# Patient Record
Sex: FEMALE | Race: WHITE | Employment: OTHER | URBAN - METROPOLITAN AREA
[De-identification: names, ages, dates, MRNs, and addresses within clinical notes are randomized per-mention and may not be internally consistent; named-entity substitution may affect disease eponyms.]

---

## 2017-06-08 ENCOUNTER — HOSPITAL ENCOUNTER (OUTPATIENT)
Dept: MAMMOGRAPHY | Age: 42
Discharge: HOME | End: 2017-06-08
Payer: MEDICAID

## 2017-06-08 ENCOUNTER — HOSPITAL ENCOUNTER (OUTPATIENT)
Age: 42
Discharge: HOME | End: 2017-06-08
Attending: FAMILY MEDICINE
Payer: MEDICAID

## 2017-06-08 VITALS
WEIGHT: 205 LBS | SYSTOLIC BLOOD PRESSURE: 116 MMHG | TEMPERATURE: 98 F | DIASTOLIC BLOOD PRESSURE: 83 MMHG | OXYGEN SATURATION: 98 % | HEART RATE: 70 BPM | RESPIRATION RATE: 16 BRPM | BODY MASS INDEX: 41 KG/M2

## 2017-06-08 DIAGNOSIS — Z12.31 SCREENING MAMMOGRAM, ENCOUNTER FOR: ICD-10-CM

## 2017-06-08 DIAGNOSIS — J40 BRONCHITIS: Primary | ICD-10-CM

## 2017-06-08 PROCEDURE — 99214 OFFICE O/P EST MOD 30 MIN: CPT

## 2017-06-08 PROCEDURE — 94640 AIRWAY INHALATION TREATMENT: CPT

## 2017-06-08 PROCEDURE — 77067 SCR MAMMO BI INCL CAD: CPT | Performed by: OBSTETRICS & GYNECOLOGY

## 2017-06-08 RX ORDER — IPRATROPIUM BROMIDE AND ALBUTEROL SULFATE 2.5; .5 MG/3ML; MG/3ML
3 SOLUTION RESPIRATORY (INHALATION) ONCE
Status: COMPLETED | OUTPATIENT
Start: 2017-06-08 | End: 2017-06-08

## 2017-06-08 RX ORDER — AZITHROMYCIN 250 MG/1
TABLET, FILM COATED ORAL
Qty: 6 TABLET | Refills: 0 | Status: SHIPPED | OUTPATIENT
Start: 2017-06-08 | End: 2017-09-28

## 2017-06-08 RX ORDER — PREDNISONE 20 MG/1
60 TABLET ORAL ONCE
Status: COMPLETED | OUTPATIENT
Start: 2017-06-08 | End: 2017-06-08

## 2017-06-08 RX ORDER — PREDNISONE 50 MG/1
50 TABLET ORAL DAILY
Qty: 4 TABLET | Refills: 0 | Status: SHIPPED | OUTPATIENT
Start: 2017-06-08 | End: 2017-06-12

## 2017-06-08 NOTE — ED INITIAL ASSESSMENT (HPI)
Productive cough and sinus congestion and pressure for couple days. Using albuterol and flovent inhalers at home. History of asthma. No fevers.

## 2017-06-08 NOTE — ED PROVIDER NOTES
[place] [address] [facility] [place]   Patient presents with:  Cough/URI    Stated Complaint: coughing     HPI    15-year-old female presents sinus pressure, congestion and cough.   States for past 2 days she has sinus pressure, con into the lungs 2 (two) times daily. acetaminophen 500 MG Oral Tab,  Take 1,000 mg by mouth every 6 (six) hours as needed for Pain. ibuprofen (MOTRIN) 800 MG Oral Tab,  Take 800 mg by mouth every 6 (six) hours as needed. History reviewed.  No perti Skin: Skin is warm and dry. Psychiatric: She has a normal mood and affect.  Her behavior is normal. Judgment and thought content normal.       ED Course   Labs Reviewed - No data to display    MDM     Nebulizer with duoneb, reports feeling slightly bett

## 2017-09-28 ENCOUNTER — HOSPITAL ENCOUNTER (OUTPATIENT)
Age: 42
Discharge: HOME | End: 2017-09-28
Attending: FAMILY MEDICINE
Payer: MEDICAID

## 2017-09-28 VITALS
HEART RATE: 74 BPM | TEMPERATURE: 97 F | RESPIRATION RATE: 16 BRPM | OXYGEN SATURATION: 98 % | DIASTOLIC BLOOD PRESSURE: 80 MMHG | SYSTOLIC BLOOD PRESSURE: 112 MMHG

## 2017-09-28 DIAGNOSIS — S39.012A LUMBAR STRAIN, INITIAL ENCOUNTER: Primary | ICD-10-CM

## 2017-09-28 PROCEDURE — 99213 OFFICE O/P EST LOW 20 MIN: CPT

## 2017-09-28 PROCEDURE — 99214 OFFICE O/P EST MOD 30 MIN: CPT

## 2017-09-28 RX ORDER — METHYLPREDNISOLONE 4 MG/1
TABLET ORAL
Qty: 21 TABLET | Refills: 0 | Status: SHIPPED | OUTPATIENT
Start: 2017-09-28 | End: 2018-01-22

## 2017-09-28 RX ORDER — CYCLOBENZAPRINE HCL 10 MG
10 TABLET ORAL 3 TIMES DAILY PRN
Qty: 21 TABLET | Refills: 0 | Status: SHIPPED | OUTPATIENT
Start: 2017-09-28 | End: 2018-01-22

## 2017-09-29 NOTE — ED PROVIDER NOTES
Patient Seen in: [address] [facility] [place]    History   Patient presents with:  Back Pain (musculoskeletal)    Stated Complaint: lower back pain for a few weeks    HPI    39-year-old female presents to clinic today with 2 week history of low back p Never Smoker                                                              Smokeless tobacco: Never Used                      Alcohol use:  No                Review of Systems    Positive for stated complaint: lower back pain for a few weeks  Other systems a motion bilaterally:  No  CVA tenderness: negative       ED Course   Labs Reviewed - No data to display    ============================================================  ED Course  ------------------------------------------------------------  MDM     Medrol

## 2017-09-29 NOTE — ED INITIAL ASSESSMENT (HPI)
Pt states hx of back pain in the past. Usually improved with motrin 800mg. States past 2 weeks worsening pain with no relief. No radiation of pain.  No trauma or injury

## 2017-10-16 ENCOUNTER — APPOINTMENT (OUTPATIENT)
Dept: GENERAL RADIOLOGY | Facility: HOSPITAL | Age: 42
End: 2017-10-16
Attending: EMERGENCY MEDICINE
Payer: MEDICAID

## 2017-10-16 ENCOUNTER — HOSPITAL ENCOUNTER (EMERGENCY)
Facility: HOSPITAL | Age: 42
Discharge: HOME | End: 2017-10-16
Attending: EMERGENCY MEDICINE
Payer: MEDICAID

## 2017-10-16 VITALS
HEART RATE: 68 BPM | SYSTOLIC BLOOD PRESSURE: 100 MMHG | BODY MASS INDEX: 37.29 KG/M2 | WEIGHT: 185 LBS | HEIGHT: 59 IN | DIASTOLIC BLOOD PRESSURE: 70 MMHG | TEMPERATURE: 98 F | OXYGEN SATURATION: 96 % | RESPIRATION RATE: 14 BRPM

## 2017-10-16 DIAGNOSIS — M54.9 BACK PAIN WITHOUT RADIATION: ICD-10-CM

## 2017-10-16 DIAGNOSIS — R10.9 ABDOMINAL PAIN OF UNKNOWN ETIOLOGY: Primary | ICD-10-CM

## 2017-10-16 PROCEDURE — 72110 X-RAY EXAM L-2 SPINE 4/>VWS: CPT | Performed by: EMERGENCY MEDICINE

## 2017-10-16 PROCEDURE — 99284 EMERGENCY DEPT VISIT MOD MDM: CPT

## 2017-10-16 PROCEDURE — 85025 COMPLETE CBC W/AUTO DIFF WBC: CPT | Performed by: EMERGENCY MEDICINE

## 2017-10-16 PROCEDURE — 81003 URINALYSIS AUTO W/O SCOPE: CPT | Performed by: EMERGENCY MEDICINE

## 2017-10-16 PROCEDURE — 80053 COMPREHEN METABOLIC PANEL: CPT | Performed by: EMERGENCY MEDICINE

## 2017-10-16 PROCEDURE — 96374 THER/PROPH/DIAG INJ IV PUSH: CPT

## 2017-10-16 PROCEDURE — 81025 URINE PREGNANCY TEST: CPT

## 2017-10-16 PROCEDURE — 96361 HYDRATE IV INFUSION ADD-ON: CPT

## 2017-10-16 PROCEDURE — 83690 ASSAY OF LIPASE: CPT | Performed by: EMERGENCY MEDICINE

## 2017-10-16 RX ORDER — TRAMADOL HYDROCHLORIDE 50 MG/1
TABLET ORAL EVERY 4 HOURS PRN
Qty: 20 TABLET | Refills: 0 | Status: SHIPPED | OUTPATIENT
Start: 2017-10-16 | End: 2017-10-23

## 2017-10-16 RX ORDER — ONDANSETRON 2 MG/ML
4 INJECTION INTRAMUSCULAR; INTRAVENOUS ONCE
Status: COMPLETED | OUTPATIENT
Start: 2017-10-16 | End: 2017-10-16

## 2017-10-16 NOTE — ED PROVIDER NOTES
Patient Seen in: [facility] Emergency Department    History   Patient presents with:  Back Pain (musculoskeletal)  Abdomen/Flank Pain (GI/)    Stated Complaint: back and pain    TONY Smith is a 64-year-old female coming with 2 complaints.   First Location:  MAIN OR    No family history on file. Smoking status: Never Smoker                                                              Smokeless tobacco: Never Used                      Alcohol use:  No                Review of Systems    Positive CBC W/ DIFFERENTIAL[[id number]]          Abnormal            Final result                 Please view results for these tests on the individual orders.    URINALYSIS WITH CULTURE REFLEX   POCT PREGNANCY, URINE       ==========================

## 2017-10-24 ENCOUNTER — OFFICE VISIT (OUTPATIENT)
Dept: SURGERY | Facility: CLINIC | Age: 42
End: 2017-10-24

## 2017-10-24 VITALS
TEMPERATURE: 98 F | DIASTOLIC BLOOD PRESSURE: 70 MMHG | WEIGHT: 210 LBS | HEIGHT: 59 IN | BODY MASS INDEX: 42.33 KG/M2 | SYSTOLIC BLOOD PRESSURE: 100 MMHG

## 2017-10-24 DIAGNOSIS — K43.9 VENTRAL HERNIA WITHOUT OBSTRUCTION OR GANGRENE: Primary | ICD-10-CM

## 2017-10-24 DIAGNOSIS — Z98.891 PREVIOUS CESAREAN SECTION: ICD-10-CM

## 2017-10-24 DIAGNOSIS — K59.01 SLOW TRANSIT CONSTIPATION: ICD-10-CM

## 2017-10-24 DIAGNOSIS — R10.33 UMBILICAL PAIN: ICD-10-CM

## 2017-10-24 DIAGNOSIS — E66.01 MORBID OBESITY WITH BMI OF 40.0-44.9, ADULT (HCC): ICD-10-CM

## 2017-10-24 PROCEDURE — 99213 OFFICE O/P EST LOW 20 MIN: CPT | Performed by: COLON & RECTAL SURGERY

## 2017-10-24 NOTE — PATIENT INSTRUCTIONS
This patient presents for evaluation of abdominal pain and possible ventral hernia.      The patient is known to me from previous ventral hernia repair with mesh performed in November 2013.   The patient's procedure was performed 8 weeks following her third is confirmed, we will schedule repair within the next two weeks. All risks, benefits, complications and alternatives to the proposed operation were fully discussed with the patient. All questions from the patient were answered in detail.  A description o

## 2017-10-24 NOTE — PROGRESS NOTES
Follow Up Visit Note       Active Problems      1. Ventral hernia without obstruction or gangrene    2. Umbilical pain    3. Slow transit constipation    4. Morbid obesity with BMI of 40.0-44.9, adult ([address]    5.  Previous  section          Chief Co there is an incision near the umbilicus. It appears to be a possible combination of a ventral incisional hernia, epigastric hernia, with multiple potential sites.   Once the patient coughs I feel protrusion of contents, when she lifts her baby I could see SPECIMEN 1 SITE RIG*      Comment: Benign  No date: OTHER      Comment: baker cyst   No date: OTHER      Comment: bunion bilateral  1999: REDUCTION LEFT  1999: REDUCTION RIGHT  No date: REMOVAL GALLBLADDER  11/20/2013: VENTRAL HERNIA REPAIR      Comment: P of breath and wheezing. Cardiovascular: Negative for chest pain, palpitations and leg swelling. Gastrointestinal: Positive for abdominal pain, constipation and nausea.  Negative for abdominal distention, anal bleeding, blood in stool, diarrhea and vomi do not reduce. There appears to be multiple sites including the umbilicus at the previous laparoscopic cholecystectomy incision, the area just above the umbilicus, and an area in the epigastrium. They are not well-defined.   I cannot feel a fascial defect pleasant obese female in no acute distress. She has normal heart and lung sounds. Her abdomen is soft, nondistended, normal bowel sounds. No masses or organomegaly. She is tender in the midline and epigastric areas.  Her left lower ventral hernia repair inc

## 2017-10-25 PROBLEM — E66.01 MORBID OBESITY WITH BMI OF 40.0-44.9, ADULT (HCC): Status: ACTIVE | Noted: 2017-10-25

## 2017-11-01 ENCOUNTER — TELEPHONE (OUTPATIENT)
Dept: SURGERY | Facility: CLINIC | Age: 42
End: 2017-11-01

## 2017-11-01 ENCOUNTER — HOSPITAL ENCOUNTER (OUTPATIENT)
Dept: CT IMAGING | Age: 42
Discharge: HOME | End: 2017-11-01
Attending: COLON & RECTAL SURGERY
Payer: MEDICAID

## 2017-11-01 RX ORDER — PREDNISONE 10 MG/1
TABLET ORAL
Qty: 15 TABLET | Refills: 0 | Status: SHIPPED | OUTPATIENT
Start: 2017-11-01 | End: 2018-01-22

## 2017-11-01 NOTE — TELEPHONE ENCOUNTER
Called pt and instructed her on prednisone and benadryl prior to her CT. Pt verbalized understanding.

## 2017-11-01 NOTE — IMAGING NOTE
pt allergic to CT contrast. Needs premeds. Dr. Anders Kovacs office called and spoke with Solomon Huang RN. Premeds to be called in. Instructions already given to pt per Rad RN. Pt called and explained will need premeds and to change appt to accommodate premeds.   Tra

## 2017-11-02 ENCOUNTER — HOSPITAL ENCOUNTER (OUTPATIENT)
Dept: CT IMAGING | Age: 42
Discharge: HOME | End: 2017-11-02
Attending: COLON & RECTAL SURGERY
Payer: MEDICAID

## 2017-11-02 ENCOUNTER — OFFICE VISIT (OUTPATIENT)
Dept: SURGERY | Facility: CLINIC | Age: 42
End: 2017-11-02

## 2017-11-02 VITALS
TEMPERATURE: 97 F | HEIGHT: 59 IN | BODY MASS INDEX: 42.33 KG/M2 | WEIGHT: 210 LBS | DIASTOLIC BLOOD PRESSURE: 74 MMHG | SYSTOLIC BLOOD PRESSURE: 114 MMHG | HEART RATE: 94 BPM

## 2017-11-02 DIAGNOSIS — R10.33 UMBILICAL PAIN: ICD-10-CM

## 2017-11-02 DIAGNOSIS — E66.01 MORBID OBESITY WITH BMI OF 40.0-44.9, ADULT (HCC): ICD-10-CM

## 2017-11-02 DIAGNOSIS — K43.9 VENTRAL HERNIA WITHOUT OBSTRUCTION OR GANGRENE: Primary | ICD-10-CM

## 2017-11-02 DIAGNOSIS — K43.9 VENTRAL HERNIA WITHOUT OBSTRUCTION OR GANGRENE: ICD-10-CM

## 2017-11-02 PROCEDURE — 74177 CT ABD & PELVIS W/CONTRAST: CPT | Performed by: COLON & RECTAL SURGERY

## 2017-11-02 PROCEDURE — 99214 OFFICE O/P EST MOD 30 MIN: CPT | Performed by: COLON & RECTAL SURGERY

## 2017-11-02 NOTE — PROGRESS NOTES
Follow Up Visit Note       Active Problems      1. Ventral hernia without obstruction or gangrene    2. Umbilical pain    3.  Morbid obesity with BMI of 40.0-44.9, adult [facility])          Chief Complaint   Patient presents with:  Hernia: continued care for her She has a periumbilical ventral incisional hernia. There are approximately 4 cm apart. They both have incarcerated fat in the hernia sacs. This is preperitoneal fat and the natural location but being extruded into the subcutaneous tissues.   There are no Slight diffuse fatty infiltration of the liver. BILIARY:  Cholecystectomy. No biliary dilatation. PANCREAS:  No lesion, fluid collection, ductal dilatation, or atrophy. SPLEEN:  No enlargement or focal lesion.     KIDNEYS:  Nonobstructing 3-4 mm le Social / Family History    The past medical and past surgical history have been reviewed by me today.     Past Medical History:   Diagnosis Date   • Asthma    • Depression 12 years ago   • Extrinsic asthma, unspecified     last attack a couple of months ago methylPREDNISolone 4 MG Oral Tablet Therapy Pack Use as directed Disp: 21 tablet Rfl: 0   Cyclobenzaprine HCl 10 MG Oral Tab Take 1 tablet (10 mg total) by mouth 3 (three) times daily as needed for Muscle spasms.  Disp: 21 tablet Rfl: 0   acetaminophen 50 splenomegaly or hepatomegaly. There is tenderness in the epigastric area and periumbilical area. There is no rigidity, no rebound and no guarding. A hernia is present. Hernia confirmed positive in the ventral area.  Hernia confirmed negative in the right in standing or getting up from a seated position. She describes a visible egg protruding from the umbilical region after standing for long periods of time. The patient has not had any abdominal imaging.      The patient has had no advancement of symptoms s old.  Her partner has contemplated vasectomy. I do not want to introduce any further variables into this decision making. She is already at high recurrence with her current BMI greater than 40.   Getting pregnancy after repair would make it virtually ce

## 2017-11-03 NOTE — PATIENT INSTRUCTIONS
I am seeing this patient in consultation regarding ventral incisional hernias. The patient's presenting history as listed below:    The patient is known to me from previous ventral hernia repair with mesh performed in November 2013.   The patient's proce with a palpable epigastric region hernia as well as a palpable umbilical hernia. They are 2 separate entities. They are not connected. Neither is tender on today's examination. Neither can be reduced on today's examination.   The remaining examination r

## 2018-01-22 ENCOUNTER — LAB ENCOUNTER (OUTPATIENT)
Dept: LAB | Facility: HOSPITAL | Age: 43
End: 2018-01-22
Attending: NURSE PRACTITIONER
Payer: MEDICAID

## 2018-01-22 ENCOUNTER — HOSPITAL ENCOUNTER (OUTPATIENT)
Age: 43
Discharge: HOME | End: 2018-01-22
Attending: FAMILY MEDICINE
Payer: MEDICAID

## 2018-01-22 VITALS
HEART RATE: 83 BPM | BODY MASS INDEX: 40 KG/M2 | TEMPERATURE: 98 F | DIASTOLIC BLOOD PRESSURE: 75 MMHG | WEIGHT: 200 LBS | OXYGEN SATURATION: 98 % | RESPIRATION RATE: 16 BRPM | SYSTOLIC BLOOD PRESSURE: 106 MMHG

## 2018-01-22 DIAGNOSIS — G89.29 CHRONIC ABDOMINAL PAIN: ICD-10-CM

## 2018-01-22 DIAGNOSIS — R11.0 NAUSEA: ICD-10-CM

## 2018-01-22 DIAGNOSIS — R10.9 CHRONIC ABDOMINAL PAIN: ICD-10-CM

## 2018-01-22 DIAGNOSIS — B34.9 VIRAL SYNDROME: Primary | ICD-10-CM

## 2018-01-22 DIAGNOSIS — R19.7 DIARRHEA, UNSPECIFIED TYPE: ICD-10-CM

## 2018-01-22 DIAGNOSIS — R10.84 GENERALIZED ABDOMINAL PAIN: ICD-10-CM

## 2018-01-22 LAB
ALBUMIN SERPL-MCNC: 3.8 G/DL (ref 3.5–4.8)
ALP LIVER SERPL-CCNC: 50 U/L (ref 37–98)
ALT SERPL-CCNC: 25 U/L (ref 14–54)
AST SERPL-CCNC: 15 U/L (ref 15–41)
BASOPHILS # BLD AUTO: 0.04 X10(3) UL (ref 0–0.1)
BASOPHILS NFR BLD AUTO: 0.5 %
BILIRUB SERPL-MCNC: 0.6 MG/DL (ref 0.1–2)
BUN BLD-MCNC: 13 MG/DL (ref 8–20)
CALCIUM BLD-MCNC: 8.6 MG/DL (ref 8.3–10.3)
CHLORIDE: 108 MMOL/L (ref 101–111)
CO2: 26 MMOL/L (ref 22–32)
CREAT BLD-MCNC: 0.64 MG/DL (ref 0.55–1.02)
EOSINOPHIL # BLD AUTO: 0.2 X10(3) UL (ref 0–0.3)
EOSINOPHIL NFR BLD AUTO: 2.6 %
ERYTHROCYTE [DISTWIDTH] IN BLOOD BY AUTOMATED COUNT: 12.3 % (ref 11.5–16)
GLUCOSE BLD-MCNC: 96 MG/DL (ref 70–99)
HCT VFR BLD AUTO: 40.6 % (ref 34–50)
HGB BLD-MCNC: 13.6 G/DL (ref 12–16)
IMMATURE GRANULOCYTE COUNT: 0.02 X10(3) UL (ref 0–1)
IMMATURE GRANULOCYTE RATIO %: 0.3 %
LYMPHOCYTES # BLD AUTO: 1.91 X10(3) UL (ref 0.9–4)
LYMPHOCYTES NFR BLD AUTO: 25.2 %
M PROTEIN MFR SERPL ELPH: 7.2 G/DL (ref 6.1–8.3)
MCH RBC QN AUTO: 30.7 PG (ref 27–33.2)
MCHC RBC AUTO-ENTMCNC: 33.5 G/DL (ref 31–37)
MCV RBC AUTO: 91.6 FL (ref 81–100)
MONOCYTES # BLD AUTO: 0.44 X10(3) UL (ref 0.1–0.6)
MONOCYTES NFR BLD AUTO: 5.8 %
NEUTROPHIL ABS PRELIM: 4.97 X10 (3) UL (ref 1.3–6.7)
NEUTROPHILS # BLD AUTO: 4.97 X10(3) UL (ref 1.3–6.7)
NEUTROPHILS NFR BLD AUTO: 65.6 %
PLATELET # BLD AUTO: 275 10(3)UL (ref 150–450)
POCT RAPID STREP: NEGATIVE
POTASSIUM SERPL-SCNC: 3.9 MMOL/L (ref 3.6–5.1)
RBC # BLD AUTO: 4.43 X10(6)UL (ref 3.8–5.1)
RED CELL DISTRIBUTION WIDTH-SD: 40.9 FL (ref 35.1–46.3)
SODIUM SERPL-SCNC: 140 MMOL/L (ref 136–144)
WBC # BLD AUTO: 7.6 X10(3) UL (ref 4–13)

## 2018-01-22 PROCEDURE — 87081 CULTURE SCREEN ONLY: CPT | Performed by: FAMILY MEDICINE

## 2018-01-22 PROCEDURE — 80053 COMPREHEN METABOLIC PANEL: CPT

## 2018-01-22 PROCEDURE — 99213 OFFICE O/P EST LOW 20 MIN: CPT

## 2018-01-22 PROCEDURE — 85025 COMPLETE CBC W/AUTO DIFF WBC: CPT

## 2018-01-22 PROCEDURE — 99214 OFFICE O/P EST MOD 30 MIN: CPT

## 2018-01-22 PROCEDURE — 36415 COLL VENOUS BLD VENIPUNCTURE: CPT

## 2018-01-22 PROCEDURE — 87430 STREP A AG IA: CPT | Performed by: FAMILY MEDICINE

## 2018-01-22 NOTE — ED PROVIDER NOTES
Patient Seen in: [address] [facility] [place]    History   Patient presents with:  Nausea/Vomiting/Diarrhea (gastrointestinal)    Stated Complaint: sore throat and ab pain    HPI    39-year-old female presented with chief complaint of nausea, vomiting presenting problem. Smoking status: Never Smoker                                                              Smokeless tobacco: Never Used                      Alcohol use:  No                Review of Systems    Positive for stated complaint: sore thro [id number]  [phone]    In 1 week          Medications Prescribed:  Discharge Medication List as of 1/22/2018 10:29 AM

## 2018-01-22 NOTE — ED INITIAL ASSESSMENT (HPI)
Onset Thursday of nausea, vomiting and diarrhea. Continues to be nauseous. Stool if now formed. C/o chills and bodyaches. Feels a little dry. Son had strep last week and mother diagnosed with hpylori.  Some abd pain but has a hernia she is due to have repai

## 2018-01-31 ENCOUNTER — HOSPITAL ENCOUNTER (OUTPATIENT)
Age: 43
Discharge: HOME | End: 2018-01-31
Attending: FAMILY MEDICINE
Payer: MEDICAID

## 2018-01-31 VITALS
TEMPERATURE: 98 F | SYSTOLIC BLOOD PRESSURE: 116 MMHG | RESPIRATION RATE: 16 BRPM | HEIGHT: 59 IN | HEART RATE: 82 BPM | BODY MASS INDEX: 40.32 KG/M2 | OXYGEN SATURATION: 100 % | DIASTOLIC BLOOD PRESSURE: 77 MMHG | WEIGHT: 200 LBS

## 2018-01-31 DIAGNOSIS — J11.1 INFLUENZA: Primary | ICD-10-CM

## 2018-01-31 PROCEDURE — 99214 OFFICE O/P EST MOD 30 MIN: CPT

## 2018-01-31 PROCEDURE — 99213 OFFICE O/P EST LOW 20 MIN: CPT

## 2018-01-31 RX ORDER — OSELTAMIVIR PHOSPHATE 75 MG/1
75 CAPSULE ORAL 2 TIMES DAILY
Qty: 10 CAPSULE | Refills: 0 | Status: SHIPPED | OUTPATIENT
Start: 2018-01-31 | End: 2018-02-05

## 2018-02-01 NOTE — ED PROVIDER NOTES
Jasmyn Nguyen is a 38year old female who presents with for chief complaint of fever, chills, nasal congestion, coryza, rhinorrhea, sore throat, cough, headache, myalgias, fatigue,  Onset of symptoms was  yesterday  .  Symptoms have been gradually worsened adenopathy  Lungs: clear to auscultation bilaterally. No chest wall retractions. No respiratory distress.  No tachypnea noted  Heart: S1, S2 normal, no murmur, click, rub or gallop, regular rate and rhythm  Abdomen: soft, non-tender; bowel sounds normal; no

## 2018-02-22 ENCOUNTER — HOSPITAL ENCOUNTER (OUTPATIENT)
Age: 43
Discharge: HOME | End: 2018-02-22
Attending: FAMILY MEDICINE
Payer: MEDICAID

## 2018-02-22 VITALS
HEIGHT: 59 IN | OXYGEN SATURATION: 98 % | RESPIRATION RATE: 16 BRPM | SYSTOLIC BLOOD PRESSURE: 106 MMHG | WEIGHT: 205 LBS | DIASTOLIC BLOOD PRESSURE: 74 MMHG | BODY MASS INDEX: 41.33 KG/M2 | TEMPERATURE: 98 F | HEART RATE: 84 BPM

## 2018-02-22 DIAGNOSIS — J20.9 ACUTE BRONCHITIS, UNSPECIFIED ORGANISM: Primary | ICD-10-CM

## 2018-02-22 PROCEDURE — 99213 OFFICE O/P EST LOW 20 MIN: CPT

## 2018-02-22 PROCEDURE — 99214 OFFICE O/P EST MOD 30 MIN: CPT

## 2018-02-22 RX ORDER — AMOXICILLIN 875 MG/1
875 TABLET, COATED ORAL 2 TIMES DAILY
Qty: 20 TABLET | Refills: 0 | Status: SHIPPED | OUTPATIENT
Start: 2018-02-22 | End: 2018-03-04

## 2018-02-23 NOTE — ED PROVIDER NOTES
Patient presents with:  Cough/URI  Body ache and/or chills      HPI:   Bruno Sheets is a 38year old female who presents with complaints of chest congestion, productive cough with colored sputum for  7  weeks.   Patient denies fever, chills, chest pain, sh Comment: bunion bilateral  1999: REDUCTION LEFT  1999: REDUCTION RIGHT  No date: REMOVAL GALLBLADDER  11/20/2013: VENTRAL HERNIA REPAIR      Comment: Procedure:  HERNIA VENTRAL REPAIR;  Surgeon:                Deborah Rivera MD;  Location: [facility] MAIN OR   No f organomegaly  Skin: Skin color, texture, turgor normal. No rashes or lesions  Extremities:  No edema, no cyanosis. ASSESSMENT AND PLAN:     @DX@    No orders of the defined types were placed in this encounter.       Meds & Refills for this Visit:  Si

## 2018-03-08 ENCOUNTER — APPOINTMENT (OUTPATIENT)
Dept: CT IMAGING | Facility: HOSPITAL | Age: 43
End: 2018-03-08
Attending: EMERGENCY MEDICINE
Payer: MEDICAID

## 2018-03-08 ENCOUNTER — HOSPITAL ENCOUNTER (EMERGENCY)
Facility: HOSPITAL | Age: 43
Discharge: HOME | End: 2018-03-08
Attending: EMERGENCY MEDICINE
Payer: MEDICAID

## 2018-03-08 VITALS
TEMPERATURE: 98 F | WEIGHT: 205 LBS | DIASTOLIC BLOOD PRESSURE: 78 MMHG | HEIGHT: 59 IN | OXYGEN SATURATION: 98 % | SYSTOLIC BLOOD PRESSURE: 110 MMHG | BODY MASS INDEX: 41.33 KG/M2 | RESPIRATION RATE: 18 BRPM | HEART RATE: 66 BPM

## 2018-03-08 DIAGNOSIS — K29.70 GASTRITIS WITHOUT BLEEDING, UNSPECIFIED CHRONICITY, UNSPECIFIED GASTRITIS TYPE: Primary | ICD-10-CM

## 2018-03-08 LAB
ALBUMIN SERPL-MCNC: 3.8 G/DL (ref 3.5–4.8)
ALP LIVER SERPL-CCNC: 56 U/L (ref 37–98)
ALT SERPL-CCNC: 28 U/L (ref 14–54)
AMYLASE: 30 U/L (ref 25–115)
AST SERPL-CCNC: 21 U/L (ref 15–41)
BASOPHILS # BLD AUTO: 0.03 X10(3) UL (ref 0–0.1)
BASOPHILS NFR BLD AUTO: 0.2 %
BILIRUB SERPL-MCNC: 0.8 MG/DL (ref 0.1–2)
BILIRUB UR QL STRIP.AUTO: NEGATIVE
BUN BLD-MCNC: 14 MG/DL (ref 8–20)
CALCIUM BLD-MCNC: 8.7 MG/DL (ref 8.3–10.3)
CHLORIDE: 106 MMOL/L (ref 101–111)
CO2: 24 MMOL/L (ref 22–32)
CREAT BLD-MCNC: 0.61 MG/DL (ref 0.55–1.02)
EOSINOPHIL # BLD AUTO: 0 X10(3) UL (ref 0–0.3)
EOSINOPHIL NFR BLD AUTO: 0 %
ERYTHROCYTE [DISTWIDTH] IN BLOOD BY AUTOMATED COUNT: 12.7 % (ref 11.5–16)
GLUCOSE BLD-MCNC: 123 MG/DL (ref 70–99)
GLUCOSE UR STRIP.AUTO-MCNC: NEGATIVE MG/DL
HCT VFR BLD AUTO: 41.9 % (ref 34–50)
HGB BLD-MCNC: 14 G/DL (ref 12–16)
IMMATURE GRANULOCYTE COUNT: 0.04 X10(3) UL (ref 0–1)
IMMATURE GRANULOCYTE RATIO %: 0.3 %
KETONES UR STRIP.AUTO-MCNC: 20 MG/DL
LEUKOCYTE ESTERASE UR QL STRIP.AUTO: NEGATIVE
LYMPHOCYTES # BLD AUTO: 1.35 X10(3) UL (ref 0.9–4)
LYMPHOCYTES NFR BLD AUTO: 11.2 %
M PROTEIN MFR SERPL ELPH: 7.3 G/DL (ref 6.1–8.3)
MCH RBC QN AUTO: 30.6 PG (ref 27–33.2)
MCHC RBC AUTO-ENTMCNC: 33.4 G/DL (ref 31–37)
MCV RBC AUTO: 91.5 FL (ref 81–100)
MONOCYTES # BLD AUTO: 0.52 X10(3) UL (ref 0.1–1)
MONOCYTES NFR BLD AUTO: 4.3 %
NEUTROPHIL ABS PRELIM: 10.12 X10 (3) UL (ref 1.3–6.7)
NEUTROPHILS # BLD AUTO: 10.12 X10(3) UL (ref 1.3–6.7)
NEUTROPHILS NFR BLD AUTO: 84 %
NITRITE UR QL STRIP.AUTO: NEGATIVE
PH UR STRIP.AUTO: 6 [PH] (ref 4.5–8)
PLATELET # BLD AUTO: 254 10(3)UL (ref 150–450)
POCT LOT NUMBER: NORMAL
POCT URINE PREGNANCY: NEGATIVE
POTASSIUM SERPL-SCNC: 3.5 MMOL/L (ref 3.6–5.1)
PROT UR STRIP.AUTO-MCNC: 30 MG/DL
RBC # BLD AUTO: 4.58 X10(6)UL (ref 3.8–5.1)
RBC UR QL AUTO: NEGATIVE
RED CELL DISTRIBUTION WIDTH-SD: 41.5 FL (ref 35.1–46.3)
SODIUM SERPL-SCNC: 140 MMOL/L (ref 136–144)
SP GR UR STRIP.AUTO: 1.03 (ref 1–1.03)
UROBILINOGEN UR STRIP.AUTO-MCNC: <2 MG/DL
WBC # BLD AUTO: 12.1 X10(3) UL (ref 4–13)

## 2018-03-08 PROCEDURE — S0028 INJECTION, FAMOTIDINE, 20 MG: HCPCS | Performed by: EMERGENCY MEDICINE

## 2018-03-08 PROCEDURE — 80053 COMPREHEN METABOLIC PANEL: CPT | Performed by: EMERGENCY MEDICINE

## 2018-03-08 PROCEDURE — 85025 COMPLETE CBC W/AUTO DIFF WBC: CPT | Performed by: EMERGENCY MEDICINE

## 2018-03-08 PROCEDURE — 81001 URINALYSIS AUTO W/SCOPE: CPT | Performed by: EMERGENCY MEDICINE

## 2018-03-08 PROCEDURE — 99284 EMERGENCY DEPT VISIT MOD MDM: CPT

## 2018-03-08 PROCEDURE — 96361 HYDRATE IV INFUSION ADD-ON: CPT

## 2018-03-08 PROCEDURE — 74176 CT ABD & PELVIS W/O CONTRAST: CPT | Performed by: EMERGENCY MEDICINE

## 2018-03-08 PROCEDURE — 96374 THER/PROPH/DIAG INJ IV PUSH: CPT

## 2018-03-08 PROCEDURE — 82150 ASSAY OF AMYLASE: CPT | Performed by: EMERGENCY MEDICINE

## 2018-03-08 PROCEDURE — 96376 TX/PRO/DX INJ SAME DRUG ADON: CPT

## 2018-03-08 PROCEDURE — 96375 TX/PRO/DX INJ NEW DRUG ADDON: CPT

## 2018-03-08 PROCEDURE — 81025 URINE PREGNANCY TEST: CPT

## 2018-03-08 RX ORDER — OMEPRAZOLE 40 MG/1
40 CAPSULE, DELAYED RELEASE ORAL DAILY
Qty: 30 CAPSULE | Refills: 0 | Status: SHIPPED | OUTPATIENT
Start: 2018-03-08 | End: 2018-03-12

## 2018-03-08 RX ORDER — FAMOTIDINE 10 MG/ML
20 INJECTION, SOLUTION INTRAVENOUS ONCE
Status: COMPLETED | OUTPATIENT
Start: 2018-03-08 | End: 2018-03-08

## 2018-03-08 RX ORDER — HYDROCODONE BITARTRATE AND ACETAMINOPHEN 5; 325 MG/1; MG/1
1-2 TABLET ORAL EVERY 4 HOURS PRN
Qty: 15 TABLET | Refills: 0 | Status: SHIPPED | OUTPATIENT
Start: 2018-03-08 | End: 2018-03-18

## 2018-03-08 RX ORDER — HYDROMORPHONE HYDROCHLORIDE 1 MG/ML
0.05 INJECTION, SOLUTION INTRAMUSCULAR; INTRAVENOUS; SUBCUTANEOUS ONCE
Status: DISCONTINUED | OUTPATIENT
Start: 2018-03-08 | End: 2018-03-08

## 2018-03-08 RX ORDER — HYDROMORPHONE HYDROCHLORIDE 1 MG/ML
INJECTION, SOLUTION INTRAMUSCULAR; INTRAVENOUS; SUBCUTANEOUS EVERY 30 MIN PRN
Status: DISCONTINUED | OUTPATIENT
Start: 2018-03-08 | End: 2018-03-08

## 2018-03-08 RX ORDER — ONDANSETRON 2 MG/ML
4 INJECTION INTRAMUSCULAR; INTRAVENOUS ONCE
Status: COMPLETED | OUTPATIENT
Start: 2018-03-08 | End: 2018-03-08

## 2018-03-08 RX ORDER — ONDANSETRON 4 MG/1
4 TABLET, ORALLY DISINTEGRATING ORAL EVERY 4 HOURS PRN
Qty: 10 TABLET | Refills: 0 | Status: SHIPPED | OUTPATIENT
Start: 2018-03-08 | End: 2018-03-15

## 2018-03-08 NOTE — ED INITIAL ASSESSMENT (HPI)
Pt states vomiting since yesterday over 100 times states it \"smells like shit\", no diarrhea, chills no fever

## 2018-03-08 NOTE — ED PROVIDER NOTES
Patient Seen in: [facility] Emergency Department    History   Patient presents with:  Abdomen/Flank Pain (GI/)    Stated Complaint: ABD PAIN    HPI    Patient started to feel slightly ill yesterday around 1:00.   Around 5:00 she started vomiting, a Smokeless tobacco: Never Used                      Alcohol use: No                Review of Systems    Positive for stated complaint: ABD PAIN  Other systems are as noted in HPI.   Constitutional and v components within normal limits   CBC W/ DIFFERENTIAL - Abnormal; Notable for the following:     Neutrophil Absolute Prelim 10.12 (*)     Neutrophil Absolute 10.12 (*)     All other components within normal limits   AMYLASE - Normal   CBC WITH DIFFERENTIAL Dispersible  Take 1 tablet (4 mg total) by mouth every 4 (four) hours as needed for Nausea. , Print Script, Disp-10 tablet, R-0    HYDROcodone-acetaminophen 5-325 MG Oral Tab  Take 1-2 tablets by mouth every 4 (four) hours as needed for Pain., Print Script,

## 2018-03-09 ENCOUNTER — APPOINTMENT (OUTPATIENT)
Dept: LAB | Facility: HOSPITAL | Age: 43
End: 2018-03-09
Attending: NURSE PRACTITIONER
Payer: MEDICAID

## 2018-03-09 DIAGNOSIS — K21.9 GASTROESOPHAGEAL REFLUX DISEASE, ESOPHAGITIS PRESENCE NOT SPECIFIED: ICD-10-CM

## 2018-03-09 DIAGNOSIS — K29.70 GASTRITIS, PRESENCE OF BLEEDING UNSPECIFIED, UNSPECIFIED CHRONICITY, UNSPECIFIED GASTRITIS TYPE: ICD-10-CM

## 2018-03-09 PROCEDURE — 83013 H PYLORI (C-13) BREATH: CPT

## 2018-03-11 LAB — H. PYLORI BREATH TEST: POSITIVE

## 2018-03-13 ENCOUNTER — HOSPITAL ENCOUNTER (EMERGENCY)
Facility: HOSPITAL | Age: 43
Discharge: HOME | End: 2018-03-13
Attending: EMERGENCY MEDICINE
Payer: MEDICAID

## 2018-03-13 ENCOUNTER — APPOINTMENT (OUTPATIENT)
Dept: CT IMAGING | Facility: HOSPITAL | Age: 43
End: 2018-03-13
Attending: EMERGENCY MEDICINE
Payer: MEDICAID

## 2018-03-13 VITALS
WEIGHT: 211.63 LBS | OXYGEN SATURATION: 98 % | HEART RATE: 65 BPM | TEMPERATURE: 98 F | SYSTOLIC BLOOD PRESSURE: 102 MMHG | DIASTOLIC BLOOD PRESSURE: 71 MMHG | RESPIRATION RATE: 16 BRPM | BODY MASS INDEX: 43 KG/M2

## 2018-03-13 DIAGNOSIS — R10.13 ABDOMINAL PAIN, EPIGASTRIC: ICD-10-CM

## 2018-03-13 DIAGNOSIS — S30.1XXA ABDOMINAL WALL SEROMA, INITIAL ENCOUNTER: ICD-10-CM

## 2018-03-13 DIAGNOSIS — A04.8 H. PYLORI INFECTION: Primary | ICD-10-CM

## 2018-03-13 DIAGNOSIS — K76.0 FATTY LIVER: ICD-10-CM

## 2018-03-13 DIAGNOSIS — N20.0 RENAL STONES: ICD-10-CM

## 2018-03-13 DIAGNOSIS — R16.1 SPLENOMEGALY: ICD-10-CM

## 2018-03-13 LAB
ALBUMIN SERPL-MCNC: 3.7 G/DL (ref 3.5–4.8)
ALP LIVER SERPL-CCNC: 48 U/L (ref 37–98)
ALT SERPL-CCNC: 25 U/L (ref 14–54)
AST SERPL-CCNC: 17 U/L (ref 15–41)
BASOPHILS # BLD AUTO: 0.05 X10(3) UL (ref 0–0.1)
BASOPHILS NFR BLD AUTO: 0.7 %
BILIRUB SERPL-MCNC: 0.4 MG/DL (ref 0.1–2)
BILIRUB UR QL STRIP.AUTO: NEGATIVE
BUN BLD-MCNC: 10 MG/DL (ref 8–20)
CALCIUM BLD-MCNC: 8.5 MG/DL (ref 8.3–10.3)
CHLORIDE: 107 MMOL/L (ref 101–111)
CO2: 28 MMOL/L (ref 22–32)
COLOR UR AUTO: YELLOW
CREAT BLD-MCNC: 0.71 MG/DL (ref 0.55–1.02)
EOSINOPHIL # BLD AUTO: 0.25 X10(3) UL (ref 0–0.3)
EOSINOPHIL NFR BLD AUTO: 3.3 %
ERYTHROCYTE [DISTWIDTH] IN BLOOD BY AUTOMATED COUNT: 13.1 % (ref 11.5–16)
GLUCOSE BLD-MCNC: 108 MG/DL (ref 70–99)
GLUCOSE UR STRIP.AUTO-MCNC: NEGATIVE MG/DL
HCT VFR BLD AUTO: 41 % (ref 34–50)
HGB BLD-MCNC: 13.7 G/DL (ref 12–16)
IMMATURE GRANULOCYTE COUNT: 0.02 X10(3) UL (ref 0–1)
IMMATURE GRANULOCYTE RATIO %: 0.3 %
KETONES UR STRIP.AUTO-MCNC: NEGATIVE MG/DL
LEUKOCYTE ESTERASE UR QL STRIP.AUTO: NEGATIVE
LIPASE: 178 U/L (ref 73–393)
LYMPHOCYTES # BLD AUTO: 1.88 X10(3) UL (ref 0.9–4)
LYMPHOCYTES NFR BLD AUTO: 24.9 %
M PROTEIN MFR SERPL ELPH: 6.9 G/DL (ref 6.1–8.3)
MCH RBC QN AUTO: 30.6 PG (ref 27–33.2)
MCHC RBC AUTO-ENTMCNC: 33.4 G/DL (ref 31–37)
MCV RBC AUTO: 91.7 FL (ref 81–100)
MONOCYTES # BLD AUTO: 0.58 X10(3) UL (ref 0.1–1)
MONOCYTES NFR BLD AUTO: 7.7 %
NEUTROPHIL ABS PRELIM: 4.77 X10 (3) UL (ref 1.3–6.7)
NEUTROPHILS # BLD AUTO: 4.77 X10(3) UL (ref 1.3–6.7)
NEUTROPHILS NFR BLD AUTO: 63.1 %
NITRITE UR QL STRIP.AUTO: NEGATIVE
PH UR STRIP.AUTO: 7 [PH] (ref 4.5–8)
PLATELET # BLD AUTO: 245 10(3)UL (ref 150–450)
POCT LOT NUMBER: NORMAL
POCT URINE PREGNANCY: NEGATIVE
POTASSIUM SERPL-SCNC: 3.8 MMOL/L (ref 3.6–5.1)
PROCEDURE CONTROL: YES
PROT UR STRIP.AUTO-MCNC: NEGATIVE MG/DL
RBC # BLD AUTO: 4.47 X10(6)UL (ref 3.8–5.1)
RBC UR QL AUTO: NEGATIVE
RED CELL DISTRIBUTION WIDTH-SD: 43 FL (ref 35.1–46.3)
SODIUM SERPL-SCNC: 141 MMOL/L (ref 136–144)
SP GR UR STRIP.AUTO: 1.02 (ref 1–1.03)
UROBILINOGEN UR STRIP.AUTO-MCNC: <2 MG/DL
WBC # BLD AUTO: 7.6 X10(3) UL (ref 4–13)

## 2018-03-13 PROCEDURE — 99285 EMERGENCY DEPT VISIT HI MDM: CPT

## 2018-03-13 PROCEDURE — 74176 CT ABD & PELVIS W/O CONTRAST: CPT | Performed by: EMERGENCY MEDICINE

## 2018-03-13 PROCEDURE — 99284 EMERGENCY DEPT VISIT MOD MDM: CPT

## 2018-03-13 PROCEDURE — 81003 URINALYSIS AUTO W/O SCOPE: CPT | Performed by: EMERGENCY MEDICINE

## 2018-03-13 PROCEDURE — 96374 THER/PROPH/DIAG INJ IV PUSH: CPT

## 2018-03-13 PROCEDURE — 83690 ASSAY OF LIPASE: CPT | Performed by: EMERGENCY MEDICINE

## 2018-03-13 PROCEDURE — 85025 COMPLETE CBC W/AUTO DIFF WBC: CPT | Performed by: EMERGENCY MEDICINE

## 2018-03-13 PROCEDURE — 96375 TX/PRO/DX INJ NEW DRUG ADDON: CPT

## 2018-03-13 PROCEDURE — 96361 HYDRATE IV INFUSION ADD-ON: CPT

## 2018-03-13 PROCEDURE — 80053 COMPREHEN METABOLIC PANEL: CPT | Performed by: EMERGENCY MEDICINE

## 2018-03-13 PROCEDURE — 81025 URINE PREGNANCY TEST: CPT

## 2018-03-13 RX ORDER — HYDROMORPHONE HYDROCHLORIDE 1 MG/ML
1 INJECTION, SOLUTION INTRAMUSCULAR; INTRAVENOUS; SUBCUTANEOUS EVERY 30 MIN PRN
Status: DISCONTINUED | OUTPATIENT
Start: 2018-03-13 | End: 2018-03-13

## 2018-03-13 RX ORDER — SODIUM CHLORIDE 9 MG/ML
1000 INJECTION, SOLUTION INTRAVENOUS ONCE
Status: DISCONTINUED | OUTPATIENT
Start: 2018-03-13 | End: 2018-03-13

## 2018-03-13 RX ORDER — MAGNESIUM HYDROXIDE/ALUMINUM HYDROXICE/SIMETHICONE 120; 1200; 1200 MG/30ML; MG/30ML; MG/30ML
30 SUSPENSION ORAL ONCE
Status: COMPLETED | OUTPATIENT
Start: 2018-03-13 | End: 2018-03-13

## 2018-03-13 RX ORDER — ONDANSETRON 2 MG/ML
4 INJECTION INTRAMUSCULAR; INTRAVENOUS ONCE
Status: COMPLETED | OUTPATIENT
Start: 2018-03-13 | End: 2018-03-13

## 2018-03-13 RX ORDER — FAMOTIDINE 20 MG/1
20 TABLET ORAL 2 TIMES DAILY PRN
Qty: 30 TABLET | Refills: 0 | Status: SHIPPED | OUTPATIENT
Start: 2018-03-13 | End: 2018-04-12

## 2018-03-13 NOTE — ED PROVIDER NOTES
Patient Seen in: [facility] Emergency Department    History   Patient presents with:  Abdominal Pain    Stated Complaint: Stomach pain    HPI    The patient is a 33-year-old female presenting to the emergency department due to abdominal pain and dec Benign  No date: OTHER      Comment: baker cyst   No date: OTHER      Comment: bunion bilateral - right foot metal  1999: REDUCTION LEFT  1999: REDUCTION RIGHT  No date: REMOVAL GALLBLADDER  11/20/2013: VENTRAL HERNIA REPAIR      Comment: Procedure:  HERNIA motion of all 4 extremities. Distal pulses normal and symmetric  Skin: No masses or nodules or abnormalities.   Psych: Normal interaction, cooperative with exam      ED Course     Labs Reviewed   URINALYSIS WITH CULTURE REFLEX - Abnormal; Notable for the f pain   FINDINGS: Evaluation of the visceral organs is limited due to the lack of IV contrast. LUNG BASE:  Mild atelectasis/scarring. LIVER:  Diffuse fatty infiltration of the liver. BILIARY:  Status post cholecystectomy.  SPLEEN:  Stable splenomegaly measu 9:52.  INDICATIONS:  ABD PAIN, vomiting  TECHNIQUE:  Unenhanced multislice CT scanning was performed from the dome of the diaphragm to the pubic symphysis. Dose reduction techniques were used.  Dose information is transmitted to the [employer] o 2.  Diffuse fatty infiltration of the liver. 3.  Splenomegaly. 4.  Left nephrolithiasis. No obstructive uropathy. Please see above for further details.   Dictated by: Donya Orona MD on 3/08/2018 at 10:28     Approved by: Donya Orona MD

## 2018-03-15 ENCOUNTER — OFFICE VISIT (OUTPATIENT)
Dept: SURGERY | Facility: CLINIC | Age: 43
End: 2018-03-15

## 2018-03-15 VITALS
BODY MASS INDEX: 42.54 KG/M2 | TEMPERATURE: 99 F | DIASTOLIC BLOOD PRESSURE: 77 MMHG | WEIGHT: 211 LBS | HEIGHT: 59 IN | HEART RATE: 69 BPM | SYSTOLIC BLOOD PRESSURE: 112 MMHG

## 2018-03-15 DIAGNOSIS — Z98.891 PREVIOUS CESAREAN SECTION: ICD-10-CM

## 2018-03-15 DIAGNOSIS — R10.33 UMBILICAL PAIN: ICD-10-CM

## 2018-03-15 DIAGNOSIS — K43.9 VENTRAL HERNIA WITHOUT OBSTRUCTION OR GANGRENE: Primary | ICD-10-CM

## 2018-03-15 DIAGNOSIS — E66.01 MORBID OBESITY WITH BMI OF 40.0-44.9, ADULT (HCC): ICD-10-CM

## 2018-03-15 PROCEDURE — 99214 OFFICE O/P EST MOD 30 MIN: CPT | Performed by: COLON & RECTAL SURGERY

## 2018-03-15 RX ORDER — RANITIDINE 300 MG/1
TABLET ORAL
Refills: 0 | COMMUNITY
Start: 2018-03-08 | End: 2018-09-01

## 2018-03-15 NOTE — PROGRESS NOTES
Follow Up Visit Note       Active Problems      1. Ventral hernia without obstruction or gangrene    2. Umbilical pain    3. Previous  section    4.  Morbid obesity with BMI of 40.0-44.9, adult [facility])          Chief Complaint   Patient presents with: Stomach pain     TECHNIQUE:  Unenhanced multislice CT scanning was performed from the dome of the diaphragm to the pubic symphysis. Dose reduction techniques were used.  Dose information is transmitted to the [facility] [employer] of Radiology) NRDR (Natio inferior aspect of the mesh. 3.  Left nephrolithiasis. No obstructive uropathy. 4.  Diffuse fatty infiltration of the liver. 5.  Splenomegaly. Please see above for further details.      Dictated by: Leora Krueger MD on 3/13/2018 at 10: education:                 Number of children:               Social History Main Topics    Smoking status: Never Smoker                                                                Smokeless tobacco: Never Used                        Alcohol use:  No abdominal pain, anal bleeding, blood in stool, constipation, diarrhea, nausea and vomiting. Genitourinary: Negative for difficulty urinating, dysuria, frequency and urgency. Musculoskeletal: Negative for arthralgias and myalgias.    Skin: Negative for c department. A CT scan was ordered for some abdominal issues. This demonstrated a seroma in the left lower quadrant, and 2 other ventral hernias.     We have seen her for the 2 occurrences of ventral hernias in the past.  One is at the umbilicus, the other problem. Theoretically she should undergo profound weight loss prior to any further attempts at herniorrhaphy on 9 urgent hernia repairs. I have given her the name of Dr. Sonia Lazaro, [facility], for potential weight loss surgery.   She is inter

## 2018-03-16 NOTE — PATIENT INSTRUCTIONS
This patient presents for further care and treatment regarding hernias as well as previous hernia repair. This patient had a hernia at the lateral aspect of a  incision. She underwent a major ventral incisional herniorrhaphy with mesh.     She hernias are identified. The left lower quadrant seroma is asymptomatic and will require no treatment. The umbilical region and epigastric region hernias will require a repair. This will be deferred until she is done having babies.     The patient's m

## 2018-05-23 ENCOUNTER — HOSPITAL ENCOUNTER (EMERGENCY)
Age: 43
Discharge: HOME | End: 2018-05-23
Attending: EMERGENCY MEDICINE
Payer: MEDICAID

## 2018-05-23 ENCOUNTER — APPOINTMENT (OUTPATIENT)
Dept: GENERAL RADIOLOGY | Age: 43
End: 2018-05-23
Attending: PHYSICIAN ASSISTANT
Payer: MEDICAID

## 2018-05-23 VITALS
WEIGHT: 205 LBS | DIASTOLIC BLOOD PRESSURE: 65 MMHG | RESPIRATION RATE: 20 BRPM | HEART RATE: 103 BPM | BODY MASS INDEX: 41.33 KG/M2 | HEIGHT: 59 IN | TEMPERATURE: 99 F | SYSTOLIC BLOOD PRESSURE: 112 MMHG | OXYGEN SATURATION: 98 %

## 2018-05-23 DIAGNOSIS — J02.0 STREP PHARYNGITIS: Primary | ICD-10-CM

## 2018-05-23 DIAGNOSIS — R11.2 NAUSEA AND VOMITING IN ADULT: ICD-10-CM

## 2018-05-23 PROCEDURE — 99284 EMERGENCY DEPT VISIT MOD MDM: CPT

## 2018-05-23 PROCEDURE — 81003 URINALYSIS AUTO W/O SCOPE: CPT | Performed by: PHYSICIAN ASSISTANT

## 2018-05-23 PROCEDURE — 96375 TX/PRO/DX INJ NEW DRUG ADDON: CPT

## 2018-05-23 PROCEDURE — 96374 THER/PROPH/DIAG INJ IV PUSH: CPT

## 2018-05-23 PROCEDURE — 80053 COMPREHEN METABOLIC PANEL: CPT | Performed by: PHYSICIAN ASSISTANT

## 2018-05-23 PROCEDURE — 96361 HYDRATE IV INFUSION ADD-ON: CPT

## 2018-05-23 PROCEDURE — 85025 COMPLETE CBC W/AUTO DIFF WBC: CPT | Performed by: PHYSICIAN ASSISTANT

## 2018-05-23 PROCEDURE — 71046 X-RAY EXAM CHEST 2 VIEWS: CPT | Performed by: PHYSICIAN ASSISTANT

## 2018-05-23 PROCEDURE — 87430 STREP A AG IA: CPT | Performed by: PHYSICIAN ASSISTANT

## 2018-05-23 RX ORDER — KETOROLAC TROMETHAMINE 30 MG/ML
30 INJECTION, SOLUTION INTRAMUSCULAR; INTRAVENOUS ONCE
Status: COMPLETED | OUTPATIENT
Start: 2018-05-23 | End: 2018-05-23

## 2018-05-23 RX ORDER — AMOXICILLIN 500 MG/1
500 TABLET, FILM COATED ORAL 2 TIMES DAILY
Qty: 20 TABLET | Refills: 0 | Status: SHIPPED | OUTPATIENT
Start: 2018-05-23 | End: 2018-06-02

## 2018-05-23 RX ORDER — ONDANSETRON 4 MG/1
4 TABLET, ORALLY DISINTEGRATING ORAL EVERY 4 HOURS PRN
Qty: 10 TABLET | Refills: 0 | Status: SHIPPED | OUTPATIENT
Start: 2018-05-23 | End: 2018-09-01

## 2018-05-23 RX ORDER — ONDANSETRON 2 MG/ML
4 INJECTION INTRAMUSCULAR; INTRAVENOUS ONCE
Status: COMPLETED | OUTPATIENT
Start: 2018-05-23 | End: 2018-05-23

## 2018-05-23 RX ORDER — ACETAMINOPHEN 500 MG
1000 TABLET ORAL ONCE
Status: COMPLETED | OUTPATIENT
Start: 2018-05-23 | End: 2018-05-23

## 2018-05-23 NOTE — ED PROVIDER NOTES
Patient Seen in: [facility] Emergency Department In [place]    History   Patient presents with:  Headache (neurologic)  GI Bleeding (gastrointestinal)    Stated Complaint: headache, sore throat, diarrhea last two days    HPI    CHIEF COMPLAINT: Headache, indicated as above.     Past Medical History:   Diagnosis Date   • Asthma    • Extrinsic asthma, unspecified     last attack a couple of months ago   • Herpes     last outbreak years ago   • IBS (irritable bowel syndrome)    • Pneumonia, organism unspecifie SpO2 98%   BMI 41.40 kg/m²         Physical Exam    Vital signs and nursing notes reviewed   General Appearance: Patient is alert and oriented x4 patient appears ill but nontoxic. Patient's temperature is 100.4 orally.   Eyes: pupils equal and round no pa PLATELET.   Procedure                               Abnormality         Status                     ---------                               -----------         ------                     CBC W/ DIFFERENTIAL[[id number]]          Abnormal            Final resul as soon as possible for a visit in 2 days  For reevaluation        Medications Prescribed:  Current Discharge Medication List    START taking these medications    amoxicillin 500 MG Oral Tab  Take 1 tablet (500 mg total) by mouth 2 (two) times daily.   Qty:

## 2018-05-23 NOTE — ED INITIAL ASSESSMENT (HPI)
c/o cold symptoms x 2-3 days with cough, diarrhea, nausea, vomiting. 5 episodes of diarrhea.   Has tightness to chest.

## 2018-05-24 NOTE — ED PROVIDER NOTES
I reviewed that chart and discussed the case. I have examined the patient and noted lungs clear to auscultation. Cardiovascular plus S1-S2 regular rate and rhythm. Abdomen soft nontender nondistended. Tympanic membranes normal bilaterally.   Throat eryt

## 2018-09-01 ENCOUNTER — APPOINTMENT (OUTPATIENT)
Dept: GENERAL RADIOLOGY | Age: 43
End: 2018-09-01
Attending: EMERGENCY MEDICINE
Payer: MEDICAID

## 2018-09-01 ENCOUNTER — HOSPITAL ENCOUNTER (EMERGENCY)
Age: 43
Discharge: HOME | End: 2018-09-01
Attending: EMERGENCY MEDICINE
Payer: MEDICAID

## 2018-09-01 VITALS
BODY MASS INDEX: 41.93 KG/M2 | OXYGEN SATURATION: 98 % | TEMPERATURE: 98 F | RESPIRATION RATE: 18 BRPM | SYSTOLIC BLOOD PRESSURE: 98 MMHG | HEIGHT: 59 IN | WEIGHT: 208 LBS | DIASTOLIC BLOOD PRESSURE: 64 MMHG | HEART RATE: 86 BPM

## 2018-09-01 DIAGNOSIS — R07.9 CHEST PAIN OF UNCERTAIN ETIOLOGY: ICD-10-CM

## 2018-09-01 DIAGNOSIS — J02.9 VIRAL PHARYNGITIS: ICD-10-CM

## 2018-09-01 DIAGNOSIS — R20.2 PARESTHESIAS: Primary | ICD-10-CM

## 2018-09-01 LAB
ALBUMIN SERPL-MCNC: 3.7 G/DL (ref 3.5–4.8)
ALBUMIN/GLOB SERPL: 1.1 {RATIO} (ref 1–2)
ALP LIVER SERPL-CCNC: 55 U/L (ref 37–98)
ALT SERPL-CCNC: 31 U/L (ref 14–54)
ANION GAP SERPL CALC-SCNC: 8 MMOL/L (ref 0–18)
AST SERPL-CCNC: 17 U/L (ref 15–41)
ATRIAL RATE: 76 BPM
BASOPHILS # BLD AUTO: 0.07 X10(3) UL (ref 0–0.1)
BASOPHILS NFR BLD AUTO: 0.8 %
BILIRUB SERPL-MCNC: 0.6 MG/DL (ref 0.1–2)
BILIRUB UR QL STRIP.AUTO: NEGATIVE
BUN BLD-MCNC: 13 MG/DL (ref 8–20)
BUN/CREAT SERPL: 18.6 (ref 10–20)
CALCIUM BLD-MCNC: 8.8 MG/DL (ref 8.3–10.3)
CHLORIDE SERPL-SCNC: 105 MMOL/L (ref 101–111)
CLARITY UR REFRACT.AUTO: CLEAR
CO2 SERPL-SCNC: 27 MMOL/L (ref 22–32)
COLOR UR AUTO: YELLOW
CREAT BLD-MCNC: 0.7 MG/DL (ref 0.55–1.02)
D-DIMER: <0.27 UG/ML FEU (ref 0–0.49)
EOSINOPHIL # BLD AUTO: 0.21 X10(3) UL (ref 0–0.3)
EOSINOPHIL NFR BLD AUTO: 2.3 %
ERYTHROCYTE [DISTWIDTH] IN BLOOD BY AUTOMATED COUNT: 13 % (ref 11.5–16)
GLOBULIN PLAS-MCNC: 3.5 G/DL (ref 2.5–4)
GLUCOSE BLD-MCNC: 111 MG/DL (ref 70–99)
GLUCOSE UR STRIP.AUTO-MCNC: NEGATIVE MG/DL
HCT VFR BLD AUTO: 40.9 % (ref 34–50)
HGB BLD-MCNC: 13.4 G/DL (ref 12–16)
IMMATURE GRANULOCYTE COUNT: 0.02 X10(3) UL (ref 0–1)
IMMATURE GRANULOCYTE RATIO %: 0.2 %
KETONES UR STRIP.AUTO-MCNC: NEGATIVE MG/DL
LEUKOCYTE ESTERASE UR QL STRIP.AUTO: NEGATIVE
LYMPHOCYTES # BLD AUTO: 1.92 X10(3) UL (ref 0.9–4)
LYMPHOCYTES NFR BLD AUTO: 20.9 %
M PROTEIN MFR SERPL ELPH: 7.2 G/DL (ref 6.1–8.3)
MCH RBC QN AUTO: 30.2 PG (ref 27–33.2)
MCHC RBC AUTO-ENTMCNC: 32.8 G/DL (ref 31–37)
MCV RBC AUTO: 92.1 FL (ref 81–100)
MONOCYTES # BLD AUTO: 0.56 X10(3) UL (ref 0.1–1)
MONOCYTES NFR BLD AUTO: 6.1 %
NEUTROPHIL ABS PRELIM: 6.42 X10 (3) UL (ref 1.3–6.7)
NEUTROPHILS # BLD AUTO: 6.42 X10(3) UL (ref 1.3–6.7)
NEUTROPHILS NFR BLD AUTO: 69.7 %
NITRITE UR QL STRIP.AUTO: NEGATIVE
OSMOLALITY SERPL CALC.SUM OF ELEC: 291 MOSM/KG (ref 275–295)
P AXIS: 42 DEGREES
P-R INTERVAL: 164 MS
PH UR STRIP.AUTO: 5.5 [PH] (ref 4.5–8)
PLATELET # BLD AUTO: 238 10(3)UL (ref 150–450)
POCT LOT NUMBER: NORMAL
POCT URINE PREGNANCY: NEGATIVE
POTASSIUM SERPL-SCNC: 3.7 MMOL/L (ref 3.6–5.1)
Q-T INTERVAL: 404 MS
QRS DURATION: 90 MS
QTC CALCULATION (BEZET): 454 MS
R AXIS: -15 DEGREES
RBC # BLD AUTO: 4.44 X10(6)UL (ref 3.8–5.1)
RBC UR QL AUTO: NEGATIVE
RED CELL DISTRIBUTION WIDTH-SD: 43 FL (ref 35.1–46.3)
SODIUM SERPL-SCNC: 140 MMOL/L (ref 136–144)
SP GR UR STRIP.AUTO: 1.02 (ref 1–1.03)
T AXIS: 10 DEGREES
TROPONIN I SERPL-MCNC: <0.046 NG/ML (ref ?–0.05)
UROBILINOGEN UR STRIP.AUTO-MCNC: 0.2 MG/DL
VENTRICULAR RATE: 76 BPM
WBC # BLD AUTO: 9.2 X10(3) UL (ref 4–13)

## 2018-09-01 PROCEDURE — 85025 COMPLETE CBC W/AUTO DIFF WBC: CPT | Performed by: EMERGENCY MEDICINE

## 2018-09-01 PROCEDURE — 87430 STREP A AG IA: CPT | Performed by: EMERGENCY MEDICINE

## 2018-09-01 PROCEDURE — 93010 ELECTROCARDIOGRAM REPORT: CPT

## 2018-09-01 PROCEDURE — 36415 COLL VENOUS BLD VENIPUNCTURE: CPT

## 2018-09-01 PROCEDURE — 87081 CULTURE SCREEN ONLY: CPT | Performed by: EMERGENCY MEDICINE

## 2018-09-01 PROCEDURE — 99285 EMERGENCY DEPT VISIT HI MDM: CPT

## 2018-09-01 PROCEDURE — 85378 FIBRIN DEGRADE SEMIQUANT: CPT | Performed by: EMERGENCY MEDICINE

## 2018-09-01 PROCEDURE — 81003 URINALYSIS AUTO W/O SCOPE: CPT | Performed by: EMERGENCY MEDICINE

## 2018-09-01 PROCEDURE — 93005 ELECTROCARDIOGRAM TRACING: CPT

## 2018-09-01 PROCEDURE — 84484 ASSAY OF TROPONIN QUANT: CPT | Performed by: EMERGENCY MEDICINE

## 2018-09-01 PROCEDURE — 81025 URINE PREGNANCY TEST: CPT

## 2018-09-01 PROCEDURE — 80053 COMPREHEN METABOLIC PANEL: CPT | Performed by: EMERGENCY MEDICINE

## 2018-09-01 PROCEDURE — 71046 X-RAY EXAM CHEST 2 VIEWS: CPT | Performed by: EMERGENCY MEDICINE

## 2018-09-01 RX ORDER — PANTOPRAZOLE SODIUM 40 MG/1
40 TABLET, DELAYED RELEASE ORAL DAILY
Qty: 30 TABLET | Refills: 0 | Status: SHIPPED | OUTPATIENT
Start: 2018-09-01 | End: 2018-10-01

## 2018-09-01 NOTE — ED PROVIDER NOTES
Patient Seen in: [facility] Emergency Department In [place]    History   Patient presents with:  Swelling    Stated Complaint: c/o hand and feet swelling x3 days     HPI    This is a 49-year-old female who presents with what he she describes as tingling i LEFT  1999: REDUCTION RIGHT  No date: REMOVAL GALLBLADDER  11/20/2013: VENTRAL HERNIA REPAIR      Comment: Procedure:  HERNIA VENTRAL REPAIR;  Surgeon:                Marcella Toth MD;  Location: [facility] MAIN OR        Smoking status: Never Smoker METABOLIC PANEL (14) - Abnormal; Notable for the following:        Result Value    Glucose 111 (*)     All other components within normal limits   URINALYSIS WITH CULTURE REFLEX - Abnormal; Notable for the following:     Protein Urine Trace (*)     All oth is no acute ST elevations or ischemic findings. The rest of the EKG including rate rhythm axis and intervals I agree with the EKG report . The rate is 76. There are some nonspecific nonspecific ST changes.   When compared to an old EKG there is no signifi to her. Constant gently. She says is more she felt more anxious with the cold symptoms but I recommend she does follow-up with a primary care physician take 1 baby aspirin daily.   Disposition and Plan     Clinical Impression:  Paresthesias  (primary enco

## 2018-09-08 ENCOUNTER — APPOINTMENT (OUTPATIENT)
Dept: GENERAL RADIOLOGY | Age: 43
End: 2018-09-08
Attending: EMERGENCY MEDICINE
Payer: MEDICAID

## 2018-09-08 ENCOUNTER — HOSPITAL ENCOUNTER (EMERGENCY)
Age: 43
Discharge: HOME | End: 2018-09-08
Attending: EMERGENCY MEDICINE
Payer: MEDICAID

## 2018-09-08 VITALS
OXYGEN SATURATION: 99 % | BODY MASS INDEX: 39.92 KG/M2 | DIASTOLIC BLOOD PRESSURE: 64 MMHG | WEIGHT: 198 LBS | TEMPERATURE: 98 F | HEIGHT: 59 IN | SYSTOLIC BLOOD PRESSURE: 117 MMHG | HEART RATE: 68 BPM | RESPIRATION RATE: 14 BRPM

## 2018-09-08 DIAGNOSIS — S46.811A STRAIN OF RIGHT TRAPEZIUS MUSCLE, INITIAL ENCOUNTER: Primary | ICD-10-CM

## 2018-09-08 PROCEDURE — 99283 EMERGENCY DEPT VISIT LOW MDM: CPT

## 2018-09-08 PROCEDURE — 99284 EMERGENCY DEPT VISIT MOD MDM: CPT

## 2018-09-08 PROCEDURE — 73030 X-RAY EXAM OF SHOULDER: CPT | Performed by: EMERGENCY MEDICINE

## 2018-09-08 RX ORDER — CYCLOBENZAPRINE HCL 10 MG
10 TABLET ORAL 3 TIMES DAILY PRN
Qty: 20 TABLET | Refills: 0 | Status: SHIPPED | OUTPATIENT
Start: 2018-09-08 | End: 2018-09-15

## 2018-09-08 RX ORDER — TRAMADOL HYDROCHLORIDE 50 MG/1
TABLET ORAL EVERY 4 HOURS PRN
Qty: 20 TABLET | Refills: 0 | Status: SHIPPED | OUTPATIENT
Start: 2018-09-08 | End: 2018-09-15

## 2018-09-09 NOTE — ED PROVIDER NOTES
Patient Seen in: [facility] In [place]    History   Patient presents with:  Upper Extremity Injury (musculoskeletal)    Stated Complaint: right arm and shoulder pain    HPI    For the past day or 2 the patient has noted pain behind th Temp 98 °F (36.7 °C)   Temp src Oral   SpO2 99 %   O2 Device None (Room air)       Current:/64   Pulse 68   Temp 98 °F (36.7 °C) (Oral)   Resp 14   Ht 149.9 cm (4' 11\")   Wt 89.8 kg   LMP 08/16/2018 (Approximate)   SpO2 99%   BMI 39.99 kg/m² pain probably muscular. Discharged with analgesics and muscle relaxants. Advise follow-up with family physician sometime later this week.             Disposition and Plan     Clinical Impression:  Strain of right trapezius muscle, initial encounter  (prim

## 2018-11-07 ENCOUNTER — HOSPITAL (OUTPATIENT)
Dept: OTHER | Age: 43
End: 2018-11-07
Attending: OBSTETRICS & GYNECOLOGY

## 2018-11-07 ENCOUNTER — IMAGING SERVICES (OUTPATIENT)
Dept: OTHER | Age: 43
End: 2018-11-07

## 2019-01-13 NOTE — ED INITIAL ASSESSMENT (HPI)
Please see the consult note already done  Pt states she has been taking care of her kids with the flu all week. States since this am sneezing, sinus congestion, coughing, chills and body aches. States asthma is under control.

## 2019-04-02 ENCOUNTER — HOSPITAL ENCOUNTER (OUTPATIENT)
Age: 44
Discharge: HOME | End: 2019-04-02
Attending: FAMILY MEDICINE
Payer: MEDICAID

## 2019-04-02 VITALS
HEIGHT: 59 IN | OXYGEN SATURATION: 99 % | BODY MASS INDEX: 40.32 KG/M2 | HEART RATE: 75 BPM | DIASTOLIC BLOOD PRESSURE: 82 MMHG | RESPIRATION RATE: 18 BRPM | SYSTOLIC BLOOD PRESSURE: 123 MMHG | TEMPERATURE: 98 F | WEIGHT: 200 LBS

## 2019-04-02 DIAGNOSIS — J20.9 ACUTE BRONCHITIS, UNSPECIFIED ORGANISM: Primary | ICD-10-CM

## 2019-04-02 DIAGNOSIS — H10.33 ACUTE CONJUNCTIVITIS OF BOTH EYES, UNSPECIFIED ACUTE CONJUNCTIVITIS TYPE: ICD-10-CM

## 2019-04-02 DIAGNOSIS — R68.89 FLU-LIKE SYMPTOMS: ICD-10-CM

## 2019-04-02 PROCEDURE — 99214 OFFICE O/P EST MOD 30 MIN: CPT

## 2019-04-02 PROCEDURE — 87502 INFLUENZA DNA AMP PROBE: CPT | Performed by: FAMILY MEDICINE

## 2019-04-02 PROCEDURE — 99213 OFFICE O/P EST LOW 20 MIN: CPT

## 2019-04-02 RX ORDER — AZITHROMYCIN 250 MG/1
TABLET, FILM COATED ORAL
Qty: 6 TABLET | Refills: 0 | Status: SHIPPED | OUTPATIENT
Start: 2019-04-02 | End: 2019-04-18

## 2019-04-02 RX ORDER — ALBUTEROL SULFATE 90 UG/1
2 AEROSOL, METERED RESPIRATORY (INHALATION) EVERY 4 HOURS PRN
Qty: 1 INHALER | Refills: 6 | Status: SHIPPED | OUTPATIENT
Start: 2019-04-02 | End: 2019-04-12

## 2019-04-02 RX ORDER — TOBRAMYCIN 3 MG/ML
1 SOLUTION/ DROPS OPHTHALMIC EVERY 6 HOURS
Qty: 1 BOTTLE | Refills: 0 | Status: SHIPPED | OUTPATIENT
Start: 2019-04-02 | End: 2019-04-09

## 2019-04-02 RX ORDER — ALBUTEROL SULFATE 2.5 MG/3ML
2.5 SOLUTION RESPIRATORY (INHALATION) EVERY 4 HOURS PRN
Qty: 30 AMPULE | Refills: 0 | Status: SHIPPED | OUTPATIENT
Start: 2019-04-02 | End: 2019-10-16

## 2019-04-02 NOTE — ED PROVIDER NOTES
Patient Seen in: [address] [facility]    History   Patient presents with:  Cough/URI  Body ache and/or chills    Stated Complaint: Cough (x3 days)    HPI    **24-year-old female presents to the immediate care today with chief complaints of reviewed and is not pertinent to presenting problem.     Social History    Tobacco Use      Smoking status: Never Smoker      Smokeless tobacco: Never Used    Alcohol use: No    Drug use: No      Review of Systems    Positive for stated complaint: Cough (x3 unspecified organism  (primary encounter diagnosis)  Acute conjunctivitis of both eyes, unspecified acute conjunctivitis type  Flu-like symptoms    Disposition:  Discharge  4/2/2019  6:34 pm    Follow-up:   R[address] 64

## 2019-04-02 NOTE — ED INITIAL ASSESSMENT (HPI)
x3 days Pt c/o congested/productive cough, +chills.   +exposure to flu at home, denies flu shot.  +recent travel to [place] [facility]    Denies fevers

## 2019-04-18 ENCOUNTER — HOSPITAL ENCOUNTER (OUTPATIENT)
Age: 44
Discharge: HOME | End: 2019-04-18
Attending: FAMILY MEDICINE
Payer: MEDICAID

## 2019-04-18 VITALS
OXYGEN SATURATION: 99 % | WEIGHT: 200 LBS | TEMPERATURE: 97 F | HEIGHT: 59 IN | BODY MASS INDEX: 40.32 KG/M2 | SYSTOLIC BLOOD PRESSURE: 126 MMHG | DIASTOLIC BLOOD PRESSURE: 87 MMHG | HEART RATE: 68 BPM | RESPIRATION RATE: 16 BRPM

## 2019-04-18 DIAGNOSIS — B34.9 VIRAL SYNDROME: Primary | ICD-10-CM

## 2019-04-18 DIAGNOSIS — R05.8 SPASMODIC COUGH: ICD-10-CM

## 2019-04-18 PROCEDURE — 99214 OFFICE O/P EST MOD 30 MIN: CPT

## 2019-04-18 PROCEDURE — 87502 INFLUENZA DNA AMP PROBE: CPT | Performed by: FAMILY MEDICINE

## 2019-04-18 PROCEDURE — 87081 CULTURE SCREEN ONLY: CPT | Performed by: FAMILY MEDICINE

## 2019-04-18 PROCEDURE — 87430 STREP A AG IA: CPT | Performed by: FAMILY MEDICINE

## 2019-04-18 RX ORDER — BENZONATATE 100 MG/1
100 CAPSULE ORAL 3 TIMES DAILY PRN
Qty: 15 CAPSULE | Refills: 0 | Status: SHIPPED | OUTPATIENT
Start: 2019-04-18 | End: 2019-04-23

## 2019-04-18 NOTE — ED PROVIDER NOTES
Patient Seen in: [address] [facility]    History   Patient presents with:  Sore Throat  Headache (neurologic)  Cough/URI    Stated Complaint: sore throat headache runny nose     HPI  This is a 36 yo F here with complaints of sore throat, heada Smoker      Smokeless tobacco: Never Used    Alcohol use: No    Drug use: No      Review of Systems  Positive for stated complaint: sore throat headache runny nose   Other systems are as noted in HPI. Constitutional and vital signs reviewed.       All othe apply over the nasal septal mucosa)  · Daily antihistamine - Claritin OR Zyrtec in the AM (non-drowsy) and take Benadryl 25 mg at night time  · Avoid Sudafed (pseudoephedrine or phenylephrine) if you have heart problems or blood pressure issues like hypert

## 2019-04-23 ENCOUNTER — OFFICE VISIT (OUTPATIENT)
Dept: SURGERY | Facility: CLINIC | Age: 44
End: 2019-04-23
Payer: MEDICAID

## 2019-04-23 VITALS
HEART RATE: 74 BPM | SYSTOLIC BLOOD PRESSURE: 129 MMHG | DIASTOLIC BLOOD PRESSURE: 89 MMHG | HEIGHT: 59 IN | BODY MASS INDEX: 40.32 KG/M2 | WEIGHT: 200 LBS

## 2019-04-23 DIAGNOSIS — R19.7 DIARRHEA, UNSPECIFIED TYPE: ICD-10-CM

## 2019-04-23 DIAGNOSIS — Z98.891 PREVIOUS CESAREAN SECTION: ICD-10-CM

## 2019-04-23 DIAGNOSIS — R10.33 UMBILICAL PAIN: ICD-10-CM

## 2019-04-23 DIAGNOSIS — R19.8 ALTERNATING CONSTIPATION AND DIARRHEA: Primary | ICD-10-CM

## 2019-04-23 DIAGNOSIS — K59.01 SLOW TRANSIT CONSTIPATION: ICD-10-CM

## 2019-04-23 DIAGNOSIS — K43.9 VENTRAL HERNIA WITHOUT OBSTRUCTION OR GANGRENE: ICD-10-CM

## 2019-04-23 DIAGNOSIS — E66.01 MORBID OBESITY WITH BMI OF 40.0-44.9, ADULT (HCC): ICD-10-CM

## 2019-04-23 PROCEDURE — 99244 OFF/OP CNSLTJ NEW/EST MOD 40: CPT | Performed by: COLON & RECTAL SURGERY

## 2019-04-23 NOTE — PATIENT INSTRUCTIONS
Luz Hunter is a 37year old female here for two different problems involving two different organ systems. The first problem is a consult for a colonoscopy. She was advised she should have an EGD because of persistent vomiting.      Her last colonosco The patient's presenting history as listed below:    The patient is known to me from previous ventral hernia repair with mesh performed in 2013. The patient's procedure was performed 8 weeks following her third  section.   The area of

## 2019-04-23 NOTE — H&P
New Patient Visit Note       Active Problems      1. Alternating constipation and diarrhea    2. Ventral hernia without obstruction or gangrene    3. Umbilical pain    4. Slow transit constipation    5.  Morbid obesity with BMI of 40.0-44.9, [facility] of [facility] procedures. The patient has not had a heart attack. The patient has not had a stroke or TIA. The patient does not have a heart murmur or valve.  The patient does not require special antibiotics prior to surgery because of a prosthesis or any othe attack a couple of months ago   • Herpes     last outbreak years ago   • IBS (irritable bowel syndrome)    • Pneumonia, organism unspecified(486) 1 year ago   • PONV (postoperative nausea and vomiting)    •  labor     pt. had 20 week loss   • Laquita needed. , Disp: , Rfl:       Review of Systems  The Review of Systems has been reviewed by me during today. Review of Systems   Constitutional: Negative for chills, diaphoresis, fatigue, fever and unexpected weight change.    HENT: Negative for hearing loss positive in the ventral area. Hernia confirmed negative in the right inguinal area and confirmed negative in the left inguinal area. Abdominal exam: At the Epigastric region, the patient has a ventral hernia that is 5 cm on the sack, it is tender.  At orders of the defined types were placed in this encounter. Imaging & Referrals   None    Follow Up  Return in 1 month (on 5/22/2019).     Toney Perrin MD

## 2019-05-01 PROBLEM — R19.8 ALTERNATING CONSTIPATION AND DIARRHEA: Status: ACTIVE | Noted: 2019-05-01

## 2019-05-08 RX ORDER — IBUPROFEN 200 MG
200 TABLET ORAL EVERY 6 HOURS PRN
COMMUNITY
End: 2019-10-30

## 2019-05-08 RX ORDER — ACETAMINOPHEN 325 MG/1
325 TABLET ORAL EVERY 6 HOURS PRN
COMMUNITY
End: 2019-10-30

## 2019-05-08 RX ORDER — OMEGA-3 FATTY ACIDS/FISH OIL 300-1000MG
CAPSULE ORAL AS NEEDED
COMMUNITY
End: 2019-10-30

## 2019-05-09 ENCOUNTER — HOSPITAL ENCOUNTER (EMERGENCY)
Age: 44
Discharge: HOME | End: 2019-05-09
Attending: EMERGENCY MEDICINE
Payer: MEDICAID

## 2019-05-09 VITALS
OXYGEN SATURATION: 98 % | BODY MASS INDEX: 40 KG/M2 | HEART RATE: 74 BPM | TEMPERATURE: 98 F | RESPIRATION RATE: 16 BRPM | WEIGHT: 199.94 LBS | DIASTOLIC BLOOD PRESSURE: 77 MMHG | SYSTOLIC BLOOD PRESSURE: 110 MMHG

## 2019-05-09 DIAGNOSIS — R19.7 DIARRHEA, UNSPECIFIED TYPE: Primary | ICD-10-CM

## 2019-05-09 PROCEDURE — 99283 EMERGENCY DEPT VISIT LOW MDM: CPT | Performed by: EMERGENCY MEDICINE

## 2019-05-09 PROCEDURE — 80053 COMPREHEN METABOLIC PANEL: CPT | Performed by: EMERGENCY MEDICINE

## 2019-05-09 PROCEDURE — 96360 HYDRATION IV INFUSION INIT: CPT | Performed by: EMERGENCY MEDICINE

## 2019-05-09 PROCEDURE — 99284 EMERGENCY DEPT VISIT MOD MDM: CPT | Performed by: EMERGENCY MEDICINE

## 2019-05-09 PROCEDURE — 81025 URINE PREGNANCY TEST: CPT | Performed by: EMERGENCY MEDICINE

## 2019-05-09 PROCEDURE — 81001 URINALYSIS AUTO W/SCOPE: CPT | Performed by: EMERGENCY MEDICINE

## 2019-05-09 PROCEDURE — 85025 COMPLETE CBC W/AUTO DIFF WBC: CPT | Performed by: EMERGENCY MEDICINE

## 2019-05-09 PROCEDURE — 83690 ASSAY OF LIPASE: CPT | Performed by: EMERGENCY MEDICINE

## 2019-05-10 NOTE — ED PROVIDER NOTES
Patient Seen in: [facility] [place]    History   Patient presents with:  Abdomen/Flank Pain (GI/)  Nausea/Vomiting/Diarrhea (gastrointestinal)    Stated Complaint: abd pain, diarrhea    HPI    38-year-old female presents with abd Catheryn Ganser, MD at [facility] MAIN OR   • [address]  2 weeks ago   • SCARLETT NEEDLE LOCALIZATION W/ SPECIMEN 1 SITE LEFT  2008    Benign   • SCARLETT NEEDLE LOCALIZATION W/ SPECIMEN 1 SITE RIGHT  2008    Benign   • OTHER      baker cyst    • OTHER      bunion Skin: no rashes or nodules    ED Course     Labs Reviewed   COMP METABOLIC PANEL (14) - Abnormal; Notable for the following components:       Result Value    Glucose 112 (*)     BUN/CREA Ratio 25.4 (*)     All other components within normal limits   URIN colonoscopy with Dr. Trisha Delgado in less than 2 weeks. There is no evidence of a UTI.             Disposition and Plan     Clinical Impression:  Diarrhea, unspecified type  (primary encounter diagnosis)    Disposition:  Discharge  5/9/2019  9:28 pm    Follow-up

## 2019-05-13 ENCOUNTER — TELEPHONE (OUTPATIENT)
Dept: SURGERY | Facility: CLINIC | Age: 44
End: 2019-05-13

## 2019-05-13 ENCOUNTER — LAB ENCOUNTER (OUTPATIENT)
Dept: LAB | Facility: HOSPITAL | Age: 44
End: 2019-05-13
Attending: COLON & RECTAL SURGERY
Payer: MEDICAID

## 2019-05-13 ENCOUNTER — OFFICE VISIT (OUTPATIENT)
Dept: SURGERY | Facility: CLINIC | Age: 44
End: 2019-05-13
Payer: MEDICAID

## 2019-05-13 VITALS
BODY MASS INDEX: 40 KG/M2 | HEART RATE: 78 BPM | DIASTOLIC BLOOD PRESSURE: 87 MMHG | SYSTOLIC BLOOD PRESSURE: 123 MMHG | TEMPERATURE: 98 F | WEIGHT: 200 LBS

## 2019-05-13 DIAGNOSIS — E66.01 MORBID OBESITY WITH BMI OF 40.0-44.9, ADULT (HCC): ICD-10-CM

## 2019-05-13 DIAGNOSIS — K43.9 VENTRAL HERNIA WITHOUT OBSTRUCTION OR GANGRENE: ICD-10-CM

## 2019-05-13 DIAGNOSIS — R19.8 ALTERNATING CONSTIPATION AND DIARRHEA: ICD-10-CM

## 2019-05-13 DIAGNOSIS — R19.8 ALTERNATING CONSTIPATION AND DIARRHEA: Primary | ICD-10-CM

## 2019-05-13 DIAGNOSIS — A04.8 H. PYLORI INFECTION: ICD-10-CM

## 2019-05-13 DIAGNOSIS — R19.7 DIARRHEA, UNSPECIFIED TYPE: Primary | ICD-10-CM

## 2019-05-13 DIAGNOSIS — Z98.891 PREVIOUS CESAREAN SECTION: ICD-10-CM

## 2019-05-13 DIAGNOSIS — R10.33 UMBILICAL PAIN: ICD-10-CM

## 2019-05-13 PROCEDURE — 83013 H PYLORI (C-13) BREATH: CPT

## 2019-05-13 PROCEDURE — 99214 OFFICE O/P EST MOD 30 MIN: CPT | Performed by: COLON & RECTAL SURGERY

## 2019-05-13 RX ORDER — POLYETHYLENE GLYCOL 3350, SODIUM CHLORIDE, SODIUM BICARBONATE, POTASSIUM CHLORIDE 420; 11.2; 5.72; 1.48 G/4L; G/4L; G/4L; G/4L
POWDER, FOR SOLUTION ORAL
Qty: 1 BOTTLE | Refills: 0 | Status: SHIPPED | OUTPATIENT
Start: 2019-05-13 | End: 2019-08-27

## 2019-05-13 NOTE — PROGRESS NOTES
Follow Up Visit Note       Active Problems      1. Diarrhea, unspecified type    2. H. pylori infection    3. Morbid obesity with BMI of 40.0-44.9, adult (HCC)    4. Umbilical pain    5. Ventral hernia without obstruction or gangrene    6.  Alternating cons not have a history of cardiac procedures. The patient has not had a heart attack. The patient has not had a stroke or TIA. The patient does not have a heart murmur or valve.  The patient does not require special antibiotics prior to surgery because of a pro • OTHER      baker cyst    • OTHER      bunion bilateral - right foot metal   • REDUCTION LEFT  1999   • REDUCTION RIGHT  1999   • REMOVAL GALLBLADDER         The family history and social history have been reviewed by me today.     No family history on f for abdominal distention, abdominal pain, diarrhea and nausea. Negative for anal bleeding, blood in stool, constipation and vomiting. Genitourinary: Negative for difficulty urinating, dysuria, frequency and urgency.    Musculoskeletal: Negative for arthra Liver is within normal limits and spleen is not palpable. Musculoskeletal: Normal range of motion. Neurological: She is alert and oriented to person, place, and time. Skin: Skin is dry.    Psychiatric: Her behavior is normal. Judgment normal.   Nursi W/SHIGATOXIN & ECOLI      Ova and Parasite w Giardia EIA      STOOL CULTURE W/SHIGATOXIN & ECOLI, second specimen      Ova and Parasite w Giardia EIA, second specimen      Imaging & Referrals   None    Follow Up  Return in 11 days (on 5/24/2019).     Angie Portillo

## 2019-05-13 NOTE — PATIENT INSTRUCTIONS
Denise Slade is a 37year old female here for evaluation of her diarrhea and constipation. She was in the [facility] emergency room May 9, 2019, for abdominal pain and diarrhea. Her presenting history is below.      Her last colonoscopy was wi hernia that is non-tender. She has a Pfannenstiel incision without any evidence of a hernia. She is morbidly obese. Her body mass index is 40.4 kg/m². Bowel sounds are present with normal activity and normal pitch. No guarding or rebound.  Liver is within

## 2019-05-14 ENCOUNTER — TELEPHONE (OUTPATIENT)
Dept: SURGERY | Facility: CLINIC | Age: 44
End: 2019-05-14

## 2019-05-14 ENCOUNTER — LAB ENCOUNTER (OUTPATIENT)
Dept: LAB | Age: 44
End: 2019-05-14
Attending: COLON & RECTAL SURGERY
Payer: MEDICAID

## 2019-05-14 DIAGNOSIS — R19.7 DIARRHEA, UNSPECIFIED TYPE: ICD-10-CM

## 2019-05-14 PROCEDURE — 87329 GIARDIA AG IA: CPT

## 2019-05-14 PROCEDURE — 87427 SHIGA-LIKE TOXIN AG IA: CPT

## 2019-05-14 PROCEDURE — 87209 SMEAR COMPLEX STAIN: CPT

## 2019-05-14 PROCEDURE — 87045 FECES CULTURE AEROBIC BACT: CPT

## 2019-05-14 PROCEDURE — 87493 C DIFF AMPLIFIED PROBE: CPT

## 2019-05-14 PROCEDURE — 87046 STOOL CULTR AEROBIC BACT EA: CPT

## 2019-05-14 PROCEDURE — 87272 CRYPTOSPORIDIUM AG IF: CPT

## 2019-05-14 PROCEDURE — 87177 OVA AND PARASITES SMEARS: CPT

## 2019-05-15 ENCOUNTER — TELEPHONE (OUTPATIENT)
Dept: SURGERY | Facility: CLINIC | Age: 44
End: 2019-05-15

## 2019-05-15 PROBLEM — A04.8 H. PYLORI INFECTION: Status: ACTIVE | Noted: 2019-05-15

## 2019-05-16 ENCOUNTER — TELEPHONE (OUTPATIENT)
Dept: SURGERY | Facility: CLINIC | Age: 44
End: 2019-05-16

## 2019-05-16 RX ORDER — LANSOPRAZOLE, AMOXICILLIN, CLARITHROMYCIN 30-500-500
1 KIT ORAL 2 TIMES DAILY
Qty: 1 KIT | Refills: 0 | Status: SHIPPED | OUTPATIENT
Start: 2019-05-16 | End: 2019-05-30

## 2019-05-16 NOTE — PROGRESS NOTES
Phoned pt, notified of test results per Dr. Kristen Wiggins. prevpac sent to pts pharmacy. Pt verbalizes understanding.

## 2019-05-16 NOTE — TELEPHONE ENCOUNTER
Phoned pt, notiied of test results per Dr. Jeanne Barthel. Order for prevpac sent to pts pharmacy. Pt verbalizes understanding.

## 2019-05-24 ENCOUNTER — ANESTHESIA (OUTPATIENT)
Dept: ENDOSCOPY | Facility: HOSPITAL | Age: 44
End: 2019-05-24

## 2019-05-24 ENCOUNTER — ANESTHESIA EVENT (OUTPATIENT)
Dept: ENDOSCOPY | Facility: HOSPITAL | Age: 44
End: 2019-05-24

## 2019-05-24 ENCOUNTER — HOSPITAL ENCOUNTER (OUTPATIENT)
Facility: HOSPITAL | Age: 44
Setting detail: HOSPITAL OUTPATIENT SURGERY
Discharge: HOME | End: 2019-05-24
Attending: COLON & RECTAL SURGERY | Admitting: COLON & RECTAL SURGERY
Payer: MEDICAID

## 2019-05-24 VITALS
DIASTOLIC BLOOD PRESSURE: 78 MMHG | HEIGHT: 59 IN | BODY MASS INDEX: 40.32 KG/M2 | RESPIRATION RATE: 18 BRPM | HEART RATE: 78 BPM | TEMPERATURE: 99 F | WEIGHT: 200 LBS | OXYGEN SATURATION: 100 % | SYSTOLIC BLOOD PRESSURE: 98 MMHG

## 2019-05-24 DIAGNOSIS — R19.8 ALTERNATING CONSTIPATION AND DIARRHEA: ICD-10-CM

## 2019-05-24 PROCEDURE — 0DB98ZX EXCISION OF DUODENUM, VIA NATURAL OR ARTIFICIAL OPENING ENDOSCOPIC, DIAGNOSTIC: ICD-10-PCS | Performed by: COLON & RECTAL SURGERY

## 2019-05-24 PROCEDURE — 0DBB8ZX EXCISION OF ILEUM, VIA NATURAL OR ARTIFICIAL OPENING ENDOSCOPIC, DIAGNOSTIC: ICD-10-PCS | Performed by: COLON & RECTAL SURGERY

## 2019-05-24 PROCEDURE — 0DB48ZX EXCISION OF ESOPHAGOGASTRIC JUNCTION, VIA NATURAL OR ARTIFICIAL OPENING ENDOSCOPIC, DIAGNOSTIC: ICD-10-PCS | Performed by: COLON & RECTAL SURGERY

## 2019-05-24 PROCEDURE — 0DB78ZX EXCISION OF STOMACH, PYLORUS, VIA NATURAL OR ARTIFICIAL OPENING ENDOSCOPIC, DIAGNOSTIC: ICD-10-PCS | Performed by: COLON & RECTAL SURGERY

## 2019-05-24 PROCEDURE — 88305 TISSUE EXAM BY PATHOLOGIST: CPT | Performed by: COLON & RECTAL SURGERY

## 2019-05-24 PROCEDURE — 81025 URINE PREGNANCY TEST: CPT | Performed by: COLON & RECTAL SURGERY

## 2019-05-24 PROCEDURE — 0DBM8ZX EXCISION OF DESCENDING COLON, VIA NATURAL OR ARTIFICIAL OPENING ENDOSCOPIC, DIAGNOSTIC: ICD-10-PCS | Performed by: COLON & RECTAL SURGERY

## 2019-05-24 PROCEDURE — 0DBK8ZX EXCISION OF ASCENDING COLON, VIA NATURAL OR ARTIFICIAL OPENING ENDOSCOPIC, DIAGNOSTIC: ICD-10-PCS | Performed by: COLON & RECTAL SURGERY

## 2019-05-24 PROCEDURE — 99152 MOD SED SAME PHYS/QHP 5/>YRS: CPT | Performed by: COLON & RECTAL SURGERY

## 2019-05-24 PROCEDURE — 99153 MOD SED SAME PHYS/QHP EA: CPT | Performed by: COLON & RECTAL SURGERY

## 2019-05-24 RX ORDER — SODIUM CHLORIDE, SODIUM LACTATE, POTASSIUM CHLORIDE, CALCIUM CHLORIDE 600; 310; 30; 20 MG/100ML; MG/100ML; MG/100ML; MG/100ML
INJECTION, SOLUTION INTRAVENOUS CONTINUOUS
Status: DISCONTINUED | OUTPATIENT
Start: 2019-05-24 | End: 2019-05-24

## 2019-05-24 NOTE — ANESTHESIA PREPROCEDURE EVALUATION
PRE-OP EVALUATION    Patient Name: Chalo Gallardo    Pre-op Diagnosis: Alternating constipation and diarrhea [R19.8]    Procedure(s):  COLONOSCOPY, ESOPHAGOGASTRODUODENOSCOPY  ESOPHAGOGASTRODUODENOSCOPY  With Biopsies    Surgeon(s) and Role:     * Ghazal, Vi Past Surgical History:   Procedure Laterality Date   •       , , ,    •  SECTION N/A 2013    Performed by Sunita Campos MD at [facility] L+D OR   • COLONOSCOPY N/A 2014    Performed by Vipul Pacheco MD a surgeon and patient. Plan/risks discussed with: patient            Discussed MAC anesthesia with patient. Discussed risks including but not limited to perioperative awareness, conversion to general anesthesia and risks associated with GA.   PT underst

## 2019-05-24 NOTE — H&P
Active Problems      1. Diarrhea, unspecified type    2. H. pylori infection    3. Morbid obesity with BMI of 40.0-44.9, adult (HCC)    4. Umbilical pain    5. Ventral hernia without obstruction or gangrene    6. Alternating constipation and diarrhea    7. The patient has not been diagnosed with Crohn's disease, or ulcerative colitis. The patient has not been diagnosed with irritable bowel syndrome.     The patient is not on any blood thinners. The patient does not have a history of cardiac procedures.  The • HERNIA VENTRAL REPAIR N/A 11/20/2013     Performed by Franco Gaines MD at [address] [facility] OR   • LAPAROSCOPIC CHOLECYSTECTOMY   2 weeks ago   • SCARLETT NEEDLE LOCALIZATION W/ SPECIMEN 1 SITE LEFT   2008     Benign   • SCARLETT NEEDLE LOCALIZATION W/ SPECIMEN 1 SITE [facility] Constitutional: Negative for chills, diaphoresis, fatigue, fever and unexpected weight change. HENT: Negative for hearing loss, nosebleeds, sore throat and trouble swallowing. Respiratory: Negative for apnea, cough, shortness of breath and wheezing. Abdominal exam: At the Epigastric region, the patient has a ventral hernia that is 5 cm on the sack, it is tender. At the [address] there is a 2 cm hernia that is non-tender. She has a Pfannenstiel incision without any evidence of a hernia.  She is morbidly She has a previous ventral hernia that remains, tender on palpation. We have decided to delay treatment because the patient desires to be pregnant. I explained that there is a high risk of recurrence of her hernia if she were to become pregnant.  We will de

## 2019-05-24 NOTE — OPERATIVE REPORT
[facility]  Operative Note    Lexine Pulling Location: OR   CSN [id number] MRN [id number]    10/10/1975 Age 37year old   Admission Date 2019 Operation Date 2019   Attending Physician Hollis Nguyen MD Operating Physician Bryon Roper suite and placed in the left lateral decubitus position. Monitored anesthesia care was administered. A bite block was placed. A time-out was performed.     The lubricated gastroscope was inserted into the mouth via the bite block and carefully navigated john suctioned from the colon and the scope removed, terminating the procedure. Monitored anesthesia care was terminated and the patient transported to the recovery unit in good condition.  The patient tolerated the procedure well without apparent intraoperat

## 2019-05-24 NOTE — ANESTHESIA POSTPROCEDURE EVALUATION
[facility] Patient Status:  Hospital Outpatient Surgery   Age/Gender 37year old female MRN [id number]   Location [facility]3]. Attending Parth Hayes MD   Hosp Day # 0 PCP Jason Wong MD       Anesthesia Post

## 2019-06-03 ENCOUNTER — TELEPHONE (OUTPATIENT)
Dept: SURGERY | Facility: CLINIC | Age: 44
End: 2019-06-03

## 2019-06-03 NOTE — TELEPHONE ENCOUNTER
Patient calling requesting results for EGD and Cscope performed by DASHA. Per Dr. Renan Arango results indicate gastritis and patient to begin omeprazole 20mg daily. States negative for H. Pylori however patient to f/u with DJP.   Offered to make patient appt cox

## 2019-06-03 NOTE — TELEPHONE ENCOUNTER
Patient calling requesting EGD and c-scope results. Informed patient we require an OV for f/u with the doctor to review. She stated she is unable to get off work and requesting Dr. Renan Arango call her. Paperwork placed on his desk.

## 2019-06-04 ENCOUNTER — TELEPHONE (OUTPATIENT)
Dept: SURGERY | Facility: CLINIC | Age: 44
End: 2019-06-04

## 2019-06-04 NOTE — TELEPHONE ENCOUNTER
Pharmacy calling to clarify on omeprazole.   Pharmacy was informed to dispense omeprazole 20mg tablet -- regular by guillaume العلي

## 2019-08-27 ENCOUNTER — APPOINTMENT (OUTPATIENT)
Dept: CT IMAGING | Facility: HOSPITAL | Age: 44
End: 2019-08-27
Attending: EMERGENCY MEDICINE
Payer: MEDICAID

## 2019-08-27 ENCOUNTER — APPOINTMENT (OUTPATIENT)
Dept: GENERAL RADIOLOGY | Facility: HOSPITAL | Age: 44
End: 2019-08-27
Attending: EMERGENCY MEDICINE
Payer: MEDICAID

## 2019-08-27 ENCOUNTER — HOSPITAL ENCOUNTER (EMERGENCY)
Facility: HOSPITAL | Age: 44
Discharge: HOME | End: 2019-08-27
Attending: EMERGENCY MEDICINE
Payer: MEDICAID

## 2019-08-27 ENCOUNTER — APPOINTMENT (OUTPATIENT)
Dept: CV DIAGNOSTICS | Facility: HOSPITAL | Age: 44
End: 2019-08-27
Attending: EMERGENCY MEDICINE
Payer: MEDICAID

## 2019-08-27 VITALS
HEART RATE: 90 BPM | HEIGHT: 59 IN | BODY MASS INDEX: 41.93 KG/M2 | WEIGHT: 208 LBS | DIASTOLIC BLOOD PRESSURE: 79 MMHG | OXYGEN SATURATION: 97 % | RESPIRATION RATE: 20 BRPM | SYSTOLIC BLOOD PRESSURE: 108 MMHG | TEMPERATURE: 98 F

## 2019-08-27 DIAGNOSIS — R07.89 CHEST PAIN, ATYPICAL: Primary | ICD-10-CM

## 2019-08-27 DIAGNOSIS — R20.2 PARESTHESIAS: ICD-10-CM

## 2019-08-27 DIAGNOSIS — R51.9 HEADACHE DISORDER: ICD-10-CM

## 2019-08-27 LAB
ALBUMIN SERPL-MCNC: 3.9 G/DL (ref 3.4–5)
ALBUMIN/GLOB SERPL: 1.1 {RATIO} (ref 1–2)
ALP LIVER SERPL-CCNC: 63 U/L (ref 37–98)
ALT SERPL-CCNC: 52 U/L (ref 13–56)
ANION GAP SERPL CALC-SCNC: 6 MMOL/L (ref 0–18)
AST SERPL-CCNC: 31 U/L (ref 15–37)
ATRIAL RATE: 88 BPM
BASOPHILS # BLD AUTO: 0.04 X10(3) UL (ref 0–0.2)
BASOPHILS NFR BLD AUTO: 0.5 %
BILIRUB SERPL-MCNC: 0.5 MG/DL (ref 0.1–2)
BUN BLD-MCNC: 12 MG/DL (ref 7–18)
BUN/CREAT SERPL: 13.8 (ref 10–20)
CALCIUM BLD-MCNC: 8.6 MG/DL (ref 8.5–10.1)
CHLORIDE SERPL-SCNC: 106 MMOL/L (ref 98–112)
CO2 SERPL-SCNC: 28 MMOL/L (ref 21–32)
CREAT BLD-MCNC: 0.87 MG/DL (ref 0.55–1.02)
D-DIMER: 0.34 UG/ML FEU (ref ?–0.5)
DEPRECATED RDW RBC AUTO: 42.7 FL (ref 35.1–46.3)
EOSINOPHIL # BLD AUTO: 0.22 X10(3) UL (ref 0–0.7)
EOSINOPHIL NFR BLD AUTO: 2.8 %
ERYTHROCYTE [DISTWIDTH] IN BLOOD BY AUTOMATED COUNT: 12.8 % (ref 11–15)
GLOBULIN PLAS-MCNC: 3.4 G/DL (ref 2.8–4.4)
GLUCOSE BLD-MCNC: 114 MG/DL (ref 70–99)
HCT VFR BLD AUTO: 42.2 % (ref 35–48)
HGB BLD-MCNC: 14.2 G/DL (ref 12–16)
IMM GRANULOCYTES # BLD AUTO: 0.03 X10(3) UL (ref 0–1)
IMM GRANULOCYTES NFR BLD: 0.4 %
LYMPHOCYTES # BLD AUTO: 1.68 X10(3) UL (ref 1–4)
LYMPHOCYTES NFR BLD AUTO: 21.6 %
M PROTEIN MFR SERPL ELPH: 7.3 G/DL (ref 6.4–8.2)
MCH RBC QN AUTO: 31.1 PG (ref 26–34)
MCHC RBC AUTO-ENTMCNC: 33.6 G/DL (ref 31–37)
MCV RBC AUTO: 92.3 FL (ref 80–100)
MONOCYTES # BLD AUTO: 0.49 X10(3) UL (ref 0.1–1)
MONOCYTES NFR BLD AUTO: 6.3 %
NEUTROPHILS # BLD AUTO: 5.32 X10 (3) UL (ref 1.5–7.7)
NEUTROPHILS # BLD AUTO: 5.32 X10(3) UL (ref 1.5–7.7)
NEUTROPHILS NFR BLD AUTO: 68.4 %
OSMOLALITY SERPL CALC.SUM OF ELEC: 291 MOSM/KG (ref 275–295)
P AXIS: 31 DEGREES
P-R INTERVAL: 164 MS
PLATELET # BLD AUTO: 255 10(3)UL (ref 150–450)
POTASSIUM SERPL-SCNC: 3.6 MMOL/L (ref 3.5–5.1)
Q-T INTERVAL: 398 MS
QRS DURATION: 98 MS
QTC CALCULATION (BEZET): 481 MS
R AXIS: 0 DEGREES
RBC # BLD AUTO: 4.57 X10(6)UL (ref 3.8–5.3)
SODIUM SERPL-SCNC: 140 MMOL/L (ref 136–145)
T AXIS: -5 DEGREES
TROPONIN I SERPL-MCNC: <0.045 NG/ML (ref ?–0.04)
VENTRICULAR RATE: 88 BPM
WBC # BLD AUTO: 7.8 X10(3) UL (ref 4–11)

## 2019-08-27 PROCEDURE — 85025 COMPLETE CBC W/AUTO DIFF WBC: CPT | Performed by: EMERGENCY MEDICINE

## 2019-08-27 PROCEDURE — 70450 CT HEAD/BRAIN W/O DYE: CPT | Performed by: EMERGENCY MEDICINE

## 2019-08-27 PROCEDURE — 93010 ELECTROCARDIOGRAM REPORT: CPT

## 2019-08-27 PROCEDURE — 80053 COMPREHEN METABOLIC PANEL: CPT | Performed by: EMERGENCY MEDICINE

## 2019-08-27 PROCEDURE — 84484 ASSAY OF TROPONIN QUANT: CPT | Performed by: EMERGENCY MEDICINE

## 2019-08-27 PROCEDURE — 93018 CV STRESS TEST I&R ONLY: CPT | Performed by: EMERGENCY MEDICINE

## 2019-08-27 PROCEDURE — 85379 FIBRIN DEGRADATION QUANT: CPT | Performed by: EMERGENCY MEDICINE

## 2019-08-27 PROCEDURE — 99284 EMERGENCY DEPT VISIT MOD MDM: CPT

## 2019-08-27 PROCEDURE — 93350 STRESS TTE ONLY: CPT | Performed by: EMERGENCY MEDICINE

## 2019-08-27 PROCEDURE — 93017 CV STRESS TEST TRACING ONLY: CPT | Performed by: EMERGENCY MEDICINE

## 2019-08-27 PROCEDURE — 71045 X-RAY EXAM CHEST 1 VIEW: CPT | Performed by: EMERGENCY MEDICINE

## 2019-08-27 PROCEDURE — 99285 EMERGENCY DEPT VISIT HI MDM: CPT

## 2019-08-27 PROCEDURE — 36415 COLL VENOUS BLD VENIPUNCTURE: CPT

## 2019-08-27 PROCEDURE — 93005 ELECTROCARDIOGRAM TRACING: CPT

## 2019-08-27 RX ORDER — ACETAMINOPHEN 500 MG
1000 TABLET ORAL ONCE
Status: COMPLETED | OUTPATIENT
Start: 2019-08-27 | End: 2019-08-27

## 2019-08-27 NOTE — ED PROVIDER NOTES
Patient Seen in: [facility] Emergency Department    History   Patient presents with:  Chest Pain Angina (cardiovascular)    Stated Complaint: chest pain    HPI    Patient is a 39-year-old female percents to ED for evaluation of multiple complaints. Herb Huang MD at [facility] L+D OR   • COLONOSCOPY N/A 5/24/2019    Performed by Aubrey Sheldon MD at [facility] ENDOSCOPY   • COLONOSCOPY N/A 7/25/2014    Performed by Aida Hagan MD at [facility] ENDOSCOPY   • ESOPHAGOGASTRODUODENOSCOPY (EGD) N/A 5/24/2019 midline, no lymphadenopathy. LUNG: Lungs clear to auscultation bilaterally, no wheezing, no rales, no rhonchi. CARDIOVASCULAR: Regular rate and rhythm. Normal S1S2. No S3S4 or murmur.   Patient has reproducible tenderness underneath the left breast  AB -----------         ------                     CBC W/ DIFFERENTIAL[[id number]]                              Final result                 Please view results for these tests on the individual orders.    RAINBOW DRAW BLUE   RAINBOW DRAW LAVENDER XR CHEST PA + LAT CHEST (CPT=71046), 9/01/2018, 14:20. [place], XR CHEST PA + LAT CHEST (RVT=[id number]), 5/23/2018, 12:49.   INDICATIONS:  chest pain  PATIENT STATED HISTORY: (As transcribed by Technologist)  Patient with c/o left sided chest pain that bega the ER for worsening, concerning, new, changing or persisting symptoms. I discussed the case with the patient and they had no questions, complaints, or concerns. Patient felt comfortable going home.                   Disposition and Plan     Clinical Impr

## 2019-08-27 NOTE — ED INITIAL ASSESSMENT (HPI)
Patient with c/o left sided chest pain that began yesterday. Patient states she has left hand tingling intermittently. Also c/o left sided headache and neck pain last night, took 800 mg of ibuprofen with little relief. Also states she has blurred vision.

## 2019-08-27 NOTE — PROGRESS NOTES
PRELIMINARYCARDIACDIAGNOSTICS STRESS TESTING RESULTS      ERSE ordered by Dr. Debi Varner, with S Cardiology to interpret. Resting EKG NSR. Resting vitals 126/88, 85. Definity enhancement utilized.     The patient walked 7:30 on standard [place] pro

## 2019-09-03 ENCOUNTER — OFFICE VISIT (OUTPATIENT)
Dept: SURGERY | Facility: CLINIC | Age: 44
End: 2019-09-03

## 2019-09-03 VITALS — HEART RATE: 95 BPM | SYSTOLIC BLOOD PRESSURE: 117 MMHG | TEMPERATURE: 100 F | DIASTOLIC BLOOD PRESSURE: 86 MMHG

## 2019-09-03 DIAGNOSIS — Z98.891 PREVIOUS CESAREAN SECTION: ICD-10-CM

## 2019-09-03 DIAGNOSIS — K43.9 VENTRAL HERNIA WITHOUT OBSTRUCTION OR GANGRENE: Primary | ICD-10-CM

## 2019-09-03 DIAGNOSIS — R19.8 ALTERNATING CONSTIPATION AND DIARRHEA: ICD-10-CM

## 2019-09-03 DIAGNOSIS — E66.01 MORBID OBESITY WITH BMI OF 40.0-44.9, ADULT (HCC): ICD-10-CM

## 2019-09-03 PROCEDURE — 99214 OFFICE O/P EST MOD 30 MIN: CPT | Performed by: COLON & RECTAL SURGERY

## 2019-09-03 NOTE — PROGRESS NOTES
Follow Up Visit Note       Active Problems      1. Ventral hernia without obstruction or gangrene    2. Alternating constipation and diarrhea    3. Morbid obesity with BMI of 40.0-44.9, adult ([facility] [address]    4.  Previous  section          Chief Complaint with the note as scribed by the PA  I performed my own physical exam and obtained the history as detailed above.   I have made all appropriate changes in documentation as necessary  I attest to the accuracy of this note as detailed above    Beverly [address] 1 SITE RIGHT (CPT=19281)  2008    Benign   • OTHER      baker cyst    • OTHER      bunion bilateral - right foot metal   • REDUCTION LEFT  1999   • REDUCTION RIGHT  1999   • REMOVAL GALLBLADDER         The family history and social history have been review urinating, dysuria, frequency and urgency. Musculoskeletal: Negative for arthralgias and myalgias. Skin: Negative for color change and rash. Neurological: Negative for tremors, syncope and weakness. Hematological: Negative for adenopathy.  Does not EGD and colonoscopy with Dr. Charles Boogie on May 24, 2019. This demonstrated mild gastritis. Multiple biopsies were obtained. These did not demonstrate any pathologic abnormalities.   The patient was recommended to take a course of omeprazole due to the gastrit We will prescribe the patient a 3-month course of Prevacid. The patient should take this to treat her gastritis. Secondly, the patient is having paresthesias of the left side with occasional blurry vision.   The patient was seen in the emergency depart

## 2019-09-04 NOTE — PATIENT INSTRUCTIONS
This patient presents today for continued evaluation and treatment regarding diarrhea and constipation. The patient underwent an EGD and colonoscopy with Dr. Haroldo Lim on May 24, 2019. This demonstrated mild gastritis. Multiple biopsies were obtained.   Trice Peoples patient's EGD and colonoscopy findings with her. The patient did have mild symptomatic improvement with a short course of omeprazole. We will prescribe the patient a 3-month course of Prevacid. The patient should take this to treat her gastritis.     Sec

## 2019-09-05 RX ORDER — LANSOPRAZOLE 30 MG/1
30 CAPSULE, DELAYED RELEASE ORAL
Qty: 30 CAPSULE | Refills: 2 | Status: SHIPPED | OUTPATIENT
Start: 2019-09-05 | End: 2019-10-30

## 2019-09-06 ENCOUNTER — TELEPHONE (OUTPATIENT)
Dept: CARDIOLOGY | Age: 44
End: 2019-09-06

## 2019-09-11 ENCOUNTER — TELEPHONE (OUTPATIENT)
Dept: CARDIOLOGY | Age: 44
End: 2019-09-11

## 2019-09-11 ENCOUNTER — OFFICE VISIT (OUTPATIENT)
Dept: CARDIOLOGY | Age: 44
End: 2019-09-11

## 2019-09-11 VITALS
HEIGHT: 59 IN | DIASTOLIC BLOOD PRESSURE: 62 MMHG | SYSTOLIC BLOOD PRESSURE: 90 MMHG | BODY MASS INDEX: 46.57 KG/M2 | HEART RATE: 72 BPM | WEIGHT: 231 LBS

## 2019-09-11 DIAGNOSIS — H53.8 BLURRED VISION: ICD-10-CM

## 2019-09-11 DIAGNOSIS — R07.2 PRECORDIAL PAIN: Primary | ICD-10-CM

## 2019-09-11 PROCEDURE — 99204 OFFICE O/P NEW MOD 45 MIN: CPT | Performed by: INTERNAL MEDICINE

## 2019-09-11 RX ORDER — ALBUTEROL SULFATE 2.5 MG/3ML
2.5 SOLUTION RESPIRATORY (INHALATION)
COMMUNITY
Start: 2019-04-02

## 2019-09-11 RX ORDER — OMEPRAZOLE 20 MG/1
CAPSULE, DELAYED RELEASE ORAL
COMMUNITY
Start: 2019-07-01 | End: 2020-06-01

## 2019-09-11 ASSESSMENT — PATIENT HEALTH QUESTIONNAIRE - PHQ9
SUM OF ALL RESPONSES TO PHQ9 QUESTIONS 1 AND 2: 0
1. LITTLE INTEREST OR PLEASURE IN DOING THINGS: NOT AT ALL
SUM OF ALL RESPONSES TO PHQ9 QUESTIONS 1 AND 2: 0
2. FEELING DOWN, DEPRESSED OR HOPELESS: NOT AT ALL

## 2019-10-16 ENCOUNTER — APPOINTMENT (OUTPATIENT)
Dept: GENERAL RADIOLOGY | Age: 44
End: 2019-10-16
Attending: FAMILY MEDICINE
Payer: MEDICAID

## 2019-10-16 ENCOUNTER — HOSPITAL ENCOUNTER (OUTPATIENT)
Age: 44
Discharge: HOME | End: 2019-10-16
Attending: FAMILY MEDICINE
Payer: MEDICAID

## 2019-10-16 VITALS
RESPIRATION RATE: 20 BRPM | OXYGEN SATURATION: 98 % | SYSTOLIC BLOOD PRESSURE: 128 MMHG | HEART RATE: 93 BPM | TEMPERATURE: 99 F | DIASTOLIC BLOOD PRESSURE: 90 MMHG

## 2019-10-16 DIAGNOSIS — J45.901 ASTHMA EXACERBATION, MILD: ICD-10-CM

## 2019-10-16 DIAGNOSIS — J06.9 UPPER RESPIRATORY TRACT INFECTION, UNSPECIFIED TYPE: ICD-10-CM

## 2019-10-16 DIAGNOSIS — S63.502A SPRAIN OF LEFT WRIST, INITIAL ENCOUNTER: Primary | ICD-10-CM

## 2019-10-16 PROCEDURE — 73110 X-RAY EXAM OF WRIST: CPT | Performed by: FAMILY MEDICINE

## 2019-10-16 PROCEDURE — 99213 OFFICE O/P EST LOW 20 MIN: CPT

## 2019-10-16 PROCEDURE — 99214 OFFICE O/P EST MOD 30 MIN: CPT

## 2019-10-16 RX ORDER — PREDNISONE 20 MG/1
40 TABLET ORAL DAILY
Qty: 10 TABLET | Refills: 0 | Status: SHIPPED | OUTPATIENT
Start: 2019-10-16 | End: 2019-10-21

## 2019-10-16 RX ORDER — ALBUTEROL SULFATE 90 UG/1
2 AEROSOL, METERED RESPIRATORY (INHALATION) EVERY 6 HOURS PRN
Qty: 1 INHALER | Refills: 0 | Status: SHIPPED | OUTPATIENT
Start: 2019-10-16 | End: 2020-11-13

## 2019-10-16 NOTE — ED INITIAL ASSESSMENT (HPI)
Patient states she fell 2 days ago and landed on her left hand while attempting to stop her fall. C/O left wrist and distal forearm pain. Also c/o runny nose and cough for 2 days.

## 2019-10-17 NOTE — ED PROVIDER NOTES
Patient Seen in: [address] [facility]      History   Patient presents with:  Wrist Injury  Cough  Runny Nose    Stated Complaint: L. Arm Injury    HPI    53year old female presents for left wrist injury and URI symptoms.  States tripped and fel OTHER      baker cyst    • OTHER      bunion bilateral - right foot metal   • REDUCTION LEFT  1999   • REDUCTION RIGHT  1999   • REMOVAL GALLBLADDER                  Family history reviewed with patient/caregiver and is not pertinent to presenting problem. Behavior normal.         Thought Content: Thought content normal.         ED Course   Labs Reviewed - No data to display    MDM     Patient presents for left wrist injury and URI symptoms.  X ray left wrist-   FINDINGS:  No fracture, dislocation or osseous

## 2019-10-30 ENCOUNTER — OFFICE VISIT (OUTPATIENT)
Dept: FAMILY MEDICINE CLINIC | Facility: CLINIC | Age: 44
End: 2019-10-30
Payer: MEDICAID

## 2019-10-30 VITALS
TEMPERATURE: 98 F | HEIGHT: 59 IN | DIASTOLIC BLOOD PRESSURE: 72 MMHG | BODY MASS INDEX: 47.37 KG/M2 | RESPIRATION RATE: 20 BRPM | WEIGHT: 235 LBS | HEART RATE: 82 BPM | OXYGEN SATURATION: 97 % | SYSTOLIC BLOOD PRESSURE: 122 MMHG

## 2019-10-30 DIAGNOSIS — H66.92 LEFT OTITIS MEDIA, UNSPECIFIED OTITIS MEDIA TYPE: Primary | ICD-10-CM

## 2019-10-30 DIAGNOSIS — J45.31 MILD PERSISTENT ASTHMA WITH EXACERBATION: ICD-10-CM

## 2019-10-30 DIAGNOSIS — R21 RASH: ICD-10-CM

## 2019-10-30 PROCEDURE — 99204 OFFICE O/P NEW MOD 45 MIN: CPT | Performed by: FAMILY MEDICINE

## 2019-10-30 RX ORDER — PREDNISONE 10 MG/1
TABLET ORAL
Qty: 30 TABLET | Refills: 0 | Status: SHIPPED | OUTPATIENT
Start: 2019-10-30 | End: 2019-11-13

## 2019-10-30 RX ORDER — AMOXICILLIN 875 MG/1
875 TABLET, COATED ORAL 2 TIMES DAILY
Qty: 20 TABLET | Refills: 0 | Status: SHIPPED | OUTPATIENT
Start: 2019-10-30 | End: 2019-11-09

## 2019-10-30 RX ORDER — PREDNISONE 10 MG/1
TABLET ORAL
Qty: 30 TABLET | Refills: 0 | Status: SHIPPED | OUTPATIENT
Start: 2019-10-30 | End: 2019-10-30

## 2019-10-30 RX ORDER — AMOXICILLIN 875 MG/1
875 TABLET, COATED ORAL 2 TIMES DAILY
Qty: 20 TABLET | Refills: 0 | Status: SHIPPED | OUTPATIENT
Start: 2019-10-30 | End: 2019-10-30

## 2019-10-30 NOTE — PROGRESS NOTES
Patient presents with:  Cough: wheezing  Rash       HPI:    Patient ID: Jet Zarate is a 53year old female. Was seen in [address] [place] [address] on 10/16 for asthma exac. Completed prednisone. Sx recurred after completing steroids. Did not use albuterol before.  Started usin states \"seizure was from Dye\"       Past Surgical History:   Procedure Laterality Date   •       , , ,    •  SECTION N/A 2013    Performed by Kalli Esquivel MD at [facility] L+D OR   • COLONOSCOPY N/A 2019    Performed heard.  Pulmonary/Chest: Effort normal. She has wheezes (b/l scattered). Right breast exhibits no skin change and no tenderness. Abdominal: Soft. There is no tenderness. Lymphadenopathy:     She has cervical adenopathy.      She has no axillary adenopat

## 2019-11-05 ENCOUNTER — TELEPHONE (OUTPATIENT)
Dept: FAMILY MEDICINE CLINIC | Facility: CLINIC | Age: 44
End: 2019-11-05

## 2019-11-05 RX ORDER — FLUCONAZOLE 150 MG/1
150 TABLET ORAL ONCE
Qty: 1 TABLET | Refills: 0 | Status: SHIPPED | OUTPATIENT
Start: 2019-11-05 | End: 2019-11-05

## 2019-11-05 NOTE — TELEPHONE ENCOUNTER
Call from patient. States she was recently prescribed an antibiotic for for an ear infection and now is having vaginal itching and cottage cheese discharge. Wanted to know if something could be called in? Advised DB out of office and will return tomorrow.

## 2019-11-05 NOTE — TELEPHONE ENCOUNTER
Pt believes she has yeast infection from antiobiotic given to her for ear infection. Asking for script to be called in to [facility], [place].

## 2019-11-11 PROBLEM — R07.2 PRECORDIAL PAIN: Status: ACTIVE | Noted: 2019-11-11

## 2019-11-11 PROBLEM — H53.8 BLURRED VISION: Status: ACTIVE | Noted: 2019-11-11

## 2019-11-13 ENCOUNTER — OFFICE VISIT (OUTPATIENT)
Dept: FAMILY MEDICINE CLINIC | Facility: CLINIC | Age: 44
End: 2019-11-13
Payer: MEDICAID

## 2019-11-13 VITALS
BODY MASS INDEX: 47.58 KG/M2 | TEMPERATURE: 98 F | RESPIRATION RATE: 20 BRPM | WEIGHT: 236 LBS | HEIGHT: 59 IN | OXYGEN SATURATION: 97 % | HEART RATE: 86 BPM | DIASTOLIC BLOOD PRESSURE: 70 MMHG | SYSTOLIC BLOOD PRESSURE: 112 MMHG

## 2019-11-13 DIAGNOSIS — Z23 NEED FOR VACCINATION: ICD-10-CM

## 2019-11-13 DIAGNOSIS — J45.20 MILD INTERMITTENT ASTHMA WITHOUT COMPLICATION: ICD-10-CM

## 2019-11-13 DIAGNOSIS — Z00.00 ANNUAL PHYSICAL EXAM: Primary | ICD-10-CM

## 2019-11-13 DIAGNOSIS — Z11.1 PPD SCREENING TEST: ICD-10-CM

## 2019-11-13 PROCEDURE — 90471 IMMUNIZATION ADMIN: CPT | Performed by: FAMILY MEDICINE

## 2019-11-13 PROCEDURE — 86580 TB INTRADERMAL TEST: CPT | Performed by: FAMILY MEDICINE

## 2019-11-13 PROCEDURE — 90686 IIV4 VACC NO PRSV 0.5 ML IM: CPT | Performed by: FAMILY MEDICINE

## 2019-11-13 PROCEDURE — 99396 PREV VISIT EST AGE 40-64: CPT | Performed by: FAMILY MEDICINE

## 2019-11-13 NOTE — PROGRESS NOTES
HPI:   Mayer Cranker is a 53year old female who presents for a complete physical exam.  Needs form for work filled out. Needs PPD. Works at  as manager. 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 Seen on 10/30 for asthma exac and ear infection.  Both re infection 2018   • Herpes     last outbreak years ago   • Osteoarthritis     mild in back   • Pneumonia, organism unspecified(486) 1 year ago   • PONV (postoperative nausea and vomiting)     mild   •  labor     pt. had 20 week loss   • Seizure disor pain or palpitations  GI: denies abdominal pain. Denies heartburn. Has regular bowel movements. No blood in stool. : denies dysuria. No discharge or itching.  Periods : reg  MUSCULOSKELETAL: denies back pain  NEURO: denies headaches or dizziness  PSYCHE:

## 2019-11-15 ENCOUNTER — NURSE ONLY (OUTPATIENT)
Dept: FAMILY MEDICINE CLINIC | Facility: CLINIC | Age: 44
End: 2019-11-15
Payer: MEDICAID

## 2019-11-22 ENCOUNTER — OFFICE VISIT (OUTPATIENT)
Dept: OBGYN CLINIC | Facility: CLINIC | Age: 44
End: 2019-11-22
Payer: MEDICAID

## 2019-11-22 VITALS
DIASTOLIC BLOOD PRESSURE: 80 MMHG | BODY MASS INDEX: 46.37 KG/M2 | HEIGHT: 59 IN | WEIGHT: 230 LBS | SYSTOLIC BLOOD PRESSURE: 118 MMHG

## 2019-11-22 DIAGNOSIS — Z12.39 SCREENING FOR MALIGNANT NEOPLASM OF BREAST: ICD-10-CM

## 2019-11-22 DIAGNOSIS — R21 RASH, SKIN: ICD-10-CM

## 2019-11-22 DIAGNOSIS — Z01.419 WELL WOMAN EXAM WITH ROUTINE GYNECOLOGICAL EXAM: Primary | ICD-10-CM

## 2019-11-22 DIAGNOSIS — E66.01 OBESITY, CLASS III, BMI 40-49.9 (MORBID OBESITY) (HCC): ICD-10-CM

## 2019-11-22 DIAGNOSIS — Z12.4 SCREENING FOR MALIGNANT NEOPLASM OF CERVIX: ICD-10-CM

## 2019-11-22 DIAGNOSIS — Z11.3 SCREEN FOR SEXUALLY TRANSMITTED DISEASES: ICD-10-CM

## 2019-11-22 PROBLEM — E66.813 OBESITY, CLASS III, BMI 40-49.9 (MORBID OBESITY): Status: ACTIVE | Noted: 2019-11-22

## 2019-11-22 PROCEDURE — 87591 N.GONORRHOEAE DNA AMP PROB: CPT | Performed by: NURSE PRACTITIONER

## 2019-11-22 PROCEDURE — 88175 CYTOPATH C/V AUTO FLUID REDO: CPT | Performed by: NURSE PRACTITIONER

## 2019-11-22 PROCEDURE — 87491 CHLMYD TRACH DNA AMP PROBE: CPT | Performed by: NURSE PRACTITIONER

## 2019-11-22 PROCEDURE — 87624 HPV HI-RISK TYP POOLED RSLT: CPT | Performed by: NURSE PRACTITIONER

## 2019-11-22 PROCEDURE — 99386 PREV VISIT NEW AGE 40-64: CPT | Performed by: NURSE PRACTITIONER

## 2019-11-22 RX ORDER — HYDROCORTISONE 25 MG/ML
1 LOTION TOPICAL 2 TIMES DAILY
Qty: 59 ML | Refills: 1 | Status: SHIPPED | OUTPATIENT
Start: 2019-11-22 | End: 2020-01-16 | Stop reason: ALTCHOICE

## 2019-11-22 NOTE — PROGRESS NOTES
HPI:   Adam Kwon is a 53year old [id number] who presents for an annual gynecological exam.   Pt is  New to office and would like to establish care.  She had her mammogram done 1-2 months ago at [address] (per pt) and it was normal. Pt will be having gas Cathy Flores MD at [facility] ENDOSCOPY   • HERNIA SURGERY     • HERNIA VENTRAL REPAIR N/A 11/20/2013    Performed by Brock Steel MD at [facility] MAIN OR   • [address]  2 weeks ago   • SCARLETT LOCALIZATION WIRE 1 SITE LEFT (CPT=19281)  2008    [facility] OBESE  EXTREMITIES: No edema  EXTERNAL GENITALIA: Normal appearance for age, no lesions  URETHRA: No masses or tenderness, no prolapse  VAGINA: Normal, physiologic discharge, no lesions   CERVIX: Normal, no lesions, no cervicitis, no cervical motion tender (morbid obesity) ([facility] [address]    Screen for sexually transmitted diseases  -     CHLAMYDIA/GONOCOCCUS, ALBARO; Future    Rash, skin  -     Hydrocortisone 2.5 % External Lotion; Apply 1 Application topically 2 (two) times daily. Apply to affected area.

## 2019-11-23 ENCOUNTER — NURSE ONLY (OUTPATIENT)
Dept: FAMILY MEDICINE CLINIC | Facility: CLINIC | Age: 44
End: 2019-11-23
Payer: MEDICAID

## 2019-11-23 DIAGNOSIS — R73.9 HYPERGLYCEMIA: ICD-10-CM

## 2019-11-23 DIAGNOSIS — Z00.00 ANNUAL PHYSICAL EXAM: ICD-10-CM

## 2019-11-23 PROCEDURE — 80053 COMPREHEN METABOLIC PANEL: CPT | Performed by: FAMILY MEDICINE

## 2019-11-23 PROCEDURE — 85025 COMPLETE CBC W/AUTO DIFF WBC: CPT | Performed by: FAMILY MEDICINE

## 2019-11-23 PROCEDURE — 84443 ASSAY THYROID STIM HORMONE: CPT | Performed by: FAMILY MEDICINE

## 2019-11-23 PROCEDURE — 80061 LIPID PANEL: CPT | Performed by: FAMILY MEDICINE

## 2019-11-23 PROCEDURE — 83036 HEMOGLOBIN GLYCOSYLATED A1C: CPT | Performed by: FAMILY MEDICINE

## 2019-11-27 ENCOUNTER — TELEPHONE (OUTPATIENT)
Dept: FAMILY MEDICINE CLINIC | Facility: CLINIC | Age: 44
End: 2019-11-27

## 2019-11-27 DIAGNOSIS — N92.0 MENORRHAGIA WITH REGULAR CYCLE: Primary | ICD-10-CM

## 2019-11-27 DIAGNOSIS — R87.619 ENDOMETRIAL CELLS ON CERVICAL PAP SMEAR INCONSISTENT W/LMP: ICD-10-CM

## 2019-11-27 DIAGNOSIS — R73.9 HYPERGLYCEMIA: Primary | ICD-10-CM

## 2019-11-27 NOTE — TELEPHONE ENCOUNTER
Pt calling back. Complains of sinus pressure, itchy throat, cough. Symptoms for a few days. Has been taking Nyquil w/o relief. Denies fevers. LOV 11/13/19 for her annual exam.  Please advise.

## 2019-11-27 NOTE — TELEPHONE ENCOUNTER
Pt states she has a head cold, want to know if dr Varghese Bishop will send a rx to pharmacy without being seen.   Please advise

## 2019-11-27 NOTE — PROGRESS NOTES
Patient informed of results. Verbalized understanding. No further questions or concerns. Call transferred to [facility] to schedule.

## 2019-11-27 NOTE — TELEPHONE ENCOUNTER
Rec supportive measures for few days. Fluids, mucinex, etc. She was recently on Ab and don't want to put her on another one unless necessary. Viral illness could have similar sx and need to run its course. Rec monitoring few more days.

## 2019-12-10 ENCOUNTER — NURSE ONLY (OUTPATIENT)
Dept: LAB | Age: 44
End: 2019-12-10
Attending: NURSE PRACTITIONER
Payer: MEDICAID

## 2019-12-10 ENCOUNTER — TELEPHONE (OUTPATIENT)
Dept: OBGYN CLINIC | Facility: CLINIC | Age: 44
End: 2019-12-10

## 2019-12-10 DIAGNOSIS — E66.01 MORBID OBESITY (HCC): Primary | ICD-10-CM

## 2019-12-10 PROCEDURE — 83013 H PYLORI (C-13) BREATH: CPT

## 2019-12-10 NOTE — TELEPHONE ENCOUNTER
Patient is schedule in March 2020 for surgery?, she walked in this morning in [facility] office and asking for a letter for pre op clearance.  Pt was advised that she should see or get it from her pcp but she is saying something got to do with her pap result do

## 2019-12-10 NOTE — TELEPHONE ENCOUNTER
Pt scheduled for gastric bypass surgery in March 2020. Pt states surgeon has requested a letter from our office clearing her for surgery in reference to her abnormal pap results; endometrial cells present on pap. Pt has US scheduled for 1/13/19.   Routed

## 2019-12-10 NOTE — TELEPHONE ENCOUNTER
We can provide this letter, but I would prefer after the US is completed. Want to ensure pt does not have thickened endometrium and//or need for EMB prior to clearing her. This would all be resolved before her March surgery though.

## 2019-12-11 ENCOUNTER — TELEPHONE (OUTPATIENT)
Dept: FAMILY MEDICINE CLINIC | Facility: CLINIC | Age: 44
End: 2019-12-11

## 2019-12-11 NOTE — TELEPHONE ENCOUNTER
----- Message from Linh Choudhury MD sent at 12/11/2019  4:43 PM CST -----  HbA1c increased to 6.2, now in pre-DM range. Trig/LDL also increased. rec lowering with diet, exercise and weight loss. Low fat, low carb, avoid sugars  Rest of BW wnl.  Repeat

## 2019-12-26 NOTE — TELEPHONE ENCOUNTER
Spoke with pt. Stated she received a voicemail and was asking about lab results. Advised pt. Verbalized understanding.

## 2020-01-08 ENCOUNTER — TELEPHONE (OUTPATIENT)
Dept: FAMILY MEDICINE CLINIC | Facility: CLINIC | Age: 45
End: 2020-01-08

## 2020-01-08 DIAGNOSIS — E66.01 MORBID OBESITY (HCC): Primary | ICD-10-CM

## 2020-01-08 NOTE — TELEPHONE ENCOUNTER
Called pt to get more info. She states she will be having gastric bypass surgery in the next few months. The surgeon is requiring she have a sleep study first.  Pt requesting an order for this.   I notified her that we usually refer to our sleep specialis

## 2020-01-08 NOTE — TELEPHONE ENCOUNTER
Pt called stated she is having a sleep study done by [facility] is doing the sleep study so they will be sending over paperwork for Dr. Kanwal Oates. Pt stated she is having gastric bypass surgey in a couple of months.  She was wondering if she needs to schedu

## 2020-01-10 ENCOUNTER — MED REC SCAN ONLY (OUTPATIENT)
Dept: FAMILY MEDICINE CLINIC | Facility: CLINIC | Age: 45
End: 2020-01-10

## 2020-01-14 NOTE — TELEPHONE ENCOUNTER
Pt cancelled her pelvic US with no reschedule date. Please call her and advise she needs to schedule an US.  Thanks

## 2020-01-16 ENCOUNTER — OFFICE VISIT (OUTPATIENT)
Dept: FAMILY MEDICINE CLINIC | Facility: CLINIC | Age: 45
End: 2020-01-16
Payer: MEDICAID

## 2020-01-16 VITALS
SYSTOLIC BLOOD PRESSURE: 120 MMHG | WEIGHT: 221 LBS | HEIGHT: 59 IN | BODY MASS INDEX: 44.55 KG/M2 | TEMPERATURE: 97 F | OXYGEN SATURATION: 98 % | HEART RATE: 80 BPM | DIASTOLIC BLOOD PRESSURE: 82 MMHG

## 2020-01-16 DIAGNOSIS — R06.83 SNORING: ICD-10-CM

## 2020-01-16 DIAGNOSIS — G47.61 PLMD (PERIODIC LIMB MOVEMENT DISORDER): ICD-10-CM

## 2020-01-16 DIAGNOSIS — J45.31 MILD PERSISTENT ASTHMA WITH EXACERBATION: ICD-10-CM

## 2020-01-16 DIAGNOSIS — E66.01 MORBID OBESITY (HCC): Primary | ICD-10-CM

## 2020-01-16 PROCEDURE — 99214 OFFICE O/P EST MOD 30 MIN: CPT | Performed by: NURSE PRACTITIONER

## 2020-01-16 RX ORDER — ERGOCALCIFEROL (VITAMIN D2) 1250 MCG
50000 CAPSULE ORAL WEEKLY
COMMUNITY
Start: 2019-12-30 | End: 2020-02-08 | Stop reason: ALTCHOICE

## 2020-01-16 RX ORDER — MELATONIN
100 DAILY
COMMUNITY
Start: 2019-12-30 | End: 2020-05-29

## 2020-01-16 NOTE — PATIENT INSTRUCTIONS
Schedule a sleep study in [place]. Recheck with Dr. Raeann Leon or Donovan Mercedes about 2 weeks after the study to review the results.      Warned if still with sleep apnea and not using CPAP has 7 fold increased risk and heart attack, stroke, abnormal heart rhythm,

## 2020-01-16 NOTE — PROGRESS NOTES
[facility]        HPI:   This is a 53year old female coming in for Patient presents with:  Sleep Problem: consult      HPI:     Patient is planning to have gastric bypass, but needs a sleep consult first.   No famil unspecified(486) 1 year ago   • PONV (postoperative nausea and vomiting)     mild   •  labor     pt. had 20 week loss   • Seizure disorder [facility]) [id number]    patient states \"seizure was from Dye\"      Past Surgical History:   Procedure Laterality Dispense Refill   • ergocalciferol 1.25 MG (08859 UT) Oral Cap Take 50,000 Int'l Units by mouth once a week. • Thiamine HCl 100 MG Oral Tab Take 100 mg by mouth daily.      • Albuterol Sulfate HFA (PROAIR HFA) 108 (90 Base) MCG/ACT Inhalation Aero Soln Right Ear: External ear normal.   Left Ear: External ear normal.   Nose: Nose normal.   Mouth/Throat: Oropharynx is clear and moist.   Mal 4, tonsils 1   Eyes: Conjunctivae are normal.   Neck: Normal range of motion. Neck supple. No thyromegaly present. night most nights of the week. Recommend weight loss, and maintain and optimal BMI with Exercise 30 minutes most days of the week to target heart rate . Advised patient to change filters,masks,hoses  and tubes and equiptment on a  regular schedule.   Keira Govea

## 2020-01-20 NOTE — TELEPHONE ENCOUNTER
Spoke to pt and informed she will need to have u/s done in order for clearance letter. Advised to call on 1st day of menses so we can get scheduled for after done. Pt verbalized understanding.

## 2020-01-20 NOTE — TELEPHONE ENCOUNTER
PT is waiting for her cycle before she schedules  She is having irregular cycles and was advised to wait after her cycle to schedule.     Can you advise Pt either way

## 2020-01-20 NOTE — TELEPHONE ENCOUNTER
Pt needs to have US done within 1-5 days after menses has stopped. Pt should call office on first day of menses to get an appt with Jen Viramontes in the timeframe that [facility] wants.     Pt will need the ultrasound completed in order for [facility] to give a clearan

## 2020-01-28 ENCOUNTER — TELEPHONE (OUTPATIENT)
Dept: SURGERY | Facility: CLINIC | Age: 45
End: 2020-01-28

## 2020-01-28 NOTE — TELEPHONE ENCOUNTER
Patient called with questions regarding her last visit. States is still having the ongoing diarrhea. Reviewed Dr. Jus Moreno last office visit note with patient. Patient also inquiring regarding her hernia.  Also reviewed Dr. Ric Morgan with her regarding th

## 2020-01-29 ENCOUNTER — TELEPHONE (OUTPATIENT)
Dept: OBGYN CLINIC | Facility: CLINIC | Age: 45
End: 2020-01-29

## 2020-01-29 NOTE — TELEPHONE ENCOUNTER
Rodrick Granados from insurance verification called asking for prior auth for upcoming ultrasound that is scheduled for Friday. Will contact [employer].

## 2020-01-29 NOTE — TELEPHONE ENCOUNTER
Submitted prior auth via [employer] webportal for [phone] and [id number] to be done at [facility]. Approved  [id number]  Pt of [facility].

## 2020-01-30 ENCOUNTER — OFFICE VISIT (OUTPATIENT)
Dept: SLEEP CENTER | Age: 45
End: 2020-01-30
Attending: NURSE PRACTITIONER
Payer: MEDICAID

## 2020-01-30 DIAGNOSIS — R06.83 SNORING: ICD-10-CM

## 2020-01-30 DIAGNOSIS — G47.61 PLMD (PERIODIC LIMB MOVEMENT DISORDER): ICD-10-CM

## 2020-01-30 DIAGNOSIS — J45.31 MILD PERSISTENT ASTHMA WITH EXACERBATION: ICD-10-CM

## 2020-01-30 DIAGNOSIS — E66.01 MORBID OBESITY (HCC): ICD-10-CM

## 2020-01-30 PROCEDURE — 95810 POLYSOM 6/> YRS 4/> PARAM: CPT

## 2020-01-31 ENCOUNTER — HOSPITAL ENCOUNTER (OUTPATIENT)
Dept: ULTRASOUND IMAGING | Age: 45
Discharge: HOME | End: 2020-01-31
Attending: NURSE PRACTITIONER
Payer: MEDICAID

## 2020-01-31 DIAGNOSIS — N92.0 MENORRHAGIA WITH REGULAR CYCLE: ICD-10-CM

## 2020-01-31 DIAGNOSIS — R87.619 ENDOMETRIAL CELLS ON CERVICAL PAP SMEAR INCONSISTENT W/LMP: ICD-10-CM

## 2020-01-31 PROCEDURE — 76830 TRANSVAGINAL US NON-OB: CPT | Performed by: NURSE PRACTITIONER

## 2020-01-31 PROCEDURE — 76856 US EXAM PELVIC COMPLETE: CPT | Performed by: NURSE PRACTITIONER

## 2020-02-03 NOTE — PROCEDURES
[address] [address],[facility] [address]       Accredited by the [facility] of Sleep Medicine (AASM)    PATIENT'S NAME:        Shabana Parker  ATTENDING PHYSICIAN:   Tony Lawson.  MD Keren  REFERRING PHYSICIAN:     PATIENT ACCOUNT #:     [id number] 0 apneas and 81 hypopneas for an apnea-hypopnea index of 11.4, a REM AHI of 27.7, and an overall study RDI of 11.4. The baseline oxygen saturation of this study was 94.8%. The lowest oxygen saturation recorded was 80.6%.       LIMB MOVEMENTS:  There were Patient Medical History: snoring, EDS, difficulty falling asleep  Setup Time began:  8:20  pm  Setup Time ended:   8:50  pm  Lights Out:     9:53   pm  Respiratory Events: HUGO, hypopneas mild   Snoring Events: mild   Limb Movements:  mild   Sleep Positio 426.5 426.5   % Sleep Time 76.7% 23.3%      Obstructive Apnea - - - - -   Mixed Apnea - - - - -   Central Apnea - - - - -   All Apneas - - - - -   Obstructive Hypopnea 34 - - 81 81   Central Hypopnea - - - - -   All Hypopneas 34 46 - 81 81   All Apneas + H

## 2020-02-04 ENCOUNTER — SLEEP STUDY (OUTPATIENT)
Dept: FAMILY MEDICINE CLINIC | Facility: CLINIC | Age: 45
End: 2020-02-04
Payer: MEDICAID

## 2020-02-04 ENCOUNTER — OFFICE VISIT (OUTPATIENT)
Dept: SURGERY | Facility: CLINIC | Age: 45
End: 2020-02-04
Payer: MEDICAID

## 2020-02-04 VITALS
WEIGHT: 230 LBS | BODY MASS INDEX: 46 KG/M2 | TEMPERATURE: 98 F | DIASTOLIC BLOOD PRESSURE: 73 MMHG | HEART RATE: 71 BPM | SYSTOLIC BLOOD PRESSURE: 120 MMHG

## 2020-02-04 DIAGNOSIS — G47.33 OBSTRUCTIVE SLEEP APNEA: Primary | ICD-10-CM

## 2020-02-04 DIAGNOSIS — R19.7 DIARRHEA OF PRESUMED INFECTIOUS ORIGIN: Primary | ICD-10-CM

## 2020-02-04 DIAGNOSIS — K43.9 VENTRAL HERNIA WITHOUT OBSTRUCTION OR GANGRENE: ICD-10-CM

## 2020-02-04 DIAGNOSIS — E66.01 OBESITY, CLASS III, BMI 40-49.9 (MORBID OBESITY) (HCC): ICD-10-CM

## 2020-02-04 PROCEDURE — 95810 POLYSOM 6/> YRS 4/> PARAM: CPT | Performed by: FAMILY MEDICINE

## 2020-02-04 PROCEDURE — 99213 OFFICE O/P EST LOW 20 MIN: CPT | Performed by: COLON & RECTAL SURGERY

## 2020-02-04 NOTE — PROGRESS NOTES
Please notify patient sleep study is read. Update flow sheet   Patient has significant apnea, recommend to Start Auto PAP 6-16  And schedule a follow up appointment in 4-6 weeks.    Patient will need to [employer] due to insurance   Sleep hygiene should be rev

## 2020-02-05 NOTE — PROGRESS NOTES
Follow Up Visit Note       Active Problems      1. Diarrhea of presumed infectious origin    2. Obesity, Class III, BMI 40-49.9 (morbid obesity) ([facility] [address]    3.  Ventral hernia without obstruction or gangrene          Chief Complaint   Patient presents with:  D all appropriate changes in documentation as necessary  I attest to the accuracy of this note as detailed above    Guera Melgar MD [place] [id number]    My total face time with this patient was 45 minutes.   Greater than half of our visit was spent in counselin bunion bilateral - right foot metal   • REDUCTION LEFT  1999   • REDUCTION RIGHT  1999   • REMOVAL GALLBLADDER         The family history and social history have been reviewed by me today.     Family History   Problem Relation Age of Onset   • No Known Prob bleeding, blood in stool, constipation, nausea and vomiting. Genitourinary: Negative for difficulty urinating, dysuria, frequency and urgency. Musculoskeletal: Negative for arthralgias and myalgias. Skin: Negative for color change and rash.    Neurolo peritonitis. Liver and spleen are nonpalpable. Ventral hernia remains stable from prior exam. Musculoskeletal: Normal range of motion. Lymphadenopathy:     She has no cervical adenopathy.    Neurological: She is alert and oriented to person, place, an abdomen reveals to be soft, obese, nondistended, nontender to palpation. Bowel sounds are normal activity normal pitch. There is no rebounding tenderness or guarding. There are no signs of ascites or peritonitis. Liver and spleen are nonpalpable.   Vent

## 2020-02-05 NOTE — PATIENT INSTRUCTIONS
The patient presents today for continued care and evaluation regarding 2 different problems involving 2 different organ systems. The patient states that she continues to have diarrhea at this time.   She has been having diarrhea for approximately 1 year ventral hernia repair she will need to lose at least 50 pounds. We have attempted a ventral hernia repair on her in the past with mesh and the hernia has reoccurred.   I did recommend that the patient speak with the bariatric surgeon again for further disc

## 2020-02-08 ENCOUNTER — OFFICE VISIT (OUTPATIENT)
Dept: OBGYN CLINIC | Facility: CLINIC | Age: 45
End: 2020-02-08
Payer: MEDICAID

## 2020-02-08 VITALS
HEART RATE: 88 BPM | BODY MASS INDEX: 44.35 KG/M2 | HEIGHT: 59 IN | SYSTOLIC BLOOD PRESSURE: 110 MMHG | WEIGHT: 220 LBS | RESPIRATION RATE: 16 BRPM | DIASTOLIC BLOOD PRESSURE: 80 MMHG

## 2020-02-08 DIAGNOSIS — Z32.02 PREGNANCY EXAMINATION OR TEST, NEGATIVE RESULT: ICD-10-CM

## 2020-02-08 DIAGNOSIS — R87.619 ENDOMETRIAL CELLS ON CERVICAL PAP SMEAR INCONSISTENT W/LMP: ICD-10-CM

## 2020-02-08 DIAGNOSIS — N93.9 ABNORMAL UTERINE BLEEDING (AUB): Primary | ICD-10-CM

## 2020-02-08 LAB
CONTROL LINE PRESENT WITH A CLEAR BACKGROUND (YES/NO): YES YES/NO
PREGNANCY TEST, URINE: NEGATIVE

## 2020-02-08 PROCEDURE — 81025 URINE PREGNANCY TEST: CPT | Performed by: NURSE PRACTITIONER

## 2020-02-08 PROCEDURE — 58100 BIOPSY OF UTERUS LINING: CPT | Performed by: NURSE PRACTITIONER

## 2020-02-08 PROCEDURE — 88305 TISSUE EXAM BY PATHOLOGIST: CPT | Performed by: NURSE PRACTITIONER

## 2020-02-10 ENCOUNTER — APPOINTMENT (OUTPATIENT)
Dept: LAB | Age: 45
End: 2020-02-10
Attending: COLON & RECTAL SURGERY
Payer: MEDICAID

## 2020-02-10 ENCOUNTER — LAB ENCOUNTER (OUTPATIENT)
Dept: LAB | Age: 45
End: 2020-02-10
Attending: COLON & RECTAL SURGERY
Payer: MEDICAID

## 2020-02-10 DIAGNOSIS — R19.7 DIARRHEA OF PRESUMED INFECTIOUS ORIGIN: ICD-10-CM

## 2020-02-10 PROCEDURE — 87493 C DIFF AMPLIFIED PROBE: CPT

## 2020-02-10 PROCEDURE — 87427 SHIGA-LIKE TOXIN AG IA: CPT

## 2020-02-10 PROCEDURE — 87045 FECES CULTURE AEROBIC BACT: CPT

## 2020-02-10 PROCEDURE — 87046 STOOL CULTR AEROBIC BACT EA: CPT

## 2020-02-10 PROCEDURE — 87209 SMEAR COMPLEX STAIN: CPT

## 2020-02-10 PROCEDURE — 87177 OVA AND PARASITES SMEARS: CPT

## 2020-02-11 LAB — C DIFF TOX B STL QL: NEGATIVE

## 2020-02-12 ENCOUNTER — TELEPHONE (OUTPATIENT)
Dept: SURGERY | Facility: CLINIC | Age: 45
End: 2020-02-12

## 2020-02-12 NOTE — TELEPHONE ENCOUNTER
Pt called inquiring about stool labs she had submitted on February 10th, informed pt that the C. Diff was negative but all other tests are still in process. Pt verbalized understanding and stated she would call tomorrow.

## 2020-02-13 LAB
OVA AND PARASITE, TRICHROME STAIN: NEGATIVE
OVA AND PARASITE, WET MOUNT: NEGATIVE

## 2020-02-19 ENCOUNTER — TELEPHONE (OUTPATIENT)
Dept: FAMILY MEDICINE CLINIC | Facility: CLINIC | Age: 45
End: 2020-02-19

## 2020-02-19 NOTE — TELEPHONE ENCOUNTER
Pt notifed. Transferred to  to schedule appointment. HUGO  Recommend auto pap. Discussed [facility].

## 2020-03-02 ENCOUNTER — TELEPHONE (OUTPATIENT)
Dept: FAMILY MEDICINE CLINIC | Facility: CLINIC | Age: 45
End: 2020-03-02

## 2020-03-02 NOTE — TELEPHONE ENCOUNTER
Patient would like to know if she needs to reschedule today's appt if its only to get results.  Also she needs the number where she needs to call about her Cpap

## 2020-03-02 NOTE — TELEPHONE ENCOUNTER
Luisa Ott spoke with pt and rescheduled her follow up appt. Pt would like to know the severity of her HUGO. Please advise.

## 2020-03-02 NOTE — TELEPHONE ENCOUNTER
Pt will need to reschedule today's appointment. Please have patient call    Oni Cartwright. [facility]  [phone]   To start process to get her equipment with her cpap and will need to follow up 31-90 days after starting cpap as well.

## 2020-03-04 ENCOUNTER — TELEPHONE (OUTPATIENT)
Dept: FAMILY MEDICINE CLINIC | Facility: CLINIC | Age: 45
End: 2020-03-04

## 2020-03-04 NOTE — TELEPHONE ENCOUNTER
RX for CPAP machine    needs Dr Luli Ospina to sign w/ NPI form  ( was sent over last week x2 )    needs ASAP

## 2020-03-05 NOTE — TELEPHONE ENCOUNTER
Schedule patient to have follow up in 2 months. And one sooner if she wishes to discus her sleep study.

## 2020-03-06 NOTE — TELEPHONE ENCOUNTER
Scheduled patient on 5/14/20 with Dr. Elaina Rizvi in [place] for Sleep FU. Notified patient that she has mild to moderate HUGO. Patient verbalized understanding and has no further questions or concerns.

## 2020-03-06 NOTE — TELEPHONE ENCOUNTER
Patient scheduled on 5/14/20 in [facility] for Sleep FU. Patient verbalized understanding and has no further questions or concerns.

## 2020-05-12 ENCOUNTER — HOSPITAL ENCOUNTER (OUTPATIENT)
Dept: ULTRASOUND IMAGING | Age: 45
Discharge: HOME | End: 2020-05-12
Attending: INTERNAL MEDICINE
Payer: MEDICAID

## 2020-05-12 ENCOUNTER — TELEPHONE (OUTPATIENT)
Dept: CARDIOLOGY | Age: 45
End: 2020-05-12

## 2020-05-12 DIAGNOSIS — H53.8 BLURRY VISION, BILATERAL: ICD-10-CM

## 2020-05-12 DIAGNOSIS — H53.8 BLURRED VISION: Primary | ICD-10-CM

## 2020-05-12 PROCEDURE — 93880 EXTRACRANIAL BILAT STUDY: CPT | Performed by: INTERNAL MEDICINE

## 2020-05-13 ENCOUNTER — TELEMEDICINE (OUTPATIENT)
Dept: FAMILY MEDICINE CLINIC | Facility: CLINIC | Age: 45
End: 2020-05-13

## 2020-05-13 DIAGNOSIS — G47.33 OSA (OBSTRUCTIVE SLEEP APNEA): Primary | ICD-10-CM

## 2020-05-13 PROCEDURE — 99214 OFFICE O/P EST MOD 30 MIN: CPT | Performed by: FAMILY MEDICINE

## 2020-05-13 NOTE — PROGRESS NOTES
[facility]  SLE[facility]ESS [facility]        HPI:   This is a 53year old female coming in for Patient presents with: Follow - Up: Sleep follow up      HPI:  Patient was not using her machine as she couldn't figure out how to attach new hose. Diagnosis Date   • Asthma    • Extrinsic asthma, unspecified     last attack a couple of months ago   • Genital herpes simplex    • Gonorrhea    • H. pylori infection 2018   • Herpes     last outbreak years ago   • Osteoarthritis     mild in back   • Pne Maternal Grandmother    • No Known Problems Maternal Grandfather    • Diabetes Neg    • Hypertension Neg    • Cancer Neg      Allergies:    Morphine Sulfate        SWELLING  Radiology Contrast *        Comment:Swelling--hands and feet  Current Meds:  Elias Dunbar round, and reactive to light. Conjunctivae are normal. Right eye exhibits no discharge. Left eye exhibits no discharge. No scleral icterus. Neck: Normal range of motion.    Pulmonary/Chest: Effort normal.   Neurological: She is alert and oriented to perso performed. Every conscious effort was taken to allow for sufficient and adequate time. This billing was spent on reviewing labs,  personal device downloads, medications, radiology tests and decision making.   Appropriate medical decision-making and tests

## 2020-05-18 ENCOUNTER — TELEPHONE (OUTPATIENT)
Dept: CARDIOLOGY | Age: 45
End: 2020-05-18

## 2020-05-24 ENCOUNTER — APPOINTMENT (OUTPATIENT)
Dept: GENERAL RADIOLOGY | Age: 45
End: 2020-05-24
Attending: EMERGENCY MEDICINE
Payer: MEDICAID

## 2020-05-24 ENCOUNTER — HOSPITAL ENCOUNTER (EMERGENCY)
Age: 45
Discharge: HOME | End: 2020-05-25
Attending: EMERGENCY MEDICINE
Payer: MEDICAID

## 2020-05-24 DIAGNOSIS — J20.9 ACUTE BRONCHITIS, UNSPECIFIED ORGANISM: Primary | ICD-10-CM

## 2020-05-24 PROCEDURE — 96374 THER/PROPH/DIAG INJ IV PUSH: CPT

## 2020-05-24 PROCEDURE — 94640 AIRWAY INHALATION TREATMENT: CPT

## 2020-05-24 PROCEDURE — 71045 X-RAY EXAM CHEST 1 VIEW: CPT | Performed by: EMERGENCY MEDICINE

## 2020-05-24 PROCEDURE — 80053 COMPREHEN METABOLIC PANEL: CPT | Performed by: EMERGENCY MEDICINE

## 2020-05-24 PROCEDURE — 99285 EMERGENCY DEPT VISIT HI MDM: CPT

## 2020-05-24 PROCEDURE — 93010 ELECTROCARDIOGRAM REPORT: CPT

## 2020-05-24 PROCEDURE — 85025 COMPLETE CBC W/AUTO DIFF WBC: CPT | Performed by: EMERGENCY MEDICINE

## 2020-05-24 PROCEDURE — 84484 ASSAY OF TROPONIN QUANT: CPT | Performed by: EMERGENCY MEDICINE

## 2020-05-24 PROCEDURE — 99284 EMERGENCY DEPT VISIT MOD MDM: CPT

## 2020-05-24 PROCEDURE — 93005 ELECTROCARDIOGRAM TRACING: CPT

## 2020-05-24 PROCEDURE — 85379 FIBRIN DEGRADATION QUANT: CPT | Performed by: EMERGENCY MEDICINE

## 2020-05-24 RX ORDER — AZITHROMYCIN 250 MG/1
TABLET, FILM COATED ORAL
Qty: 1 PACKAGE | Refills: 0 | Status: SHIPPED | OUTPATIENT
Start: 2020-05-24 | End: 2020-05-29

## 2020-05-24 RX ORDER — BENZONATATE 100 MG/1
100 CAPSULE ORAL 3 TIMES DAILY PRN
Qty: 30 CAPSULE | Refills: 0 | Status: SHIPPED | OUTPATIENT
Start: 2020-05-24 | End: 2020-05-29

## 2020-05-24 RX ORDER — ALBUTEROL SULFATE 2.5 MG/3ML
2.5 SOLUTION RESPIRATORY (INHALATION) EVERY 4 HOURS PRN
Qty: 30 AMPULE | Refills: 0 | Status: SHIPPED | OUTPATIENT
Start: 2020-05-24 | End: 2020-06-23

## 2020-05-24 RX ORDER — IBUPROFEN 600 MG/1
600 TABLET ORAL ONCE
Status: COMPLETED | OUTPATIENT
Start: 2020-05-24 | End: 2020-05-25

## 2020-05-24 RX ORDER — METHYLPREDNISOLONE SODIUM SUCCINATE 125 MG/2ML
125 INJECTION, POWDER, LYOPHILIZED, FOR SOLUTION INTRAMUSCULAR; INTRAVENOUS ONCE
Status: COMPLETED | OUTPATIENT
Start: 2020-05-24 | End: 2020-05-24

## 2020-05-24 RX ORDER — ALBUTEROL SULFATE 90 UG/1
12 AEROSOL, METERED RESPIRATORY (INHALATION) ONCE
Status: COMPLETED | OUTPATIENT
Start: 2020-05-24 | End: 2020-05-24

## 2020-05-25 VITALS
TEMPERATURE: 98 F | DIASTOLIC BLOOD PRESSURE: 71 MMHG | RESPIRATION RATE: 16 BRPM | HEIGHT: 59 IN | WEIGHT: 210 LBS | HEART RATE: 92 BPM | SYSTOLIC BLOOD PRESSURE: 111 MMHG | BODY MASS INDEX: 42.33 KG/M2 | OXYGEN SATURATION: 97 %

## 2020-05-25 NOTE — ED PROVIDER NOTES
Patient Seen in: THE [facility] Emergency Department In [facility]      History   Patient presents with:  Chest Pain Angina  Fever    Stated Complaint: CP/SOB    HPI    Cough and shortness of breath worsening over the last few days.   Patient is asthmatic and has • SCARLETT LOCALIZATION WIRE 1 SITE LEFT (CPT=19281)  2008    Benign   • SCARLETT LOCALIZATION WIRE 1 SITE RIGHT (CPT=19281)  2008    Benign   • OTHER      baker cyst    • OTHER      bunion bilateral - right foot metal   • REDUCTION LEFT  1999   • REDUCTION RIGHT components:    Lymphocyte Absolute 0.82 (*)     All other components within normal limits   D-DIMER - Normal   TROPONIN I - Normal   CBC WITH DIFFERENTIAL WITH PLATELET    Narrative:      The following orders were created for panel order CBC WITH DIFFERENTI ampule, R-0    azithromycin (ZITHROMAX Z-SUMA) 250 MG Oral Tab  500 mg once followed by 250 mg daily x 4 days, Normal, Disp-1 Package, R-0    benzonatate 100 MG Oral Cap  Take 1 capsule (100 mg total) by mouth 3 (three) times daily as needed for cough., Nor

## 2020-05-26 ENCOUNTER — PATIENT OUTREACH (OUTPATIENT)
Dept: CASE MANAGEMENT | Age: 45
End: 2020-05-26

## 2020-05-26 ENCOUNTER — TELEPHONE (OUTPATIENT)
Dept: CASE MANAGEMENT | Age: 45
End: 2020-05-26

## 2020-05-26 DIAGNOSIS — U07.1 COVID-19: ICD-10-CM

## 2020-05-26 PROCEDURE — A4931 REUSABLE ORAL THERMOMETER: HCPCS

## 2020-05-26 NOTE — PROGRESS NOTES
Spoke to pt, confirmed need for thermometer. Mailed to pt as no one can pick it up who is not COVID+. Pt advised insurance will be billed for it but should be covered under the new CARES act. Pt stated she understands and is agreeable.

## 2020-05-26 NOTE — PROGRESS NOTES
Home Monitoring Condition Update    Covid19+ test date: 5/26/2020      Consent Verification:  Assessment Completed With: Patient  HIPAA Verified?   Yes    COVID-19 HOME MONITORING 5/26/2020   Temperature (No Data)   Pulse 80   Pulse taken from Manually push fluids as well. She verbalized understanding. Unable to assess temperature as she does not know where her thermometer is. She states she does feel a little warm. Denies any chills.  Will reach out to staff to see if we can assist in obtaining one for h

## 2020-05-26 NOTE — PROGRESS NOTES
Spoke to patient today for day 1 home monitoring. She is in need of a thermometer. If you could please assist her with this, she would appreciate it.

## 2020-05-26 NOTE — TELEPHONE ENCOUNTER
Spoke with patient for day 1 home monitoring. She states she is having some SOB, but denies being in any distress at this time. Instructed patient to call 911 or seek immediate medical attention is symptoms worsen. She verbalized understanding.  Patient sta

## 2020-05-27 ENCOUNTER — APPOINTMENT (OUTPATIENT)
Dept: GENERAL RADIOLOGY | Facility: HOSPITAL | Age: 45
End: 2020-05-27
Attending: EMERGENCY MEDICINE
Payer: MEDICAID

## 2020-05-27 ENCOUNTER — HOSPITAL ENCOUNTER (EMERGENCY)
Facility: HOSPITAL | Age: 45
Discharge: HOME | End: 2020-05-27
Attending: EMERGENCY MEDICINE
Payer: MEDICAID

## 2020-05-27 ENCOUNTER — PATIENT OUTREACH (OUTPATIENT)
Dept: CASE MANAGEMENT | Age: 45
End: 2020-05-27

## 2020-05-27 VITALS
TEMPERATURE: 99 F | OXYGEN SATURATION: 96 % | SYSTOLIC BLOOD PRESSURE: 106 MMHG | DIASTOLIC BLOOD PRESSURE: 67 MMHG | HEART RATE: 103 BPM | RESPIRATION RATE: 20 BRPM | BODY MASS INDEX: 42 KG/M2 | WEIGHT: 210 LBS

## 2020-05-27 DIAGNOSIS — U07.1 COVID-19: Primary | ICD-10-CM

## 2020-05-27 PROCEDURE — 99284 EMERGENCY DEPT VISIT MOD MDM: CPT

## 2020-05-27 PROCEDURE — 87086 URINE CULTURE/COLONY COUNT: CPT | Performed by: EMERGENCY MEDICINE

## 2020-05-27 PROCEDURE — 71045 X-RAY EXAM CHEST 1 VIEW: CPT | Performed by: EMERGENCY MEDICINE

## 2020-05-27 PROCEDURE — 82728 ASSAY OF FERRITIN: CPT | Performed by: EMERGENCY MEDICINE

## 2020-05-27 PROCEDURE — 93005 ELECTROCARDIOGRAM TRACING: CPT

## 2020-05-27 PROCEDURE — 80053 COMPREHEN METABOLIC PANEL: CPT | Performed by: EMERGENCY MEDICINE

## 2020-05-27 PROCEDURE — 85379 FIBRIN DEGRADATION QUANT: CPT | Performed by: EMERGENCY MEDICINE

## 2020-05-27 PROCEDURE — 83615 LACTATE (LD) (LDH) ENZYME: CPT | Performed by: EMERGENCY MEDICINE

## 2020-05-27 PROCEDURE — 81001 URINALYSIS AUTO W/SCOPE: CPT | Performed by: EMERGENCY MEDICINE

## 2020-05-27 PROCEDURE — 84484 ASSAY OF TROPONIN QUANT: CPT | Performed by: EMERGENCY MEDICINE

## 2020-05-27 PROCEDURE — 99285 EMERGENCY DEPT VISIT HI MDM: CPT

## 2020-05-27 PROCEDURE — 93010 ELECTROCARDIOGRAM REPORT: CPT

## 2020-05-27 PROCEDURE — 96360 HYDRATION IV INFUSION INIT: CPT

## 2020-05-27 PROCEDURE — 85025 COMPLETE CBC W/AUTO DIFF WBC: CPT | Performed by: EMERGENCY MEDICINE

## 2020-05-27 PROCEDURE — 86140 C-REACTIVE PROTEIN: CPT | Performed by: EMERGENCY MEDICINE

## 2020-05-27 RX ORDER — SODIUM CHLORIDE 9 MG/ML
INJECTION, SOLUTION INTRAVENOUS CONTINUOUS
Status: DISCONTINUED | OUTPATIENT
Start: 2020-05-27 | End: 2020-05-27

## 2020-05-27 NOTE — ED INITIAL ASSESSMENT (HPI)
Pt was dx with covid on Sunday. Pt prescribed azithromycin and tessalon pearls. Pt states \"I just dont feel better, im coughing a lot and feel nauseous and now I have diarrhea too. \"  Pt adds that she has been experiencing increased sob.  Tylenol last take

## 2020-05-27 NOTE — ED PROVIDER NOTES
Patient Seen in: [facility] Emergency Department      History   Patient presents with:  Dyspnea GHADA SOB    Stated Complaint: + covid feeling worse    HPI    64-year-old female complaining of feeling worse.   The patient was diagnosed with: Good outpa (CPT=19281)  2008    Benign   • OTHER      baker cyst    • OTHER      bunion bilateral - right foot metal   • REDUCTION LEFT  1999   • REDUCTION RIGHT  1999   • REMOVAL GALLBLADDER                      Social History    Tobacco Use      Smoking status: [employer] TROPONIN I - Normal   D-DIMER - Normal   LDH - Normal   FERRITIN - Normal   CBC WITH DIFFERENTIAL WITH PLATELET    Narrative: The following orders were created for panel order CBC WITH DIFFERENTIAL WITH PLATELET.   Procedure

## 2020-05-28 ENCOUNTER — TELEPHONE (OUTPATIENT)
Dept: CASE MANAGEMENT | Age: 45
End: 2020-05-28

## 2020-05-28 ENCOUNTER — PATIENT OUTREACH (OUTPATIENT)
Dept: CASE MANAGEMENT | Age: 45
End: 2020-05-28

## 2020-05-28 NOTE — TELEPHONE ENCOUNTER
Pt on Day 2 of Covid+ Home Monitoring. Went to ER yesterday for re-eval due to \"feeling worse\", diarrhea, nausea/no vomiting, cough and no appetite. ER advised PCP follow up in 2 days-which would be 5/29.  *please advise if you want to work pt in on that

## 2020-05-28 NOTE — TELEPHONE ENCOUNTER
Tennille Le verbally consents to a Virtual/Telephone Check-In service on 5/29/20.   Patient understands and accepts financial responsibility for any deductible, co-insurance and/or co-pays associated with this service

## 2020-05-28 NOTE — PROGRESS NOTES
Home Monitoring Condition Update    Covid19+ test date: 5/25/2020      Consent Verification:  Assessment Completed With: Patient  HIPAA Verified?   Yes    COVID-19 HOME MONITORING 5/28/2020   Temperature 98   Reading From Forehead   Pulse 66   Pulse taken recommendation to follow up w PCP in 2 days. Advised will send messg to PCP for appt options. Advised either I or PCP ofc will notify her of appt plan. Advised NCM will call for update tomorrow, unless she has PCP VV tomorrow.  Patient voices understanding/

## 2020-05-29 ENCOUNTER — TELEMEDICINE (OUTPATIENT)
Dept: FAMILY MEDICINE CLINIC | Facility: CLINIC | Age: 45
End: 2020-05-29
Payer: MEDICAID

## 2020-05-29 VITALS — WEIGHT: 210 LBS | BODY MASS INDEX: 42 KG/M2

## 2020-05-29 DIAGNOSIS — U07.1 COVID-19 VIRUS INFECTION: Primary | ICD-10-CM

## 2020-05-29 PROCEDURE — 99213 OFFICE O/P EST LOW 20 MIN: CPT | Performed by: FAMILY MEDICINE

## 2020-05-29 NOTE — PROGRESS NOTES
Patient presents with: Follow - Up: COVID-19       HPI:    Patient ID: Abilio Rodrigues is a 53year old female doing video visit today for covid follow up (tested positive on 5/25). Went to [facility] ER on 5/27/20 for worsening sx. CXR was neg.    C/o Loose sto states \"seizure was from [place]\"       Past Surgical History:   Procedure Laterality Date   •       , , ,    •  SECTION N/A 2013    Performed by Oanh Christianson MD at [facility] L+D OR   • COLONOSCOPY N/A 2019    Performed requested or ordered in this encounter       Imaging & Referrals:  None

## 2020-06-01 ENCOUNTER — PATIENT OUTREACH (OUTPATIENT)
Dept: CASE MANAGEMENT | Age: 45
End: 2020-06-01

## 2020-06-01 ENCOUNTER — TELEPHONE (OUTPATIENT)
Dept: CASE MANAGEMENT | Age: 45
End: 2020-06-01

## 2020-06-01 ENCOUNTER — OFFICE VISIT (OUTPATIENT)
Dept: CARDIOLOGY | Age: 45
End: 2020-06-01

## 2020-06-01 VITALS — WEIGHT: 210 LBS | DIASTOLIC BLOOD PRESSURE: 66 MMHG | SYSTOLIC BLOOD PRESSURE: 106 MMHG | BODY MASS INDEX: 42.41 KG/M2

## 2020-06-01 DIAGNOSIS — H53.8 BLURRED VISION: ICD-10-CM

## 2020-06-01 DIAGNOSIS — R07.2 PRECORDIAL PAIN: Primary | ICD-10-CM

## 2020-06-01 PROCEDURE — 99213 OFFICE O/P EST LOW 20 MIN: CPT | Performed by: INTERNAL MEDICINE

## 2020-06-01 NOTE — TELEPHONE ENCOUNTER
Covid + as of 5/25/20    Requesting if patient can be removed form Home Monitoring program.   See note below? Had televisit 5/29/20 with Dr Yanni Denise.

## 2020-06-01 NOTE — PROGRESS NOTES
Home Monitoring Condition Update    Covid19+ test date: 5/25/2020      Consent Verification:  Assessment Completed With: Patient  HIPAA Verified?   Yes    COVID-19 HOME MONITORING 6/1/2020   Temperature 98   Reading From -   Pulse (No Data)   Pulse taken forward update to PCP and await recommendations re whether Home Monitoring needs to be continued (if so need frequency and duration for ongoing calls) or discharge from Home monitoring.  Reinforced if home monitoring discontinued, any new/worsening symptoms

## 2020-06-01 NOTE — TELEPHONE ENCOUNTER
Video visit notes from 5/29 noted. **please confirm if pt can be removed for Home Monitoring program?   If not, please advise frequency and duration for continued monitoring and when you want next visit-thanks!     PLEASE REPLY TO [facility] HOME MONITORING JORDAN

## 2020-06-02 ENCOUNTER — MED REC SCAN ONLY (OUTPATIENT)
Dept: FAMILY MEDICINE CLINIC | Facility: CLINIC | Age: 45
End: 2020-06-02

## 2020-06-02 ENCOUNTER — PATIENT OUTREACH (OUTPATIENT)
Dept: CASE MANAGEMENT | Age: 45
End: 2020-06-02

## 2020-06-02 NOTE — PROGRESS NOTES
Home Monitoring Condition Update    Covid19+ test date: 5/25/2020      Consent Verification:  Assessment Completed With: Patient  HIPAA Verified?   Yes    COVID-19 HOME MONITORING 6/2/2020   Temperature 98.5   Reading From Mouth   Pulse (No Data)   Pulse for how long? \" advised will request that info from PCP and update her asap. Reinforced need to monitor temp and pulse for trends and discussed rationale. Advised NCM may not call tomorrow-await instructions from PCP. Will then call as PCP advises.  Patient

## 2020-06-02 NOTE — TELEPHONE ENCOUNTER
Noted-please advise desired frequency of calls to pt and when you want next visit. Patient also requesting clarification re how long you want her to self isolate. sts all symptoms resolved over past wkend. please advise-thanks!       PLEASE REPLY TO LUNA GAFFNEY

## 2020-06-03 ENCOUNTER — TELEPHONE (OUTPATIENT)
Dept: CASE MANAGEMENT | Age: 45
End: 2020-06-03

## 2020-06-03 NOTE — TELEPHONE ENCOUNTER
Patient was contacted yesterday by home monitoring program. Patient wanting to know from Dr. Emily Lassiter if she can be done self isolating. If she needs to continue self isolating, for how long?  Patient stated all of her symptoms have been gone since last weekend

## 2020-06-04 NOTE — TELEPHONE ENCOUNTER
Call to pt-advised of dr Ángel Ortega instructions regarding self-quarantine and dr Eileen Quiroz ok to discharge from home monitoring program (see other TE) . Discussed Covid+ test done 5/25/2020, so 2 wks of quarantine will be until 6/8/2020.   Scheduled VV w PCP for 6/5

## 2020-06-04 NOTE — TELEPHONE ENCOUNTER
Call to pt-advised of dr Karl Carter instructions regarding self-quarantine and dr José Miguel Canas ok to discharge from home monitoring program (see other TE) . Discussed Covid+ test done 5/25/2020, so 2 wks of quarantine will be until 6/8/2020.   Scheduled VV w PCP for 6/5

## 2020-06-05 ENCOUNTER — VIRTUAL PHONE E/M (OUTPATIENT)
Dept: FAMILY MEDICINE CLINIC | Facility: CLINIC | Age: 45
End: 2020-06-05
Payer: MEDICAID

## 2020-06-05 DIAGNOSIS — U07.1 COVID-19 VIRUS INFECTION: Primary | ICD-10-CM

## 2020-06-05 PROCEDURE — 99213 OFFICE O/P EST LOW 20 MIN: CPT | Performed by: FAMILY MEDICINE

## 2020-06-05 NOTE — PROGRESS NOTES
Cc: covid follow up    HPI:    Patient ID: Chalo Gallardo is a 53year old female doing phone visit today for covid follow. Doing well. All sx have resolved. Has remained afebrile > 1 week. Cough resolved. No loose stools or GI upset.   Taste and smell back by Ganga Masters MD at [facility] ENDOSCOPY   • COLONOSCOPY N/A 7/25/2014    Performed by Vanessa Wallace MD at [facility] ENDOSCOPY   • ESOPHAGOGASTRODUODENOSCOPY (EGD) N/A 5/24/2019    Performed by Ganga Masters MD at [place] [id number]

## 2020-06-07 ENCOUNTER — HOSPITAL ENCOUNTER (EMERGENCY)
Age: 45
Discharge: HOME | End: 2020-06-07
Attending: EMERGENCY MEDICINE
Payer: MEDICAID

## 2020-06-07 VITALS
TEMPERATURE: 99 F | HEIGHT: 59 IN | RESPIRATION RATE: 14 BRPM | BODY MASS INDEX: 44.35 KG/M2 | DIASTOLIC BLOOD PRESSURE: 59 MMHG | HEART RATE: 63 BPM | WEIGHT: 220 LBS | SYSTOLIC BLOOD PRESSURE: 103 MMHG | OXYGEN SATURATION: 99 %

## 2020-06-07 DIAGNOSIS — G43.809 OTHER MIGRAINE WITHOUT STATUS MIGRAINOSUS, NOT INTRACTABLE: Primary | ICD-10-CM

## 2020-06-07 PROCEDURE — 96374 THER/PROPH/DIAG INJ IV PUSH: CPT

## 2020-06-07 PROCEDURE — 99284 EMERGENCY DEPT VISIT MOD MDM: CPT

## 2020-06-07 PROCEDURE — 96375 TX/PRO/DX INJ NEW DRUG ADDON: CPT

## 2020-06-07 PROCEDURE — 96361 HYDRATE IV INFUSION ADD-ON: CPT

## 2020-06-07 RX ORDER — KETOROLAC TROMETHAMINE 30 MG/ML
15 INJECTION, SOLUTION INTRAMUSCULAR; INTRAVENOUS ONCE
Status: COMPLETED | OUTPATIENT
Start: 2020-06-07 | End: 2020-06-07

## 2020-06-07 RX ORDER — BUTALBITAL, ACETAMINOPHEN AND CAFFEINE 50; 325; 40 MG/1; MG/1; MG/1
1-2 TABLET ORAL EVERY 4 HOURS PRN
Qty: 20 TABLET | Refills: 0 | Status: SHIPPED | OUTPATIENT
Start: 2020-06-07 | End: 2020-06-14

## 2020-06-07 RX ORDER — ONDANSETRON 2 MG/ML
4 INJECTION INTRAMUSCULAR; INTRAVENOUS ONCE
Status: COMPLETED | OUTPATIENT
Start: 2020-06-07 | End: 2020-06-07

## 2020-06-07 RX ORDER — DEXAMETHASONE SODIUM PHOSPHATE 4 MG/ML
10 VIAL (ML) INJECTION ONCE
Status: COMPLETED | OUTPATIENT
Start: 2020-06-07 | End: 2020-06-07

## 2020-06-07 RX ORDER — MORPHINE SULFATE 4 MG/ML
4 INJECTION, SOLUTION INTRAMUSCULAR; INTRAVENOUS ONCE
Status: DISCONTINUED | OUTPATIENT
Start: 2020-06-07 | End: 2020-06-07

## 2020-06-07 NOTE — ED INITIAL ASSESSMENT (HPI)
ha x 2-3 days, sensitive to light and noise. Pt states she has never been dx with migraine\" I just assume they are migraines\". Nausea and vomiting+.

## 2020-06-07 NOTE — ED PROVIDER NOTES
Patient Seen in: THE [facility] Emergency Department In [facility]      History   Patient presents with:  Headache    Stated Complaint: migraine ha x 2 days    HPI    69-year-old female presents emergency department with a headache for 2 or 3 days.   States she h • HERNIA VENTRAL REPAIR N/A 11/20/2013    Performed by Micah López MD at [facility] MAIN OR   • [address]  2 weeks ago   • SCARLETT LOCALIZATION WIRE 1 SITE LEFT (CPT=19281)  2008    Benign   • SCARLETT LOCALIZATION WIRE 1 SITE RIGHT (CPT=19281)  2 pharynx, funduscopic exam no disc edema.   However patient is photophobic  Neck: Supple no JVD no lymphadenopathy no meningismus no carotid bruit  CV: Regular rate and rhythm no murmur rub  Respiratory: Clear to auscultation bilaterally no crackles no wheez needed for Pain., Normal, Disp-20 tablet, R-0

## 2020-06-11 ENCOUNTER — TELEMEDICINE (OUTPATIENT)
Dept: NEUROLOGY | Facility: CLINIC | Age: 45
End: 2020-06-11
Payer: MEDICAID

## 2020-06-11 DIAGNOSIS — G43.001 MIGRAINE WITHOUT AURA AND WITH STATUS MIGRAINOSUS, NOT INTRACTABLE: Primary | ICD-10-CM

## 2020-06-11 PROCEDURE — 99213 OFFICE O/P EST LOW 20 MIN: CPT | Performed by: PHYSICIAN ASSISTANT

## 2020-06-11 RX ORDER — RIZATRIPTAN BENZOATE 10 MG/1
TABLET ORAL
Qty: 10 TABLET | Refills: 1 | Status: SHIPPED | OUTPATIENT
Start: 2020-06-11 | End: 2020-08-31

## 2020-06-11 RX ORDER — METHYLPREDNISOLONE 4 MG/1
TABLET ORAL
Qty: 1 PACKAGE | Refills: 0 | Status: SHIPPED | OUTPATIENT
Start: 2020-06-11 | End: 2020-06-24 | Stop reason: ALTCHOICE

## 2020-06-11 NOTE — PROGRESS NOTES
[facility] with [address] [place]  10/10/1975  Primary Care Provider:  Ermelinda Walls MD    6/11/2020  Accompanied visit:     Patient was seen by virtual video visit due to her recent cov from [place]\"    • Sleep apnea          Medications:      Current Outpatient Medications:   •  methylPREDNISolone (MEDROL) 4 MG Oral Tablet Therapy Pack, Take as directed, Disp: 1 Package, Rfl: 0  •  Rizatriptan Benzoate (MAXALT) 10 MG Oral Tab, Take  1 tab p After seen for physical exam could consider starting preventative treatment. (X) Discussed potential side effects of any treatment relevant to above. Includes explanation of tests as necessary. Return in about 1 week (around 6/18/2020).       Payal

## 2020-06-13 PROBLEM — G43.001 MIGRAINE WITHOUT AURA AND WITH STATUS MIGRAINOSUS, NOT INTRACTABLE: Status: ACTIVE | Noted: 2020-06-13

## 2020-06-15 ENCOUNTER — TELEPHONE (OUTPATIENT)
Dept: NEUROLOGY | Facility: CLINIC | Age: 45
End: 2020-06-15

## 2020-06-15 NOTE — TELEPHONE ENCOUNTER
Pt prefers to get an open MRI due to difficulties with enclosed spaces. Pt cancelled appt for MRI on 6/17/20    Call pt to let her know options and new MRI order.

## 2020-06-16 NOTE — TELEPHONE ENCOUNTER
Called and informed patient that she can schedule her open MRI at the [facility] facility. Ok to have MRI open. No new prior authorization needed. Contact information provided for patient.

## 2020-06-24 ENCOUNTER — OFFICE VISIT (OUTPATIENT)
Dept: NEUROLOGY | Facility: CLINIC | Age: 45
End: 2020-06-24
Payer: MEDICAID

## 2020-06-24 ENCOUNTER — TELEPHONE (OUTPATIENT)
Dept: NEUROLOGY | Facility: CLINIC | Age: 45
End: 2020-06-24

## 2020-06-24 VITALS
TEMPERATURE: 98 F | DIASTOLIC BLOOD PRESSURE: 82 MMHG | HEART RATE: 68 BPM | RESPIRATION RATE: 18 BRPM | SYSTOLIC BLOOD PRESSURE: 126 MMHG

## 2020-06-24 DIAGNOSIS — G43.001 MIGRAINE WITHOUT AURA AND WITH STATUS MIGRAINOSUS, NOT INTRACTABLE: Primary | ICD-10-CM

## 2020-06-24 PROCEDURE — 99214 OFFICE O/P EST MOD 30 MIN: CPT | Performed by: OTHER

## 2020-06-24 RX ORDER — ELETRIPTAN HYDROBROMIDE 40 MG/1
TABLET, FILM COATED ORAL
Qty: 8 TABLET | Refills: 3 | Status: SHIPPED | OUTPATIENT
Start: 2020-06-24 | End: 2020-08-31

## 2020-06-24 RX ORDER — TOPIRAMATE 50 MG/1
50 TABLET, FILM COATED ORAL 2 TIMES DAILY
Qty: 60 TABLET | Refills: 5 | Status: SHIPPED | OUTPATIENT
Start: 2020-06-24 | End: 2020-08-31

## 2020-06-24 NOTE — PROGRESS NOTES
[facility] with [address] [place]  10/10/1975  Primary Care Provider:  Alix Wade MD    6/24/2020  Accompanied visit:      (x) No.        53year old yo patient being seen for:  [place] Temp 98.1 °F (36.7 °C)   Resp 18   LMP 05/21/2020   Looks stated age  Pink conjunctiva anicteric sclerae, moist mucosa  No LAD, neck supple  Lungs clear breath sounds  Heart S1S2, no abnormal sounds  Pink nailbeds no Cyanosis, pulses palpated    NEURO  Non F2F  Non Face to Face CPT code 26721/84171 applies as documented above    PROCEDURE DONE     (   ) see notes      After visit, patient was escorted out and handed-off discharge process and instructions to the check out desk.   No additional issues raised to

## 2020-06-30 ENCOUNTER — TELEPHONE (OUTPATIENT)
Dept: NEUROLOGY | Facility: CLINIC | Age: 45
End: 2020-06-30

## 2020-06-30 DIAGNOSIS — G43.011 INTRACTABLE MIGRAINE WITHOUT AURA AND WITH STATUS MIGRAINOSUS: Primary | ICD-10-CM

## 2020-06-30 NOTE — TELEPHONE ENCOUNTER
Fax received from [facility] [employer]. Authorization received for Eletriptan 40 mg tabs. Authorization effective 3/26/2020 - 6/26/20210.     Pharmacy notified of approval.

## 2020-06-30 NOTE — TELEPHONE ENCOUNTER
Pt unable to have MRI. She is allergic to contrast, but did not realize the test would include contrast.  She needs order w/o contrast, or instructions for prep if contrast necessary.     She is also claustrophobic and will need something to relax for test

## 2020-07-06 NOTE — TELEPHONE ENCOUNTER
Venus Cook [phone] and changed CPT code from 58387 to [phone] MRI Brain without contrast.    Approval #[id number] expires 12/12/2020 [facility]

## 2020-07-07 ENCOUNTER — TELEPHONE (OUTPATIENT)
Dept: SURGERY | Facility: CLINIC | Age: 45
End: 2020-07-07

## 2020-07-07 NOTE — TELEPHONE ENCOUNTER
Per gi [place], Dr. Usman Edgar will not operate on patient until she loses the weight. Called patient. No answer. TCB.

## 2020-07-07 NOTE — TELEPHONE ENCOUNTER
[facility] He will speak with Dr. Mariella Hendricks and notify myself afterwards.

## 2020-07-07 NOTE — TELEPHONE ENCOUNTER
I called patient back regarding. States she seen Dr. Santana Good and a CT was performed yesterday and 2 hernia's were found.  States Dr. Santana Good is willing to proceed with surgery, however since she has done previous surgeries with Dr. Tino Hernandez, she is inquiring i

## 2020-07-15 ENCOUNTER — MED REC SCAN ONLY (OUTPATIENT)
Dept: SURGERY | Facility: CLINIC | Age: 45
End: 2020-07-15

## 2020-07-20 ENCOUNTER — TELEPHONE (OUTPATIENT)
Dept: FAMILY MEDICINE CLINIC | Facility: CLINIC | Age: 45
End: 2020-07-20

## 2020-07-20 DIAGNOSIS — R22.40: ICD-10-CM

## 2020-07-20 DIAGNOSIS — H91.93 BILATERAL HEARING LOSS, UNSPECIFIED HEARING LOSS TYPE: Primary | ICD-10-CM

## 2020-07-20 NOTE — TELEPHONE ENCOUNTER
Pt called [facility] stated she needs a referral for an ENT would like it faxed to [phone].

## 2020-07-20 NOTE — TELEPHONE ENCOUNTER
Called pt to get more info. She would like to see ENT due to hearing loss. Referral placed and faxed to the # below. Pt states she has a pea sized cyst on her leg for a while now. She is wondering if this is something DB would remove in the office?

## 2020-07-21 ENCOUNTER — OFFICE VISIT (OUTPATIENT)
Dept: SURGERY | Facility: CLINIC | Age: 45
End: 2020-07-21
Payer: MEDICAID

## 2020-07-21 VITALS — HEART RATE: 82 BPM | SYSTOLIC BLOOD PRESSURE: 143 MMHG | TEMPERATURE: 98 F | DIASTOLIC BLOOD PRESSURE: 82 MMHG

## 2020-07-21 DIAGNOSIS — R10.33 UMBILICAL PAIN: ICD-10-CM

## 2020-07-21 DIAGNOSIS — L81.9 PIGMENTED SKIN LESION OF UNCERTAIN NATURE: ICD-10-CM

## 2020-07-21 DIAGNOSIS — G47.33 OSA (OBSTRUCTIVE SLEEP APNEA): ICD-10-CM

## 2020-07-21 DIAGNOSIS — Z98.891 PREVIOUS CESAREAN SECTION: ICD-10-CM

## 2020-07-21 DIAGNOSIS — M67.432 GANGLION CYST OF WRIST, LEFT: ICD-10-CM

## 2020-07-21 DIAGNOSIS — K43.9 VENTRAL HERNIA WITHOUT OBSTRUCTION OR GANGRENE: Primary | ICD-10-CM

## 2020-07-21 DIAGNOSIS — E66.01 OBESITY, CLASS III, BMI 40-49.9 (MORBID OBESITY) (HCC): ICD-10-CM

## 2020-07-21 PROCEDURE — 3077F SYST BP >= 140 MM HG: CPT | Performed by: COLON & RECTAL SURGERY

## 2020-07-21 PROCEDURE — 3079F DIAST BP 80-89 MM HG: CPT | Performed by: COLON & RECTAL SURGERY

## 2020-07-21 PROCEDURE — 99215 OFFICE O/P EST HI 40 MIN: CPT | Performed by: COLON & RECTAL SURGERY

## 2020-07-21 NOTE — PROGRESS NOTES
Follow Up Visit Note       Active Problems      1. Ventral hernia without obstruction or gangrene    2. Pigmented skin lesion of uncertain nature    3. HUGO (obstructive sleep apnea)    4. Obesity, Class III, BMI 40-49.9 (morbid obesity) ([address]    5.  Umbilic portion of the knee. It is also getting larger. I did review an outside film from [facility] by reports only. It does not see her larger hernia and sac and contents.   It mentions only a small umbilical hernia that is identified in my physical exam.  I need LOCALIZATION WIRE 1 SITE LEFT (CPT=19281)  2008    Benign   • SCARLETT LOCALIZATION WIRE 1 SITE RIGHT (OCG=35488)  2008    Benign   • OTHER      baker cyst    • OTHER      bunion bilateral - right foot metal   • REDUCTION LEFT  1999   • Jamar Valdez been reviewed by me during today. Review of Systems   Constitutional: Negative for chills, diaphoresis, fatigue, fever and unexpected weight change. HENT: Negative for hearing loss, nosebleeds, sore throat and trouble swallowing.     Respiratory: Negativ lower Pfannenstiel incision is without evidence of hernia. Clinical exam of the left wrist reveals a 1.5 cm ganglion cyst that is slightly tender to palpation. It is over the carpal bones of the left wrist near the wrist joint on its dorsal surface.   Vern Dinning with a left wrist ganglion cyst.  It is approximately 1.5 cm. It is also expanding in size and causing symptoms.     She has a further problem with a skin lesion of uncertain behavior with slight pigmentation on the lateral aspect of the right knee region incarcerated, will go to the operating room for robotic recurrent incarcerated ventral incisional herniorrhaphy with mesh. I discussed possible outcomes of the ventral incisional herniorrhaphy. It may require conversion to an open procedure.   If bowel repaired.     The reason we are proceeding with repair is the fact that she has made reasonable times that weight loss, she has had some slight success, but she is also having advancing symptoms, and advancing amounts of incarcerated material within the her

## 2020-07-21 NOTE — PATIENT INSTRUCTIONS
This patient presents for evaluation of an expanding ventral incisional hernia. Her previous operations consist of several C-sections.   She had a previous ventral incisional herniorrhaphy with mesh through a transverse infraumbilical incision using 4.5 Neither of the hernia sites are tender, but they did aggravate her upon reduction. Bowel sounds have normal activity and normal pitch. The patient remains morbidly obese. Liver and spleen are not palpable.   The other right subcostal incisions from the l detail. A description of the procedure and it's possible outcomes was fully discussed. The patient seemed to understand the conversation and its details. Consent for surgery was confirmed with the patient.     We are picking a date for surgical interv

## 2020-07-21 NOTE — TELEPHONE ENCOUNTER
Referral placed. Patient advised. Verbalized understanding.      Dr. Luciano Reed phone # [phone]

## 2020-07-22 ENCOUNTER — TELEPHONE (OUTPATIENT)
Dept: FAMILY MEDICINE CLINIC | Facility: CLINIC | Age: 45
End: 2020-07-22

## 2020-07-22 DIAGNOSIS — M67.432 GANGLION CYST OF WRIST, LEFT: Primary | ICD-10-CM

## 2020-07-22 NOTE — TELEPHONE ENCOUNTER
Ann Marie Aggarwal [facility] ortho  States Dr Salomón Hidalgo doesn't do hand surgery. Referral placed and faxed  Patient advised. Verbalized understanding.

## 2020-07-22 NOTE — TELEPHONE ENCOUNTER
Pt called stated she needs a referral to see Dr. Molly Smith for a cyst on her hand fax number [phone].  Pt stated they won't schedule her apt until she gets a referral.

## 2020-07-29 ENCOUNTER — TELEPHONE (OUTPATIENT)
Dept: FAMILY MEDICINE CLINIC | Facility: CLINIC | Age: 45
End: 2020-07-29

## 2020-07-29 DIAGNOSIS — H91.93 BILATERAL HEARING LOSS, UNSPECIFIED HEARING LOSS TYPE: Primary | ICD-10-CM

## 2020-08-26 ENCOUNTER — TELEPHONE (OUTPATIENT)
Dept: FAMILY MEDICINE CLINIC | Facility: CLINIC | Age: 45
End: 2020-08-26

## 2020-08-26 NOTE — TELEPHONE ENCOUNTER
No new order needed to have the MRI open. Will process request for the sedative. Patient with history of claustrophobia.

## 2020-08-26 NOTE — TELEPHONE ENCOUNTER
Pt is looking for an ENT to do nasal pharyngeal surgery,   Pt is not compliant with wearing Sleep Mask/Machine, she feels claustrophobic. Pt will write down and ask all questions at virtual phone visit. TALAT Redman  Future appt:     Your appointments

## 2020-08-27 ENCOUNTER — VIRTUAL PHONE E/M (OUTPATIENT)
Dept: FAMILY MEDICINE CLINIC | Facility: CLINIC | Age: 45
End: 2020-08-27
Payer: MEDICAID

## 2020-08-27 DIAGNOSIS — G47.33 OSA (OBSTRUCTIVE SLEEP APNEA): Primary | ICD-10-CM

## 2020-08-27 PROCEDURE — 99213 OFFICE O/P EST LOW 20 MIN: CPT | Performed by: FAMILY MEDICINE

## 2020-08-27 RX ORDER — ROPINIROLE 0.5 MG/1
0.5 TABLET, FILM COATED ORAL EVERY EVENING
Qty: 30 TABLET | Refills: 3 | Status: SHIPPED | OUTPATIENT
Start: 2020-08-27 | End: 2021-03-17 | Stop reason: ALTCHOICE

## 2020-08-27 NOTE — PROGRESS NOTES
[facility]  SLEEP PROGRESS NOTE        HPI:   This is a 53year old female coming in for No chief complaint on file. HPI:  She is having a hard time with her sleep mask and having trouble with her phobias.    She is unable to [employer] than 35kg/mg2? - -   Age over 48years old? - -   Neck circumference >16 inches (40 cm)?  - -   Gender Male? - -   Score and HUGO Risk - -   Obstructive Sleep Apnea Risk - -       No results found for: IRON, IRONTOT, TIBC, IRONSAT, TRANSFERRIN, TIBCP, IRONBI Social History:  Social History    Patient does not qualify to have social determinant information on file (likely too young). Social History Narrative       worker, for emergency .      Social History    Tobacco Use      Smoking status smear inconsistent w/LMP     HUGO (obstructive sleep apnea)     Migraine without aura and with status migrainosus, not intractable     Pigmented skin lesion of uncertain nature     Ganglion cyst of wrist, left      REVIEW OF SYSTEMS:   Review of Systems with the Durable medical equipment provider. Due to covid crisis this visit was carried out electronically by  Phone 15:50 minutes.      “Please note that the following visit was completed using two-way, real-time interactive audio and/or video  Or au

## 2020-09-07 ENCOUNTER — APPOINTMENT (OUTPATIENT)
Dept: LAB | Facility: HOSPITAL | Age: 45
End: 2020-09-07
Attending: COLON & RECTAL SURGERY
Payer: MEDICAID

## 2020-09-07 DIAGNOSIS — Z01.818 PRE-OP TESTING: ICD-10-CM

## 2020-09-08 ENCOUNTER — TELEPHONE (OUTPATIENT)
Dept: SURGERY | Facility: CLINIC | Age: 45
End: 2020-09-08

## 2020-09-08 DIAGNOSIS — K43.9 VENTRAL HERNIA WITHOUT OBSTRUCTION OR GANGRENE: Primary | ICD-10-CM

## 2020-09-08 LAB — SARS-COV-2 RNA RESP QL NAA+PROBE: NOT DETECTED

## 2020-09-09 ENCOUNTER — ANESTHESIA EVENT (OUTPATIENT)
Dept: SURGERY | Facility: HOSPITAL | Age: 45
End: 2020-09-09
Payer: MEDICAID

## 2020-09-09 ENCOUNTER — ANESTHESIA (OUTPATIENT)
Dept: SURGERY | Facility: HOSPITAL | Age: 45
End: 2020-09-09
Payer: MEDICAID

## 2020-09-09 ENCOUNTER — HOSPITAL ENCOUNTER (OUTPATIENT)
Facility: HOSPITAL | Age: 45
Discharge: HOME | End: 2020-09-12
Attending: COLON & RECTAL SURGERY | Admitting: COLON & RECTAL SURGERY
Payer: MEDICAID

## 2020-09-09 DIAGNOSIS — K43.9 VENTRAL HERNIA WITHOUT OBSTRUCTION OR GANGRENE: ICD-10-CM

## 2020-09-09 DIAGNOSIS — Z01.818 PRE-OP TESTING: Primary | ICD-10-CM

## 2020-09-09 LAB
POCT LOT NUMBER: NORMAL
POCT URINE PREGNANCY: NEGATIVE

## 2020-09-09 PROCEDURE — S0028 INJECTION, FAMOTIDINE, 20 MG: HCPCS | Performed by: PHYSICIAN ASSISTANT

## 2020-09-09 PROCEDURE — 0HBHXZZ EXCISION OF RIGHT UPPER LEG SKIN, EXTERNAL APPROACH: ICD-10-PCS | Performed by: COLON & RECTAL SURGERY

## 2020-09-09 PROCEDURE — 94660 CPAP INITIATION&MGMT: CPT

## 2020-09-09 PROCEDURE — 80048 BASIC METABOLIC PNL TOTAL CA: CPT | Performed by: COLON & RECTAL SURGERY

## 2020-09-09 PROCEDURE — 0WUF4JZ SUPPLEMENT ABDOMINAL WALL WITH SYNTHETIC SUBSTITUTE, PERCUTANEOUS ENDOSCOPIC APPROACH: ICD-10-PCS | Performed by: COLON & RECTAL SURGERY

## 2020-09-09 PROCEDURE — 81025 URINE PREGNANCY TEST: CPT | Performed by: COLON & RECTAL SURGERY

## 2020-09-09 PROCEDURE — 85025 COMPLETE CBC W/AUTO DIFF WBC: CPT | Performed by: COLON & RECTAL SURGERY

## 2020-09-09 PROCEDURE — S0028 INJECTION, FAMOTIDINE, 20 MG: HCPCS

## 2020-09-09 PROCEDURE — 8E0W4CZ ROBOTIC ASSISTED PROCEDURE OF TRUNK REGION, PERCUTANEOUS ENDOSCOPIC APPROACH: ICD-10-PCS | Performed by: COLON & RECTAL SURGERY

## 2020-09-09 PROCEDURE — 88305 TISSUE EXAM BY PATHOLOGIST: CPT | Performed by: COLON & RECTAL SURGERY

## 2020-09-09 DEVICE — VENTRALIGHT ST MESH
Type: IMPLANTABLE DEVICE | Site: ABDOMEN | Status: FUNCTIONAL
Brand: VENTRALIGHT ST

## 2020-09-09 RX ORDER — ROPINIROLE 0.5 MG/1
0.5 TABLET, FILM COATED ORAL EVERY EVENING
Status: DISCONTINUED | OUTPATIENT
Start: 2020-09-09 | End: 2020-09-13

## 2020-09-09 RX ORDER — MIDAZOLAM HYDROCHLORIDE 1 MG/ML
INJECTION INTRAMUSCULAR; INTRAVENOUS AS NEEDED
Status: DISCONTINUED | OUTPATIENT
Start: 2020-09-09 | End: 2020-09-09 | Stop reason: SURG

## 2020-09-09 RX ORDER — HYDROMORPHONE HYDROCHLORIDE 1 MG/ML
0.4 INJECTION, SOLUTION INTRAMUSCULAR; INTRAVENOUS; SUBCUTANEOUS EVERY 5 MIN PRN
Status: DISCONTINUED | OUTPATIENT
Start: 2020-09-09 | End: 2020-09-09 | Stop reason: HOSPADM

## 2020-09-09 RX ORDER — ACETAMINOPHEN 500 MG
1000 TABLET ORAL ONCE
Status: DISCONTINUED | OUTPATIENT
Start: 2020-09-09 | End: 2020-09-09 | Stop reason: HOSPADM

## 2020-09-09 RX ORDER — BUPIVACAINE HYDROCHLORIDE AND EPINEPHRINE 5; 5 MG/ML; UG/ML
INJECTION, SOLUTION EPIDURAL; INTRACAUDAL; PERINEURAL AS NEEDED
Status: DISCONTINUED | OUTPATIENT
Start: 2020-09-09 | End: 2020-09-09 | Stop reason: HOSPADM

## 2020-09-09 RX ORDER — HEPARIN SODIUM 5000 [USP'U]/ML
5000 INJECTION, SOLUTION INTRAVENOUS; SUBCUTANEOUS EVERY 8 HOURS SCHEDULED
Status: DISCONTINUED | OUTPATIENT
Start: 2020-09-09 | End: 2020-09-13

## 2020-09-09 RX ORDER — KETOROLAC TROMETHAMINE 30 MG/ML
INJECTION, SOLUTION INTRAMUSCULAR; INTRAVENOUS AS NEEDED
Status: DISCONTINUED | OUTPATIENT
Start: 2020-09-09 | End: 2020-09-09 | Stop reason: SURG

## 2020-09-09 RX ORDER — DEXAMETHASONE SODIUM PHOSPHATE 4 MG/ML
VIAL (ML) INJECTION AS NEEDED
Status: DISCONTINUED | OUTPATIENT
Start: 2020-09-09 | End: 2020-09-09 | Stop reason: SURG

## 2020-09-09 RX ORDER — ALBUTEROL SULFATE 90 UG/1
2 AEROSOL, METERED RESPIRATORY (INHALATION) EVERY 6 HOURS PRN
Status: DISCONTINUED | OUTPATIENT
Start: 2020-09-09 | End: 2020-09-13

## 2020-09-09 RX ORDER — NEOSTIGMINE METHYLSULFATE 1 MG/ML
INJECTION INTRAVENOUS AS NEEDED
Status: DISCONTINUED | OUTPATIENT
Start: 2020-09-09 | End: 2020-09-09 | Stop reason: SURG

## 2020-09-09 RX ORDER — SODIUM CHLORIDE 9 MG/ML
INJECTION, SOLUTION INTRAVENOUS ONCE
Status: COMPLETED | OUTPATIENT
Start: 2020-09-09 | End: 2020-09-10

## 2020-09-09 RX ORDER — HEPARIN SODIUM 5000 [USP'U]/ML
5000 INJECTION, SOLUTION INTRAVENOUS; SUBCUTANEOUS ONCE
Status: COMPLETED | OUTPATIENT
Start: 2020-09-09 | End: 2020-09-09

## 2020-09-09 RX ORDER — HYDROMORPHONE HYDROCHLORIDE 1 MG/ML
0.4 INJECTION, SOLUTION INTRAMUSCULAR; INTRAVENOUS; SUBCUTANEOUS EVERY 2 HOUR PRN
Status: DISCONTINUED | OUTPATIENT
Start: 2020-09-09 | End: 2020-09-13

## 2020-09-09 RX ORDER — FAMOTIDINE 10 MG/ML
20 INJECTION, SOLUTION INTRAVENOUS 2 TIMES DAILY
Status: DISCONTINUED | OUTPATIENT
Start: 2020-09-09 | End: 2020-09-11

## 2020-09-09 RX ORDER — HYDROMORPHONE HYDROCHLORIDE 1 MG/ML
0.8 INJECTION, SOLUTION INTRAMUSCULAR; INTRAVENOUS; SUBCUTANEOUS EVERY 2 HOUR PRN
Status: DISCONTINUED | OUTPATIENT
Start: 2020-09-09 | End: 2020-09-13

## 2020-09-09 RX ORDER — GLYCOPYRROLATE 0.2 MG/ML
INJECTION, SOLUTION INTRAMUSCULAR; INTRAVENOUS AS NEEDED
Status: DISCONTINUED | OUTPATIENT
Start: 2020-09-09 | End: 2020-09-09 | Stop reason: SURG

## 2020-09-09 RX ORDER — ONDANSETRON 2 MG/ML
INJECTION INTRAMUSCULAR; INTRAVENOUS AS NEEDED
Status: DISCONTINUED | OUTPATIENT
Start: 2020-09-09 | End: 2020-09-09 | Stop reason: SURG

## 2020-09-09 RX ORDER — SODIUM CHLORIDE, SODIUM LACTATE, POTASSIUM CHLORIDE, CALCIUM CHLORIDE 600; 310; 30; 20 MG/100ML; MG/100ML; MG/100ML; MG/100ML
INJECTION, SOLUTION INTRAVENOUS CONTINUOUS
Status: DISCONTINUED | OUTPATIENT
Start: 2020-09-09 | End: 2020-09-09 | Stop reason: HOSPADM

## 2020-09-09 RX ORDER — LIDOCAINE HYDROCHLORIDE 10 MG/ML
INJECTION, SOLUTION EPIDURAL; INFILTRATION; INTRACAUDAL; PERINEURAL AS NEEDED
Status: DISCONTINUED | OUTPATIENT
Start: 2020-09-09 | End: 2020-09-09 | Stop reason: SURG

## 2020-09-09 RX ORDER — CISATRACURIUM BESYLATE 2 MG/ML
INJECTION INTRAVENOUS AS NEEDED
Status: DISCONTINUED | OUTPATIENT
Start: 2020-09-09 | End: 2020-09-09 | Stop reason: SURG

## 2020-09-09 RX ORDER — HYDROMORPHONE HYDROCHLORIDE 1 MG/ML
1.2 INJECTION, SOLUTION INTRAMUSCULAR; INTRAVENOUS; SUBCUTANEOUS EVERY 2 HOUR PRN
Status: DISCONTINUED | OUTPATIENT
Start: 2020-09-09 | End: 2020-09-13

## 2020-09-09 RX ORDER — NALOXONE HYDROCHLORIDE 0.4 MG/ML
80 INJECTION, SOLUTION INTRAMUSCULAR; INTRAVENOUS; SUBCUTANEOUS AS NEEDED
Status: DISCONTINUED | OUTPATIENT
Start: 2020-09-09 | End: 2020-09-09 | Stop reason: HOSPADM

## 2020-09-09 RX ORDER — ACETAMINOPHEN 500 MG
500 TABLET ORAL EVERY 6 HOURS PRN
Status: DISCONTINUED | OUTPATIENT
Start: 2020-09-09 | End: 2020-09-13

## 2020-09-09 RX ORDER — METOCLOPRAMIDE HYDROCHLORIDE 5 MG/ML
INJECTION INTRAMUSCULAR; INTRAVENOUS AS NEEDED
Status: DISCONTINUED | OUTPATIENT
Start: 2020-09-09 | End: 2020-09-09 | Stop reason: SURG

## 2020-09-09 RX ORDER — HYDROCODONE BITARTRATE AND ACETAMINOPHEN 5; 325 MG/1; MG/1
2 TABLET ORAL EVERY 4 HOURS PRN
Status: DISCONTINUED | OUTPATIENT
Start: 2020-09-09 | End: 2020-09-13

## 2020-09-09 RX ORDER — CEFAZOLIN SODIUM/WATER 2 G/20 ML
2 SYRINGE (ML) INTRAVENOUS ONCE
Status: COMPLETED | OUTPATIENT
Start: 2020-09-09 | End: 2020-09-09

## 2020-09-09 RX ORDER — FAMOTIDINE 10 MG/ML
INJECTION, SOLUTION INTRAVENOUS AS NEEDED
Status: DISCONTINUED | OUTPATIENT
Start: 2020-09-09 | End: 2020-09-09 | Stop reason: SURG

## 2020-09-09 RX ORDER — DEXTROSE, SODIUM CHLORIDE, AND POTASSIUM CHLORIDE 5; .45; .15 G/100ML; G/100ML; G/100ML
INJECTION INTRAVENOUS CONTINUOUS
Status: DISCONTINUED | OUTPATIENT
Start: 2020-09-09 | End: 2020-09-13

## 2020-09-09 RX ORDER — ONDANSETRON 2 MG/ML
4 INJECTION INTRAMUSCULAR; INTRAVENOUS EVERY 6 HOURS PRN
Status: DISCONTINUED | OUTPATIENT
Start: 2020-09-09 | End: 2020-09-13

## 2020-09-09 RX ORDER — HYDROMORPHONE HYDROCHLORIDE 1 MG/ML
INJECTION, SOLUTION INTRAMUSCULAR; INTRAVENOUS; SUBCUTANEOUS
Status: COMPLETED
Start: 2020-09-09 | End: 2020-09-09

## 2020-09-09 RX ORDER — ONDANSETRON 2 MG/ML
4 INJECTION INTRAMUSCULAR; INTRAVENOUS AS NEEDED
Status: DISCONTINUED | OUTPATIENT
Start: 2020-09-09 | End: 2020-09-09 | Stop reason: HOSPADM

## 2020-09-09 RX ORDER — ACETAMINOPHEN 500 MG
1000 TABLET ORAL ONCE
Status: ON HOLD | COMMUNITY
End: 2020-09-12

## 2020-09-09 RX ORDER — SODIUM CHLORIDE, SODIUM LACTATE, POTASSIUM CHLORIDE, CALCIUM CHLORIDE 600; 310; 30; 20 MG/100ML; MG/100ML; MG/100ML; MG/100ML
INJECTION, SOLUTION INTRAVENOUS CONTINUOUS
Status: DISCONTINUED | OUTPATIENT
Start: 2020-09-09 | End: 2020-09-13

## 2020-09-09 RX ORDER — HYDROCODONE BITARTRATE AND ACETAMINOPHEN 5; 325 MG/1; MG/1
1 TABLET ORAL EVERY 4 HOURS PRN
Status: DISCONTINUED | OUTPATIENT
Start: 2020-09-09 | End: 2020-09-13

## 2020-09-09 RX ORDER — LIDOCAINE HYDROCHLORIDE ANHYDROUS AND DEXTROSE MONOHYDRATE .8; 5 G/100ML; G/100ML
INJECTION, SOLUTION INTRAVENOUS CONTINUOUS PRN
Status: DISCONTINUED | OUTPATIENT
Start: 2020-09-09 | End: 2020-09-09 | Stop reason: SURG

## 2020-09-09 RX ADMIN — MIDAZOLAM HYDROCHLORIDE 4 MG: 1 INJECTION INTRAMUSCULAR; INTRAVENOUS at 12:45:00

## 2020-09-09 RX ADMIN — METOCLOPRAMIDE HYDROCHLORIDE 10 MG: 5 INJECTION INTRAMUSCULAR; INTRAVENOUS at 12:56:00

## 2020-09-09 RX ADMIN — CISATRACURIUM BESYLATE 2 MG: 2 INJECTION INTRAVENOUS at 12:52:00

## 2020-09-09 RX ADMIN — KETOROLAC TROMETHAMINE 30 MG: 30 INJECTION, SOLUTION INTRAMUSCULAR; INTRAVENOUS at 14:37:00

## 2020-09-09 RX ADMIN — CISATRACURIUM BESYLATE 2 MG: 2 INJECTION INTRAVENOUS at 13:50:00

## 2020-09-09 RX ADMIN — NEOSTIGMINE METHYLSULFATE 2 MG: 1 INJECTION INTRAVENOUS at 14:37:00

## 2020-09-09 RX ADMIN — CEFAZOLIN SODIUM/WATER 2 G: 2 G/20 ML SYRINGE (ML) INTRAVENOUS at 12:55:00

## 2020-09-09 RX ADMIN — FAMOTIDINE 20 MG: 10 INJECTION, SOLUTION INTRAVENOUS at 12:56:00

## 2020-09-09 RX ADMIN — CISATRACURIUM BESYLATE 4 MG: 2 INJECTION INTRAVENOUS at 13:07:00

## 2020-09-09 RX ADMIN — SODIUM CHLORIDE, SODIUM LACTATE, POTASSIUM CHLORIDE, CALCIUM CHLORIDE: 600; 310; 30; 20 INJECTION, SOLUTION INTRAVENOUS at 14:55:00

## 2020-09-09 RX ADMIN — GLYCOPYRROLATE 0.2 MG: 0.2 INJECTION, SOLUTION INTRAMUSCULAR; INTRAVENOUS at 14:37:00

## 2020-09-09 RX ADMIN — ONDANSETRON 4 MG: 2 INJECTION INTRAMUSCULAR; INTRAVENOUS at 14:37:00

## 2020-09-09 RX ADMIN — DEXAMETHASONE SODIUM PHOSPHATE 8 MG: 4 MG/ML VIAL (ML) INJECTION at 12:56:00

## 2020-09-09 RX ADMIN — LIDOCAINE HYDROCHLORIDE 100 MG: 10 INJECTION, SOLUTION EPIDURAL; INFILTRATION; INTRACAUDAL; PERINEURAL at 12:52:00

## 2020-09-09 RX ADMIN — LIDOCAINE HYDROCHLORIDE ANHYDROUS AND DEXTROSE MONOHYDRATE 2 MG/KG/HR: .8; 5 INJECTION, SOLUTION INTRAVENOUS at 12:55:00

## 2020-09-09 NOTE — OPERATIVE REPORT
[facility]   Operative Note    Mayer Cranker Location: OR   [facility] [id number] MRN [id number]   Admission Date 9/9/2020 Operation Date 9/9/2020   Attending Physician Gerry Paris MD Operating Physician Monique Steiner MD     Pre-Operative Diagnosis: Ventral previous left lower quadrant hernia site, and other adhesions within the abdominal cavity. The resultant defect was closed initially with a V lock suture. An onlay mesh graft was performed with 4.5 inch Ventralyte.   The patient underwent an uncomplicate fascial defect. The mesh was also marked with the Seprafilm portion of the mesh toward the abdominal wall. We then secured the perimeter of the mesh to the fascia with a running #0-V lock suture in short running segments.   We also ran some segments of

## 2020-09-09 NOTE — ANESTHESIA PREPROCEDURE EVALUATION
PRE-OP EVALUATION    Patient Name: Farhan Mendoza    Pre-op Diagnosis: Ventral hernia without obstruction or gangrene [K43.9]    Procedure(s):  ROBOTIC VENTRAL HERNIA REPAIR WITH MESH - COMPLEX     Surgeon(s) and Role:     Kaz Jackman MD - Primary    P • ESOPHAGOGASTRODUODENOSCOPY (EGD) N/A 5/24/2019    Performed by Blanca Gill MD at [facility] ENDOSCOPY   • ESOPHAGOGASTRODUODENOSCOPY (EGD) N/A 7/25/2014    Performed by Noe Solitario MD at [address] [place] [address]

## 2020-09-09 NOTE — H&P
Active Problems      1. Ventral hernia without obstruction or gangrene    2. Pigmented skin lesion of uncertain nature    3. HUGO (obstructive sleep apnea)    4. Obesity, Class III, BMI 40-49.9 (morbid obesity) ([address]    5. Umbilical pain    6.  Previous cesa She has a further problem with a skin lesion of uncertain behavior with slight pigmentation on the lateral aspect of the right knee region on the distal thigh portion of the knee. It is also getting larger.      I did review an outside film from [facility] by [email or web address] • LAPAROSCOPIC CHOLECYSTECTOMY   2 weeks ago   • SCARLETT LOCALIZATION WIRE 1 SITE LEFT (CPT=19281)   2008     Benign   • SCARLETT LOCALIZATION WIRE 1 SITE RIGHT (BFT=31525)   2008     Benign   • OTHER         baker cyst    • OTHER         bunion bilateral - right f •  Albuterol Sulfate HFA (PROAIR HFA) 108 (90 Base) MCG/ACT Inhalation Aero Soln, Inhale 2 puffs into the lungs every 6 (six) hours as needed for Wheezing., Disp: 1 Inhaler, Rfl: 0          Physical Findings     Physical Exam   Abdominal:       Clinical ex Ganglion cyst of wrist, left     Plan   This patient presents for evaluation of an expanding ventral incisional hernia. Her previous operations consist of several C-sections.   She had a previous ventral incisional herniorrhaphy with mesh through a valencia Clinical exam of the abdomen reveals her to have 2 separate hernias. She has a hernia in the epigastrium that is greater than 12 cm in greatest dimension. It is slightly reducible, but also slightly incarcerated.   The fascial defect is probably only 2.5 I discussed possible outcomes of the ventral incisional herniorrhaphy. It may require conversion to an open procedure.   If bowel is violated during the reduction, or lysis of adhesions, the patient will have the procedure terminated, the bowel fixed, and The reason we are proceeding with repair is the fact that she has made reasonable times that weight loss, she has had some slight success, but she is also having advancing symptoms, and advancing amounts of incarcerated material within the hernia sac.

## 2020-09-09 NOTE — ANESTHESIA PROCEDURE NOTES
Airway  Date/Time: 9/9/2020 12:53 PM  Urgency: elective      General Information and Staff    Patient location during procedure: OR  Anesthesiologist: Niles Eden MD  Performed: anesthesiologist     Indications and Patient Condition  Indications for airway

## 2020-09-09 NOTE — PLAN OF CARE
Problem: Patient/Family Goals  Goal: Patient/Family Long Term Goal  Description  Patient's Long Term Goal: DISCHARGE     Interventions:  - PAIN TOLERABLE  -TOLERATING DIET   - RETURN TO PREVIOUS ADLs   - See additional Care Plan goals for specific interv eating environment  Outcome: Progressing  Goal: Achieves appropriate nutritional intake (bariatric)  Description  INTERVENTIONS:  - Monitor for over-consumption  - Identify factors contributing to increased intake, treat as appropriate  - Monitor I&O, WT a

## 2020-09-09 NOTE — ANESTHESIA POSTPROCEDURE EVALUATION
[address] Patient Status:  Hospital Outpatient Surgery   Age/Gender 53year old female MRN [id number]   [facility] SURGERY Attending Zain Jaramillo MD   Hosp Day # 0 PCP Debbie Chauhan MD       Anesthesia Post-op Note

## 2020-09-10 LAB
ANION GAP SERPL CALC-SCNC: 10 MMOL/L (ref 0–18)
BASOPHILS # BLD AUTO: 0.03 X10(3) UL (ref 0–0.2)
BASOPHILS NFR BLD AUTO: 0.2 %
BUN BLD-MCNC: 17 MG/DL (ref 7–18)
BUN/CREAT SERPL: 12.6 (ref 10–20)
CALCIUM BLD-MCNC: 7.8 MG/DL (ref 8.5–10.1)
CHLORIDE SERPL-SCNC: 107 MMOL/L (ref 98–112)
CO2 SERPL-SCNC: 20 MMOL/L (ref 21–32)
CREAT BLD-MCNC: 1.35 MG/DL (ref 0.55–1.02)
DEPRECATED RDW RBC AUTO: 43.9 FL (ref 35.1–46.3)
EOSINOPHIL # BLD AUTO: 0.01 X10(3) UL (ref 0–0.7)
EOSINOPHIL NFR BLD AUTO: 0.1 %
ERYTHROCYTE [DISTWIDTH] IN BLOOD BY AUTOMATED COUNT: 12.9 % (ref 11–15)
GLUCOSE BLD-MCNC: 222 MG/DL (ref 70–99)
HCT VFR BLD AUTO: 34 % (ref 35–48)
HGB BLD-MCNC: 11 G/DL (ref 12–16)
IMM GRANULOCYTES # BLD AUTO: 0.07 X10(3) UL (ref 0–1)
IMM GRANULOCYTES NFR BLD: 0.4 %
LYMPHOCYTES # BLD AUTO: 1.03 X10(3) UL (ref 1–4)
LYMPHOCYTES NFR BLD AUTO: 6.1 %
MCH RBC QN AUTO: 30.3 PG (ref 26–34)
MCHC RBC AUTO-ENTMCNC: 32.4 G/DL (ref 31–37)
MCV RBC AUTO: 93.7 FL (ref 80–100)
MONOCYTES # BLD AUTO: 1.03 X10(3) UL (ref 0.1–1)
MONOCYTES NFR BLD AUTO: 6.1 %
NEUTROPHILS # BLD AUTO: 14.61 X10 (3) UL (ref 1.5–7.7)
NEUTROPHILS # BLD AUTO: 14.61 X10(3) UL (ref 1.5–7.7)
NEUTROPHILS NFR BLD AUTO: 87.1 %
OSMOLALITY SERPL CALC.SUM OF ELEC: 292 MOSM/KG (ref 275–295)
PLATELET # BLD AUTO: 251 10(3)UL (ref 150–450)
POTASSIUM SERPL-SCNC: 4.3 MMOL/L (ref 3.5–5.1)
RBC # BLD AUTO: 3.63 X10(6)UL (ref 3.8–5.3)
SODIUM SERPL-SCNC: 137 MMOL/L (ref 136–145)
WBC # BLD AUTO: 16.8 X10(3) UL (ref 4–11)

## 2020-09-10 PROCEDURE — S0028 INJECTION, FAMOTIDINE, 20 MG: HCPCS | Performed by: PHYSICIAN ASSISTANT

## 2020-09-10 NOTE — PLAN OF CARE
A&Ox4. VSS. 2L O2. Patient was doing well. Upon 2100 assessment, a small amount of what looked lie old drainage was coming from 2 lap sites on left side of abdomen. Pressures was applied and 4x4 gauze was placed by this RN.  This RN checked back on dressing patient is spotting. PCT reports no blood. Patient declines/refuses to wear mesh briefs with pad.    0615: Dressing on left side steri strips checked--No shadowing through ABD pad at this time.       Problem: Patient/Family Goals  Goal: Patient/Family Long meals as needed  - Monitor I&O, WT and lab values  - Obtain nutritional consult as needed  - Optimize oral hygiene and moisture  - Encourage food from home; allow for food preferences  - Enhance eating environment  Outcome: Progressing  Goal: Achieves appr

## 2020-09-10 NOTE — PROGRESS NOTES
2100: Blood is draining from 2 lap sites and on sheets. 4x4 Gauze dressing applied. 2330: Dressing is saturated. MD paged.

## 2020-09-10 NOTE — PROGRESS NOTES
[facility]  Progress Note    Bharath Collins Patient Status:  Outpatient in a Bed    10/10/1975 MRN [id number]   [facility] 3NW-A Attending Micah López MD   Hosp Day # 0 PCP Godfrey Lopez MD     Subjective:  No new complaints, incisi constipation and diarrhea     H. pylori infection     Well woman exam with routine gynecological exam     Obesity, Class III, BMI 40-49.9 (morbid obesity) ([facility] [address]     Endometrial cells on cervical Pap smear inconsistent w/LMP     HUGO (obstructive sleep apnea

## 2020-09-11 PROCEDURE — 97161 PT EVAL LOW COMPLEX 20 MIN: CPT

## 2020-09-11 PROCEDURE — 97116 GAIT TRAINING THERAPY: CPT

## 2020-09-11 RX ORDER — FAMOTIDINE 20 MG/1
20 TABLET ORAL 2 TIMES DAILY
Status: DISCONTINUED | OUTPATIENT
Start: 2020-09-11 | End: 2020-09-13

## 2020-09-11 NOTE — RESPIRATORY THERAPY NOTE
HUGO - Equipment Use Daily Summary:  · Set Mode   · Usage in hours:   · 90% Pressure (EPAP) level:   · 90% Insp Pressure (IPAP):   · AHI:   · Supplemental Oxygen:  · Comments: DID NOT USE

## 2020-09-11 NOTE — PLAN OF CARE
Patient resting in bed. VSS. Denies flatus/belching. Hypo bowel sounds. Has not ambulating in halls, states feels like \"insides will fall out\". RN reassured patient that would not happen, patient can use a pillow for support.  Discussed importance of ambu

## 2020-09-11 NOTE — PROGRESS NOTES
[facility]  Progress Note    Jet Zarate Patient Status:  Outpatient in a Bed    10/10/1975 MRN [id number]   [facility] 3NW-A Attending Dagmar Valdez MD   Hosp Day # 0 PCP Danitza Burns MD     Subjective:  No new complaints, incisi Ganglion cyst of wrist, left    POD 2 Robotic Ventral hernia repair    Plan:  1. Encouraged increased ambulation, must increase activity  2. Diet as tolerated  3. Plan for D/C home tomorrow in AM  4. Remains admitted for pain control   5.  DVT prophylaxis,

## 2020-09-11 NOTE — PLAN OF CARE
Pt alert and orientatedx4. Room air. Pulse Ox. Slight [place]. Anxious at times. HUGO w/ CPAP. Tele- NSR, ST when ambulating. On heparin. SCDs. Regular diet and tolerating well. Voiding. Denies passing gas. Denies belching.  Laps sitesx4, ABD pads, tape, and pre effectiveness of GI medications  - Encourage mobilization and activity  - Obtain nutritional consult as needed  - Establish a toileting routine/schedule  - Consider collaborating with pharmacy to review patient's medication profile  Outcome: Progressing  G

## 2020-09-11 NOTE — PLAN OF CARE
Pt alert and oriented x4. Norco given for pain. Denies nausea. Denies gas. Tolerating regular diet. Voiding. Lap sites x4. Left upper lap sites with large amount of drainage. Discussed with surgical PA, ok to continue heparin shots. GHAZALA. .  Tele, NSR. VSS tolerated  - Evaluate effectiveness of GI medications  - Encourage mobilization and activity  - Obtain nutritional consult as needed  - Establish a toileting routine/schedule  - Consider collaborating with pharmacy to review patient's medication profile  O

## 2020-09-11 NOTE — PLAN OF CARE
Problem: Patient/Family Goals  Goal: Patient/Family Long Term Goal  Description  Patient's Long Term Goal: DISCHARGE     Interventions:  - PAIN TOLERABLE  -TOLERATING DIET   - RETURN TO PREVIOUS ADLs   - See additional Care Plan goals for specific interv

## 2020-09-11 NOTE — PHYSICAL THERAPY NOTE
PHYSICAL THERAPY EVALUATION - INPATIENT     Room Number: 306/306-A  Evaluation Date: 9/11/2020  Type of Evaluation: Initial  Physician Order: PT Eval and Treat    Presenting Problem: s/p ventral hernia repair 9/9/20  Reason for Therapy: Mobility Dysf weeks ago   • SCARLETT LOCALIZATION WIRE 1 SITE LEFT (CPT=19281)  2008    Benign   • SCARLETT LOCALIZATION WIRE 1 SITE RIGHT (SWN=[id number])  2008    Benign   • OTHER      baker cyst    • OTHER      bunion bilateral - right foot metal   • REDUCTION LEFT  1999   • REDUCT blankets)?: None   -   Sitting down on and standing up from a chair with arms (e.g., wheelchair, bedside commode, etc.): None   -   Moving from lying on back to sitting on the side of the bed?: None   How much help from another person does the patient curr Functional outcome measures completed include AM-PAC. The AM-PAC '6-Clicks' Inpatient Basic Mobility Short Form was completed and this patient is demonstrating a 20.91% degree of impairment in mobility.  Research supports that patients with this level of im

## 2020-09-12 VITALS
WEIGHT: 226.19 LBS | BODY MASS INDEX: 45.6 KG/M2 | HEART RATE: 75 BPM | RESPIRATION RATE: 17 BRPM | TEMPERATURE: 98 F | HEIGHT: 59 IN | OXYGEN SATURATION: 98 % | SYSTOLIC BLOOD PRESSURE: 124 MMHG | DIASTOLIC BLOOD PRESSURE: 76 MMHG

## 2020-09-12 PROBLEM — R10.33 UMBILICAL PAIN: Status: RESOLVED | Noted: 2017-10-24 | Resolved: 2020-09-12

## 2020-09-12 PROBLEM — Z99.89 OSA ON CPAP: Status: ACTIVE | Noted: 2020-05-13

## 2020-09-12 PROBLEM — Z01.419 WELL WOMAN EXAM WITH ROUTINE GYNECOLOGICAL EXAM: Status: RESOLVED | Noted: 2019-11-22 | Resolved: 2020-09-12

## 2020-09-12 PROBLEM — G47.33 OSA ON CPAP: Status: ACTIVE | Noted: 2020-05-13

## 2020-09-12 PROBLEM — R19.8 ALTERNATING CONSTIPATION AND DIARRHEA: Status: RESOLVED | Noted: 2019-05-01 | Resolved: 2020-09-12

## 2020-09-12 RX ORDER — HYDROCODONE BITARTRATE AND ACETAMINOPHEN 5; 325 MG/1; MG/1
2 TABLET ORAL EVERY 4 HOURS PRN
Qty: 30 TABLET | Refills: 0 | Status: SHIPPED | OUTPATIENT
Start: 2020-09-12 | End: 2020-09-21

## 2020-09-12 NOTE — PHYSICAL THERAPY NOTE
IP PT attempted , per RN pt preparing for d/c and has been ambulating halls . RN states pt has no further PT needs.

## 2020-09-12 NOTE — RESPIRATORY THERAPY NOTE
HUGO : EQUIPMENT USE: DAILY SUMMARY                                            SET MODE:                                          USAGE IN HOURS:                                          90% EXP. PRESSURE(EPAP):

## 2020-09-12 NOTE — DISCHARGE SUMMARY
[facility]  Discharge Summary    Denver Simons Patient Status:  Outpatient in a Bed    10/10/1975 MRN [id number]   [facility] 3NW-A Attending Caryl Del Rio MD   Hosp Day # 0 PCP Mery Marshall MD     Date of Admission: 2020    Son 3    Albuterol Sulfate HFA (PROAIR HFA) 108 (90 Base) MCG/ACT Inhalation Aero Soln  Inhale 2 puffs into the lungs every 6 (six) hours as needed for Wheezing.   Qty: 1 Inhaler Refills: 0      STOP taking these medications    acetaminophen 500 MG Oral Tab

## 2020-09-12 NOTE — PLAN OF CARE
Received patient awake and oriented. On room air, breath sounds diminished. On tele, SR, ST with exertion. Up ambulating in the hallway at times. Pressure dressing to abdomen with old blood. Medicated with Constantine for c/o abdominal pain.  Patient noted to hav NULL

## 2020-09-12 NOTE — PROGRESS NOTES
[facility]  Progress Note    Rebecca Rubin Patient Status:  Outpatient in a Bed    10/10/1975 MRN [id number]   [facility] 3NW-A Attending Yulia Miller MD   Hosp Day # 0 PCP Ermelinda Walls MD     Subjective:  No new complaints, tolera total face time with this patient was 30 minutes. Greater than half of our visit was spent in counseling the patient on the above listed diagnoses and treatment options.     Melanie Altamirano  9/12/2020  9:15 AM

## 2020-09-17 ENCOUNTER — TELEPHONE (OUTPATIENT)
Dept: FAMILY MEDICINE CLINIC | Facility: CLINIC | Age: 45
End: 2020-09-17

## 2020-09-17 ENCOUNTER — TELEPHONE (OUTPATIENT)
Dept: OBGYN CLINIC | Facility: CLINIC | Age: 45
End: 2020-09-17

## 2020-09-17 ENCOUNTER — OFFICE VISIT (OUTPATIENT)
Dept: OBGYN CLINIC | Facility: CLINIC | Age: 45
End: 2020-09-17
Payer: MEDICAID

## 2020-09-17 VITALS
TEMPERATURE: 98 F | DIASTOLIC BLOOD PRESSURE: 70 MMHG | HEIGHT: 59 IN | WEIGHT: 221.19 LBS | SYSTOLIC BLOOD PRESSURE: 122 MMHG | HEART RATE: 88 BPM | BODY MASS INDEX: 44.59 KG/M2

## 2020-09-17 DIAGNOSIS — Z11.3 SCREEN FOR STD (SEXUALLY TRANSMITTED DISEASE): Primary | ICD-10-CM

## 2020-09-17 LAB
APPEARANCE: CLEAR
GLUCOSE (URINE DIPSTICK): NEGATIVE MG/DL
KETONES (URINE DIPSTICK): NEGATIVE MG/DL
LEUKOCYTES: NEGATIVE
MULTISTIX LOT#: NORMAL NUMERIC
NITRITE, URINE: NEGATIVE
PH, URINE: 5.5 (ref 4.5–8)
PROTEIN (URINE DIPSTICK): 30 MG/DL
SPECIFIC GRAVITY: 1.02 (ref 1–1.03)
T PALLIDUM AB SER QL IA: NONREACTIVE
URINE-COLOR: YELLOW
UROBILINOGEN,SEMI-QN: 0.2 MG/DL (ref 0–1.9)

## 2020-09-17 PROCEDURE — 87480 CANDIDA DNA DIR PROBE: CPT | Performed by: OBSTETRICS & GYNECOLOGY

## 2020-09-17 PROCEDURE — 86780 TREPONEMA PALLIDUM: CPT | Performed by: OBSTETRICS & GYNECOLOGY

## 2020-09-17 PROCEDURE — 87491 CHLMYD TRACH DNA AMP PROBE: CPT | Performed by: OBSTETRICS & GYNECOLOGY

## 2020-09-17 PROCEDURE — 87086 URINE CULTURE/COLONY COUNT: CPT | Performed by: OBSTETRICS & GYNECOLOGY

## 2020-09-17 PROCEDURE — 87389 HIV-1 AG W/HIV-1&-2 AB AG IA: CPT | Performed by: OBSTETRICS & GYNECOLOGY

## 2020-09-17 PROCEDURE — 87660 TRICHOMONAS VAGIN DIR PROBE: CPT | Performed by: OBSTETRICS & GYNECOLOGY

## 2020-09-17 PROCEDURE — 3078F DIAST BP <80 MM HG: CPT | Performed by: OBSTETRICS & GYNECOLOGY

## 2020-09-17 PROCEDURE — 87591 N.GONORRHOEAE DNA AMP PROB: CPT | Performed by: OBSTETRICS & GYNECOLOGY

## 2020-09-17 PROCEDURE — 81002 URINALYSIS NONAUTO W/O SCOPE: CPT | Performed by: OBSTETRICS & GYNECOLOGY

## 2020-09-17 PROCEDURE — 3008F BODY MASS INDEX DOCD: CPT | Performed by: OBSTETRICS & GYNECOLOGY

## 2020-09-17 PROCEDURE — 3074F SYST BP LT 130 MM HG: CPT | Performed by: OBSTETRICS & GYNECOLOGY

## 2020-09-17 PROCEDURE — 1111F DSCHRG MED/CURRENT MED MERGE: CPT | Performed by: OBSTETRICS & GYNECOLOGY

## 2020-09-17 PROCEDURE — 87510 GARDNER VAG DNA DIR PROBE: CPT | Performed by: OBSTETRICS & GYNECOLOGY

## 2020-09-17 PROCEDURE — 99213 OFFICE O/P EST LOW 20 MIN: CPT | Performed by: OBSTETRICS & GYNECOLOGY

## 2020-09-17 RX ORDER — NYSTATIN 100000 U/G
1 CREAM TOPICAL 3 TIMES DAILY PRN
Qty: 30 G | Refills: 0 | Status: SHIPPED | OUTPATIENT
Start: 2020-09-17 | End: 2021-03-17 | Stop reason: ALTCHOICE

## 2020-09-17 RX ORDER — FLUCONAZOLE 150 MG/1
150 TABLET ORAL
Qty: 2 TABLET | Refills: 0 | Status: SHIPPED | OUTPATIENT
Start: 2020-09-17 | End: 2021-03-17 | Stop reason: ALTCHOICE

## 2020-09-17 NOTE — TELEPHONE ENCOUNTER
Pt called stated she thinks she has a yeast infection. She stated she is really burning. Was wondering if she can be seen.

## 2020-09-17 NOTE — PROGRESS NOTES
GYN H&P     9/17/2020  4:19 PM    CC: Patient is here for STD screen    HPI: patient is a 53year old [id number] here for her STD screen    Reports she recently had sex with a partner she had last been with 3 years ago, 1 week ago, when she has now started to Performed by Gaila Severs, MD at [facility] ENDOSCOPY   • COLONOSCOPY N/A 7/25/2014    Performed by Narda Gandara MD at [facility] ENDOSCOPY   • ESOPHAGOGASTRODUODENOSCOPY (EGD) N/A 5/24/2019    Performed by Gaila Severs, MD at [facility] Constitutional: Negative for activity change and appetite change. Gastrointestinal: Negative for abdominal pain, blood in stool and abdominal distention. Genitourinary: Positive for vaginal discharge and genital sores.  Negative for bladder incontinence -Grossly appears c/w intertrigo yeast. Rx PO diflucan x 2 and nystatin externally.  Check full STD panel and urine culture  -We also reviewed briefly yes she would be high risk pregnancy, high risk of chromosomal issues, with 4 c/S, I would not recommend pr

## 2020-09-17 NOTE — TELEPHONE ENCOUNTER
52 y/o called c/o vaginal burning. She states she recently found out the person she had intercourse with was sleeping with other women. She also has odor. She treated herself with Monistat with no improvement. She is very concerned.    Last OV date: 11/22/2

## 2020-09-17 NOTE — TELEPHONE ENCOUNTER
Patient declined apt - was to schedule with her OB/GYN    Future Appointments   Date Time Provider Erick Fishman   9/17/2020  3:30 PM Charly Alvarado MD [facility] [place]   9/21/2020 10:00 AM DARRICK DillardUR [facility] General   10/5/

## 2020-09-17 NOTE — TELEPHONE ENCOUNTER
Symptoms started a week ago  Patient had surgery on Wednesday  Vaginal burning and itching  Foul vaginal odor  Urinating more frequently as well  Small amount of vaginal discharge but unsure just finished menstrual  No fever  Patient had hernia repair Wendi Ferris

## 2020-09-18 LAB
C TRACH DNA SPEC QL NAA+PROBE: NEGATIVE
N GONORRHOEA DNA SPEC QL NAA+PROBE: NEGATIVE

## 2020-09-18 NOTE — PROGRESS NOTES
Please let pt know she has BV,. So far STD tseting is negative.  Please treat per protocol, thank you

## 2020-09-21 ENCOUNTER — TELEPHONE (OUTPATIENT)
Dept: OBGYN CLINIC | Facility: CLINIC | Age: 45
End: 2020-09-21

## 2020-09-21 ENCOUNTER — OFFICE VISIT (OUTPATIENT)
Dept: SURGERY | Facility: CLINIC | Age: 45
End: 2020-09-21

## 2020-09-21 VITALS — TEMPERATURE: 98 F

## 2020-09-21 DIAGNOSIS — K43.9 VENTRAL HERNIA WITHOUT OBSTRUCTION OR GANGRENE: Primary | ICD-10-CM

## 2020-09-21 DIAGNOSIS — E66.01 OBESITY, CLASS III, BMI 40-49.9 (MORBID OBESITY) (HCC): ICD-10-CM

## 2020-09-21 DIAGNOSIS — D23.9 DERMATOFIBROMA: ICD-10-CM

## 2020-09-21 PROCEDURE — 99024 POSTOP FOLLOW-UP VISIT: CPT | Performed by: PHYSICIAN ASSISTANT

## 2020-09-21 RX ORDER — METRONIDAZOLE 500 MG/1
500 TABLET ORAL 2 TIMES DAILY
Qty: 14 TABLET | Refills: 0 | Status: SHIPPED | OUTPATIENT
Start: 2020-09-21 | End: 2020-09-28

## 2020-09-21 NOTE — PROGRESS NOTES
Patient notified; advised not ETOH or intercourse during treatment. Patient verbalized understanding. Med ordered.

## 2020-09-21 NOTE — PATIENT INSTRUCTIONS
The patient presents today for continued care evaluation after undergoing a robotic ventral hernia repair with mesh and and excision of a skin lesion on her right lateral thigh performed on September 9, 2020.     The patient states that she is doing well ov for any problems or complications related to the surgical intervention.

## 2020-09-21 NOTE — PROGRESS NOTES
Post Operative Visit Note       Active Problems  1. Ventral hernia without obstruction or gangrene    2. Dermatofibroma    3.  Obesity, Class III, BMI 40-49.9 (morbid obesity) [facility])         Chief Complaint   Patient presents with:  Post-Op: Post op visit-- Laterality Date   •       , , ,    •  SECTION N/A 2013    Performed by Mini Yang MD at [facility] L[facility]D OR   • COLONOSCOPY N/A 2019    Performed by Parth Hayes MD at [facility] ENDOSCOPY   • COLONOSCOPY N/A 20 Exercise: No        Seat Belt: Yes    Social History Narrative       worker, for emergency . Current Outpatient Medications:   •  fluconazole 150 MG Oral Tab, Take 1 tablet (150 mg total) by mouth twice a week.  Take one tablet today, r LMP 09/10/2020 (Exact Date)   Physical Exam   Constitutional: She appears well-developed and well-nourished.    Abdominal:       Clinical examination of the abdomen reveals it to be soft, nondistended, nontender, bowel sounds are normal activity normal pi

## 2020-09-25 ENCOUNTER — MED REC SCAN ONLY (OUTPATIENT)
Dept: SURGERY | Facility: CLINIC | Age: 45
End: 2020-09-25

## 2020-10-11 NOTE — PROGRESS NOTES
NCM called pt for Day 2 Home Monitoring-sts she spoke [facility] and on her way their now for evaluation. sts \"just don't feel well-mouth is dry, diarrhea, still short of breath, weak, don't know if I have a fever. \"  Advised pt to proceed to ER as murphy
No

## 2020-10-28 ENCOUNTER — TELEPHONE (OUTPATIENT)
Dept: OBGYN CLINIC | Facility: CLINIC | Age: 45
End: 2020-10-28

## 2020-10-28 NOTE — TELEPHONE ENCOUNTER
51 y/o called regarding unprotected intercourse she had on 10/20 and 10/22. Her LMP was 10/10/2020. She is . She states that she is still getting her periods each month and that they are regular.    Last OV date: 2020  Recent Test/Labs: NA   Recom

## 2020-10-28 NOTE — TELEPHONE ENCOUNTER
Pt calling states she would like a nurse to call her. She could not say what for at time of call due to privacy reasons.

## 2020-11-05 ENCOUNTER — TELEPHONE (OUTPATIENT)
Dept: FAMILY MEDICINE CLINIC | Facility: CLINIC | Age: 45
End: 2020-11-05

## 2020-11-05 NOTE — TELEPHONE ENCOUNTER
Patient denies HI/SI  Contact information for "[employer] Mins give to patient.   Patient advised may be a couple days before she receives a call back

## 2020-11-05 NOTE — TELEPHONE ENCOUNTER
Yes. Make sure nothing acute going on as she is out of the office for few days.   Otherwise will send elsewhere

## 2020-11-13 ENCOUNTER — TELEPHONE (OUTPATIENT)
Dept: FAMILY MEDICINE CLINIC | Facility: CLINIC | Age: 45
End: 2020-11-13

## 2020-11-13 ENCOUNTER — TELEMEDICINE (OUTPATIENT)
Dept: FAMILY MEDICINE CLINIC | Facility: CLINIC | Age: 45
End: 2020-11-13
Payer: MEDICAID

## 2020-11-13 DIAGNOSIS — J45.31 MILD PERSISTENT ASTHMA WITH EXACERBATION: Primary | ICD-10-CM

## 2020-11-13 PROCEDURE — 99214 OFFICE O/P EST MOD 30 MIN: CPT | Performed by: FAMILY MEDICINE

## 2020-11-13 RX ORDER — ALBUTEROL SULFATE 90 UG/1
2 AEROSOL, METERED RESPIRATORY (INHALATION) EVERY 4 HOURS PRN
Qty: 1 INHALER | Refills: 1 | Status: SHIPPED | OUTPATIENT
Start: 2020-11-13

## 2020-11-13 RX ORDER — ALBUTEROL SULFATE 2.5 MG/3ML
2.5 SOLUTION RESPIRATORY (INHALATION) EVERY 4 HOURS PRN
Qty: 1 BOX | Refills: 0 | Status: SHIPPED | OUTPATIENT
Start: 2020-11-13

## 2020-11-13 RX ORDER — PREDNISONE 20 MG/1
20 TABLET ORAL 2 TIMES DAILY
Qty: 10 TABLET | Refills: 0 | Status: SHIPPED | OUTPATIENT
Start: 2020-11-13 | End: 2020-11-18

## 2020-11-13 NOTE — PROGRESS NOTES
Patient presents with:  Asthma         HPI:    Patient ID: Katiuska Smith is a 50year old female doing a video visit today to f/u on asthma. No issues currently. She would like refills on hand due to winter months coming and upcoming travel out of state.  Renata Peralta infection 2018   • Herpes     last outbreak years ago   • Osteoarthritis     mild in back   • Pneumonia, organism unspecified(486) 1 year ago   • PONV (postoperative nausea and vomiting)     mild   •  labor     pt. had 20 week loss   • Seizure disor Exam    Constitutional: She appears well-nourished. No distress. Pulmonary/Chest: Effort normal.            ASSESSMENT/PLAN:   Mild persistent asthma with exacerbation  (primary encounter diagnosis)  Asthma - controlled.  Albuterol refilled  Course of pre

## 2020-11-30 ENCOUNTER — TELEPHONE (OUTPATIENT)
Dept: FAMILY MEDICINE CLINIC | Facility: CLINIC | Age: 45
End: 2020-11-30

## 2020-11-30 NOTE — TELEPHONE ENCOUNTER
Patient wants to speak with a nurse. Would not tell me what ity was in regards too. Just that she has a question.

## 2020-11-30 NOTE — TELEPHONE ENCOUNTER
Called pt. She states she needs an Rx for the morning-after pill. She asked the pharmacist but they told her since she is overweight the one they provide is not strong enough. She has had unprotected intercourse everyday since the 18th.   I let her know

## 2020-11-30 NOTE — TELEPHONE ENCOUNTER
Spoke with DB. The morning after pill is NOT recommended as pt could be pregnant at this time. Pt advised to monitor for her period this week.   If she does not get it, she should take a pregnancy test.  Even if she does get her period, she should take a

## 2020-12-26 DIAGNOSIS — G43.011 INTRACTABLE MIGRAINE WITHOUT AURA AND WITH STATUS MIGRAINOSUS: Primary | ICD-10-CM

## 2020-12-28 RX ORDER — TOPIRAMATE 50 MG/1
TABLET, FILM COATED ORAL
Qty: 60 TABLET | Refills: 1 | Status: SHIPPED | OUTPATIENT
Start: 2020-12-28 | End: 2021-03-09

## 2020-12-28 RX ORDER — ELETRIPTAN HYDROBROMIDE 40 MG/1
TABLET, FILM COATED ORAL
Qty: 8 TABLET | Refills: 5 | Status: SHIPPED | OUTPATIENT
Start: 2020-12-28 | End: 2021-03-17

## 2020-12-28 NOTE — TELEPHONE ENCOUNTER
Medication: topiramate 50 mg oral tabs    Date of last refill: 9/19/2020 (#60/5)  [place]    Last office visit: ***  Due back to clinic per last office note:  ***  Date next office visit scheduled:  No future appointments.     Last OV note recommendation: Per Dr. Angy Velazquez MD:    ***

## 2021-02-09 ENCOUNTER — TELEPHONE (OUTPATIENT)
Dept: FAMILY MEDICINE CLINIC | Facility: CLINIC | Age: 46
End: 2021-02-09

## 2021-02-09 NOTE — TELEPHONE ENCOUNTER
Asthma medication was changed per PT was told to call when she started taking it  and had a fall didn't really want to talk to me she wanted to talk to nurse/Dr to give them the info

## 2021-02-09 NOTE — TELEPHONE ENCOUNTER
She does need to be evaluated for the cough and worsening asthma symptoms. Given current COVID restrictions, I would recommend she go to IC or ED. There is a possibility of COVID, even if she had infection 5/2020.      Aside from pelvic cramping, any other

## 2021-02-09 NOTE — TELEPHONE ENCOUNTER
Asthma is acting up x 2 days. Cough and phlegm. She states is not COVID. Pt  stat taking prednisone today. No chest pain  Or SOB. PT took a pregnancy test 2 months ago and was positive,  Pt fall 2 wks ago , she was in a [place] in [address] [place] [address].   Pt was

## 2021-02-10 ENCOUNTER — APPOINTMENT (OUTPATIENT)
Dept: GENERAL RADIOLOGY | Age: 46
End: 2021-02-10
Attending: PHYSICIAN ASSISTANT
Payer: MEDICAID

## 2021-02-10 ENCOUNTER — HOSPITAL ENCOUNTER (EMERGENCY)
Age: 46
Discharge: HOME | End: 2021-02-10
Attending: EMERGENCY MEDICINE
Payer: MEDICAID

## 2021-02-10 ENCOUNTER — APPOINTMENT (OUTPATIENT)
Dept: CT IMAGING | Age: 46
End: 2021-02-10
Attending: PHYSICIAN ASSISTANT
Payer: MEDICAID

## 2021-02-10 VITALS
DIASTOLIC BLOOD PRESSURE: 80 MMHG | HEART RATE: 80 BPM | TEMPERATURE: 98 F | OXYGEN SATURATION: 98 % | RESPIRATION RATE: 16 BRPM | HEIGHT: 59 IN | SYSTOLIC BLOOD PRESSURE: 119 MMHG | BODY MASS INDEX: 43.34 KG/M2 | WEIGHT: 215 LBS

## 2021-02-10 DIAGNOSIS — R10.9 ABDOMINAL PAIN OF UNKNOWN ETIOLOGY: Primary | ICD-10-CM

## 2021-02-10 DIAGNOSIS — J45.21 MILD INTERMITTENT ASTHMA WITH EXACERBATION: ICD-10-CM

## 2021-02-10 LAB
ALBUMIN SERPL-MCNC: 3.8 G/DL (ref 3.4–5)
ALBUMIN/GLOB SERPL: 1 {RATIO} (ref 1–2)
ALP LIVER SERPL-CCNC: 58 U/L
ALT SERPL-CCNC: 27 U/L
ANION GAP SERPL CALC-SCNC: 5 MMOL/L (ref 0–18)
AST SERPL-CCNC: 30 U/L (ref 15–37)
B-HCG SERPL-ACNC: <1 MIU/ML
BASOPHILS # BLD AUTO: 0.03 X10(3) UL (ref 0–0.2)
BASOPHILS NFR BLD AUTO: 0.3 %
BILIRUB SERPL-MCNC: 0.4 MG/DL (ref 0.1–2)
BILIRUB UR QL STRIP.AUTO: NEGATIVE
BUN BLD-MCNC: 17 MG/DL (ref 7–18)
BUN/CREAT SERPL: 18.5 (ref 10–20)
CALCIUM BLD-MCNC: 8.7 MG/DL (ref 8.5–10.1)
CHLORIDE SERPL-SCNC: 106 MMOL/L (ref 98–112)
CLARITY UR REFRACT.AUTO: CLEAR
CO2 SERPL-SCNC: 24 MMOL/L (ref 21–32)
COLOR UR AUTO: YELLOW
CREAT BLD-MCNC: 0.92 MG/DL
DEPRECATED RDW RBC AUTO: 45.4 FL (ref 35.1–46.3)
EOSINOPHIL # BLD AUTO: 0.01 X10(3) UL (ref 0–0.7)
EOSINOPHIL NFR BLD AUTO: 0.1 %
ERYTHROCYTE [DISTWIDTH] IN BLOOD BY AUTOMATED COUNT: 13.4 % (ref 11–15)
GLOBULIN PLAS-MCNC: 3.9 G/DL (ref 2.8–4.4)
GLUCOSE BLD-MCNC: 212 MG/DL (ref 70–99)
GLUCOSE UR STRIP.AUTO-MCNC: 500 MG/DL
HCT VFR BLD AUTO: 41.5 %
HGB BLD-MCNC: 13.5 G/DL
IMM GRANULOCYTES # BLD AUTO: 0.04 X10(3) UL (ref 0–1)
IMM GRANULOCYTES NFR BLD: 0.4 %
LEUKOCYTE ESTERASE UR QL STRIP.AUTO: NEGATIVE
LYMPHOCYTES # BLD AUTO: 1.02 X10(3) UL (ref 1–4)
LYMPHOCYTES NFR BLD AUTO: 11.1 %
M PROTEIN MFR SERPL ELPH: 7.7 G/DL (ref 6.4–8.2)
MCH RBC QN AUTO: 30.3 PG (ref 26–34)
MCHC RBC AUTO-ENTMCNC: 32.5 G/DL (ref 31–37)
MCV RBC AUTO: 93 FL
MONOCYTES # BLD AUTO: 0.18 X10(3) UL (ref 0.1–1)
MONOCYTES NFR BLD AUTO: 2 %
NEUTROPHILS # BLD AUTO: 7.94 X10 (3) UL (ref 1.5–7.7)
NEUTROPHILS # BLD AUTO: 7.94 X10(3) UL (ref 1.5–7.7)
NEUTROPHILS NFR BLD AUTO: 86.1 %
NITRITE UR QL STRIP.AUTO: NEGATIVE
OSMOLALITY SERPL CALC.SUM OF ELEC: 288 MOSM/KG (ref 275–295)
PH UR STRIP.AUTO: 6.5 [PH] (ref 4.5–8)
PLATELET # BLD AUTO: 259 10(3)UL (ref 150–450)
POTASSIUM SERPL-SCNC: 4.5 MMOL/L (ref 3.5–5.1)
PROT UR STRIP.AUTO-MCNC: NEGATIVE MG/DL
RBC # BLD AUTO: 4.46 X10(6)UL
RBC UR QL AUTO: NEGATIVE
RH BLOOD TYPE: POSITIVE
SODIUM SERPL-SCNC: 135 MMOL/L (ref 136–145)
SP GR UR STRIP.AUTO: >=1.03 (ref 1–1.03)
UROBILINOGEN UR STRIP.AUTO-MCNC: 0.2 MG/DL
WBC # BLD AUTO: 9.2 X10(3) UL (ref 4–11)

## 2021-02-10 PROCEDURE — 36415 COLL VENOUS BLD VENIPUNCTURE: CPT

## 2021-02-10 PROCEDURE — 99284 EMERGENCY DEPT VISIT MOD MDM: CPT

## 2021-02-10 PROCEDURE — 74176 CT ABD & PELVIS W/O CONTRAST: CPT | Performed by: PHYSICIAN ASSISTANT

## 2021-02-10 PROCEDURE — 86900 BLOOD TYPING SEROLOGIC ABO: CPT | Performed by: PHYSICIAN ASSISTANT

## 2021-02-10 PROCEDURE — 85025 COMPLETE CBC W/AUTO DIFF WBC: CPT | Performed by: PHYSICIAN ASSISTANT

## 2021-02-10 PROCEDURE — 84702 CHORIONIC GONADOTROPIN TEST: CPT | Performed by: PHYSICIAN ASSISTANT

## 2021-02-10 PROCEDURE — 71045 X-RAY EXAM CHEST 1 VIEW: CPT | Performed by: PHYSICIAN ASSISTANT

## 2021-02-10 PROCEDURE — 86901 BLOOD TYPING SEROLOGIC RH(D): CPT | Performed by: PHYSICIAN ASSISTANT

## 2021-02-10 PROCEDURE — 81003 URINALYSIS AUTO W/O SCOPE: CPT | Performed by: PHYSICIAN ASSISTANT

## 2021-02-10 PROCEDURE — 80053 COMPREHEN METABOLIC PANEL: CPT | Performed by: PHYSICIAN ASSISTANT

## 2021-02-10 RX ORDER — PREDNISONE 20 MG/1
60 TABLET ORAL DAILY
Qty: 15 TABLET | Refills: 0 | Status: SHIPPED | OUTPATIENT
Start: 2021-02-10 | End: 2021-02-15

## 2021-02-10 RX ORDER — ALBUTEROL SULFATE 90 UG/1
2 AEROSOL, METERED RESPIRATORY (INHALATION) EVERY 4 HOURS PRN
Qty: 1 INHALER | Refills: 0 | Status: SHIPPED | OUTPATIENT
Start: 2021-02-10 | End: 2021-03-12

## 2021-02-10 RX ORDER — METHYLPREDNISOLONE 4 MG/1
TABLET ORAL AS DIRECTED
COMMUNITY
End: 2021-03-17 | Stop reason: ALTCHOICE

## 2021-02-11 LAB — SARS-COV-2 RNA RESP QL NAA+PROBE: NOT DETECTED

## 2021-02-11 NOTE — ED INITIAL ASSESSMENT (HPI)
Positive pregnancy 2 months ago, then  had heavy bleeding for 3 days-2 weeks ago after falling, taking prednisone for 2 days now \"for asthma\", on and off abdom cramping since falling, \"hot flashes\", denies vag bleeding now, had first Covid vaccine toda

## 2021-02-11 NOTE — ED PROVIDER NOTES
Patient is a 76-year-old female presents with initial complaint of crampy low abdominal pain. She had a positive home pregnancy test 2 months ago. 2 weeks ago she fell and subsequently had 3 days of heavy vaginal bleeding.   Was advised to be evaluated bu

## 2021-02-11 NOTE — ED PROVIDER NOTES
Patient Seen in: THE [facility] Emergency Department In [place]      History   Patient presents with:  Difficulty Breathing  Medication Reaction    Stated Complaint: eval g/shilo    HPI/Subjective:   HPI    41-year-old female.   Arrives to the emergency departme Smoking status: Never Smoker      Smokeless tobacco: Never Used      Tobacco comment: NON-SMOKER    Alcohol use: Yes      Frequency: 2-4 times a month    Drug use:  No             Review of Systems    Positive for stated complaint: eval g/shilo  Other systems components within normal limits   HCG, BETA SUBUNIT (QUANT PREGNANCY TEST) - Normal   CBC WITH DIFFERENTIAL WITH PLATELET    Narrative: The following orders were created for panel order CBC WITH DIFFERENTIAL WITH PLATELET.   Procedure mass or adenopathy. BOWEL/MESENTERY:  Normal caliber small and large bowel including the appendix.   ABDOMINAL WALL:  Postsurgical changes with interval decrease in size of previously described seroma along the inferior aspect of the mesh which now measure supervising physician was involved in the management of this patient. Covid testing. CBC, CMP, hCG quant, ABO Rh    Pelvic ultrasound    CBC demonstrates no acute abnormalities. Urine dip demonstrates glucose spilling of 500 and trace ketones.     hC

## 2021-03-08 DIAGNOSIS — G43.011 INTRACTABLE MIGRAINE WITHOUT AURA AND WITH STATUS MIGRAINOSUS: ICD-10-CM

## 2021-03-09 RX ORDER — TOPIRAMATE 50 MG/1
TABLET, FILM COATED ORAL
Qty: 60 TABLET | Refills: 1 | Status: SHIPPED | OUTPATIENT
Start: 2021-03-09 | End: 2021-03-17 | Stop reason: ALTCHOICE

## 2021-03-09 NOTE — TELEPHONE ENCOUNTER
Medication: Topiramate 50 mg      Date of last refill: 12/28/20 with 1 addt refill  [place]      Last office visit: 06/24/20   Due back to clinic per last office note:  RTN in 3 months  Date next office visit scheduled:

## 2021-03-17 ENCOUNTER — OFFICE VISIT (OUTPATIENT)
Dept: NEUROLOGY | Facility: CLINIC | Age: 46
End: 2021-03-17
Payer: MEDICAID

## 2021-03-17 VITALS
HEART RATE: 80 BPM | RESPIRATION RATE: 16 BRPM | SYSTOLIC BLOOD PRESSURE: 116 MMHG | HEIGHT: 59 IN | BODY MASS INDEX: 45.96 KG/M2 | WEIGHT: 228 LBS | DIASTOLIC BLOOD PRESSURE: 72 MMHG

## 2021-03-17 DIAGNOSIS — G43.011 INTRACTABLE MIGRAINE WITHOUT AURA AND WITH STATUS MIGRAINOSUS: Primary | ICD-10-CM

## 2021-03-17 PROCEDURE — 3078F DIAST BP <80 MM HG: CPT | Performed by: OTHER

## 2021-03-17 PROCEDURE — 3074F SYST BP LT 130 MM HG: CPT | Performed by: OTHER

## 2021-03-17 PROCEDURE — 99214 OFFICE O/P EST MOD 30 MIN: CPT | Performed by: OTHER

## 2021-03-17 PROCEDURE — 3008F BODY MASS INDEX DOCD: CPT | Performed by: OTHER

## 2021-03-17 RX ORDER — TOPIRAMATE 50 MG/1
50 TABLET, FILM COATED ORAL 2 TIMES DAILY
COMMUNITY
End: 2021-09-08 | Stop reason: ALTCHOICE

## 2021-03-17 RX ORDER — UBROGEPANT 100 MG/1
TABLET ORAL
Qty: 10 TABLET | Refills: 5 | Status: SHIPPED | OUTPATIENT
Start: 2021-03-17 | End: 2021-04-16

## 2021-03-17 RX ORDER — ELETRIPTAN HYDROBROMIDE 40 MG/1
TABLET, FILM COATED ORAL
Qty: 8 TABLET | Refills: 5 | Status: SHIPPED | OUTPATIENT
Start: 2021-03-17 | End: 2021-09-08 | Stop reason: ALTCHOICE

## 2021-03-17 NOTE — PROGRESS NOTES
[facility] with [address] [place]  10/10/1975  Primary Care Provider:  Janina Lane MD    3/17/2021  Accompanied visit:      (x) No.      50year old yo patient being seen for:  migrain Soln, Inhale 2 puffs into the lungs every 4 (four) hours as needed for Wheezing., Disp: 1 Inhaler, Rfl: 1  •  albuterol sulfate (2.5 MG/3ML) 0.083% Inhalation Nebu Soln, Take 3 mL (2.5 mg total) by nebulization every 4 (four) hours as needed for Wheezing. , 6/17/2021).       Patient understands that if needed, based on condition and or test results, follow up will be readjusted      Easton Jacobson MD  Vascular & General Neurology  Director, [facility]  3/17/2021

## 2021-04-18 ENCOUNTER — HOSPITAL ENCOUNTER (OUTPATIENT)
Age: 46
Discharge: HOME | End: 2021-04-18
Payer: MEDICAID

## 2021-04-18 VITALS
HEART RATE: 75 BPM | DIASTOLIC BLOOD PRESSURE: 92 MMHG | RESPIRATION RATE: 18 BRPM | SYSTOLIC BLOOD PRESSURE: 130 MMHG | OXYGEN SATURATION: 97 % | TEMPERATURE: 98 F

## 2021-04-18 DIAGNOSIS — J30.2 SEASONAL ALLERGIC RHINITIS, UNSPECIFIED TRIGGER: Primary | ICD-10-CM

## 2021-04-18 DIAGNOSIS — R09.82 POSTNASAL DRIP: ICD-10-CM

## 2021-04-18 PROCEDURE — 87880 STREP A ASSAY W/OPTIC: CPT | Performed by: PHYSICIAN ASSISTANT

## 2021-04-18 PROCEDURE — 99213 OFFICE O/P EST LOW 20 MIN: CPT | Performed by: PHYSICIAN ASSISTANT

## 2021-04-18 NOTE — ED PROVIDER NOTES
Patient Seen in: Immediate [address] [place] [address]      History   Patient presents with:  Sore Throat  Cough/URI    Stated Complaint: Asthma- tickle in the throat, congestion, x 2 days     HPI/Subjective:   HPI    CHIEF COMPLAINT: Sore throat, postnasal drip     HIS unspecified(486) 1 year ago   • PONV (postoperative nausea and vomiting)     mild   •  labor     pt. had 20 week loss   • Seizure disorder [facility]) [phone]    patient states Alejandro Chavez was from [place]    • Sleep apnea               Past Surgical Histor There is no erythema or exudates, no tonsillar hypertrophy. Postnasal drip visualized. No trismus or stridor. Uvula midline. No phonation changes, patient handling secretions well.  Mucous membranes moist.  Respiratory: there are no retractions, lungs are

## 2021-04-27 ENCOUNTER — HOSPITAL ENCOUNTER (OUTPATIENT)
Age: 46
Discharge: HOME | End: 2021-04-27
Payer: MEDICAID

## 2021-04-27 VITALS
OXYGEN SATURATION: 98 % | WEIGHT: 215 LBS | TEMPERATURE: 98 F | SYSTOLIC BLOOD PRESSURE: 129 MMHG | HEIGHT: 59 IN | RESPIRATION RATE: 20 BRPM | DIASTOLIC BLOOD PRESSURE: 90 MMHG | BODY MASS INDEX: 43.34 KG/M2 | HEART RATE: 85 BPM

## 2021-04-27 DIAGNOSIS — J06.9 UPPER RESPIRATORY TRACT INFECTION, UNSPECIFIED TYPE: Primary | ICD-10-CM

## 2021-04-27 DIAGNOSIS — R35.0 URINE FREQUENCY: ICD-10-CM

## 2021-04-27 PROCEDURE — 99213 OFFICE O/P EST LOW 20 MIN: CPT | Performed by: NURSE PRACTITIONER

## 2021-04-27 PROCEDURE — 81002 URINALYSIS NONAUTO W/O SCOPE: CPT | Performed by: NURSE PRACTITIONER

## 2021-04-27 PROCEDURE — 81025 URINE PREGNANCY TEST: CPT | Performed by: NURSE PRACTITIONER

## 2021-04-27 RX ORDER — BENZONATATE 100 MG/1
100 CAPSULE ORAL 3 TIMES DAILY PRN
Qty: 30 CAPSULE | Refills: 0 | Status: SHIPPED | OUTPATIENT
Start: 2021-04-27 | End: 2021-05-27

## 2021-04-27 NOTE — ED PROVIDER NOTES
Patient Seen in: Immediate [address] [place] [address]      History   Patient presents with:  Cough/URI    Stated Complaint: asthma symptoms    HPI/Subjective:   22-year-old female presents to the IC with complaints of cough and congestion that remains for the past coup (CPT=19281)  2008    Benign   • SCARLETT LOCALIZATION WIRE 1 SITE RIGHT (CPT=19281)  2008    Benign   • OTHER      baker cyst    • OTHER      bunion bilateral - right foot metal   • REDUCTION LEFT  1999   • REDUCTION RIGHT  1999   • REMOVAL GALLBLADDER     • VE for the following components:       Result Value    Urine Clarity Cloudy (*)     Blood, Urine Trace-Intact (*)     All other components within normal limits   POCT PREGNANCY, URINE - Normal   URINE CULTURE, ROUTINE                   MDM   I have discussed

## 2021-04-30 NOTE — ED NOTES
Patient calling for urine culture results. Advised patient to f/u with pcp if symptoms continue. Patient verbalized understanding of information given.

## 2021-05-04 ENCOUNTER — OFFICE VISIT (OUTPATIENT)
Dept: OBGYN CLINIC | Facility: CLINIC | Age: 46
End: 2021-05-04
Payer: MEDICAID

## 2021-05-04 VITALS
BODY MASS INDEX: 46.37 KG/M2 | DIASTOLIC BLOOD PRESSURE: 78 MMHG | WEIGHT: 230 LBS | SYSTOLIC BLOOD PRESSURE: 126 MMHG | HEIGHT: 59 IN

## 2021-05-04 DIAGNOSIS — Z01.419 WELL WOMAN EXAM WITH ROUTINE GYNECOLOGICAL EXAM: ICD-10-CM

## 2021-05-04 DIAGNOSIS — Z12.4 CERVICAL CANCER SCREENING: ICD-10-CM

## 2021-05-04 DIAGNOSIS — N92.6 LATE MENSES: Primary | ICD-10-CM

## 2021-05-04 DIAGNOSIS — Z12.31 ENCOUNTER FOR SCREENING MAMMOGRAM FOR BREAST CANCER: ICD-10-CM

## 2021-05-04 PROCEDURE — 88175 CYTOPATH C/V AUTO FLUID REDO: CPT | Performed by: NURSE PRACTITIONER

## 2021-05-04 PROCEDURE — 87625 HPV TYPES 16 & 18 ONLY: CPT | Performed by: NURSE PRACTITIONER

## 2021-05-04 PROCEDURE — 87624 HPV HI-RISK TYP POOLED RSLT: CPT | Performed by: NURSE PRACTITIONER

## 2021-05-04 PROCEDURE — 3074F SYST BP LT 130 MM HG: CPT | Performed by: NURSE PRACTITIONER

## 2021-05-04 PROCEDURE — 3008F BODY MASS INDEX DOCD: CPT | Performed by: NURSE PRACTITIONER

## 2021-05-04 PROCEDURE — 3078F DIAST BP <80 MM HG: CPT | Performed by: NURSE PRACTITIONER

## 2021-05-04 PROCEDURE — 81025 URINE PREGNANCY TEST: CPT | Performed by: NURSE PRACTITIONER

## 2021-05-04 PROCEDURE — 99396 PREV VISIT EST AGE 40-64: CPT | Performed by: NURSE PRACTITIONER

## 2021-05-04 PROCEDURE — 84443 ASSAY THYROID STIM HORMONE: CPT | Performed by: NURSE PRACTITIONER

## 2021-05-04 NOTE — PROGRESS NOTES
Here for Routine Annual Exam  Menses is 23 days late based on her rudy, she denies any hot flashes or night sweats. She has taken pregnancy tests at home all of which were negative.  She had positive pregnancy tests in 2/2021 ER note  She is not using a

## 2021-05-10 ENCOUNTER — HOSPITAL ENCOUNTER (OUTPATIENT)
Dept: MAMMOGRAPHY | Facility: HOSPITAL | Age: 46
Discharge: HOME | End: 2021-05-10
Attending: NURSE PRACTITIONER
Payer: MEDICAID

## 2021-05-10 DIAGNOSIS — Z12.31 ENCOUNTER FOR SCREENING MAMMOGRAM FOR BREAST CANCER: ICD-10-CM

## 2021-05-10 PROCEDURE — 77067 SCR MAMMO BI INCL CAD: CPT | Performed by: NURSE PRACTITIONER

## 2021-05-10 PROCEDURE — 77063 BREAST TOMOSYNTHESIS BI: CPT | Performed by: NURSE PRACTITIONER

## 2021-05-12 ENCOUNTER — TELEPHONE (OUTPATIENT)
Dept: OBGYN CLINIC | Facility: CLINIC | Age: 46
End: 2021-05-12

## 2021-05-26 ENCOUNTER — HOSPITAL ENCOUNTER (EMERGENCY)
Age: 46
Discharge: HOME | End: 2021-05-26
Attending: EMERGENCY MEDICINE
Payer: MEDICAID

## 2021-05-26 VITALS
SYSTOLIC BLOOD PRESSURE: 127 MMHG | OXYGEN SATURATION: 98 % | BODY MASS INDEX: 46.37 KG/M2 | DIASTOLIC BLOOD PRESSURE: 74 MMHG | RESPIRATION RATE: 16 BRPM | HEART RATE: 87 BPM | HEIGHT: 59 IN | TEMPERATURE: 98 F | WEIGHT: 230 LBS

## 2021-05-26 DIAGNOSIS — N93.9 VAGINAL BLEEDING: Primary | ICD-10-CM

## 2021-05-26 PROCEDURE — 99284 EMERGENCY DEPT VISIT MOD MDM: CPT

## 2021-05-26 PROCEDURE — 96360 HYDRATION IV INFUSION INIT: CPT

## 2021-05-26 PROCEDURE — 85025 COMPLETE CBC W/AUTO DIFF WBC: CPT | Performed by: EMERGENCY MEDICINE

## 2021-05-26 PROCEDURE — 81015 MICROSCOPIC EXAM OF URINE: CPT | Performed by: EMERGENCY MEDICINE

## 2021-05-26 PROCEDURE — 81025 URINE PREGNANCY TEST: CPT

## 2021-05-26 PROCEDURE — 81001 URINALYSIS AUTO W/SCOPE: CPT | Performed by: EMERGENCY MEDICINE

## 2021-05-26 NOTE — ED INITIAL ASSESSMENT (HPI)
Pt states she has been bleeding for 2 days changing her pads every 1-2 hours pt feeling lightheaded.

## 2021-05-27 ENCOUNTER — TELEPHONE (OUTPATIENT)
Dept: OBGYN CLINIC | Facility: CLINIC | Age: 46
End: 2021-05-27

## 2021-05-27 PROBLEM — E66.01 MORBID OBESITY (HCC): Status: ACTIVE | Noted: 2019-11-11

## 2021-05-27 NOTE — ED QUICK NOTES
Pt report from rn, pt resting on the cart, pt informed of f/u and dc instructions, pt iv removed area intact.

## 2021-05-27 NOTE — ED PROVIDER NOTES
Patient Seen in: THE [facility] Emergency Department In [facility]      History   Patient presents with:  Korin-VANNESA    Stated Complaint: Lightheaded, vaginal bleeding    HPI/Subjective:   HPI    40-year-old white female presents emerged from today for complaint of REPAIR  11/20/2013    Procedure:  HERNIA VENTRAL REPAIR;  Surgeon: Yulia Miller MD;  Location: [address] [place] [address] MAIN OR                Social History    Tobacco Use      Smoking status: Never Smoker      Smokeless tobacco: Never Used      Tobacco comment: NON-SMOKER Urine Large (*)     Protein Urine 100 mg/dL (*)     All other components within normal limits   URINE MICROSCOPIC W REFLEX CULTURE - Abnormal; Notable for the following components:    RBC Urine >10 (*)     Bacteria Urine Rare (*)     Squamous Epi.  Cells Fe

## 2021-05-27 NOTE — TELEPHONE ENCOUNTER
Patient was in ER last night for heavy bleeding. Her CBC showed Hgb of 13.3. Patient states she continues to have heavy bleeding. She denies any shortness of breath, dizziness, chest pain. ER f/u scheduled for tomorrow. Patient screened.    ER prec

## 2021-05-28 ENCOUNTER — HOSPITAL ENCOUNTER (OUTPATIENT)
Facility: HOSPITAL | Age: 46
Setting detail: HOSPITAL OUTPATIENT SURGERY
Discharge: HOME | End: 2021-05-28
Attending: OBSTETRICS & GYNECOLOGY | Admitting: OBSTETRICS & GYNECOLOGY
Payer: MEDICAID

## 2021-05-28 ENCOUNTER — OFFICE VISIT (OUTPATIENT)
Dept: OBGYN CLINIC | Facility: CLINIC | Age: 46
End: 2021-05-28
Payer: MEDICAID

## 2021-05-28 ENCOUNTER — ANESTHESIA EVENT (OUTPATIENT)
Dept: SURGERY | Facility: HOSPITAL | Age: 46
End: 2021-05-28
Payer: MEDICAID

## 2021-05-28 ENCOUNTER — HOSPITAL ENCOUNTER (EMERGENCY)
Facility: HOSPITAL | Age: 46
Discharge: LEFT BEFORE DISCHARGE | End: 2021-05-28
Payer: MEDICAID

## 2021-05-28 ENCOUNTER — ANESTHESIA (OUTPATIENT)
Dept: SURGERY | Facility: HOSPITAL | Age: 46
End: 2021-05-28
Payer: MEDICAID

## 2021-05-28 VITALS — BODY MASS INDEX: 46 KG/M2 | WEIGHT: 230 LBS | SYSTOLIC BLOOD PRESSURE: 120 MMHG | DIASTOLIC BLOOD PRESSURE: 70 MMHG

## 2021-05-28 VITALS
HEART RATE: 79 BPM | HEIGHT: 59 IN | OXYGEN SATURATION: 97 % | RESPIRATION RATE: 21 BRPM | SYSTOLIC BLOOD PRESSURE: 117 MMHG | DIASTOLIC BLOOD PRESSURE: 71 MMHG | WEIGHT: 231.5 LBS | BODY MASS INDEX: 46.67 KG/M2 | TEMPERATURE: 97 F

## 2021-05-28 DIAGNOSIS — N93.9 ABNORMAL UTERINE BLEEDING: Primary | ICD-10-CM

## 2021-05-28 DIAGNOSIS — N93.9 VAGINA BLEEDING: ICD-10-CM

## 2021-05-28 DIAGNOSIS — N91.4 SECONDARY OLIGOMENORRHEA: ICD-10-CM

## 2021-05-28 PROCEDURE — 0UB98ZX EXCISION OF UTERUS, VIA NATURAL OR ARTIFICIAL OPENING ENDOSCOPIC, DIAGNOSTIC: ICD-10-PCS | Performed by: OBSTETRICS & GYNECOLOGY

## 2021-05-28 PROCEDURE — 3078F DIAST BP <80 MM HG: CPT | Performed by: OBSTETRICS & GYNECOLOGY

## 2021-05-28 PROCEDURE — 99214 OFFICE O/P EST MOD 30 MIN: CPT | Performed by: OBSTETRICS & GYNECOLOGY

## 2021-05-28 PROCEDURE — 58558 HYSTEROSCOPY BIOPSY: CPT | Performed by: OBSTETRICS & GYNECOLOGY

## 2021-05-28 PROCEDURE — 3074F SYST BP LT 130 MM HG: CPT | Performed by: OBSTETRICS & GYNECOLOGY

## 2021-05-28 PROCEDURE — 0UDB8ZX EXTRACTION OF ENDOMETRIUM, VIA NATURAL OR ARTIFICIAL OPENING ENDOSCOPIC, DIAGNOSTIC: ICD-10-PCS | Performed by: OBSTETRICS & GYNECOLOGY

## 2021-05-28 RX ORDER — METOCLOPRAMIDE HYDROCHLORIDE 5 MG/ML
10 INJECTION INTRAMUSCULAR; INTRAVENOUS AS NEEDED
Status: DISCONTINUED | OUTPATIENT
Start: 2021-05-28 | End: 2021-05-28

## 2021-05-28 RX ORDER — DEXAMETHASONE SODIUM PHOSPHATE 4 MG/ML
4 VIAL (ML) INJECTION AS NEEDED
Status: DISCONTINUED | OUTPATIENT
Start: 2021-05-28 | End: 2021-05-28

## 2021-05-28 RX ORDER — METOCLOPRAMIDE HYDROCHLORIDE 5 MG/ML
INJECTION INTRAMUSCULAR; INTRAVENOUS AS NEEDED
Status: DISCONTINUED | OUTPATIENT
Start: 2021-05-28 | End: 2021-05-28 | Stop reason: SURG

## 2021-05-28 RX ORDER — DEXAMETHASONE SODIUM PHOSPHATE 4 MG/ML
VIAL (ML) INJECTION AS NEEDED
Status: DISCONTINUED | OUTPATIENT
Start: 2021-05-28 | End: 2021-05-28 | Stop reason: SURG

## 2021-05-28 RX ORDER — MEPERIDINE HYDROCHLORIDE 25 MG/ML
12.5 INJECTION INTRAMUSCULAR; INTRAVENOUS; SUBCUTANEOUS AS NEEDED
Status: DISCONTINUED | OUTPATIENT
Start: 2021-05-28 | End: 2021-05-28

## 2021-05-28 RX ORDER — SODIUM CHLORIDE, SODIUM LACTATE, POTASSIUM CHLORIDE, CALCIUM CHLORIDE 600; 310; 30; 20 MG/100ML; MG/100ML; MG/100ML; MG/100ML
INJECTION, SOLUTION INTRAVENOUS CONTINUOUS
Status: DISCONTINUED | OUTPATIENT
Start: 2021-05-28 | End: 2021-05-28

## 2021-05-28 RX ORDER — ACETAMINOPHEN 500 MG
1000 TABLET ORAL ONCE AS NEEDED
Status: DISCONTINUED | OUTPATIENT
Start: 2021-05-28 | End: 2021-05-28

## 2021-05-28 RX ORDER — MEDROXYPROGESTERONE ACETATE 10 MG/1
20 TABLET ORAL DAILY
Qty: 20 TABLET | Refills: 2 | Status: ON HOLD | OUTPATIENT
Start: 2021-05-28 | End: 2021-05-28 | Stop reason: ALTCHOICE

## 2021-05-28 RX ORDER — LIDOCAINE HYDROCHLORIDE 10 MG/ML
INJECTION, SOLUTION EPIDURAL; INFILTRATION; INTRACAUDAL; PERINEURAL AS NEEDED
Status: DISCONTINUED | OUTPATIENT
Start: 2021-05-28 | End: 2021-05-28 | Stop reason: SURG

## 2021-05-28 RX ORDER — ONDANSETRON 2 MG/ML
4 INJECTION INTRAMUSCULAR; INTRAVENOUS AS NEEDED
Status: DISCONTINUED | OUTPATIENT
Start: 2021-05-28 | End: 2021-05-28

## 2021-05-28 RX ORDER — LABETALOL HYDROCHLORIDE 5 MG/ML
5 INJECTION, SOLUTION INTRAVENOUS EVERY 5 MIN PRN
Status: DISCONTINUED | OUTPATIENT
Start: 2021-05-28 | End: 2021-05-28

## 2021-05-28 RX ORDER — HYDROCODONE BITARTRATE AND ACETAMINOPHEN 5; 325 MG/1; MG/1
1 TABLET ORAL AS NEEDED
Status: DISCONTINUED | OUTPATIENT
Start: 2021-05-28 | End: 2021-05-28

## 2021-05-28 RX ORDER — ONDANSETRON 2 MG/ML
INJECTION INTRAMUSCULAR; INTRAVENOUS AS NEEDED
Status: DISCONTINUED | OUTPATIENT
Start: 2021-05-28 | End: 2021-05-28 | Stop reason: SURG

## 2021-05-28 RX ORDER — ACETAMINOPHEN 500 MG
1000 TABLET ORAL ONCE
Status: DISCONTINUED | OUTPATIENT
Start: 2021-05-28 | End: 2021-05-28 | Stop reason: HOSPADM

## 2021-05-28 RX ORDER — HYDROCODONE BITARTRATE AND ACETAMINOPHEN 5; 325 MG/1; MG/1
2 TABLET ORAL AS NEEDED
Status: DISCONTINUED | OUTPATIENT
Start: 2021-05-28 | End: 2021-05-28

## 2021-05-28 RX ORDER — MIDAZOLAM HYDROCHLORIDE 1 MG/ML
INJECTION INTRAMUSCULAR; INTRAVENOUS AS NEEDED
Status: DISCONTINUED | OUTPATIENT
Start: 2021-05-28 | End: 2021-05-28 | Stop reason: SURG

## 2021-05-28 RX ORDER — MIDAZOLAM HYDROCHLORIDE 1 MG/ML
1 INJECTION INTRAMUSCULAR; INTRAVENOUS EVERY 5 MIN PRN
Status: DISCONTINUED | OUTPATIENT
Start: 2021-05-28 | End: 2021-05-28

## 2021-05-28 RX ORDER — NALOXONE HYDROCHLORIDE 0.4 MG/ML
80 INJECTION, SOLUTION INTRAMUSCULAR; INTRAVENOUS; SUBCUTANEOUS AS NEEDED
Status: DISCONTINUED | OUTPATIENT
Start: 2021-05-28 | End: 2021-05-28

## 2021-05-28 RX ORDER — HYDROMORPHONE HYDROCHLORIDE 1 MG/ML
0.4 INJECTION, SOLUTION INTRAMUSCULAR; INTRAVENOUS; SUBCUTANEOUS EVERY 5 MIN PRN
Status: DISCONTINUED | OUTPATIENT
Start: 2021-05-28 | End: 2021-05-28

## 2021-05-28 RX ORDER — KETOROLAC TROMETHAMINE 30 MG/ML
INJECTION, SOLUTION INTRAMUSCULAR; INTRAVENOUS AS NEEDED
Status: DISCONTINUED | OUTPATIENT
Start: 2021-05-28 | End: 2021-05-28 | Stop reason: SURG

## 2021-05-28 RX ADMIN — LIDOCAINE HYDROCHLORIDE 100 MG: 10 INJECTION, SOLUTION EPIDURAL; INFILTRATION; INTRACAUDAL; PERINEURAL at 18:50:00

## 2021-05-28 RX ADMIN — MIDAZOLAM HYDROCHLORIDE 2 MG: 1 INJECTION INTRAMUSCULAR; INTRAVENOUS at 18:38:00

## 2021-05-28 RX ADMIN — ONDANSETRON 4 MG: 2 INJECTION INTRAMUSCULAR; INTRAVENOUS at 19:00:00

## 2021-05-28 RX ADMIN — DEXAMETHASONE SODIUM PHOSPHATE 4 MG: 4 MG/ML VIAL (ML) INJECTION at 19:00:00

## 2021-05-28 RX ADMIN — KETOROLAC TROMETHAMINE 30 MG: 30 INJECTION, SOLUTION INTRAMUSCULAR; INTRAVENOUS at 19:19:00

## 2021-05-28 RX ADMIN — METOCLOPRAMIDE HYDROCHLORIDE 10 MG: 5 INJECTION INTRAMUSCULAR; INTRAVENOUS at 19:00:00

## 2021-05-28 RX ADMIN — SODIUM CHLORIDE, SODIUM LACTATE, POTASSIUM CHLORIDE, CALCIUM CHLORIDE: 600; 310; 30; 20 INJECTION, SOLUTION INTRAVENOUS at 18:50:00

## 2021-05-28 RX ADMIN — SODIUM CHLORIDE, SODIUM LACTATE, POTASSIUM CHLORIDE, CALCIUM CHLORIDE: 600; 310; 30; 20 INJECTION, SOLUTION INTRAVENOUS at 19:29:00

## 2021-05-28 NOTE — PROGRESS NOTES
Subjective:  Patient presents with:  ER F/U: heavy bleeding , clots, on/off-diziness     50year old female [id number] presents for complaint of very heavy menstrual bleeding for past 4 days.   History of chronic heavy menses, but monthly, through March of thi COLONOSCOPY;  Surgeon: Elle Cox MD;  Location: [facility] ENDOSCOPY   • COLONOSCOPY N/A 5/24/2019    Procedure: COLONOSCOPY;  Surgeon: Marquita Hill MD;  Location: [facility] ENDOSCOPY   • HERNIA SURGERY     • LAPAROSCOPIC CHOLECYSTECTOMY  2 weeks ago edema  Skin:  General inspection- no rashes, lesions or discoloration  Rectum: No hemorrhoids, no masses.   Declines rectal exam    Assessment/Plan:  Oligomenorrhea- likely perimenopause and anovulation  Abnormal uterine bleeding- patient with profuse vagin

## 2021-05-28 NOTE — ANESTHESIA PROCEDURE NOTES
Airway  Date/Time: 5/28/2021 6:51 PM  Urgency: elective    Airway not difficult    General Information and Staff    Patient location during procedure: OR  Anesthesiologist: Sharee Orellana MD    Indications and Patient Condition  Indications for airway man

## 2021-05-28 NOTE — H&P
50year old female [id number] presents for complaint of very heavy menstrual bleeding for past 4 days. History of chronic heavy menses, but monthly, through March of this year. No bleeding between March and this week.   No flooding and soaking through to [facility]anner ENDOSCOPY   • COLONOSCOPY N/A 5/24/2019     Procedure: COLONOSCOPY;  Surgeon: Kat Carter MD;  Location: [facility] ENDOSCOPY   • HERNIA SURGERY       • LAPAROSCOPIC CHOLECYSTECTOMY   2 weeks ago   • SCARLETT LOCALIZATION WIRE 1 SITE LEFT (BOD=98883)   2008 lesions  Extremities: Non-tender, full range of motion, no clubbing, cyanosis or edema  Skin:  General inspection- no rashes, lesions or discoloration  Rectum: No hemorrhoids, no masses.   Declines rectal exam     Assessment/Plan:  Oligomenorrhea- likely pe

## 2021-05-28 NOTE — ANESTHESIA PREPROCEDURE EVALUATION
PRE-OP EVALUATION    Patient Name: Steven Weleetka    Admit Diagnosis: Vagina bleeding [N93.9]    Pre-op Diagnosis: Vagina bleeding [N93.9]    HYSTEROSCOPY DILATION AND CURETTAGE    Anesthesia Procedure: HYSTEROSCOPY DILATION AND CURETTAGE (N/A )    Surgeon( neg  Antibiotic allergy years ago, mother thinks may have been gent                         (+) arthritis       Pulmonary      (+) asthma (no inhaler use needed recently)       (+) pneumonia (one year ago)    (-) recent URI   (+) sleep apnea (fitted one we Dental             Pulmonary      Breath sounds clear to auscultation bilaterally. Other findings            ASA: 3   Plan: general  NPO status verified and patient meets guidelines.     Post-procedure pain management plan discussed with jenny

## 2021-05-29 NOTE — OPERATIVE REPORT
Pre-Operative Diagnosis: Abnormal uterine bleeding    Post-Operative Diagnosis: Same, endometrial polyp    Procedure Performed: Hysteroscopy, dilation and curettage, endometrial polypectomy     Surgeon(s) and Role:     Maribel Turner MD - Primary     Ass end of the procedure.

## 2021-05-29 NOTE — ANESTHESIA POSTPROCEDURE EVALUATION
[facility] Patient Status:  Hospital Outpatient Surgery   Age/Gender 50year old female MRN [id number]   Location [facility] Attending Brendan Nicolas MD   Hosp Day # 0 PCP MD Paty Rueda

## 2021-06-24 ENCOUNTER — TELEPHONE (OUTPATIENT)
Dept: OBGYN CLINIC | Facility: CLINIC | Age: 46
End: 2021-06-24

## 2021-06-24 NOTE — TELEPHONE ENCOUNTER
Patient has some personal questions that she would not say because she is at work, but would like a nurse to call her back.

## 2021-06-24 NOTE — TELEPHONE ENCOUNTER
S/p Hysteroscopy, D&C, polypectomy on 5/28/21. Pt has post op appt tomorrow. Pt states her boyfriend had Syphillis 30 years ago and was treated. Was tested again last week at the [facility] and they informed him he needs treatment again.   Is getting weekly i

## 2021-06-25 ENCOUNTER — OFFICE VISIT (OUTPATIENT)
Dept: OBGYN CLINIC | Facility: CLINIC | Age: 46
End: 2021-06-25
Payer: MEDICAID

## 2021-06-25 ENCOUNTER — TELEPHONE (OUTPATIENT)
Dept: OBGYN CLINIC | Facility: CLINIC | Age: 46
End: 2021-06-25

## 2021-06-25 ENCOUNTER — LAB ENCOUNTER (OUTPATIENT)
Dept: LAB | Facility: HOSPITAL | Age: 46
End: 2021-06-25
Attending: OBSTETRICS & GYNECOLOGY
Payer: MEDICAID

## 2021-06-25 VITALS
BODY MASS INDEX: 47.17 KG/M2 | WEIGHT: 234 LBS | HEART RATE: 72 BPM | DIASTOLIC BLOOD PRESSURE: 70 MMHG | SYSTOLIC BLOOD PRESSURE: 114 MMHG | HEIGHT: 59 IN

## 2021-06-25 DIAGNOSIS — Z20.2 EXPOSURE TO SYPHILIS: ICD-10-CM

## 2021-06-25 DIAGNOSIS — Z20.2 EXPOSURE TO SYPHILIS: Primary | ICD-10-CM

## 2021-06-25 DIAGNOSIS — N92.0 MENORRHAGIA WITH REGULAR CYCLE: ICD-10-CM

## 2021-06-25 PROCEDURE — 3074F SYST BP LT 130 MM HG: CPT | Performed by: OBSTETRICS & GYNECOLOGY

## 2021-06-25 PROCEDURE — 86780 TREPONEMA PALLIDUM: CPT

## 2021-06-25 PROCEDURE — 99213 OFFICE O/P EST LOW 20 MIN: CPT | Performed by: OBSTETRICS & GYNECOLOGY

## 2021-06-25 PROCEDURE — 36415 COLL VENOUS BLD VENIPUNCTURE: CPT

## 2021-06-25 PROCEDURE — 3008F BODY MASS INDEX DOCD: CPT | Performed by: OBSTETRICS & GYNECOLOGY

## 2021-06-25 PROCEDURE — 3078F DIAST BP <80 MM HG: CPT | Performed by: OBSTETRICS & GYNECOLOGY

## 2021-06-25 NOTE — TELEPHONE ENCOUNTER
Pt was just here and Dr. Aj Buchanan was supposed to send an RX to [facility]    Pt is down there now  Dr. Aj Buchanan got called away though    [address] they need to wait

## 2021-06-26 NOTE — PROGRESS NOTES
Subjective:  Patient presents with: Other: 4 week fu D&C ENDOMETRIAL POLYPECTOMY     Patient presents 4 weeks status post hysteroscopy, D&C and endometrial polypectomy. Also patient's partner recently diagnosed with syphilis.   Currently without complaint

## 2021-06-29 ENCOUNTER — NURSE ONLY (OUTPATIENT)
Dept: OBGYN CLINIC | Facility: CLINIC | Age: 46
End: 2021-06-29
Payer: MEDICAID

## 2021-06-29 VITALS — SYSTOLIC BLOOD PRESSURE: 126 MMHG | DIASTOLIC BLOOD PRESSURE: 80 MMHG | WEIGHT: 234 LBS | BODY MASS INDEX: 47 KG/M2

## 2021-06-29 PROCEDURE — 3074F SYST BP LT 130 MM HG: CPT | Performed by: OBSTETRICS & GYNECOLOGY

## 2021-06-29 PROCEDURE — 96372 THER/PROPH/DIAG INJ SC/IM: CPT | Performed by: OBSTETRICS & GYNECOLOGY

## 2021-06-29 PROCEDURE — 3079F DIAST BP 80-89 MM HG: CPT | Performed by: OBSTETRICS & GYNECOLOGY

## 2021-06-29 NOTE — TELEPHONE ENCOUNTER
Spoke with patient and she would like injection in [facility] if possible. This is the closest office to her house and she would prefer to not travel to [place] for injection only. Advised pt will discuss with supervisor and call her back.

## 2021-06-29 NOTE — TELEPHONE ENCOUNTER
Per Pedro Jeffrey, unable to transport medication to [facility]. Pt scheduled for today in [facility]. Routed to Dr. Denia Robles for order.

## 2021-06-29 NOTE — PROGRESS NOTES
Pt presented in office for Bicillin injection 2.4 million units. Vitals checked and allergies verified with patient. Medication supplied in 2 syringes, 1.2 million units each.   One dose given in left ventrogluteal and one dose given in right ventroglut

## 2021-06-29 NOTE — TELEPHONE ENCOUNTER
Per Dr. Jamilah Fletcher, patient to have Bicillin 2.4 mil units IM. Ordered from THE [facility] and received in [place] office. Left message on answering machine to call back. Need to schedule patient for injection.

## 2021-07-09 ENCOUNTER — TELEPHONE (OUTPATIENT)
Dept: FAMILY MEDICINE CLINIC | Facility: CLINIC | Age: 46
End: 2021-07-09

## 2021-07-09 NOTE — TELEPHONE ENCOUNTER
Patient states has tried xanax and clonazepam past.  Patient states \"I need something really good, my anxiety is bad when traveling. \"  Patient requesting about 10 tabs of the medication because she has a few connecting flights  Patient requesting medicat

## 2021-07-09 NOTE — TELEPHONE ENCOUNTER
Pt will be traveling and has severe anxiety when she gets on a plane, wants to know if she can be prescribed something. Pt is traveling on Tuesday and would like to have this by Monday.

## 2021-07-11 RX ORDER — ALPRAZOLAM 0.25 MG/1
0.25 TABLET ORAL DAILY PRN
Qty: 10 TABLET | Refills: 0 | Status: SHIPPED | OUTPATIENT
Start: 2021-07-11 | End: 2021-09-08 | Stop reason: ALTCHOICE

## 2021-08-05 ENCOUNTER — TELEPHONE (OUTPATIENT)
Dept: OBGYN CLINIC | Facility: CLINIC | Age: 46
End: 2021-08-05

## 2021-08-05 NOTE — TELEPHONE ENCOUNTER
51 y/o called stating she has not had a period since her D&C on 05/28/2021. She denies any other s/s. Denies any concerns. Last OV date: 06/25/2021  Recent Test/Labs: NA   Recommendations: Advised patient that this is not uncommon following D&C. Advised will route to Dr. Karly Craven to see if there is certain amount of time patient should go without menses. Aware she will get call back on 08/09.

## 2021-08-05 NOTE — TELEPHONE ENCOUNTER
Pt calling states that she had D&C in May/June and hasnt gotten period since.  Wondering if she should be worried or is this normal?

## 2021-08-06 NOTE — TELEPHONE ENCOUNTER
TC to patient. Bleeding precautions given. Discussed Mirena IUD to avoid future problems with heavy menses. Patient may desire pregnancy. Discussed issues with AMA and previous  section. Preconceptual counseling performed. Encouraged folic acid supplement and address possible elevated blood sugars prior to attempting pregnancy. Patient noted to have elevated glucose with Dr. Marcelino Delacruz and elevated HgA1c with Dr. Andres Mckeon. Discussed increased risks of fetal malformations with elevated blood sugars. Discussed increased risks of aneuploidy and miscarriage, as well as fertility issues.

## 2021-08-10 ENCOUNTER — TELEPHONE (OUTPATIENT)
Dept: OBGYN CLINIC | Facility: CLINIC | Age: 46
End: 2021-08-10

## 2021-08-10 NOTE — TELEPHONE ENCOUNTER
Pt called stating her fertility doctor has not received the medical records. Pt states she filled out a ONIEL in [facility] back in May. Even spoke with nurse who stated she would send results.     Pt requesting records go to   [facility] [phone]

## 2021-08-12 ENCOUNTER — TELEPHONE (OUTPATIENT)
Dept: FAMILY MEDICINE CLINIC | Facility: CLINIC | Age: 46
End: 2021-08-12

## 2021-08-21 ENCOUNTER — HOSPITAL ENCOUNTER (OUTPATIENT)
Age: 46
Discharge: HOME | End: 2021-08-21
Payer: MEDICAID

## 2021-08-21 VITALS
OXYGEN SATURATION: 96 % | RESPIRATION RATE: 16 BRPM | TEMPERATURE: 98 F | HEART RATE: 66 BPM | SYSTOLIC BLOOD PRESSURE: 125 MMHG | DIASTOLIC BLOOD PRESSURE: 88 MMHG

## 2021-08-21 DIAGNOSIS — J22 LOWER RESPIRATORY INFECTION: Primary | ICD-10-CM

## 2021-08-21 DIAGNOSIS — J40 BRONCHITIS: ICD-10-CM

## 2021-08-21 LAB — SARS-COV-2 RNA RESP QL NAA+PROBE: NOT DETECTED

## 2021-08-21 PROCEDURE — U0002 COVID-19 LAB TEST NON-CDC: HCPCS | Performed by: NURSE PRACTITIONER

## 2021-08-21 PROCEDURE — 99214 OFFICE O/P EST MOD 30 MIN: CPT | Performed by: NURSE PRACTITIONER

## 2021-08-21 RX ORDER — DOXYCYCLINE HYCLATE 100 MG/1
100 CAPSULE ORAL 2 TIMES DAILY
Qty: 14 CAPSULE | Refills: 0 | Status: SHIPPED | OUTPATIENT
Start: 2021-08-21 | End: 2021-08-28

## 2021-08-21 RX ORDER — PREDNISONE 20 MG/1
40 TABLET ORAL DAILY
Qty: 10 TABLET | Refills: 0 | Status: SHIPPED | OUTPATIENT
Start: 2021-08-21 | End: 2021-08-26

## 2021-08-21 RX ORDER — ALBUTEROL SULFATE 90 UG/1
2 AEROSOL, METERED RESPIRATORY (INHALATION) EVERY 4 HOURS PRN
Qty: 8 G | Refills: 0 | Status: SHIPPED | OUTPATIENT
Start: 2021-08-21 | End: 2021-09-20

## 2021-08-21 NOTE — ED INITIAL ASSESSMENT (HPI)
Couple of days of runny nose, cough, head pounding and it is exacerbating her asthma. States needs a steroid. Not concerned about covid. States she had it and is vaccinated.

## 2021-08-21 NOTE — ED PROVIDER NOTES
Patient Seen in: Immediate [facility]      History   Patient presents with:  Sinusitis    Stated Complaint: cough,congestion, runny nose    HPI/Subjective:   24-year-old female presents to immediate care with cough congestion wheezing.   Patient has a hi History    Tobacco Use      Smoking status: Never Smoker      Smokeless tobacco: Never Used      Tobacco comment: NON-SMOKER    Vaping Use      Vaping Use: Never used    Alcohol use: Yes    Drug use:  No             Review of Systems   Constitutional: Negat female presents to immediate care with increased cough congestion wheezing. Patient has history of asthma. Rapid Covid is negative here in the immediate care patient is fully vaccinated.     We will start patient on antibiotics inhaler and steroids for lo

## 2021-09-08 ENCOUNTER — OFFICE VISIT (OUTPATIENT)
Dept: FAMILY MEDICINE CLINIC | Facility: CLINIC | Age: 46
End: 2021-09-08
Payer: MEDICAID

## 2021-09-08 VITALS
RESPIRATION RATE: 18 BRPM | HEIGHT: 59 IN | WEIGHT: 239 LBS | OXYGEN SATURATION: 98 % | DIASTOLIC BLOOD PRESSURE: 80 MMHG | BODY MASS INDEX: 48.18 KG/M2 | HEART RATE: 80 BPM | SYSTOLIC BLOOD PRESSURE: 120 MMHG | TEMPERATURE: 98 F

## 2021-09-08 DIAGNOSIS — B35.3 TINEA PEDIS OF BOTH FEET: ICD-10-CM

## 2021-09-08 DIAGNOSIS — Z00.00 ANNUAL PHYSICAL EXAM: Primary | ICD-10-CM

## 2021-09-08 DIAGNOSIS — Z11.1 PPD SCREENING TEST: ICD-10-CM

## 2021-09-08 DIAGNOSIS — E66.01 MORBID OBESITY (HCC): ICD-10-CM

## 2021-09-08 DIAGNOSIS — J45.31 MILD PERSISTENT ASTHMA WITH EXACERBATION: ICD-10-CM

## 2021-09-08 PROCEDURE — 99396 PREV VISIT EST AGE 40-64: CPT | Performed by: FAMILY MEDICINE

## 2021-09-08 PROCEDURE — 3074F SYST BP LT 130 MM HG: CPT | Performed by: FAMILY MEDICINE

## 2021-09-08 PROCEDURE — 3008F BODY MASS INDEX DOCD: CPT | Performed by: FAMILY MEDICINE

## 2021-09-08 PROCEDURE — 3079F DIAST BP 80-89 MM HG: CPT | Performed by: FAMILY MEDICINE

## 2021-09-08 PROCEDURE — 86580 TB INTRADERMAL TEST: CPT | Performed by: FAMILY MEDICINE

## 2021-09-08 RX ORDER — CLOTRIMAZOLE 1 %
CREAM (GRAM) TOPICAL
Qty: 14 G | Refills: 0 | Status: SHIPPED | OUTPATIENT
Start: 2021-09-08 | End: 2021-11-21

## 2021-09-08 NOTE — PROGRESS NOTES
HPI:   Aure De La Cruz is a 50year old female who presents for a complete physical exam.  Needs form filled and PPD test for work. Works at  as manager.      Pap: UTD  Mammo: UTD  Has  GYN    Complains of itchy moist rash in between the toes of bila Soln Inhale 2 puffs into the lungs every 4 (four) hours as needed for Wheezing. 8 g 0   • Albuterol Sulfate HFA (PROAIR HFA) 108 (90 Base) MCG/ACT Inhalation Aero Soln Inhale 2 puffs into the lungs every 4 (four) hours as needed for Wheezing.  1 Inhaler 1 Grandmother    • No Known Problems Maternal Grandfather    • Diabetes Neg    • Cancer Neg       Social History:   Social History    Tobacco Use      Smoking status: Never Smoker      Smokeless tobacco: Never Used      Tobacco comment: NON-SMOKER    Vaping results  Asthma: Controlled ACT 20. Continue present management  BMI 48: Diet,  exercise reviewed. [facility].   Has lost weight with phentermine in past.  She would like to explore medication and surgical options  Athlete's foot:

## 2021-09-11 ENCOUNTER — NURSE ONLY (OUTPATIENT)
Dept: FAMILY MEDICINE CLINIC | Facility: CLINIC | Age: 46
End: 2021-09-11
Payer: MEDICAID

## 2021-09-11 DIAGNOSIS — Z00.00 ANNUAL PHYSICAL EXAM: ICD-10-CM

## 2021-09-11 LAB
ALBUMIN SERPL-MCNC: 3.4 G/DL (ref 3.4–5)
ALBUMIN/GLOB SERPL: 0.9 {RATIO} (ref 1–2)
ALP LIVER SERPL-CCNC: 59 U/L
ALT SERPL-CCNC: 40 U/L
ANION GAP SERPL CALC-SCNC: 7 MMOL/L (ref 0–18)
AST SERPL-CCNC: 20 U/L (ref 15–37)
BASOPHILS # BLD AUTO: 0.06 X10(3) UL (ref 0–0.2)
BASOPHILS NFR BLD AUTO: 0.8 %
BILIRUB SERPL-MCNC: 0.6 MG/DL (ref 0.1–2)
BUN BLD-MCNC: 13 MG/DL (ref 7–18)
CALCIUM BLD-MCNC: 8.5 MG/DL (ref 8.5–10.1)
CHLORIDE SERPL-SCNC: 108 MMOL/L (ref 98–112)
CHOLEST SMN-MCNC: 191 MG/DL (ref ?–200)
CO2 SERPL-SCNC: 24 MMOL/L (ref 21–32)
CREAT BLD-MCNC: 0.81 MG/DL
EOSINOPHIL # BLD AUTO: 0.24 X10(3) UL (ref 0–0.7)
EOSINOPHIL NFR BLD AUTO: 3.4 %
ERYTHROCYTE [DISTWIDTH] IN BLOOD BY AUTOMATED COUNT: 12.9 %
EST. AVERAGE GLUCOSE BLD GHB EST-MCNC: 131 MG/DL (ref 68–126)
GLOBULIN PLAS-MCNC: 3.7 G/DL (ref 2.8–4.4)
GLUCOSE BLD-MCNC: 123 MG/DL (ref 70–99)
HBA1C MFR BLD HPLC: 6.2 % (ref ?–5.7)
HCT VFR BLD AUTO: 41.2 %
HDLC SERPL-MCNC: 30 MG/DL (ref 40–59)
HGB BLD-MCNC: 13.5 G/DL
IMM GRANULOCYTES # BLD AUTO: 0.02 X10(3) UL (ref 0–1)
IMM GRANULOCYTES NFR BLD: 0.3 %
INDURATION (): 0 MM (ref 0–11)
LDLC SERPL CALC-MCNC: 116 MG/DL (ref ?–100)
LYMPHOCYTES # BLD AUTO: 1.86 X10(3) UL (ref 1–4)
LYMPHOCYTES NFR BLD AUTO: 26.2 %
M PROTEIN MFR SERPL ELPH: 7.1 G/DL (ref 6.4–8.2)
MCH RBC QN AUTO: 30.4 PG (ref 26–34)
MCHC RBC AUTO-ENTMCNC: 32.8 G/DL (ref 31–37)
MCV RBC AUTO: 92.8 FL
MONOCYTES # BLD AUTO: 0.38 X10(3) UL (ref 0.1–1)
MONOCYTES NFR BLD AUTO: 5.3 %
NEUTROPHILS # BLD AUTO: 4.55 X10 (3) UL (ref 1.5–7.7)
NEUTROPHILS # BLD AUTO: 4.55 X10(3) UL (ref 1.5–7.7)
NEUTROPHILS NFR BLD AUTO: 64 %
NONHDLC SERPL-MCNC: 161 MG/DL (ref ?–130)
OSMOLALITY SERPL CALC.SUM OF ELEC: 289 MOSM/KG (ref 275–295)
PATIENT FASTING Y/N/NP: YES
PATIENT FASTING Y/N/NP: YES
PLATELET # BLD AUTO: 269 10(3)UL (ref 150–450)
POTASSIUM SERPL-SCNC: 3.8 MMOL/L (ref 3.5–5.1)
RBC # BLD AUTO: 4.44 X10(6)UL
SODIUM SERPL-SCNC: 139 MMOL/L (ref 136–145)
TRIGL SERPL-MCNC: 259 MG/DL (ref 30–149)
TSI SER-ACNC: 0.86 MIU/ML (ref 0.36–3.74)
VLDLC SERPL CALC-MCNC: 46 MG/DL (ref 0–30)
WBC # BLD AUTO: 7.1 X10(3) UL (ref 4–11)

## 2021-09-11 PROCEDURE — 83036 HEMOGLOBIN GLYCOSYLATED A1C: CPT | Performed by: FAMILY MEDICINE

## 2021-09-11 PROCEDURE — 80061 LIPID PANEL: CPT | Performed by: FAMILY MEDICINE

## 2021-09-11 PROCEDURE — 85025 COMPLETE CBC W/AUTO DIFF WBC: CPT | Performed by: FAMILY MEDICINE

## 2021-09-11 PROCEDURE — 84443 ASSAY THYROID STIM HORMONE: CPT | Performed by: FAMILY MEDICINE

## 2021-09-11 PROCEDURE — 80053 COMPREHEN METABOLIC PANEL: CPT | Performed by: FAMILY MEDICINE

## 2021-09-11 NOTE — PROGRESS NOTES
Mandy Cheek presents today for nurse visit. Labs ordered by Dr. Denice Avilez. Lavender and green tube drawn from left ac area with a butterfly needle. Patient tolerated well. Left office in stable condition.

## 2021-09-25 ENCOUNTER — OFFICE VISIT (OUTPATIENT)
Dept: OTOLARYNGOLOGY | Facility: CLINIC | Age: 46
End: 2021-09-25
Payer: MEDICAID

## 2021-09-25 VITALS — WEIGHT: 239 LBS | HEIGHT: 59 IN | BODY MASS INDEX: 48.18 KG/M2

## 2021-09-25 DIAGNOSIS — G47.33 OSA ON CPAP: Primary | ICD-10-CM

## 2021-09-25 DIAGNOSIS — Z99.89 OSA ON CPAP: Primary | ICD-10-CM

## 2021-09-25 DIAGNOSIS — J34.2 DEVIATED NASAL SEPTUM: ICD-10-CM

## 2021-09-25 PROCEDURE — 99203 OFFICE O/P NEW LOW 30 MIN: CPT | Performed by: OTOLARYNGOLOGY

## 2021-09-25 PROCEDURE — 3008F BODY MASS INDEX DOCD: CPT | Performed by: OTOLARYNGOLOGY

## 2021-09-25 NOTE — PROGRESS NOTES
Jet Zarate is a 50year old female. Patient presents with:  Obstructive Sleep Apnea (HUGO): pt presents today for HUGO. pt is feeling well today, pt was using cpap machine, but stopped couple of months ago.       HISTORY OF PRESENT ILLNESS  She presents w year ago   • PONV (postoperative nausea and vomiting)     mild   •  labor     pt. had 20 week loss   • Seizure disorder [facility]) [phone]    patient states Dorpilara Skagit was from [place]    • Sleep apnea        Past Surgical History:   Procedure Laterality Appropriate mood and affect.    Neck Exam Normal Inspection - Normal. Palpation - Normal. Parotid gland - Normal. Thyroid gland - Normal.   Eyes Normal Conjunctiva - Right: Normal, Left: Normal. Pupil - Right: Normal, Left: Normal. Fundus - Right: Normal, L stages. Milder when she is out of REM sleep stage. We discussed that the best way to completely cure her of her apnea would be to use CPAP.   I did talk to her about alternatives including use of Inspire and implantable electrode that helps to maintain th

## 2021-09-29 ENCOUNTER — TELEPHONE (OUTPATIENT)
Dept: FAMILY MEDICINE CLINIC | Facility: CLINIC | Age: 46
End: 2021-09-29

## 2021-09-29 NOTE — TELEPHONE ENCOUNTER
Recall on cpap machine. One not available for 12 months. Would like to know what to do. Also has some questions regarding ENT.

## 2021-10-12 NOTE — TELEPHONE ENCOUNTER
----- Message from José Miguel Castellon sent at 10/12/2021 12:17 PM CDT -----  Returned call- [phone]

## 2021-10-13 ENCOUNTER — TELEPHONE (OUTPATIENT)
Dept: FAMILY MEDICINE CLINIC | Facility: CLINIC | Age: 46
End: 2021-10-13

## 2021-10-13 NOTE — TELEPHONE ENCOUNTER
FYI - Noted that patient never saw the [employer] message from the end of September about the recall and need to schedule appointment.

## 2021-10-13 NOTE — TELEPHONE ENCOUNTER
Discussed with pt regarding recall and she is due for a sleep follow up appt.    She has not been using her cpap and will start using again and schedule a follow up appt

## 2021-10-14 ENCOUNTER — TELEPHONE (OUTPATIENT)
Dept: FAMILY MEDICINE CLINIC | Facility: CLINIC | Age: 46
End: 2021-10-14

## 2021-10-14 DIAGNOSIS — E66.01 MORBID OBESITY (HCC): Primary | ICD-10-CM

## 2021-10-14 NOTE — TELEPHONE ENCOUNTER
Advised referral in and authorized. New referral placed since they are unable to see it  Patient advised. Verbalized understanding.

## 2021-10-14 NOTE — TELEPHONE ENCOUNTER
Pt want to confirm or not if she has a referal in place to go see a weight loss doctor. Facility is saying that they do not have one in but I think I see one in the system.      Please call once it is in there

## 2021-11-02 ENCOUNTER — TELEPHONE (OUTPATIENT)
Dept: FAMILY MEDICINE CLINIC | Facility: CLINIC | Age: 46
End: 2021-11-02

## 2021-11-02 NOTE — TELEPHONE ENCOUNTER
was seen on 02-04 for a sleep study, has the results, wants to understand treatment and possibly with oral appliance

## 2021-11-03 NOTE — TELEPHONE ENCOUNTER
Spoke with Dr. Eulalia Bustillo. He wanted to review the sleep study from 2020. Discussed the results with him that I had available. Also informed that patient has only followed up once since starting AutoPap therapy.     He requested if we would be able to fill out

## 2021-11-03 NOTE — TELEPHONE ENCOUNTER
Dr. Pilar Melton (dentist) from [facility] called regarding patient Kale Steward. She had a sleep study and was diagnosed with HUGO. Patient went to Dr. Pilar Melton for possible oral appliance for sleep apnea.  He would like to discuss sleep study results with

## 2021-11-21 ENCOUNTER — APPOINTMENT (OUTPATIENT)
Dept: ULTRASOUND IMAGING | Facility: HOSPITAL | Age: 46
End: 2021-11-21
Attending: EMERGENCY MEDICINE
Payer: MEDICAID

## 2021-11-21 ENCOUNTER — HOSPITAL ENCOUNTER (EMERGENCY)
Facility: HOSPITAL | Age: 46
Discharge: HOME | End: 2021-11-21
Attending: EMERGENCY MEDICINE
Payer: MEDICAID

## 2021-11-21 VITALS
HEART RATE: 63 BPM | HEIGHT: 59 IN | BODY MASS INDEX: 44.35 KG/M2 | TEMPERATURE: 98 F | WEIGHT: 220 LBS | RESPIRATION RATE: 14 BRPM | DIASTOLIC BLOOD PRESSURE: 74 MMHG | SYSTOLIC BLOOD PRESSURE: 124 MMHG | OXYGEN SATURATION: 100 %

## 2021-11-21 DIAGNOSIS — G43.909 MIGRAINE WITHOUT STATUS MIGRAINOSUS, NOT INTRACTABLE, UNSPECIFIED MIGRAINE TYPE: Primary | ICD-10-CM

## 2021-11-21 DIAGNOSIS — M71.22 BAKER'S CYST OF KNEE, LEFT: ICD-10-CM

## 2021-11-21 PROCEDURE — 99284 EMERGENCY DEPT VISIT MOD MDM: CPT

## 2021-11-21 PROCEDURE — 96374 THER/PROPH/DIAG INJ IV PUSH: CPT

## 2021-11-21 PROCEDURE — 96375 TX/PRO/DX INJ NEW DRUG ADDON: CPT

## 2021-11-21 PROCEDURE — 93971 EXTREMITY STUDY: CPT | Performed by: EMERGENCY MEDICINE

## 2021-11-21 RX ORDER — DIPHENHYDRAMINE HYDROCHLORIDE 50 MG/ML
25 INJECTION INTRAMUSCULAR; INTRAVENOUS ONCE
Status: COMPLETED | OUTPATIENT
Start: 2021-11-21 | End: 2021-11-21

## 2021-11-21 RX ORDER — METOCLOPRAMIDE HYDROCHLORIDE 5 MG/ML
10 INJECTION INTRAMUSCULAR; INTRAVENOUS ONCE
Status: COMPLETED | OUTPATIENT
Start: 2021-11-21 | End: 2021-11-21

## 2021-11-21 RX ORDER — KETOROLAC TROMETHAMINE 30 MG/ML
15 INJECTION, SOLUTION INTRAMUSCULAR; INTRAVENOUS ONCE
Status: COMPLETED | OUTPATIENT
Start: 2021-11-21 | End: 2021-11-21

## 2021-11-21 NOTE — ED QUICK NOTES
Rounding Completed    Plan of Care reviewed. Waiting for ultrasound. Elimination needs assessed. Bed is locked and in lowest position. Call light within reach.

## 2021-11-21 NOTE — ED INITIAL ASSESSMENT (HPI)
Pt to ed for c/o head ache for \"a while\" and \"I need an MRI but haven't done it yet because I am claustrophobic. Co pain to right side of the face with facial swelling. complaiins of double vision on and off. + nausea, no vomiting.  Also c/o left chest w

## 2021-11-22 NOTE — ED PROVIDER NOTES
Patient Seen in: [facility] Emergency Department      History   Patient presents with:  Headache    Stated Complaint: headache started 2 days ago    Subjective:   HPI    68-year-old female complaining of headache the patient's had a history of migra alert and oriented ×3  Cranial nerves II through XII within normal limits  Motor function is intact in all 4 extremities  Sensation is intact in all 4 extremities  Cerebellar finger-nose and heel-to-shin are normal  Deep tendon reflexes are normal.  Extrem

## 2021-11-23 ENCOUNTER — TELEPHONE (OUTPATIENT)
Dept: ORTHOPEDICS CLINIC | Facility: CLINIC | Age: 46
End: 2021-11-23

## 2021-11-23 DIAGNOSIS — Z01.89 ENCOUNTER FOR LOWER EXTREMITY COMPARISON IMAGING STUDY: Primary | ICD-10-CM

## 2021-11-23 DIAGNOSIS — M25.562 LEFT KNEE PAIN, UNSPECIFIED CHRONICITY: ICD-10-CM

## 2021-11-23 NOTE — TELEPHONE ENCOUNTER
Future Appointments   Date Time Provider Erick Sravanthi   11/29/2021  2:40 PM BELEN Alcantar [facility] [id number]     Patient is coming for LT Leg Hernandez's Cyst. There is a recent US in epic.  Please advise if we need additional views for patiyobany

## 2021-11-23 NOTE — TELEPHONE ENCOUNTER
Ultrasound 11/21/21   Impression   CONCLUSION:  No DVT in the left leg. There is a 2.4 x 1.7 x 0.6 cm Baker's cyst.      Reviewed patients chart, xray orders are required. Patient needs weight bearing xrays of her left knee to help visualize her knee.  Or

## 2021-11-27 ENCOUNTER — HOSPITAL ENCOUNTER (OUTPATIENT)
Dept: GENERAL RADIOLOGY | Age: 46
Discharge: HOME | End: 2021-11-27
Attending: PHYSICIAN ASSISTANT
Payer: MEDICAID

## 2021-11-27 DIAGNOSIS — Z01.89 ENCOUNTER FOR LOWER EXTREMITY COMPARISON IMAGING STUDY: ICD-10-CM

## 2021-11-27 DIAGNOSIS — M25.562 LEFT KNEE PAIN, UNSPECIFIED CHRONICITY: ICD-10-CM

## 2021-11-27 PROCEDURE — 73564 X-RAY EXAM KNEE 4 OR MORE: CPT | Performed by: PHYSICIAN ASSISTANT

## 2021-11-27 PROCEDURE — 73562 X-RAY EXAM OF KNEE 3: CPT | Performed by: PHYSICIAN ASSISTANT

## 2021-11-29 ENCOUNTER — OFFICE VISIT (OUTPATIENT)
Dept: ORTHOPEDICS CLINIC | Facility: CLINIC | Age: 46
End: 2021-11-29
Payer: MEDICAID

## 2021-11-29 VITALS — HEART RATE: 84 BPM | WEIGHT: 230 LBS | BODY MASS INDEX: 46.37 KG/M2 | OXYGEN SATURATION: 97 % | HEIGHT: 59 IN

## 2021-11-29 DIAGNOSIS — M17.12 PRIMARY OSTEOARTHRITIS OF LEFT KNEE: Primary | ICD-10-CM

## 2021-11-29 PROCEDURE — 99214 OFFICE O/P EST MOD 30 MIN: CPT | Performed by: PHYSICIAN ASSISTANT

## 2021-11-29 PROCEDURE — 3008F BODY MASS INDEX DOCD: CPT | Performed by: PHYSICIAN ASSISTANT

## 2021-11-29 NOTE — H&P
[facility] - ORTHOPEDICS  [facility] [address] [id number]  [phone]     NEW PATIENT VISIT - HISTORY AND PHYSICAL EXAMINATION     Name: Antonio Reyes   MRN: [id number]  Date: 11/29/2021     CC: Left knee edwin GALLBLADDER     • VENTRAL HERNIA REPAIR  11/20/2013    Procedure:  HERNIA VENTRAL REPAIR;  Surgeon: Franco Gaines MD;  Location:  MAIN OR       FAMILY HX:  Family History   Problem Relation Age of Onset   • Hypertension Mother    • No Known Problems Mate motion and neck supple. Cardiovascular:      Pulses: Normal pulses. Pulmonary:      Effort: Pulmonary effort is normal. No respiratory distress. Abdominal:      General: There is no distension. Skin:     General: Skin is warm.       Capillary Refill joint space narrowing. Small tricompartmental marginal osteophytes. CONCLUSION:  Mild tricompartmental osteoarthritic changes.    Dictated by (CST): Alice Fierro MD on 11/27/2021 at 12:53 PM     Finalized by (CST): Alice Fierro MD on 11/27/202 and augmentation. CONCLUSION:  No DVT in the left leg.   There is a 2.4 x 1.7 x 0.6 cm Baker's cyst.    Dictated by (CST): Yousif Nam MD on 11/21/2021 at 7:38 PM     Finalized by (CST): Yousif Nam MD on 11/21/2021 at 7:38 PM         STEPHANIEE While it was briefly proofread prior to completion, some grammatical, spelling, and word choice errors due to dictation may still occur.

## 2021-12-01 ENCOUNTER — TELEPHONE (OUTPATIENT)
Dept: FAMILY MEDICINE CLINIC | Facility: CLINIC | Age: 46
End: 2021-12-01

## 2021-12-01 DIAGNOSIS — M19.90 ARTHRITIS: Primary | ICD-10-CM

## 2021-12-01 NOTE — TELEPHONE ENCOUNTER
PT calling wants a new referral to see a different ortho. Per pt the one she want to see is the one that puts a gel instead of surgery. pt isn't sure who or where said she will call back when her mom calls her to give all the info.

## 2021-12-01 NOTE — TELEPHONE ENCOUNTER
Pt wants a referral for   [facility] and care   Ph. [phone]  Pt would like a call when referral is sent

## 2021-12-14 ENCOUNTER — OFFICE VISIT (OUTPATIENT)
Dept: FAMILY MEDICINE CLINIC | Facility: CLINIC | Age: 46
End: 2021-12-14
Payer: MEDICAID

## 2021-12-14 VITALS
OXYGEN SATURATION: 98 % | HEIGHT: 59 IN | SYSTOLIC BLOOD PRESSURE: 128 MMHG | DIASTOLIC BLOOD PRESSURE: 84 MMHG | BODY MASS INDEX: 46.37 KG/M2 | WEIGHT: 230 LBS | HEART RATE: 74 BPM | TEMPERATURE: 97 F | RESPIRATION RATE: 16 BRPM

## 2021-12-14 DIAGNOSIS — B37.2 CUTANEOUS CANDIDIASIS: Primary | ICD-10-CM

## 2021-12-14 DIAGNOSIS — E66.01 MORBID OBESITY (HCC): ICD-10-CM

## 2021-12-14 DIAGNOSIS — H04.203 EXCESSIVE TEAR PRODUCTION OF BOTH LACRIMAL GLANDS: ICD-10-CM

## 2021-12-14 DIAGNOSIS — R73.03 PREDIABETES: ICD-10-CM

## 2021-12-14 PROCEDURE — 99214 OFFICE O/P EST MOD 30 MIN: CPT | Performed by: NURSE PRACTITIONER

## 2021-12-14 PROCEDURE — 3079F DIAST BP 80-89 MM HG: CPT | Performed by: NURSE PRACTITIONER

## 2021-12-14 PROCEDURE — 3074F SYST BP LT 130 MM HG: CPT | Performed by: NURSE PRACTITIONER

## 2021-12-14 PROCEDURE — 3008F BODY MASS INDEX DOCD: CPT | Performed by: NURSE PRACTITIONER

## 2021-12-14 RX ORDER — NYSTATIN 100000 U/G
1 CREAM TOPICAL 2 TIMES DAILY
Qty: 30 G | Refills: 2 | Status: SHIPPED | OUTPATIENT
Start: 2021-12-14 | End: 2021-12-19

## 2021-12-14 NOTE — PROGRESS NOTES
HPI: HPI   Patient is here for recurrent rash. Occurs under her breasts and in groin folds. Comes and goes. Occasionally uses OTC vaginal yeast cream and it helps. Wondering if there is prescription form though. Has slight rash today.  It is itchy and red HYSTEROSCOPY,BIOPSY  05/28/2021    Hysteroscopy, D&C, endometrial polypectomy   • LAPAROSCOPIC CHOLECYSTECTOMY  2 weeks ago   • SCARLETT LOCALIZATION WIRE 1 SITE LEFT (CPT=19281)  2008    Benign   • SCARLETT LOCALIZATION WIRE 1 SITE RIGHT (CPT=19281)  2008    Benign Rhythm: Normal rate and regular rhythm. Heart sounds: Normal heart sounds. Pulmonary:      Effort: Pulmonary effort is normal.      Breath sounds: Normal breath sounds. Skin:     Findings: Rash (dull red excoriation) present.           Neurological

## 2021-12-14 NOTE — PATIENT INSTRUCTIONS
Candida Skin Infection (Adult)   Candida is a type of yeast. It grows naturally on the skin and in the mouth. If it grows out of control, it can cause an infection.  Candida can cause infections in the genital area, skin folds, in the mouth, and under the healthcare provider right away if any of these occur:  · Pain or redness that gets worse or spreads  · Fluid coming from the skin  · Yellow crusts on the skin  · Fever of 100.4°F (38°C) or higher, or as directed by your provider  Jolanta last reviewed thi , [place] American, , [place] Native, , or   Diagnosing prediabetes  Prediabetes may have no symptoms. Or you may have some of the symptoms of diabetes (see below).  The diagnosis is made with a blood test stay at a 7% weight loss and increase physical activity. Follow-up  If not treated, prediabetes can turn into diabetes. This is a serious health condition. Take steps to stop this from happening. Follow the treatment plan you have been given.  You may have

## 2021-12-15 ENCOUNTER — TELEPHONE (OUTPATIENT)
Dept: FAMILY MEDICINE CLINIC | Facility: CLINIC | Age: 46
End: 2021-12-15

## 2021-12-15 PROBLEM — M17.12 PRIMARY OSTEOARTHRITIS OF LEFT KNEE: Status: ACTIVE | Noted: 2021-12-15

## 2021-12-15 PROBLEM — M17.11 PRIMARY OSTEOARTHRITIS OF RIGHT KNEE: Status: ACTIVE | Noted: 2021-12-15

## 2021-12-15 NOTE — TELEPHONE ENCOUNTER
Pt states she thinks she has an asthma flare, not COVID or strep. Notified pt a zpak won't help an asthma flare either. Offered several appts today. Pt states she will have to CB.

## 2021-12-15 NOTE — TELEPHONE ENCOUNTER
Pt was saw Ivanna Pillai yesterday and requested a Radha Corners did not prescribe because pt did not have any sx. Pt called today,  States she is now having sx, and wants to get a zpac,  She is now having Asthma flareup due to the weather.    Please advise

## 2021-12-15 NOTE — TELEPHONE ENCOUNTER
Marley Sharif not appropriate for less than 1 day of symptoms. Would recommend sick clinic or WIC to be evaluated. Needs COVID testing.

## 2021-12-15 NOTE — TELEPHONE ENCOUNTER
Call from patient. States saw Dg Cheng yesterday and requested a zpak but it wasn't prescribed because she didn't \"have symptoms\" states today woke up with Sore throat, runny nose and cough. No fever or chills.  Please advise

## 2021-12-18 ENCOUNTER — APPOINTMENT (OUTPATIENT)
Dept: GENERAL RADIOLOGY | Age: 46
End: 2021-12-18
Attending: PHYSICIAN ASSISTANT
Payer: MEDICAID

## 2021-12-18 ENCOUNTER — HOSPITAL ENCOUNTER (OUTPATIENT)
Age: 46
Discharge: HOME | End: 2021-12-18
Payer: MEDICAID

## 2021-12-18 VITALS
RESPIRATION RATE: 16 BRPM | OXYGEN SATURATION: 99 % | TEMPERATURE: 98 F | DIASTOLIC BLOOD PRESSURE: 75 MMHG | SYSTOLIC BLOOD PRESSURE: 140 MMHG | HEART RATE: 70 BPM

## 2021-12-18 DIAGNOSIS — J45.909 MILD ASTHMA, UNSPECIFIED WHETHER COMPLICATED, UNSPECIFIED WHETHER PERSISTENT: ICD-10-CM

## 2021-12-18 DIAGNOSIS — R05.9 COUGH: Primary | ICD-10-CM

## 2021-12-18 PROCEDURE — U0002 COVID-19 LAB TEST NON-CDC: HCPCS | Performed by: PHYSICIAN ASSISTANT

## 2021-12-18 PROCEDURE — 99214 OFFICE O/P EST MOD 30 MIN: CPT | Performed by: PHYSICIAN ASSISTANT

## 2021-12-18 PROCEDURE — 94640 AIRWAY INHALATION TREATMENT: CPT | Performed by: PHYSICIAN ASSISTANT

## 2021-12-18 PROCEDURE — 71046 X-RAY EXAM CHEST 2 VIEWS: CPT | Performed by: PHYSICIAN ASSISTANT

## 2021-12-18 RX ORDER — PREDNISONE 20 MG/1
40 TABLET ORAL DAILY
Qty: 8 TABLET | Refills: 0 | Status: SHIPPED | OUTPATIENT
Start: 2021-12-18 | End: 2021-12-22

## 2021-12-18 RX ORDER — ALBUTEROL SULFATE 2.5 MG/3ML
2.5 SOLUTION RESPIRATORY (INHALATION) EVERY 4 HOURS PRN
Qty: 30 EACH | Refills: 0 | Status: SHIPPED | OUTPATIENT
Start: 2021-12-18 | End: 2022-01-17

## 2021-12-18 RX ORDER — PREDNISONE 20 MG/1
40 TABLET ORAL ONCE
Status: COMPLETED | OUTPATIENT
Start: 2021-12-18 | End: 2021-12-18

## 2021-12-18 RX ORDER — IPRATROPIUM BROMIDE AND ALBUTEROL SULFATE 2.5; .5 MG/3ML; MG/3ML
3 SOLUTION RESPIRATORY (INHALATION) ONCE
Status: COMPLETED | OUTPATIENT
Start: 2021-12-18 | End: 2021-12-18

## 2021-12-18 RX ORDER — ALBUTEROL SULFATE 90 UG/1
2 AEROSOL, METERED RESPIRATORY (INHALATION) EVERY 4 HOURS PRN
Qty: 1 EACH | Refills: 0 | Status: SHIPPED | OUTPATIENT
Start: 2021-12-18 | End: 2022-01-17

## 2021-12-18 NOTE — ED PROVIDER NOTES
Patient Seen in: Immediate [facility]      History   Patient presents with:  Cough/URI    Stated Complaint: asthma flare-up and shilo    Subjective:   HPI    47-year-old female. Medical history of asthma, migraines, obesity, seizure disorder.   Sleep apne REPAIR  11/20/2013    Procedure:  HERNIA VENTRAL REPAIR;  Surgeon: Scotty Pineda MD;  Location: [facility] MAIN OR                Social History    Tobacco Use      Smoking status: Never Smoker      Smokeless tobacco: Never Used      Tobacco comment: NON-SMOKER whether complicated, unspecified whether persistent     Disposition:  Discharge  12/18/2021 10:53 am    Follow-up:  Dwain Webster, [address] [facility] [address] [facility] Raúl Trimble  [phone]                Medications Prescribed:  Current Discharge Medication L

## 2021-12-21 ENCOUNTER — HOSPITAL ENCOUNTER (EMERGENCY)
Age: 46
Discharge: HOME | End: 2021-12-21
Attending: EMERGENCY MEDICINE
Payer: MEDICAID

## 2021-12-21 VITALS
HEIGHT: 59 IN | RESPIRATION RATE: 18 BRPM | WEIGHT: 230 LBS | OXYGEN SATURATION: 97 % | DIASTOLIC BLOOD PRESSURE: 76 MMHG | BODY MASS INDEX: 46.37 KG/M2 | TEMPERATURE: 98 F | SYSTOLIC BLOOD PRESSURE: 147 MMHG | HEART RATE: 92 BPM

## 2021-12-21 DIAGNOSIS — J45.901 EXACERBATION OF ASTHMA, UNSPECIFIED ASTHMA SEVERITY, UNSPECIFIED WHETHER PERSISTENT: ICD-10-CM

## 2021-12-21 DIAGNOSIS — R73.9 HYPERGLYCEMIA: ICD-10-CM

## 2021-12-21 DIAGNOSIS — R30.0 DYSURIA: Primary | ICD-10-CM

## 2021-12-21 PROCEDURE — 99283 EMERGENCY DEPT VISIT LOW MDM: CPT

## 2021-12-21 PROCEDURE — 82962 GLUCOSE BLOOD TEST: CPT

## 2021-12-21 PROCEDURE — 81003 URINALYSIS AUTO W/O SCOPE: CPT | Performed by: EMERGENCY MEDICINE

## 2021-12-22 NOTE — ED PROVIDER NOTES
Patient Seen in: [facility] Emergency Department In [place]      History   Patient presents with:  Difficulty Breathing    Stated Complaint: shilo, pain with urination    Subjective:   HPI    53-year-old woman with a history of asthma who was in the emergen [facility]ERNIA [facility]ENTRAL REPAIR;  Surgeon: Ken Syed MD;  Location: [facility] MAIN OR                Social History    Tobacco Use      Smoking status: Never Smoker      Smokeless tobacco: Never Used      Tobacco comment: NON-SMOKER    Vaping Use      Vaping Use: Pavan Overton Technologist)  Patient states she has had cough and shortness of breath for the past 2 days. Patient has history of Asthma. FINDINGS:  LUNGS:  No focal consolidation. Normal vascularity. CARDIAC:  Normal size cardiac silhouette.  MEDIASTINUM:  No hilar

## 2021-12-25 ENCOUNTER — HOSPITAL ENCOUNTER (EMERGENCY)
Age: 46
Discharge: HOME | End: 2021-12-25
Attending: EMERGENCY MEDICINE
Payer: MEDICAID

## 2021-12-25 ENCOUNTER — APPOINTMENT (OUTPATIENT)
Dept: GENERAL RADIOLOGY | Age: 46
End: 2021-12-25
Attending: EMERGENCY MEDICINE
Payer: MEDICAID

## 2021-12-25 VITALS
HEART RATE: 95 BPM | HEIGHT: 59 IN | BODY MASS INDEX: 46.36 KG/M2 | RESPIRATION RATE: 20 BRPM | TEMPERATURE: 100 F | OXYGEN SATURATION: 97 % | WEIGHT: 229.94 LBS | DIASTOLIC BLOOD PRESSURE: 86 MMHG | SYSTOLIC BLOOD PRESSURE: 121 MMHG

## 2021-12-25 DIAGNOSIS — J20.9 ACUTE BRONCHITIS, UNSPECIFIED ORGANISM: Primary | ICD-10-CM

## 2021-12-25 PROCEDURE — 93010 ELECTROCARDIOGRAM REPORT: CPT

## 2021-12-25 PROCEDURE — 93005 ELECTROCARDIOGRAM TRACING: CPT

## 2021-12-25 PROCEDURE — 80053 COMPREHEN METABOLIC PANEL: CPT | Performed by: EMERGENCY MEDICINE

## 2021-12-25 PROCEDURE — 94640 AIRWAY INHALATION TREATMENT: CPT

## 2021-12-25 PROCEDURE — 71045 X-RAY EXAM CHEST 1 VIEW: CPT | Performed by: EMERGENCY MEDICINE

## 2021-12-25 PROCEDURE — 84484 ASSAY OF TROPONIN QUANT: CPT | Performed by: EMERGENCY MEDICINE

## 2021-12-25 PROCEDURE — 99284 EMERGENCY DEPT VISIT MOD MDM: CPT

## 2021-12-25 PROCEDURE — 96360 HYDRATION IV INFUSION INIT: CPT

## 2021-12-25 PROCEDURE — 85025 COMPLETE CBC W/AUTO DIFF WBC: CPT | Performed by: EMERGENCY MEDICINE

## 2021-12-25 RX ORDER — PREDNISONE 20 MG/1
60 TABLET ORAL ONCE
Status: COMPLETED | OUTPATIENT
Start: 2021-12-25 | End: 2021-12-25

## 2021-12-25 RX ORDER — PREDNISONE 20 MG/1
40 TABLET ORAL DAILY
Qty: 6 TABLET | Refills: 0 | Status: SHIPPED | OUTPATIENT
Start: 2021-12-25 | End: 2021-12-28

## 2021-12-25 RX ORDER — ALBUTEROL SULFATE 90 UG/1
8 AEROSOL, METERED RESPIRATORY (INHALATION) ONCE
Status: COMPLETED | OUTPATIENT
Start: 2021-12-25 | End: 2021-12-25

## 2021-12-25 RX ORDER — AZITHROMYCIN 250 MG/1
TABLET, FILM COATED ORAL
Qty: 6 TABLET | Refills: 0 | Status: SHIPPED | OUTPATIENT
Start: 2021-12-25 | End: 2021-12-30

## 2021-12-25 RX ORDER — IPRATROPIUM BROMIDE AND ALBUTEROL SULFATE 2.5; .5 MG/3ML; MG/3ML
3 SOLUTION RESPIRATORY (INHALATION) ONCE
Status: DISCONTINUED | OUTPATIENT
Start: 2021-12-25 | End: 2021-12-25

## 2021-12-25 NOTE — ED PROVIDER NOTES
Patient Seen in: THE [facility] Emergency Department In [place]      History   Patient presents with:  Difficulty Breathing    Stated Complaint: increased shilo    Subjective:   HPI    49 yo f with past medical history of asthma presents today for cough and gabriel 05/28/2021    Hysteroscopy, D&C, endometrial polypectomy   • LAPAROSCOPIC CHOLECYSTECTOMY  2 weeks ago   • SCARLETT LOCALIZATION WIRE 1 SITE LEFT (CPT=19281)  2008    Benign   • SCARLETT LOCALIZATION WIRE 1 SITE RIGHT (CPT=19281)  2008    Benign   • OTHER      peralta Tenderness: There is no abdominal tenderness. Musculoskeletal:         General: Normal range of motion. Cervical back: Neck supple. Skin:     General: Skin is warm. Neurological:      General: No focal deficit present.       Mental Status: She is basic labs. Will obtain chest x-ray to assess for consolidation suggestive of pneumonia. Will give albuterol and reassess patient. 3:30 am  On reassessment, patient is  resting comfortably. She is satting 96% to 98% on room air.  on auscultation, sheree

## 2021-12-30 ENCOUNTER — TELEPHONE (OUTPATIENT)
Dept: FAMILY MEDICINE CLINIC | Facility: CLINIC | Age: 46
End: 2021-12-30

## 2021-12-30 RX ORDER — BENZONATATE 100 MG/1
100 CAPSULE ORAL 2 TIMES DAILY PRN
Qty: 15 CAPSULE | Refills: 0 | Status: SHIPPED | OUTPATIENT
Start: 2021-12-30 | End: 2022-02-01

## 2021-12-30 NOTE — TELEPHONE ENCOUNTER
Spoke with the pt and she states that she has this rattle and cough-  While on steroids did get a little better, did not resolve- she still have a wheeze and a rattle in her chest    Still using inhaler and OTC cough syrup    No fever    Finished melonie esposito

## 2021-12-30 NOTE — TELEPHONE ENCOUNTER
Spoke with the pt and advised of the script and the recommendations- I gave her the number to Dr. Didier Juan- she v/u  She will call on Monday if not improving

## 2021-12-30 NOTE — TELEPHONE ENCOUNTER
Can prescribe tessalon perles to help with cough. rx pended. Call back next week if not continued improvement.   She was advised to f/u with pulm - remind pt to make appt if she hasn't done so       She was given contact for Dr. Jimmy Arndt at [facility] discharge

## 2021-12-30 NOTE — TELEPHONE ENCOUNTER
Pt was in [address] [place] [address] during [place] and again after that- was prescribed     azithromycin (ZITHROMAX Z-SUMA) 250 MG Oral Tab 6 tablet     Pt stated it's not working- covid neg and chest xray normal- was wondering if she needs to schedule a VV or if DB can prescribe a

## 2022-01-03 ENCOUNTER — TELEMEDICINE (OUTPATIENT)
Dept: FAMILY MEDICINE CLINIC | Facility: CLINIC | Age: 47
End: 2022-01-03

## 2022-01-03 DIAGNOSIS — J06.9 VIRAL URI: Primary | ICD-10-CM

## 2022-01-03 PROCEDURE — 99213 OFFICE O/P EST LOW 20 MIN: CPT | Performed by: FAMILY MEDICINE

## 2022-01-03 RX ORDER — GUAIFENESIN DEXTROMETHORPHAN HYDROBROMIDE ORAL SOLUTION 10; 100 MG/5ML; MG/5ML
5 SOLUTION ORAL EVERY 12 HOURS
Qty: 100 ML | Refills: 0 | Status: SHIPPED | OUTPATIENT
Start: 2022-01-03 | End: 2022-01-13

## 2022-01-03 NOTE — TELEPHONE ENCOUNTER
Pt called states her asthma is acting up, and the medication is not helping,  Wants to know if she needs a stronger.   Please advise

## 2022-01-03 NOTE — TELEPHONE ENCOUNTER
Spoke with pt, VV scheduled.   Future Appointments   Date Time Provider Erick Fishman   1/3/2022 11:00 AM Tiana Bullock MD [facility]

## 2022-01-03 NOTE — PROGRESS NOTES
No chief complaint on file. Viral URI follow-up  This visit is conducted using Telemedicine with live, interactive video and audio.     Patient has been referred to the [facility] website at [email or web address] to review the yearly Consent to Treat document puffs into the lungs every 4 (four) hours as needed for Wheezing. 1 Inhaler 1   • albuterol sulfate (2.5 MG/3ML) 0.083% Inhalation Nebu Soln Take 3 mL (2.5 mg total) by nebulization every 4 (four) hours as needed for Wheezing.  1 Box 0     Allergies:  Morph Grandfather    • Diabetes Neg    • Cancer Neg       Social History: Social History    Tobacco Use      Smoking status: Never Smoker      Smokeless tobacco: Never Used      Tobacco comment: NON-SMOKER    Vaping Use      Vaping Use: Never used    Alcohol use

## 2022-01-07 ENCOUNTER — HOSPITAL ENCOUNTER (OUTPATIENT)
Dept: GENERAL RADIOLOGY | Age: 47
Discharge: HOME | End: 2022-01-07
Attending: FAMILY MEDICINE
Payer: MEDICAID

## 2022-01-07 ENCOUNTER — TELEPHONE (OUTPATIENT)
Dept: FAMILY MEDICINE CLINIC | Facility: CLINIC | Age: 47
End: 2022-01-07

## 2022-01-07 DIAGNOSIS — R05.9 COUGH: ICD-10-CM

## 2022-01-07 DIAGNOSIS — R05.9 COUGH: Primary | ICD-10-CM

## 2022-01-07 PROCEDURE — 71046 X-RAY EXAM CHEST 2 VIEWS: CPT | Performed by: FAMILY MEDICINE

## 2022-01-07 NOTE — TELEPHONE ENCOUNTER
Patient had tele visit 1/3/22  Patient states cough medication helped a little bit  Patient states her lungs hurt  Patient states it is painful to take a deep breath in  Cough - productive a times with clear sputum  Denies fever  COVID test negative yester

## 2022-01-07 NOTE — TELEPHONE ENCOUNTER
Had telemed appt on 1/3/22  Pt states she is still sick   Has been to the IC twice, pt is on zpac and prednisone, and this is not helping, states her lungs hurt when she breathes  Had a covid test yesterday and it came back negative   Wants to talk to the

## 2022-01-08 ENCOUNTER — TELEPHONE (OUTPATIENT)
Dept: FAMILY MEDICINE CLINIC | Facility: CLINIC | Age: 47
End: 2022-01-08

## 2022-01-08 DIAGNOSIS — J45.20 MILD INTERMITTENT ASTHMA WITHOUT COMPLICATION: Primary | ICD-10-CM

## 2022-01-08 RX ORDER — BENZONATATE 100 MG/1
100 CAPSULE ORAL 3 TIMES DAILY PRN
Qty: 30 CAPSULE | Refills: 0 | Status: SHIPPED | OUTPATIENT
Start: 2022-01-08

## 2022-01-08 RX ORDER — PREDNISONE 20 MG/1
20 TABLET ORAL 2 TIMES DAILY
Qty: 14 TABLET | Refills: 0 | Status: SHIPPED | OUTPATIENT
Start: 2022-01-08 | End: 2022-01-15

## 2022-01-08 NOTE — TELEPHONE ENCOUNTER
Cough and lung pian with deep breath  Cough present no sob. Able to speak in full sentence. No fever. Treated for brochitis continue steroid and albuteorl. Cough meds sent.

## 2022-01-08 NOTE — TELEPHONE ENCOUNTER
CXR from yesterday:    CONCLUSION:  Slight shallow inspiration.  Chest otherwise negative. Vlad Sotomayor

## 2022-02-01 ENCOUNTER — HOSPITAL ENCOUNTER (OUTPATIENT)
Age: 47
Discharge: HOME | End: 2022-02-01
Payer: MEDICAID

## 2022-02-01 ENCOUNTER — APPOINTMENT (OUTPATIENT)
Dept: GENERAL RADIOLOGY | Age: 47
End: 2022-02-01
Attending: NURSE PRACTITIONER
Payer: MEDICAID

## 2022-02-01 VITALS
HEART RATE: 81 BPM | DIASTOLIC BLOOD PRESSURE: 44 MMHG | RESPIRATION RATE: 18 BRPM | SYSTOLIC BLOOD PRESSURE: 112 MMHG | TEMPERATURE: 98 F | OXYGEN SATURATION: 96 %

## 2022-02-01 DIAGNOSIS — R19.7 NAUSEA VOMITING AND DIARRHEA: ICD-10-CM

## 2022-02-01 DIAGNOSIS — R05.9 COUGH: Primary | ICD-10-CM

## 2022-02-01 DIAGNOSIS — R11.2 NAUSEA VOMITING AND DIARRHEA: ICD-10-CM

## 2022-02-01 DIAGNOSIS — B34.9 VIRAL SYNDROME: ICD-10-CM

## 2022-02-01 LAB
#MXD IC: 0.5 X10ˆ3/UL (ref 0.1–1)
BUN BLD-MCNC: 11 MG/DL (ref 7–18)
CHLORIDE BLD-SCNC: 100 MMOL/L (ref 98–112)
CO2 BLD-SCNC: 28 MMOL/L (ref 21–32)
CREAT BLD-MCNC: 0.7 MG/DL
GLUCOSE BLD-MCNC: 137 MG/DL (ref 70–99)
HCT VFR BLD AUTO: 41.9 %
HCT VFR BLD CALC: 41 %
HGB BLD-MCNC: 14 G/DL
ISTAT IONIZED CALCIUM FOR CHEM 8: 1.3 MMOL/L (ref 1.12–1.32)
LYMPHOCYTES # BLD AUTO: 1.8 X10ˆ3/UL (ref 1–4)
LYMPHOCYTES NFR BLD AUTO: 20 %
MCH RBC QN AUTO: 31.2 PG (ref 26–34)
MCHC RBC AUTO-ENTMCNC: 33.4 G/DL (ref 31–37)
MCV RBC AUTO: 93.3 FL (ref 80–100)
MIXED CELL %: 5.6 %
NEUTROPHILS # BLD AUTO: 6.6 X10ˆ3/UL (ref 1.5–7.7)
PLATELET # BLD AUTO: 232 X10ˆ3/UL (ref 150–450)
POTASSIUM BLD-SCNC: 3.6 MMOL/L (ref 3.6–5.1)
RBC # BLD AUTO: 4.49 X10ˆ6/UL
SARS-COV-2 RNA RESP QL NAA+PROBE: NOT DETECTED
SODIUM BLD-SCNC: 140 MMOL/L (ref 136–145)
WBC # BLD AUTO: 8.9 X10ˆ3/UL (ref 4–11)

## 2022-02-01 PROCEDURE — 80047 BASIC METABLC PNL IONIZED CA: CPT | Performed by: NURSE PRACTITIONER

## 2022-02-01 PROCEDURE — U0002 COVID-19 LAB TEST NON-CDC: HCPCS | Performed by: NURSE PRACTITIONER

## 2022-02-01 PROCEDURE — 96375 TX/PRO/DX INJ NEW DRUG ADDON: CPT | Performed by: NURSE PRACTITIONER

## 2022-02-01 PROCEDURE — 96374 THER/PROPH/DIAG INJ IV PUSH: CPT | Performed by: NURSE PRACTITIONER

## 2022-02-01 PROCEDURE — 99214 OFFICE O/P EST MOD 30 MIN: CPT | Performed by: NURSE PRACTITIONER

## 2022-02-01 PROCEDURE — 85025 COMPLETE CBC W/AUTO DIFF WBC: CPT | Performed by: NURSE PRACTITIONER

## 2022-02-01 PROCEDURE — S0028 INJECTION, FAMOTIDINE, 20 MG: HCPCS | Performed by: NURSE PRACTITIONER

## 2022-02-01 RX ORDER — ONDANSETRON 2 MG/ML
4 INJECTION INTRAMUSCULAR; INTRAVENOUS ONCE
Status: COMPLETED | OUTPATIENT
Start: 2022-02-01 | End: 2022-02-01

## 2022-02-01 RX ORDER — FAMOTIDINE 10 MG/ML
20 INJECTION, SOLUTION INTRAVENOUS ONCE
Status: COMPLETED | OUTPATIENT
Start: 2022-02-01 | End: 2022-02-01

## 2022-02-01 RX ORDER — KETOROLAC TROMETHAMINE 30 MG/ML
15 INJECTION, SOLUTION INTRAMUSCULAR; INTRAVENOUS ONCE
Status: COMPLETED | OUTPATIENT
Start: 2022-02-01 | End: 2022-02-01

## 2022-02-01 RX ORDER — DICYCLOMINE HCL 20 MG
20 TABLET ORAL 4 TIMES DAILY PRN
Qty: 30 TABLET | Refills: 0 | Status: SHIPPED | OUTPATIENT
Start: 2022-02-01 | End: 2022-03-03

## 2022-02-01 RX ORDER — SODIUM CHLORIDE 9 MG/ML
1000 INJECTION, SOLUTION INTRAVENOUS ONCE
Status: COMPLETED | OUTPATIENT
Start: 2022-02-01 | End: 2022-02-01

## 2022-02-01 RX ORDER — ONDANSETRON 4 MG/1
4 TABLET, ORALLY DISINTEGRATING ORAL EVERY 4 HOURS PRN
Qty: 10 TABLET | Refills: 0 | Status: SHIPPED | OUTPATIENT
Start: 2022-02-01 | End: 2022-02-08

## 2022-02-16 NOTE — ED INITIAL ASSESSMENT (HPI)
[facility] CMS chart audits-MH-1 DEPRESSION REMISSION.    SCREENING:  NO PHQ-9 SCREENING FOR MEASUREMENT PERIOD.     Patient c/o cough and asthma flare up for a few days. Patient has  inhalers at home.

## 2022-02-17 ENCOUNTER — TELEPHONE (OUTPATIENT)
Dept: FAMILY MEDICINE CLINIC | Facility: CLINIC | Age: 47
End: 2022-02-17

## 2022-02-17 ENCOUNTER — OFFICE VISIT (OUTPATIENT)
Dept: FAMILY MEDICINE CLINIC | Facility: CLINIC | Age: 47
End: 2022-02-17
Payer: MEDICAID

## 2022-02-17 VITALS
OXYGEN SATURATION: 98 % | HEIGHT: 59 IN | DIASTOLIC BLOOD PRESSURE: 74 MMHG | BODY MASS INDEX: 47.17 KG/M2 | RESPIRATION RATE: 18 BRPM | WEIGHT: 234 LBS | HEART RATE: 81 BPM | TEMPERATURE: 98 F | SYSTOLIC BLOOD PRESSURE: 118 MMHG

## 2022-02-17 DIAGNOSIS — R30.0 DYSURIA: ICD-10-CM

## 2022-02-17 DIAGNOSIS — R35.0 URINE FREQUENCY: Primary | ICD-10-CM

## 2022-02-17 DIAGNOSIS — R73.03 PREDIABETES: ICD-10-CM

## 2022-02-17 LAB
APPEARANCE: CLEAR
BILIRUBIN: NEGATIVE
CONTROL LINE PRESENT WITH A CLEAR BACKGROUND (YES/NO): YES YES/NO
GLUCOSE (URINE DIPSTICK): NEGATIVE MG/DL
KETONES (URINE DIPSTICK): NEGATIVE MG/DL
LEUKOCYTES: NEGATIVE
MULTISTIX LOT#: ABNORMAL NUMERIC
NITRITE, URINE: NEGATIVE
PH, URINE: 5.5 (ref 4.5–8)
PREGNANCY TEST, URINE: NEGATIVE
PROTEIN (URINE DIPSTICK): NEGATIVE MG/DL
SPECIFIC GRAVITY: 1.03 (ref 1–1.03)
URINE-COLOR: YELLOW
UROBILINOGEN,SEMI-QN: 0.2 MG/DL (ref 0–1.9)

## 2022-02-17 PROCEDURE — 87086 URINE CULTURE/COLONY COUNT: CPT | Performed by: NURSE PRACTITIONER

## 2022-02-17 PROCEDURE — 81025 URINE PREGNANCY TEST: CPT | Performed by: NURSE PRACTITIONER

## 2022-02-17 PROCEDURE — 99213 OFFICE O/P EST LOW 20 MIN: CPT | Performed by: NURSE PRACTITIONER

## 2022-02-17 PROCEDURE — 3008F BODY MASS INDEX DOCD: CPT | Performed by: NURSE PRACTITIONER

## 2022-02-17 PROCEDURE — 81003 URINALYSIS AUTO W/O SCOPE: CPT | Performed by: NURSE PRACTITIONER

## 2022-02-17 PROCEDURE — 3078F DIAST BP <80 MM HG: CPT | Performed by: NURSE PRACTITIONER

## 2022-02-17 PROCEDURE — 3074F SYST BP LT 130 MM HG: CPT | Performed by: NURSE PRACTITIONER

## 2022-02-17 RX ORDER — AMOXICILLIN 875 MG/1
875 TABLET, COATED ORAL 2 TIMES DAILY
Qty: 20 TABLET | Refills: 0 | Status: SHIPPED | OUTPATIENT
Start: 2022-02-17 | End: 2022-02-27

## 2022-02-17 NOTE — TELEPHONE ENCOUNTER
Per verbal Chelsea Lindsey okay to place.   Referral placed and printed for patient - patient will be given today at apt    Future Appointments   Date Time Provider Erick Fishman   2/17/2022  2:40 PM MAYA Barraza [facility] [place]   4/19/2022  2:45 PM Wellington West MD [address] [facility]

## 2022-02-17 NOTE — TELEPHONE ENCOUNTER
Pt is requesting a referral to see the optomologist that she has a consult with on 4/19-pt saw someone a couple of months ago (no one from our office or that I saw in the chart)- eyes are always tearing up-pt has a consult set up  Future Appointments   Date Time Provider Erick Fishman   4/19/2022  2:45 PM Jamar Johnson MD [address] [facility]

## 2022-02-19 ENCOUNTER — TELEPHONE (OUTPATIENT)
Dept: FAMILY MEDICINE CLINIC | Facility: CLINIC | Age: 47
End: 2022-02-19

## 2022-02-19 NOTE — TELEPHONE ENCOUNTER
----- Message from MAYA John sent at 2/19/2022  8:07 AM CST -----  Results reviewed. Contaminated specimen.  Complete antibiotics and get blood work done

## 2022-03-04 ENCOUNTER — TELEPHONE (OUTPATIENT)
Dept: FAMILY MEDICINE CLINIC | Facility: CLINIC | Age: 47
End: 2022-03-04

## 2022-03-04 RX ORDER — ALPRAZOLAM 0.5 MG/1
0.5 TABLET ORAL DAILY PRN
Qty: 10 TABLET | Refills: 0 | Status: SHIPPED | OUTPATIENT
Start: 2022-03-04

## 2022-03-04 NOTE — TELEPHONE ENCOUNTER
Pt called said she is going on a trip on 03/17. She requesting some medication to help relax her on the plane. Per pt  Has gave her some medication before but isn't sure what but she would like to know if it could be stronger then last time because it didn't seen to help.

## 2022-03-07 ENCOUNTER — TELEPHONE (OUTPATIENT)
Dept: FAMILY MEDICINE CLINIC | Facility: CLINIC | Age: 47
End: 2022-03-07

## 2022-03-07 ENCOUNTER — HOSPITAL ENCOUNTER (OUTPATIENT)
Age: 47
Discharge: HOME | End: 2022-03-07
Payer: MEDICAID

## 2022-03-07 VITALS
RESPIRATION RATE: 18 BRPM | WEIGHT: 220 LBS | OXYGEN SATURATION: 95 % | SYSTOLIC BLOOD PRESSURE: 122 MMHG | HEART RATE: 106 BPM | DIASTOLIC BLOOD PRESSURE: 80 MMHG | BODY MASS INDEX: 44.35 KG/M2 | TEMPERATURE: 101 F | HEIGHT: 59 IN

## 2022-03-07 DIAGNOSIS — J40 BRONCHITIS: ICD-10-CM

## 2022-03-07 DIAGNOSIS — R05.9 COUGH: Primary | ICD-10-CM

## 2022-03-07 LAB — SARS-COV-2 RNA RESP QL NAA+PROBE: NOT DETECTED

## 2022-03-07 PROCEDURE — A9150 MISC/EXPER NON-PRESCRIPT DRU: HCPCS | Performed by: NURSE PRACTITIONER

## 2022-03-07 PROCEDURE — 99213 OFFICE O/P EST LOW 20 MIN: CPT | Performed by: NURSE PRACTITIONER

## 2022-03-07 PROCEDURE — U0002 COVID-19 LAB TEST NON-CDC: HCPCS | Performed by: NURSE PRACTITIONER

## 2022-03-07 RX ORDER — ACETAMINOPHEN 500 MG
1000 TABLET ORAL ONCE
Status: COMPLETED | OUTPATIENT
Start: 2022-03-07 | End: 2022-03-07

## 2022-03-07 RX ORDER — PREDNISONE 20 MG/1
20 TABLET ORAL 2 TIMES DAILY
Qty: 10 TABLET | Refills: 0 | Status: SHIPPED | OUTPATIENT
Start: 2022-03-07 | End: 2022-03-12

## 2022-03-07 RX ORDER — ALBUTEROL SULFATE 90 UG/1
2 AEROSOL, METERED RESPIRATORY (INHALATION) EVERY 4 HOURS PRN
Qty: 1 EACH | Refills: 0 | Status: SHIPPED | OUTPATIENT
Start: 2022-03-07 | End: 2022-04-06

## 2022-03-07 RX ORDER — BENZONATATE 100 MG/1
100 CAPSULE ORAL 3 TIMES DAILY PRN
Qty: 30 CAPSULE | Refills: 0 | Status: SHIPPED | OUTPATIENT
Start: 2022-03-07 | End: 2022-04-06

## 2022-03-07 NOTE — TELEPHONE ENCOUNTER
Call from patient. States started having dry cough yesterday. No fever, SOB, no chills. No sore throat. Using dayquil and albuterol inhaler which helps with sx. States wants script for prednisone. Schedule appt?

## 2022-03-07 NOTE — TELEPHONE ENCOUNTER
Pt was prescribed prednisone for a cough on 1/8/22, states the cough is back again and wants a refill. Explained to the pt dr may not prescribe this w/o being seen.

## 2022-03-07 NOTE — TELEPHONE ENCOUNTER
Called pt to get more info - LMTCB. Televisit 1/3/22 for a viral URI with SC.  LOV 2/17/22 with Amelie for a UTI.

## 2022-03-07 NOTE — TELEPHONE ENCOUNTER
OV needed. I can fit her in on Wednesday.   Use albuterol q 4 hours prn  If SOB/chest tightness in interim , can go to IC

## 2022-03-07 NOTE — TELEPHONE ENCOUNTER
Spoke with pt. OV scheduled.   Future Appointments   Date Time Provider Erick Sravanthi   3/9/2022  2:15 PM Keith Pastor MD [facility] [place]   4/19/2022  2:45 PM Bayron Ayala MD [address] [facility]

## 2022-04-17 ENCOUNTER — HOSPITAL ENCOUNTER (EMERGENCY)
Age: 47
Discharge: HOME | End: 2022-04-17
Attending: EMERGENCY MEDICINE
Payer: MEDICAID

## 2022-04-17 VITALS
TEMPERATURE: 98 F | OXYGEN SATURATION: 95 % | RESPIRATION RATE: 16 BRPM | HEART RATE: 91 BPM | HEIGHT: 59 IN | WEIGHT: 230 LBS | BODY MASS INDEX: 46.37 KG/M2 | DIASTOLIC BLOOD PRESSURE: 90 MMHG | SYSTOLIC BLOOD PRESSURE: 129 MMHG

## 2022-04-17 DIAGNOSIS — G89.29 CHRONIC PAIN OF LEFT KNEE: Primary | ICD-10-CM

## 2022-04-17 DIAGNOSIS — M25.562 CHRONIC PAIN OF LEFT KNEE: Primary | ICD-10-CM

## 2022-04-17 PROCEDURE — 99283 EMERGENCY DEPT VISIT LOW MDM: CPT

## 2022-04-19 ENCOUNTER — OFFICE VISIT (OUTPATIENT)
Dept: OPHTHALMOLOGY | Facility: CLINIC | Age: 47
End: 2022-04-19
Payer: MEDICAID

## 2022-04-19 DIAGNOSIS — H04.203 TEARING EYES: ICD-10-CM

## 2022-04-19 DIAGNOSIS — H52.4 PRESBYOPIA OF BOTH EYES: Primary | ICD-10-CM

## 2022-04-19 PROCEDURE — 92004 COMPRE OPH EXAM NEW PT 1/>: CPT | Performed by: OPHTHALMOLOGY

## 2022-04-19 NOTE — ASSESSMENT & PLAN NOTE
Diagnosis discussed with patient. Use artificial tears (any over the counter brand is okay) up to 4 times per day as needed for dry eye symptoms. Patient was instructed to use warm compresses to the eyelids twice a day everyday. Instructions for warm compress use:   Patient should place wash compresses on both eyelids for 5 minutes every morning and every night. After 5 minutes of holding the warm compresses on the eyelids, patient should gently rub the eyelashes and then rinse thoroughly with warm water.

## 2022-04-19 NOTE — PATIENT INSTRUCTIONS
Presbyopia of both eyes  Suggest +1.25 OTC reading glasses at near. Tearing eyes  Diagnosis discussed with patient. Use artificial tears (any over the counter brand is okay) up to 4 times per day as needed for dry eye symptoms. Patient was instructed to use warm compresses to the eyelids twice a day everyday. Instructions for warm compress use:   Patient should place wash compresses on both eyelids for 5 minutes every morning and every night. After 5 minutes of holding the warm compresses on the eyelids, patient should gently rub the eyelashes and then rinse thoroughly with warm water.

## 2022-04-22 ENCOUNTER — HOSPITAL ENCOUNTER (OUTPATIENT)
Age: 47
Discharge: HOME | End: 2022-04-22
Payer: MEDICAID

## 2022-04-22 ENCOUNTER — TELEPHONE (OUTPATIENT)
Dept: ORTHOPEDICS CLINIC | Facility: CLINIC | Age: 47
End: 2022-04-22

## 2022-04-22 VITALS
HEIGHT: 59 IN | BODY MASS INDEX: 44.35 KG/M2 | SYSTOLIC BLOOD PRESSURE: 145 MMHG | WEIGHT: 220 LBS | TEMPERATURE: 97 F | HEART RATE: 67 BPM | DIASTOLIC BLOOD PRESSURE: 89 MMHG | OXYGEN SATURATION: 97 % | RESPIRATION RATE: 16 BRPM

## 2022-04-22 DIAGNOSIS — Z32.02 NEGATIVE PREGNANCY TEST: ICD-10-CM

## 2022-04-22 DIAGNOSIS — J01.10 ACUTE NON-RECURRENT FRONTAL SINUSITIS: Primary | ICD-10-CM

## 2022-04-22 LAB
B-HCG UR QL: NEGATIVE
SARS-COV-2 RNA RESP QL NAA+PROBE: NOT DETECTED

## 2022-04-22 PROCEDURE — U0002 COVID-19 LAB TEST NON-CDC: HCPCS | Performed by: PHYSICIAN ASSISTANT

## 2022-04-22 PROCEDURE — 99213 OFFICE O/P EST LOW 20 MIN: CPT | Performed by: PHYSICIAN ASSISTANT

## 2022-04-22 PROCEDURE — 81025 URINE PREGNANCY TEST: CPT | Performed by: PHYSICIAN ASSISTANT

## 2022-04-22 RX ORDER — METHYLPREDNISOLONE 4 MG/1
TABLET ORAL
Qty: 1 EACH | Refills: 0 | Status: SHIPPED | OUTPATIENT
Start: 2022-04-22

## 2022-04-22 NOTE — TELEPHONE ENCOUNTER
Patient has been scheduled for Bilateral Knee injection with Sincer on 22 at 11:40am. Patient was las seen in our office on 21 by Mannie Salazar for Left Knee pain. Has not been seen in our office for Right knee pain. Provider placed gel injection referral at last visit for patient for left knee. Monovisc injection had gotten approved by insurance, Patient was a NS on 21 and no follow up appointments were made for patient. Patient was seen by Dr. Chan Mort office for Bilateral Knee pain was administered (3 mL Bupivacaine HCl 0.5 %; 40 mg triamcinolone acetonide 40 MG/ML) on 12/15/21. Referral for Monovisc injection has . Unfortunately patient will not be able to receive gel injection at time of visit on 22. She will need to be evaluated by Sincer, if recommended to administer gel injection for patient a new referral will be needed for the patient. Message routed to provider:  Please advise if you would like for us to get new imaging for Mrs. Edgard Cuellar? Did you want to evaluate her first before getting new XR for her? Order pended for provider to sign off if approved to get new imaging for patient.

## 2022-04-25 ENCOUNTER — OFFICE VISIT (OUTPATIENT)
Dept: ORTHOPEDICS CLINIC | Facility: CLINIC | Age: 47
End: 2022-04-25
Payer: MEDICAID

## 2022-04-25 ENCOUNTER — HOSPITAL ENCOUNTER (OUTPATIENT)
Dept: GENERAL RADIOLOGY | Age: 47
Discharge: HOME | End: 2022-04-25
Attending: PHYSICIAN ASSISTANT
Payer: MEDICAID

## 2022-04-25 VITALS — HEART RATE: 81 BPM | OXYGEN SATURATION: 98 %

## 2022-04-25 DIAGNOSIS — M25.561 RIGHT KNEE PAIN, UNSPECIFIED CHRONICITY: ICD-10-CM

## 2022-04-25 DIAGNOSIS — E66.01 CLASS 3 SEVERE OBESITY DUE TO EXCESS CALORIES WITH BODY MASS INDEX (BMI) OF 40.0 TO 44.9 IN ADULT, UNSPECIFIED WHETHER SERIOUS COMORBIDITY PRESENT (HCC): ICD-10-CM

## 2022-04-25 DIAGNOSIS — M17.12 PRIMARY OSTEOARTHRITIS OF LEFT KNEE: Primary | ICD-10-CM

## 2022-04-25 DIAGNOSIS — M25.562 LEFT KNEE PAIN, UNSPECIFIED CHRONICITY: ICD-10-CM

## 2022-04-25 PROCEDURE — 99213 OFFICE O/P EST LOW 20 MIN: CPT | Performed by: PHYSICIAN ASSISTANT

## 2022-04-25 PROCEDURE — 73564 X-RAY EXAM KNEE 4 OR MORE: CPT | Performed by: PHYSICIAN ASSISTANT

## 2022-05-09 ENCOUNTER — OFFICE VISIT (OUTPATIENT)
Dept: ORTHOPEDICS CLINIC | Facility: CLINIC | Age: 47
End: 2022-05-09
Payer: MEDICAID

## 2022-05-09 VITALS — HEART RATE: 73 BPM | OXYGEN SATURATION: 99 %

## 2022-05-09 DIAGNOSIS — M22.2X2 PATELLOFEMORAL PAIN SYNDROME OF LEFT KNEE: ICD-10-CM

## 2022-05-09 DIAGNOSIS — M17.12 PRIMARY OSTEOARTHRITIS OF LEFT KNEE: Primary | ICD-10-CM

## 2022-05-09 DIAGNOSIS — M62.81 QUADRICEPS WEAKNESS: ICD-10-CM

## 2022-05-09 NOTE — PROCEDURES
Left Knee Intra-articular Injection    Name: Janessa Jaramillo   MRN: [id number]  Date: 5/9/2022     Clinical Indications:   Knee Osteoarthritis with symptoms refractory to conservative measures. After informed consent, the injection site was marked, sterilized with topical chlorhexidine antiseptic, and locally anesthetized with skin refrigerant. The patient was situation in a comfortable position. Using sterile technique: a single Monovisc 4mL (22mg/mL premixed syringe)  was injected utilizing anterolateral approach with a 21 gauge needle. A band-aid was applied. The patient tolerated the procedure well. Disposition:   Return to clinic on an as needed basis. ONELIA Doty, DARRICK Orthopedic Surgery / Sports Medicine Specialist  [facility]  Dolly [address], Kylie Marshall [address]   [email or web address]  [email or web address]  t: [phone]  o: [phone]  f: [phone]          This note was dictated using Dragon software. While it was briefly proofread prior to completion, some grammatical, spelling, and word choice errors due to dictation may still occur.

## 2022-05-19 ENCOUNTER — TELEPHONE (OUTPATIENT)
Dept: FAMILY MEDICINE CLINIC | Facility: CLINIC | Age: 47
End: 2022-05-19

## 2022-05-19 NOTE — TELEPHONE ENCOUNTER
Overdue labs  [facility] sent  Future Appointments   Date Time Provider Erick Fishman   8/3/2022  1:00 PM MAYA Colon [facility]

## 2022-06-02 NOTE — TELEPHONE ENCOUNTER
Pt will be flying on Saturday, in the past Dr Lauren Valiente has prescribed Xanax to help calm her nerves.    Last Refill 3/4/22    ALPRAZolam 0.5 MG Oral Tab      Send to Josefina Elam on [address] and [facility]

## 2022-06-03 RX ORDER — ALPRAZOLAM 0.5 MG/1
0.5 TABLET ORAL DAILY PRN
Qty: 8 TABLET | Refills: 0 | Status: SHIPPED | OUTPATIENT
Start: 2022-06-03

## 2022-06-20 ENCOUNTER — TELEPHONE (OUTPATIENT)
Dept: FAMILY MEDICINE CLINIC | Facility: CLINIC | Age: 47
End: 2022-06-20

## 2022-06-20 NOTE — TELEPHONE ENCOUNTER
Overdue labs  Letter sent  Future Appointments   Date Time Provider Erick Fishman   8/3/2022  1:00 PM MAYA Holliday [address]

## 2022-08-03 ENCOUNTER — OFFICE VISIT (OUTPATIENT)
Dept: INTERNAL MEDICINE CLINIC | Facility: CLINIC | Age: 47
End: 2022-08-03
Payer: MEDICAID

## 2022-08-03 VITALS
HEIGHT: 59 IN | BODY MASS INDEX: 47.94 KG/M2 | DIASTOLIC BLOOD PRESSURE: 80 MMHG | RESPIRATION RATE: 16 BRPM | HEART RATE: 81 BPM | WEIGHT: 237.81 LBS | SYSTOLIC BLOOD PRESSURE: 128 MMHG | OXYGEN SATURATION: 97 %

## 2022-08-03 DIAGNOSIS — F31.81 BIPOLAR 2 DISORDER (HCC): ICD-10-CM

## 2022-08-03 DIAGNOSIS — M17.12 PRIMARY OSTEOARTHRITIS OF LEFT KNEE: ICD-10-CM

## 2022-08-03 DIAGNOSIS — G47.33 OSA ON CPAP: ICD-10-CM

## 2022-08-03 DIAGNOSIS — G40.909 SEIZURE DISORDER (HCC): ICD-10-CM

## 2022-08-03 DIAGNOSIS — R73.03 PREDIABETES: ICD-10-CM

## 2022-08-03 DIAGNOSIS — Z99.89 OSA ON CPAP: ICD-10-CM

## 2022-08-03 DIAGNOSIS — Z51.81 THERAPEUTIC DRUG MONITORING: Primary | ICD-10-CM

## 2022-08-03 DIAGNOSIS — E66.01 MORBID OBESITY (HCC): ICD-10-CM

## 2022-08-03 PROCEDURE — 3074F SYST BP LT 130 MM HG: CPT | Performed by: NURSE PRACTITIONER

## 2022-08-03 PROCEDURE — 99214 OFFICE O/P EST MOD 30 MIN: CPT | Performed by: NURSE PRACTITIONER

## 2022-08-03 PROCEDURE — 3008F BODY MASS INDEX DOCD: CPT | Performed by: NURSE PRACTITIONER

## 2022-08-03 PROCEDURE — 3079F DIAST BP 80-89 MM HG: CPT | Performed by: NURSE PRACTITIONER

## 2022-08-03 RX ORDER — METFORMIN HYDROCHLORIDE 500 MG/1
500 TABLET, EXTENDED RELEASE ORAL 2 TIMES DAILY WITH MEALS
Qty: 60 TABLET | Refills: 1 | Status: SHIPPED | OUTPATIENT
Start: 2022-08-03

## 2022-08-03 NOTE — PATIENT INSTRUCTIONS
We are here to support you with weight loss, but please remember that you still need your primary care provider for your routine health maintenance. PLAN:  Will start medications: metformin 500mg bid (1 tablet in the morning and 1 tablet with dinner)- make sure to take with food  Get blood work done  [employer] moving  Follow up with me in 6 weeks  Schedule follow up appointments: Genaro Jara (dietitian) or Doren Riedel (presurgery dietitian)   Check for insurance coverage for dietitian and labwork prior to scheduling appointment. Please try to work on the following dietary changes:  1. Goals: Aim for 20-30 grams of protein/ meal  i. Aim for 120 grams of carbohydrates/day  ii. Eat 4-6 vegetables/day  iii. Avoid skipping meals- eat every 4-5 hours  iv. Aim for 3 meals/day  2. Drink lots of water and cut down on soda/juice consumption if soda/juice drinker  3. Focus on protein: (15-30 grams with each meal) ie. greek yogurt, cottage cheese, string cheese, hard boiled eggs  4. Healthy snacks: peanut butter and apples, hummus and carrots, berries, nuts (1/4 cup), tuna and crackers                 Protein Shakes: Premier protein or Core Power                Protein Bars: Rx Bars, Oatmega, Power Crunch                 Sargento balanced breaks (cheese and nuts)- without chocolate  5. Reduce carbohydrates which includes sweets as well as rice, pasta, potatoes, bread, corn and instead choose whole grain options or more protein or vegetables (4-6 servings of vegetables per day)  6. Get a good night of sleep  7. Try to decrease stress in life     Please download apps:  1. \"My Fitness Pal\" (other option is Lose it)) to help you to monitor daily dietary intake and you will be able to see if you are eating the right amount of calories, protein, carbs                With My Fitness Pal-->When you set-up the rudy or need to adjust settings:                Goals should include:                  Lose 1.5-2 lbs per week Activity level: not very active (can't count exercise towards calorie number per day)                   ** Daily INPUT> Look at nutrition section-- \"nutrients\" and it will break down your macros for the day (ie. Protein, carbs, fibers, sugars and fats). Try to stay within these numbers daily     2. \"7 minute workout\" to help with exercise/activity which takes 7 minutes of your day and that you can do at home! 3. \"Calm\" or \"Headspace\" which helps with mindfulness, meditation, clarity, sleep, and rita to your daily life. 4. [facility] blog for healthy recipe ideas  5. LC Style.com for low carb resources    HIGH PROTEIN SNACK IDEAS  -cottage cheese  -plain yogurt  -kefir  -hard-boiled eggs  -natural cheeses  -nuts (measure portion size)   -unsweetened nut butters  -dried edamame   -tierra seeds soaked in water or almond milk  -soy nuts  -cured meats (monitor for sodium issues)   -hummus with vegetables  -bean dip with vegetables     FRUIT  Low carb fruit options   Raspberries: Half a cup (60 grams) contains 3 grams of carbs. Blackberries: Half a cup (70 grams) contains 4 grams of carbs. Strawberries: Half a cup (100 grams) contains 6 grams of carbs. Blueberries: Half a cup (50 grams) contains 6 grams of carbs. Plum: One medium-sized (80 grams) contains 6 grams of carbs.      VEGETABLES  Low carb vegetables

## 2022-08-09 ENCOUNTER — TELEPHONE (OUTPATIENT)
Dept: OBGYN CLINIC | Facility: CLINIC | Age: 47
End: 2022-08-09

## 2022-08-09 DIAGNOSIS — Z12.31 BREAST CANCER SCREENING BY MAMMOGRAM: Primary | ICD-10-CM

## 2022-08-09 NOTE — TELEPHONE ENCOUNTER
Patient called and set up her annual exam for September 27th with June Castillo. She is looking to get an order put in ahead of time for her mammogram.  Please call pt when done.

## 2022-08-25 ENCOUNTER — TELEPHONE (OUTPATIENT)
Dept: OBGYN CLINIC | Facility: CLINIC | Age: 47
End: 2022-08-25

## 2022-08-25 ENCOUNTER — HOSPITAL ENCOUNTER (OUTPATIENT)
Dept: MAMMOGRAPHY | Age: 47
Discharge: HOME | End: 2022-08-25
Attending: NURSE PRACTITIONER
Payer: MEDICAID

## 2022-08-25 ENCOUNTER — TELEPHONE (OUTPATIENT)
Dept: FAMILY MEDICINE CLINIC | Facility: CLINIC | Age: 47
End: 2022-08-25

## 2022-08-25 DIAGNOSIS — Z12.11 COLON CANCER SCREENING: Primary | ICD-10-CM

## 2022-08-25 DIAGNOSIS — Z12.31 BREAST CANCER SCREENING BY MAMMOGRAM: ICD-10-CM

## 2022-08-25 PROCEDURE — 77063 BREAST TOMOSYNTHESIS BI: CPT | Performed by: NURSE PRACTITIONER

## 2022-08-25 PROCEDURE — 77067 SCR MAMMO BI INCL CAD: CPT | Performed by: NURSE PRACTITIONER

## 2022-08-25 NOTE — TELEPHONE ENCOUNTER
Pt. Micaela Gilliam she had a upper and lower scope done 8 years ago and she feel's she is due for another one and asking for referral for both.

## 2022-08-25 NOTE — TELEPHONE ENCOUNTER
Last colonoscopy 5/24/19    G- Descending colon biopsy:  -Fragments of colonic mucosa with no pathologic changes.  -No evidence of active colitis, dysplasia, or malignancy    Also had OV for colonoscopy results on 9/3/19  I do not see recall date    States she originally had colonoscopy because was having stomach issues and was told to follow up with another colonoscopy but never did it due to insurance. States still occasionally has stomach issues and just wants colonoscopy to check everything.     Refer to GI?

## 2022-08-25 NOTE — TELEPHONE ENCOUNTER
Patient would like order for mammogram before scheduled annual       Future Appointments   Date Time Provider Erick Fishman   9/14/2022  2:40 PM MAYA Mccartney [facility] [address]   9/27/2022  2:00 PM GABBY Stoddard [facility] [place]   9/29/2022  6:00 PM Karen Gallo MD [facility]

## 2022-09-07 NOTE — TELEPHONE ENCOUNTER
Prior authorization initiated through cover my meds for Eletriptan 40 mg tablets. Case holly# Conrado Cesar  Holly: [id number] pending insurance authorization. 22-Feb-2021

## 2022-09-11 RX ORDER — METFORMIN HYDROCHLORIDE 500 MG/1
TABLET, EXTENDED RELEASE ORAL
Qty: 60 TABLET | Refills: 1 | OUTPATIENT
Start: 2022-09-11

## 2022-09-11 NOTE — TELEPHONE ENCOUNTER
Requesting Metformin 500 mg  LOV: 8/3/22  RTC: 6 weeks  Last Relevant Labs: 9/11/21  Filled: 8/3/22 #60 with 1 refills    Future Appointments   Date Time Provider Erick Fishman   9/14/2022  2:40 PM MAYA Mccartney [facility]   \  Denied refill as it is receipt confirmed at pharmacy requesting due in October.

## 2022-09-27 ENCOUNTER — OFFICE VISIT (OUTPATIENT)
Dept: OBGYN CLINIC | Facility: CLINIC | Age: 47
End: 2022-09-27

## 2022-09-27 VITALS
SYSTOLIC BLOOD PRESSURE: 126 MMHG | HEIGHT: 58.25 IN | WEIGHT: 233 LBS | HEART RATE: 83 BPM | BODY MASS INDEX: 48.25 KG/M2 | DIASTOLIC BLOOD PRESSURE: 78 MMHG

## 2022-09-27 DIAGNOSIS — Z11.3 SCREEN FOR STD (SEXUALLY TRANSMITTED DISEASE): ICD-10-CM

## 2022-09-27 DIAGNOSIS — R87.810 CERVICAL HIGH RISK HUMAN PAPILLOMAVIRUS (HPV) DNA TEST POSITIVE: ICD-10-CM

## 2022-09-27 DIAGNOSIS — Z01.419 WELL WOMAN EXAM WITH ROUTINE GYNECOLOGICAL EXAM: Primary | ICD-10-CM

## 2022-09-27 DIAGNOSIS — B37.49 CANDIDA INFECTION OF GENITAL REGION: ICD-10-CM

## 2022-09-27 DIAGNOSIS — N89.8 VAGINAL LEUKORRHEA: ICD-10-CM

## 2022-09-27 PROCEDURE — 87660 TRICHOMONAS VAGIN DIR PROBE: CPT | Performed by: NURSE PRACTITIONER

## 2022-09-27 PROCEDURE — 87491 CHLMYD TRACH DNA AMP PROBE: CPT | Performed by: NURSE PRACTITIONER

## 2022-09-27 PROCEDURE — 3078F DIAST BP <80 MM HG: CPT | Performed by: NURSE PRACTITIONER

## 2022-09-27 PROCEDURE — 87591 N.GONORRHOEAE DNA AMP PROB: CPT | Performed by: NURSE PRACTITIONER

## 2022-09-27 PROCEDURE — 87510 GARDNER VAG DNA DIR PROBE: CPT | Performed by: NURSE PRACTITIONER

## 2022-09-27 PROCEDURE — 3008F BODY MASS INDEX DOCD: CPT | Performed by: NURSE PRACTITIONER

## 2022-09-27 PROCEDURE — 87480 CANDIDA DNA DIR PROBE: CPT | Performed by: NURSE PRACTITIONER

## 2022-09-27 PROCEDURE — 99396 PREV VISIT EST AGE 40-64: CPT | Performed by: NURSE PRACTITIONER

## 2022-09-27 PROCEDURE — 3074F SYST BP LT 130 MM HG: CPT | Performed by: NURSE PRACTITIONER

## 2022-09-27 RX ORDER — NYSTATIN 100000 [USP'U]/G
1 POWDER TOPICAL 2 TIMES DAILY
Qty: 60 G | Refills: 0 | Status: SHIPPED | OUTPATIENT
Start: 2022-09-27 | End: 2022-10-11

## 2022-09-28 LAB
C TRACH DNA SPEC QL NAA+PROBE: NEGATIVE
N GONORRHOEA DNA SPEC QL NAA+PROBE: NEGATIVE

## 2022-10-10 LAB — HPV I/H RISK 1 DNA SPEC QL NAA+PROBE: NEGATIVE

## 2022-10-18 ENCOUNTER — HOSPITAL ENCOUNTER (OUTPATIENT)
Age: 47
Discharge: HOME | End: 2022-10-18
Payer: MEDICAID

## 2022-10-18 VITALS
RESPIRATION RATE: 16 BRPM | TEMPERATURE: 97 F | OXYGEN SATURATION: 98 % | DIASTOLIC BLOOD PRESSURE: 80 MMHG | SYSTOLIC BLOOD PRESSURE: 118 MMHG | WEIGHT: 210 LBS | HEART RATE: 73 BPM | HEIGHT: 59 IN | BODY MASS INDEX: 42.33 KG/M2

## 2022-10-18 DIAGNOSIS — M77.02 MEDIAL EPICONDYLITIS OF ELBOW, LEFT: Primary | ICD-10-CM

## 2022-10-18 PROCEDURE — 99213 OFFICE O/P EST LOW 20 MIN: CPT | Performed by: NURSE PRACTITIONER

## 2022-10-18 RX ORDER — IBUPROFEN 800 MG/1
800 TABLET ORAL EVERY 8 HOURS PRN
Qty: 30 TABLET | Refills: 0 | Status: SHIPPED | OUTPATIENT
Start: 2022-10-18 | End: 2022-10-25

## 2022-10-18 NOTE — ED INITIAL ASSESSMENT (HPI)
Pt reports she has had left elbow pain for 2 weeks, denies any injury, no swelling.  No numbness or tingling

## 2022-10-25 ENCOUNTER — HOSPITAL ENCOUNTER (OUTPATIENT)
Age: 47
Discharge: HOME | End: 2022-10-25
Payer: MEDICAID

## 2022-10-25 ENCOUNTER — APPOINTMENT (OUTPATIENT)
Dept: GENERAL RADIOLOGY | Age: 47
End: 2022-10-25
Attending: NURSE PRACTITIONER
Payer: MEDICAID

## 2022-10-25 VITALS
DIASTOLIC BLOOD PRESSURE: 94 MMHG | OXYGEN SATURATION: 98 % | WEIGHT: 220 LBS | BODY MASS INDEX: 46.18 KG/M2 | SYSTOLIC BLOOD PRESSURE: 139 MMHG | HEIGHT: 58 IN | HEART RATE: 88 BPM | RESPIRATION RATE: 20 BRPM | TEMPERATURE: 98 F

## 2022-10-25 DIAGNOSIS — R05.1 ACUTE COUGH: Primary | ICD-10-CM

## 2022-10-25 DIAGNOSIS — J45.901 ASTHMA EXACERBATION, MILD: ICD-10-CM

## 2022-10-25 LAB — SARS-COV-2 RNA RESP QL NAA+PROBE: NOT DETECTED

## 2022-10-25 PROCEDURE — U0002 COVID-19 LAB TEST NON-CDC: HCPCS | Performed by: NURSE PRACTITIONER

## 2022-10-25 PROCEDURE — 71046 X-RAY EXAM CHEST 2 VIEWS: CPT | Performed by: NURSE PRACTITIONER

## 2022-10-25 PROCEDURE — 99213 OFFICE O/P EST LOW 20 MIN: CPT | Performed by: NURSE PRACTITIONER

## 2022-10-25 PROCEDURE — 94640 AIRWAY INHALATION TREATMENT: CPT | Performed by: NURSE PRACTITIONER

## 2022-10-25 RX ORDER — PREDNISONE 20 MG/1
40 TABLET ORAL DAILY
Qty: 10 TABLET | Refills: 0 | Status: SHIPPED | OUTPATIENT
Start: 2022-10-26 | End: 2022-10-31

## 2022-10-25 RX ORDER — ALBUTEROL SULFATE 90 UG/1
2 AEROSOL, METERED RESPIRATORY (INHALATION) EVERY 4 HOURS PRN
Qty: 6.7 G | Refills: 0 | Status: SHIPPED | OUTPATIENT
Start: 2022-10-25

## 2022-10-25 RX ORDER — ALBUTEROL SULFATE 2.5 MG/3ML
2.5 SOLUTION RESPIRATORY (INHALATION) EVERY 4 HOURS PRN
Qty: 30 EACH | Refills: 0 | Status: SHIPPED | OUTPATIENT
Start: 2022-10-25

## 2022-10-25 RX ORDER — PREDNISONE 20 MG/1
60 TABLET ORAL ONCE
Status: COMPLETED | OUTPATIENT
Start: 2022-10-25 | End: 2022-10-25

## 2022-10-25 RX ORDER — IPRATROPIUM BROMIDE AND ALBUTEROL SULFATE 2.5; .5 MG/3ML; MG/3ML
3 SOLUTION RESPIRATORY (INHALATION) ONCE
Status: COMPLETED | OUTPATIENT
Start: 2022-10-25 | End: 2022-10-25

## 2022-10-25 NOTE — ED INITIAL ASSESSMENT (HPI)
Pt here w/ cough, congestion, hoarse voice. States using MDI q4 hours. Still wheezing. Left ear bothering her. Sore throat.

## 2022-11-02 ENCOUNTER — HOSPITAL ENCOUNTER (EMERGENCY)
Age: 47
Discharge: HOME | End: 2022-11-02
Attending: EMERGENCY MEDICINE
Payer: MEDICAID

## 2022-11-02 ENCOUNTER — APPOINTMENT (OUTPATIENT)
Dept: GENERAL RADIOLOGY | Age: 47
End: 2022-11-02
Attending: EMERGENCY MEDICINE
Payer: MEDICAID

## 2022-11-02 VITALS
WEIGHT: 220 LBS | HEIGHT: 58 IN | OXYGEN SATURATION: 98 % | RESPIRATION RATE: 18 BRPM | BODY MASS INDEX: 46.18 KG/M2 | SYSTOLIC BLOOD PRESSURE: 104 MMHG | HEART RATE: 75 BPM | TEMPERATURE: 98 F | DIASTOLIC BLOOD PRESSURE: 71 MMHG

## 2022-11-02 DIAGNOSIS — J98.01 BRONCHOSPASM: Primary | ICD-10-CM

## 2022-11-02 LAB
POCT INFLUENZA A: NEGATIVE
POCT INFLUENZA B: NEGATIVE
SARS-COV-2 RNA RESP QL NAA+PROBE: NOT DETECTED

## 2022-11-02 PROCEDURE — 87502 INFLUENZA DNA AMP PROBE: CPT | Performed by: EMERGENCY MEDICINE

## 2022-11-02 PROCEDURE — 99283 EMERGENCY DEPT VISIT LOW MDM: CPT

## 2022-11-02 PROCEDURE — 71045 X-RAY EXAM CHEST 1 VIEW: CPT | Performed by: EMERGENCY MEDICINE

## 2022-11-02 NOTE — ED INITIAL ASSESSMENT (HPI)
Pt to ed with c/o cough, GHADA, chills, dry throat, n/v/d x 2 weeks  Was seen in UC last week and placed on steroids for 5 days with inhaler.  Pt feels worse

## 2022-11-07 ENCOUNTER — PATIENT OUTREACH (OUTPATIENT)
Dept: CASE MANAGEMENT | Age: 47
End: 2022-11-07

## 2022-11-07 NOTE — PROGRESS NOTES
1st attempt; pt had recent ED visit, calling to offer PCP f/u apt (dc 11/2)      Dr. Natalia Nur [address]  [phone]    Spk to pt who sts she is doing ok right now and declines PCP F/U     Closing encounter

## 2022-11-10 ENCOUNTER — APPOINTMENT (OUTPATIENT)
Dept: GENERAL RADIOLOGY | Age: 47
End: 2022-11-10
Attending: NURSE PRACTITIONER
Payer: MEDICAID

## 2022-11-10 ENCOUNTER — HOSPITAL ENCOUNTER (OUTPATIENT)
Age: 47
Discharge: HOME | End: 2022-11-10
Payer: MEDICAID

## 2022-11-10 VITALS
DIASTOLIC BLOOD PRESSURE: 80 MMHG | TEMPERATURE: 98 F | WEIGHT: 210 LBS | BODY MASS INDEX: 44.08 KG/M2 | RESPIRATION RATE: 18 BRPM | OXYGEN SATURATION: 99 % | SYSTOLIC BLOOD PRESSURE: 125 MMHG | HEART RATE: 94 BPM | HEIGHT: 58 IN

## 2022-11-10 DIAGNOSIS — J40 SINOBRONCHITIS: Primary | ICD-10-CM

## 2022-11-10 DIAGNOSIS — J32.9 SINOBRONCHITIS: Primary | ICD-10-CM

## 2022-11-10 PROCEDURE — 71046 X-RAY EXAM CHEST 2 VIEWS: CPT | Performed by: NURSE PRACTITIONER

## 2022-11-10 PROCEDURE — 99213 OFFICE O/P EST LOW 20 MIN: CPT | Performed by: NURSE PRACTITIONER

## 2022-11-10 PROCEDURE — U0002 COVID-19 LAB TEST NON-CDC: HCPCS | Performed by: NURSE PRACTITIONER

## 2022-11-10 PROCEDURE — 87502 INFLUENZA DNA AMP PROBE: CPT | Performed by: NURSE PRACTITIONER

## 2022-11-10 RX ORDER — BENZONATATE 100 MG/1
100 CAPSULE ORAL 3 TIMES DAILY PRN
Qty: 30 CAPSULE | Refills: 0 | Status: SHIPPED | OUTPATIENT
Start: 2022-11-10 | End: 2022-12-10

## 2022-11-10 RX ORDER — FLUCONAZOLE 150 MG/1
150 TABLET ORAL ONCE
Qty: 1 TABLET | Refills: 0 | Status: SHIPPED | OUTPATIENT
Start: 2022-11-10 | End: 2022-11-10

## 2022-11-10 RX ORDER — AMOXICILLIN AND CLAVULANATE POTASSIUM 875; 125 MG/1; MG/1
1 TABLET, FILM COATED ORAL 2 TIMES DAILY
Qty: 20 TABLET | Refills: 0 | Status: SHIPPED | OUTPATIENT
Start: 2022-11-10 | End: 2022-11-20

## 2022-11-10 NOTE — DISCHARGE INSTRUCTIONS
Please take Augmentin as prescribed. Tessalon Perles for coughing. Continue inhalers. Follow closely with your primary.

## 2022-12-19 ENCOUNTER — HOSPITAL ENCOUNTER (OUTPATIENT)
Age: 47
Discharge: HOME | End: 2022-12-19
Attending: NURSE PRACTITIONER
Payer: MEDICAID

## 2022-12-19 VITALS
SYSTOLIC BLOOD PRESSURE: 127 MMHG | TEMPERATURE: 97 F | HEART RATE: 69 BPM | DIASTOLIC BLOOD PRESSURE: 74 MMHG | RESPIRATION RATE: 18 BRPM | OXYGEN SATURATION: 100 %

## 2022-12-19 DIAGNOSIS — B34.9 VIRAL ILLNESS: Primary | ICD-10-CM

## 2022-12-19 LAB
POCT INFLUENZA A: NEGATIVE
POCT INFLUENZA B: NEGATIVE
S PYO AG THROAT QL: NEGATIVE
SARS-COV-2 RNA RESP QL NAA+PROBE: NOT DETECTED

## 2022-12-19 PROCEDURE — U0002 COVID-19 LAB TEST NON-CDC: HCPCS | Performed by: NURSE PRACTITIONER

## 2022-12-19 PROCEDURE — 99213 OFFICE O/P EST LOW 20 MIN: CPT | Performed by: NURSE PRACTITIONER

## 2022-12-19 PROCEDURE — 87502 INFLUENZA DNA AMP PROBE: CPT | Performed by: NURSE PRACTITIONER

## 2022-12-19 PROCEDURE — 87880 STREP A ASSAY W/OPTIC: CPT | Performed by: NURSE PRACTITIONER

## 2022-12-20 NOTE — DISCHARGE INSTRUCTIONS
Retest for COVID at home tomorrow. Follow closely with your primary or you may return here to the clinic. Use your inhalers.

## 2023-01-13 ENCOUNTER — HOSPITAL ENCOUNTER (OUTPATIENT)
Age: 48
Discharge: HOME | End: 2023-01-13
Payer: MEDICAID

## 2023-01-13 VITALS
RESPIRATION RATE: 18 BRPM | SYSTOLIC BLOOD PRESSURE: 110 MMHG | OXYGEN SATURATION: 100 % | HEART RATE: 96 BPM | TEMPERATURE: 101 F | DIASTOLIC BLOOD PRESSURE: 82 MMHG

## 2023-01-13 DIAGNOSIS — B34.9 VIRAL SYNDROME: Primary | ICD-10-CM

## 2023-01-13 PROCEDURE — A9150 MISC/EXPER NON-PRESCRIPT DRU: HCPCS | Performed by: PHYSICIAN ASSISTANT

## 2023-01-13 PROCEDURE — U0002 COVID-19 LAB TEST NON-CDC: HCPCS | Performed by: NURSE PRACTITIONER

## 2023-01-13 PROCEDURE — 87502 INFLUENZA DNA AMP PROBE: CPT | Performed by: NURSE PRACTITIONER

## 2023-01-13 PROCEDURE — 99213 OFFICE O/P EST LOW 20 MIN: CPT | Performed by: NURSE PRACTITIONER

## 2023-01-13 RX ORDER — ONDANSETRON 4 MG/1
4 TABLET, ORALLY DISINTEGRATING ORAL EVERY 6 HOURS
Qty: 20 TABLET | Refills: 0 | Status: SHIPPED | OUTPATIENT
Start: 2023-01-13 | End: 2023-01-18

## 2023-01-13 RX ORDER — ACETAMINOPHEN 500 MG
1000 TABLET ORAL ONCE
Status: COMPLETED | OUTPATIENT
Start: 2023-01-13 | End: 2023-01-13

## 2023-01-13 NOTE — DISCHARGE INSTRUCTIONS
Rest and drink plenty of fluids. This will help to thin the mucous in the back of your throat. Take Tylenol and/or ibuprofen as needed for pain or fever. Use Zyrtec, Claritin, or Allegra to help with nasal drainage. You can also take Sudafed to help with sinus pressure or pain. Get the medication behind the pharmacy counter. Take Robitussin or Delsym as needed for cough. Continue to use your inhalers as prescribed. You can use your rescue inhaler 2 to 4 puffs every 4 hours as needed to help with cough or congestion. Take the Zofran as needed to help with nausea and upset stomach. Follow up with your PCP in 3-5 days. Your symptoms should improve in the next 7-10 days; however, the cough can linger for much longer. Thank you for choosing [facility] for your care.

## 2023-01-13 NOTE — ED INITIAL ASSESSMENT (HPI)
Pt presents with c/o of body aches and chills, vomiting and abdominal pain x 2 days.    Denies UR symptoms at this time

## 2023-02-15 ENCOUNTER — TELEPHONE (OUTPATIENT)
Dept: FAMILY MEDICINE CLINIC | Facility: CLINIC | Age: 48
End: 2023-02-15

## 2023-02-15 DIAGNOSIS — M17.12 PRIMARY OSTEOARTHRITIS OF LEFT KNEE: Primary | ICD-10-CM

## 2023-02-15 DIAGNOSIS — M25.529 ELBOW PAIN, UNSPECIFIED LATERALITY: ICD-10-CM

## 2023-02-15 DIAGNOSIS — M17.11 PRIMARY OSTEOARTHRITIS OF RIGHT KNEE: ICD-10-CM

## 2023-02-15 NOTE — TELEPHONE ENCOUNTER
Patient states needs referral for [facility]. Has appt today. States she thinks it is Dr Scarlette Collet?  Referral placed and faxed    Dr Carreon Plater: [phone]  Fax : [phone]

## 2023-02-15 NOTE — TELEPHONE ENCOUNTER
PATIENT IS ASKING FOR A REFERRAL ORTHOPEDIC    SHE HAS SEEN HIM BEFORE AT [facility]  FOR KNEES AND ELBOW   HAS AN APPOINTMENT TODAY AT 2:45

## 2023-02-16 ENCOUNTER — TELEPHONE (OUTPATIENT)
Dept: FAMILY MEDICINE CLINIC | Facility: CLINIC | Age: 48
End: 2023-02-16

## 2023-02-16 NOTE — TELEPHONE ENCOUNTER
Pt received a referral yesterday for her Knees, pt states she needs a separate Referral for both Elbows   Pt saw Dr Que Ocampo  Please fax to [phone]

## 2023-03-06 ENCOUNTER — APPOINTMENT (OUTPATIENT)
Dept: CT IMAGING | Age: 48
End: 2023-03-06
Attending: NURSE PRACTITIONER
Payer: MEDICAID

## 2023-03-06 ENCOUNTER — HOSPITAL ENCOUNTER (OUTPATIENT)
Age: 48
Discharge: HOME | End: 2023-03-06
Payer: MEDICAID

## 2023-03-06 VITALS
TEMPERATURE: 98 F | WEIGHT: 230 LBS | OXYGEN SATURATION: 98 % | BODY MASS INDEX: 46.37 KG/M2 | HEART RATE: 75 BPM | RESPIRATION RATE: 20 BRPM | HEIGHT: 59 IN | SYSTOLIC BLOOD PRESSURE: 135 MMHG | DIASTOLIC BLOOD PRESSURE: 90 MMHG

## 2023-03-06 DIAGNOSIS — N20.0 KIDNEY STONE: ICD-10-CM

## 2023-03-06 DIAGNOSIS — R10.9 ABDOMINAL PAIN OF UNKNOWN ETIOLOGY: Primary | ICD-10-CM

## 2023-03-06 LAB
#MXD IC: 0.7 X10ˆ3/UL (ref 0.1–1)
B-HCG UR QL: NEGATIVE
BUN BLD-MCNC: 9 MG/DL (ref 7–18)
CHLORIDE BLD-SCNC: 99 MMOL/L (ref 98–112)
CO2 BLD-SCNC: 29 MMOL/L (ref 21–32)
CREAT BLD-MCNC: 0.7 MG/DL
GFR SERPLBLD BASED ON 1.73 SQ M-ARVRAT: 106 ML/MIN/1.73M2 (ref 60–?)
GLUCOSE BLD-MCNC: 170 MG/DL (ref 70–99)
HCT VFR BLD AUTO: 41 %
HCT VFR BLD CALC: 41 %
HGB BLD-MCNC: 13.5 G/DL
ISTAT IONIZED CALCIUM FOR CHEM 8: 1.18 MMOL/L (ref 1.12–1.32)
LYMPHOCYTES # BLD AUTO: 2.7 X10ˆ3/UL (ref 1–4)
LYMPHOCYTES NFR BLD AUTO: 28.8 %
MCH RBC QN AUTO: 30.2 PG (ref 26–34)
MCHC RBC AUTO-ENTMCNC: 32.9 G/DL (ref 31–37)
MCV RBC AUTO: 91.7 FL (ref 80–100)
MIXED CELL %: 7.5 %
NEUTROPHILS # BLD AUTO: 5.9 X10ˆ3/UL (ref 1.5–7.7)
NEUTROPHILS NFR BLD AUTO: 63.7 %
PLATELET # BLD AUTO: 260 X10ˆ3/UL (ref 150–450)
POCT BILIRUBIN URINE: NEGATIVE
POCT GLUCOSE URINE: NEGATIVE MG/DL
POCT KETONE URINE: NEGATIVE MG/DL
POCT NITRITE URINE: NEGATIVE
POCT PH URINE: 6 (ref 5–8)
POCT PROTEIN URINE: NEGATIVE MG/DL
POCT SPECIFIC GRAVITY URINE: 1.02
POCT URINE CLARITY: CLEAR
POCT URINE COLOR: YELLOW
POCT UROBILINOGEN URINE: 0.2 MG/DL
POTASSIUM BLD-SCNC: 3.6 MMOL/L (ref 3.6–5.1)
RBC # BLD AUTO: 4.47 X10ˆ6/UL
SODIUM BLD-SCNC: 139 MMOL/L (ref 136–145)
WBC # BLD AUTO: 9.3 X10ˆ3/UL (ref 4–11)

## 2023-03-06 PROCEDURE — 74176 CT ABD & PELVIS W/O CONTRAST: CPT | Performed by: NURSE PRACTITIONER

## 2023-03-06 PROCEDURE — 99214 OFFICE O/P EST MOD 30 MIN: CPT | Performed by: NURSE PRACTITIONER

## 2023-03-06 PROCEDURE — 81025 URINE PREGNANCY TEST: CPT | Performed by: NURSE PRACTITIONER

## 2023-03-06 PROCEDURE — 85025 COMPLETE CBC W/AUTO DIFF WBC: CPT | Performed by: NURSE PRACTITIONER

## 2023-03-06 PROCEDURE — 80047 BASIC METABLC PNL IONIZED CA: CPT | Performed by: NURSE PRACTITIONER

## 2023-03-06 PROCEDURE — 81002 URINALYSIS NONAUTO W/O SCOPE: CPT | Performed by: NURSE PRACTITIONER

## 2023-03-06 RX ORDER — TAMSULOSIN HYDROCHLORIDE 0.4 MG/1
0.4 CAPSULE ORAL DAILY
Qty: 7 CAPSULE | Refills: 0 | Status: SHIPPED | OUTPATIENT
Start: 2023-03-06 | End: 2023-03-09

## 2023-03-06 RX ORDER — NAPROXEN 500 MG/1
500 TABLET ORAL 2 TIMES DAILY PRN
Qty: 14 TABLET | Refills: 0 | Status: SHIPPED | OUTPATIENT
Start: 2023-03-06 | End: 2023-03-09

## 2023-03-06 RX ORDER — MELOXICAM 15 MG/1
15 TABLET ORAL DAILY
COMMUNITY
End: 2023-03-09

## 2023-03-07 ENCOUNTER — TELEPHONE (OUTPATIENT)
Dept: FAMILY MEDICINE CLINIC | Facility: CLINIC | Age: 48
End: 2023-03-07

## 2023-03-07 DIAGNOSIS — N20.0 KIDNEY STONE: ICD-10-CM

## 2023-03-07 DIAGNOSIS — R35.0 URINE FREQUENCY: Primary | ICD-10-CM

## 2023-03-07 NOTE — DISCHARGE INSTRUCTIONS
Take medications as directed. Call for close follow-up with urology. Strain your urine. If any fever, increased pain, or difficulty with urination go to the emergency room. Follow-up with surgery on your issue that you feel is with your ventral hernia. Follow-up with your primary care doctor on the hepatomegaly-liver enlargement. Follow-up with your primary care doctor on your elevated blood sugar as well.

## 2023-03-07 NOTE — TELEPHONE ENCOUNTER
Gave # for Angel Vealsco   Did advise if not able to get in call ins for list of in network providers. Also advised if pain worsening go to ER. Verbalized understanding to all.    [facility] [phone]

## 2023-03-07 NOTE — TELEPHONE ENCOUNTER
Pt called back said she called [facility] but don't have anything till the end of march. Would like to see someone sooner.

## 2023-03-07 NOTE — TELEPHONE ENCOUNTER
Pt was seen in [address] [place] [address] was given referral for a urologist. Per pt she called to schedule apt but they don't take her ins [facility].  Pt would like a new referral.

## 2023-03-07 NOTE — TELEPHONE ENCOUNTER
Call from patient. States IC gave referral for [facility] urology and they don't take her ins. Gave info for [facility] group. Verbalized understanding.  Referral placed    Dr Veronika Allan, [address] [facility]  [phone]

## 2023-03-08 ENCOUNTER — HOSPITAL ENCOUNTER (OUTPATIENT)
Facility: HOSPITAL | Age: 48
Setting detail: OBSERVATION
Discharge: HOME | End: 2023-03-09
Attending: EMERGENCY MEDICINE | Admitting: HOSPITALIST
Payer: MEDICAID

## 2023-03-08 ENCOUNTER — APPOINTMENT (OUTPATIENT)
Dept: GENERAL RADIOLOGY | Facility: HOSPITAL | Age: 48
End: 2023-03-08
Attending: EMERGENCY MEDICINE
Payer: MEDICAID

## 2023-03-08 DIAGNOSIS — N23 RENAL COLIC: Primary | ICD-10-CM

## 2023-03-08 LAB
ALBUMIN SERPL-MCNC: 3.5 G/DL (ref 3.4–5)
ALBUMIN/GLOB SERPL: 0.9 {RATIO} (ref 1–2)
ALP LIVER SERPL-CCNC: 62 U/L
ALT SERPL-CCNC: 27 U/L
ANION GAP SERPL CALC-SCNC: 8 MMOL/L (ref 0–18)
AST SERPL-CCNC: 16 U/L (ref 15–37)
BASOPHILS # BLD AUTO: 0.07 X10(3) UL (ref 0–0.2)
BASOPHILS NFR BLD AUTO: 0.6 %
BILIRUB SERPL-MCNC: 0.4 MG/DL (ref 0.1–2)
BILIRUB UR QL STRIP.AUTO: NEGATIVE
BUN BLD-MCNC: 16 MG/DL (ref 7–18)
CALCIUM BLD-MCNC: 9 MG/DL (ref 8.5–10.1)
CHLORIDE SERPL-SCNC: 106 MMOL/L (ref 98–112)
CO2 SERPL-SCNC: 25 MMOL/L (ref 21–32)
COLOR UR AUTO: YELLOW
CREAT BLD-MCNC: 0.84 MG/DL
EOSINOPHIL # BLD AUTO: 0.36 X10(3) UL (ref 0–0.7)
EOSINOPHIL NFR BLD AUTO: 3.1 %
ERYTHROCYTE [DISTWIDTH] IN BLOOD BY AUTOMATED COUNT: 12.7 %
GFR SERPLBLD BASED ON 1.73 SQ M-ARVRAT: 85 ML/MIN/1.73M2 (ref 60–?)
GLOBULIN PLAS-MCNC: 3.7 G/DL (ref 2.8–4.4)
GLUCOSE BLD-MCNC: 178 MG/DL (ref 70–99)
GLUCOSE UR STRIP.AUTO-MCNC: NEGATIVE MG/DL
HCT VFR BLD AUTO: 41.2 %
HGB BLD-MCNC: 13.6 G/DL
IMM GRANULOCYTES # BLD AUTO: 0.03 X10(3) UL (ref 0–1)
IMM GRANULOCYTES NFR BLD: 0.3 %
LYMPHOCYTES # BLD AUTO: 2.7 X10(3) UL (ref 1–4)
LYMPHOCYTES NFR BLD AUTO: 23.5 %
MCH RBC QN AUTO: 30.3 PG (ref 26–34)
MCHC RBC AUTO-ENTMCNC: 33 G/DL (ref 31–37)
MCV RBC AUTO: 91.8 FL
MONOCYTES # BLD AUTO: 0.72 X10(3) UL (ref 0.1–1)
MONOCYTES NFR BLD AUTO: 6.3 %
NEUTROPHILS # BLD AUTO: 7.6 X10 (3) UL (ref 1.5–7.7)
NEUTROPHILS # BLD AUTO: 7.6 X10(3) UL (ref 1.5–7.7)
NEUTROPHILS NFR BLD AUTO: 66.2 %
NITRITE UR QL STRIP.AUTO: NEGATIVE
OSMOLALITY SERPL CALC.SUM OF ELEC: 294 MOSM/KG (ref 275–295)
PH UR STRIP.AUTO: 5 [PH] (ref 5–8)
PLATELET # BLD AUTO: 255 10(3)UL (ref 150–450)
POTASSIUM SERPL-SCNC: 3.7 MMOL/L (ref 3.5–5.1)
PROT SERPL-MCNC: 7.2 G/DL (ref 6.4–8.2)
PROT UR STRIP.AUTO-MCNC: 30 MG/DL
RBC # BLD AUTO: 4.49 X10(6)UL
SARS-COV-2 RNA RESP QL NAA+PROBE: NOT DETECTED
SODIUM SERPL-SCNC: 139 MMOL/L (ref 136–145)
SP GR UR STRIP.AUTO: 1.03 (ref 1–1.03)
UROBILINOGEN UR STRIP.AUTO-MCNC: <2 MG/DL
WBC # BLD AUTO: 11.5 X10(3) UL (ref 4–11)

## 2023-03-08 PROCEDURE — 74018 RADEX ABDOMEN 1 VIEW: CPT | Performed by: EMERGENCY MEDICINE

## 2023-03-08 PROCEDURE — 99223 1ST HOSP IP/OBS HIGH 75: CPT | Performed by: HOSPITALIST

## 2023-03-08 RX ORDER — KETOROLAC TROMETHAMINE 15 MG/ML
15 INJECTION, SOLUTION INTRAMUSCULAR; INTRAVENOUS ONCE
Status: COMPLETED | OUTPATIENT
Start: 2023-03-08 | End: 2023-03-08

## 2023-03-08 RX ORDER — ONDANSETRON 2 MG/ML
4 INJECTION INTRAMUSCULAR; INTRAVENOUS ONCE
Status: COMPLETED | OUTPATIENT
Start: 2023-03-08 | End: 2023-03-08

## 2023-03-09 ENCOUNTER — TELEPHONE (OUTPATIENT)
Dept: SURGERY | Facility: CLINIC | Age: 48
End: 2023-03-09

## 2023-03-09 ENCOUNTER — APPOINTMENT (OUTPATIENT)
Dept: GENERAL RADIOLOGY | Facility: HOSPITAL | Age: 48
End: 2023-03-09
Attending: SURGERY
Payer: MEDICAID

## 2023-03-09 ENCOUNTER — ANESTHESIA EVENT (OUTPATIENT)
Dept: SURGERY | Facility: HOSPITAL | Age: 48
End: 2023-03-09
Payer: MEDICAID

## 2023-03-09 ENCOUNTER — ANESTHESIA (OUTPATIENT)
Dept: SURGERY | Facility: HOSPITAL | Age: 48
End: 2023-03-09
Payer: MEDICAID

## 2023-03-09 VITALS
BODY MASS INDEX: 46.37 KG/M2 | WEIGHT: 230 LBS | OXYGEN SATURATION: 94 % | SYSTOLIC BLOOD PRESSURE: 116 MMHG | HEIGHT: 59 IN | DIASTOLIC BLOOD PRESSURE: 62 MMHG | RESPIRATION RATE: 22 BRPM | TEMPERATURE: 98 F | HEART RATE: 90 BPM

## 2023-03-09 DIAGNOSIS — N20.1 LEFT URETERAL STONE: ICD-10-CM

## 2023-03-09 DIAGNOSIS — N20.0 LEFT RENAL STONE: Primary | ICD-10-CM

## 2023-03-09 LAB
ALBUMIN SERPL-MCNC: 3 G/DL (ref 3.4–5)
ALBUMIN/GLOB SERPL: 0.9 {RATIO} (ref 1–2)
ALP LIVER SERPL-CCNC: 56 U/L
ALT SERPL-CCNC: 22 U/L
ANION GAP SERPL CALC-SCNC: 4 MMOL/L (ref 0–18)
AST SERPL-CCNC: 13 U/L (ref 15–37)
BASOPHILS # BLD AUTO: 0.05 X10(3) UL (ref 0–0.2)
BASOPHILS NFR BLD AUTO: 0.7 %
BILIRUB SERPL-MCNC: 0.4 MG/DL (ref 0.1–2)
BUN BLD-MCNC: 12 MG/DL (ref 7–18)
CALCIUM BLD-MCNC: 8.3 MG/DL (ref 8.5–10.1)
CHLORIDE SERPL-SCNC: 110 MMOL/L (ref 98–112)
CO2 SERPL-SCNC: 25 MMOL/L (ref 21–32)
CREAT BLD-MCNC: 0.63 MG/DL
EOSINOPHIL # BLD AUTO: 0.28 X10(3) UL (ref 0–0.7)
EOSINOPHIL NFR BLD AUTO: 3.8 %
ERYTHROCYTE [DISTWIDTH] IN BLOOD BY AUTOMATED COUNT: 12.7 %
EST. AVERAGE GLUCOSE BLD GHB EST-MCNC: 140 MG/DL (ref 68–126)
GFR SERPLBLD BASED ON 1.73 SQ M-ARVRAT: 109 ML/MIN/1.73M2 (ref 60–?)
GLOBULIN PLAS-MCNC: 3.3 G/DL (ref 2.8–4.4)
GLUCOSE BLD-MCNC: 157 MG/DL (ref 70–99)
HBA1C MFR BLD: 6.5 % (ref ?–5.7)
HCT VFR BLD AUTO: 37.7 %
HGB BLD-MCNC: 12.5 G/DL
IMM GRANULOCYTES # BLD AUTO: 0.03 X10(3) UL (ref 0–1)
IMM GRANULOCYTES NFR BLD: 0.4 %
LYMPHOCYTES # BLD AUTO: 1.99 X10(3) UL (ref 1–4)
LYMPHOCYTES NFR BLD AUTO: 27.3 %
MCH RBC QN AUTO: 30.8 PG (ref 26–34)
MCHC RBC AUTO-ENTMCNC: 33.2 G/DL (ref 31–37)
MCV RBC AUTO: 92.9 FL
MONOCYTES # BLD AUTO: 0.49 X10(3) UL (ref 0.1–1)
MONOCYTES NFR BLD AUTO: 6.7 %
NEUTROPHILS # BLD AUTO: 4.46 X10 (3) UL (ref 1.5–7.7)
NEUTROPHILS # BLD AUTO: 4.46 X10(3) UL (ref 1.5–7.7)
NEUTROPHILS NFR BLD AUTO: 61.1 %
OSMOLALITY SERPL CALC.SUM OF ELEC: 291 MOSM/KG (ref 275–295)
PLATELET # BLD AUTO: 227 10(3)UL (ref 150–450)
POTASSIUM SERPL-SCNC: 3.6 MMOL/L (ref 3.5–5.1)
PROT SERPL-MCNC: 6.3 G/DL (ref 6.4–8.2)
RBC # BLD AUTO: 4.06 X10(6)UL
SODIUM SERPL-SCNC: 139 MMOL/L (ref 136–145)
WBC # BLD AUTO: 7.3 X10(3) UL (ref 4–11)

## 2023-03-09 PROCEDURE — 0WHR8YZ INSERTION OF OTHER DEVICE INTO GENITOURINARY TRACT, VIA NATURAL OR ARTIFICIAL OPENING ENDOSCOPIC: ICD-10-PCS | Performed by: UROLOGY

## 2023-03-09 PROCEDURE — 99239 HOSP IP/OBS DSCHRG MGMT >30: CPT | Performed by: HOSPITALIST

## 2023-03-09 PROCEDURE — 74420 UROGRAPHY RTRGR +-KUB: CPT | Performed by: UROLOGY

## 2023-03-09 PROCEDURE — 99254 IP/OBS CNSLTJ NEW/EST MOD 60: CPT | Performed by: UROLOGY

## 2023-03-09 PROCEDURE — 52332 CYSTOSCOPY AND TREATMENT: CPT | Performed by: UROLOGY

## 2023-03-09 DEVICE — URETERAL STENT
Type: IMPLANTABLE DEVICE | Site: URETER | Status: FUNCTIONAL
Brand: ASCERTA™

## 2023-03-09 RX ORDER — PROCHLORPERAZINE EDISYLATE 5 MG/ML
5 INJECTION INTRAMUSCULAR; INTRAVENOUS EVERY 8 HOURS PRN
Status: DISCONTINUED | OUTPATIENT
Start: 2023-03-09 | End: 2023-03-09

## 2023-03-09 RX ORDER — ACETAMINOPHEN 500 MG
1000 TABLET ORAL EVERY 4 HOURS PRN
Status: DISCONTINUED | OUTPATIENT
Start: 2023-03-09 | End: 2023-03-09

## 2023-03-09 RX ORDER — SENNOSIDES 8.6 MG
17.2 TABLET ORAL NIGHTLY PRN
Status: DISCONTINUED | OUTPATIENT
Start: 2023-03-09 | End: 2023-03-09

## 2023-03-09 RX ORDER — ONDANSETRON 2 MG/ML
4 INJECTION INTRAMUSCULAR; INTRAVENOUS EVERY 6 HOURS PRN
Status: DISCONTINUED | OUTPATIENT
Start: 2023-03-09 | End: 2023-03-09 | Stop reason: HOSPADM

## 2023-03-09 RX ORDER — ENOXAPARIN SODIUM 100 MG/ML
0.5 INJECTION SUBCUTANEOUS DAILY
Status: DISCONTINUED | OUTPATIENT
Start: 2023-03-09 | End: 2023-03-09

## 2023-03-09 RX ORDER — CIPROFLOXACIN 500 MG/1
500 TABLET, FILM COATED ORAL 2 TIMES DAILY
Qty: 28 TABLET | Refills: 0 | Status: SHIPPED | OUTPATIENT
Start: 2023-03-09 | End: 2023-03-23

## 2023-03-09 RX ORDER — KETOROLAC TROMETHAMINE 15 MG/ML
15 INJECTION, SOLUTION INTRAMUSCULAR; INTRAVENOUS EVERY 6 HOURS PRN
Status: DISCONTINUED | OUTPATIENT
Start: 2023-03-09 | End: 2023-03-09

## 2023-03-09 RX ORDER — HYDROMORPHONE HYDROCHLORIDE 1 MG/ML
0.6 INJECTION, SOLUTION INTRAMUSCULAR; INTRAVENOUS; SUBCUTANEOUS EVERY 5 MIN PRN
Status: DISCONTINUED | OUTPATIENT
Start: 2023-03-09 | End: 2023-03-09 | Stop reason: HOSPADM

## 2023-03-09 RX ORDER — METOCLOPRAMIDE HYDROCHLORIDE 5 MG/ML
INJECTION INTRAMUSCULAR; INTRAVENOUS AS NEEDED
Status: DISCONTINUED | OUTPATIENT
Start: 2023-03-09 | End: 2023-03-09 | Stop reason: SURG

## 2023-03-09 RX ORDER — ONDANSETRON 2 MG/ML
4 INJECTION INTRAMUSCULAR; INTRAVENOUS EVERY 6 HOURS PRN
Status: DISCONTINUED | OUTPATIENT
Start: 2023-03-09 | End: 2023-03-09

## 2023-03-09 RX ORDER — ALBUTEROL SULFATE 90 UG/1
2 AEROSOL, METERED RESPIRATORY (INHALATION) EVERY 4 HOURS PRN
Status: DISCONTINUED | OUTPATIENT
Start: 2023-03-09 | End: 2023-03-09

## 2023-03-09 RX ORDER — MEPERIDINE HYDROCHLORIDE 25 MG/ML
25 INJECTION INTRAMUSCULAR; INTRAVENOUS; SUBCUTANEOUS
Status: DISCONTINUED | OUTPATIENT
Start: 2023-03-09 | End: 2023-03-09 | Stop reason: HOSPADM

## 2023-03-09 RX ORDER — HYDROCODONE BITARTRATE AND ACETAMINOPHEN 5; 325 MG/1; MG/1
1 TABLET ORAL EVERY 6 HOURS PRN
Qty: 30 TABLET | Refills: 0 | Status: SHIPPED | OUTPATIENT
Start: 2023-03-09

## 2023-03-09 RX ORDER — LIDOCAINE HYDROCHLORIDE 10 MG/ML
INJECTION, SOLUTION EPIDURAL; INFILTRATION; INTRACAUDAL; PERINEURAL AS NEEDED
Status: DISCONTINUED | OUTPATIENT
Start: 2023-03-09 | End: 2023-03-09 | Stop reason: SURG

## 2023-03-09 RX ORDER — TAMSULOSIN HYDROCHLORIDE 0.4 MG/1
0.4 CAPSULE ORAL DAILY
Status: DISCONTINUED | OUTPATIENT
Start: 2023-03-09 | End: 2023-03-09

## 2023-03-09 RX ORDER — BENZONATATE 100 MG/1
200 CAPSULE ORAL 3 TIMES DAILY PRN
Status: DISCONTINUED | OUTPATIENT
Start: 2023-03-09 | End: 2023-03-09

## 2023-03-09 RX ORDER — SODIUM CHLORIDE 9 MG/ML
INJECTION, SOLUTION INTRAVENOUS CONTINUOUS
Status: DISCONTINUED | OUTPATIENT
Start: 2023-03-09 | End: 2023-03-09

## 2023-03-09 RX ORDER — METFORMIN HYDROCHLORIDE 500 MG/1
500 TABLET, EXTENDED RELEASE ORAL 2 TIMES DAILY WITH MEALS
Qty: 60 TABLET | Refills: 1 | Status: SHIPPED | OUTPATIENT
Start: 2023-03-09

## 2023-03-09 RX ORDER — DOCUSATE SODIUM 100 MG/1
100 CAPSULE, LIQUID FILLED ORAL 2 TIMES DAILY PRN
Qty: 30 CAPSULE | Refills: 0 | Status: SHIPPED | OUTPATIENT
Start: 2023-03-09

## 2023-03-09 RX ORDER — DEXAMETHASONE SODIUM PHOSPHATE 4 MG/ML
VIAL (ML) INJECTION AS NEEDED
Status: DISCONTINUED | OUTPATIENT
Start: 2023-03-09 | End: 2023-03-09 | Stop reason: SURG

## 2023-03-09 RX ORDER — NALOXONE HYDROCHLORIDE 0.4 MG/ML
80 INJECTION, SOLUTION INTRAMUSCULAR; INTRAVENOUS; SUBCUTANEOUS AS NEEDED
Status: DISCONTINUED | OUTPATIENT
Start: 2023-03-09 | End: 2023-03-09 | Stop reason: HOSPADM

## 2023-03-09 RX ORDER — POLYETHYLENE GLYCOL 3350 17 G/17G
17 POWDER, FOR SOLUTION ORAL DAILY PRN
Status: DISCONTINUED | OUTPATIENT
Start: 2023-03-09 | End: 2023-03-09

## 2023-03-09 RX ORDER — SODIUM PHOSPHATE, DIBASIC AND SODIUM PHOSPHATE, MONOBASIC 7; 19 G/133ML; G/133ML
1 ENEMA RECTAL ONCE AS NEEDED
Status: DISCONTINUED | OUTPATIENT
Start: 2023-03-09 | End: 2023-03-09

## 2023-03-09 RX ORDER — KETOROLAC TROMETHAMINE 30 MG/ML
30 INJECTION, SOLUTION INTRAMUSCULAR; INTRAVENOUS EVERY 6 HOURS PRN
Status: DISCONTINUED | OUTPATIENT
Start: 2023-03-09 | End: 2023-03-09

## 2023-03-09 RX ORDER — MIDAZOLAM HYDROCHLORIDE 1 MG/ML
1 INJECTION INTRAMUSCULAR; INTRAVENOUS EVERY 5 MIN PRN
Status: DISCONTINUED | OUTPATIENT
Start: 2023-03-09 | End: 2023-03-09 | Stop reason: HOSPADM

## 2023-03-09 RX ORDER — BISACODYL 10 MG
10 SUPPOSITORY, RECTAL RECTAL
Status: DISCONTINUED | OUTPATIENT
Start: 2023-03-09 | End: 2023-03-09

## 2023-03-09 RX ORDER — HYDROMORPHONE HYDROCHLORIDE 1 MG/ML
0.4 INJECTION, SOLUTION INTRAMUSCULAR; INTRAVENOUS; SUBCUTANEOUS EVERY 5 MIN PRN
Status: DISCONTINUED | OUTPATIENT
Start: 2023-03-09 | End: 2023-03-09 | Stop reason: HOSPADM

## 2023-03-09 RX ORDER — MELATONIN
3 NIGHTLY PRN
Status: DISCONTINUED | OUTPATIENT
Start: 2023-03-09 | End: 2023-03-09

## 2023-03-09 RX ORDER — HYDROMORPHONE HYDROCHLORIDE 1 MG/ML
0.2 INJECTION, SOLUTION INTRAMUSCULAR; INTRAVENOUS; SUBCUTANEOUS EVERY 5 MIN PRN
Status: DISCONTINUED | OUTPATIENT
Start: 2023-03-09 | End: 2023-03-09 | Stop reason: HOSPADM

## 2023-03-09 RX ORDER — SODIUM CHLORIDE, SODIUM LACTATE, POTASSIUM CHLORIDE, CALCIUM CHLORIDE 600; 310; 30; 20 MG/100ML; MG/100ML; MG/100ML; MG/100ML
INJECTION, SOLUTION INTRAVENOUS CONTINUOUS
Status: DISCONTINUED | OUTPATIENT
Start: 2023-03-09 | End: 2023-03-09 | Stop reason: HOSPADM

## 2023-03-09 RX ORDER — ALBUTEROL SULFATE 2.5 MG/3ML
2.5 SOLUTION RESPIRATORY (INHALATION) EVERY 4 HOURS PRN
Status: DISCONTINUED | OUTPATIENT
Start: 2023-03-09 | End: 2023-03-09

## 2023-03-09 RX ADMIN — METOCLOPRAMIDE HYDROCHLORIDE 10 MG: 5 INJECTION INTRAMUSCULAR; INTRAVENOUS at 11:05:00

## 2023-03-09 RX ADMIN — LIDOCAINE HYDROCHLORIDE 10 MG: 10 INJECTION, SOLUTION EPIDURAL; INFILTRATION; INTRACAUDAL; PERINEURAL at 10:35:00

## 2023-03-09 RX ADMIN — DEXAMETHASONE SODIUM PHOSPHATE 4 MG: 4 MG/ML VIAL (ML) INJECTION at 11:05:00

## 2023-03-09 NOTE — ANESTHESIA PROCEDURE NOTES
Airway  Date/Time: 3/9/2023 10:36 AM  Urgency: elective    Airway not difficult    General Information and Staff    Patient location during procedure: OR  Anesthesiologist: Donte Marcelo MD  Performed: anesthesiologist   Performed by: Donte Marcelo MD  Authorized by:  Donte Marcelo MD      Indications and Patient Condition  Indications for airway management: anesthesia  Spontaneous ventilation: present  Sedation level: deep  Preoxygenated: yes  Patient position: sniffing  Mask difficulty assessment: 1 - vent by mask    Final Airway Details  Final airway type: supraglottic airway      Successful airway: classic  Size 3       Number of attempts at approach: 1  Ventilation between attempts: none  Number of other approaches attempted: 0

## 2023-03-09 NOTE — PROGRESS NOTES
UROLOGY NOTE    Full consult note to follow. Patient seen this morning for obstructing ureteral stone with fever of 102 at home and UA c/w infection. White count resolved this morning on repeat testing and renal function normal. Patient with ongoing left flank pain. No fevers overnight. Reviewed prior CT scan results and risks related to fever/obstructing stone/UTI. Recommend ureteral stent placement with future definitive stone treatment of large left renal stone and ureteral stone. Surgical risks, benefits and alternatives discussed. Risks including but not limited to infection, bleeding, anesthetic issues, need for subsequent procedure, need for subsequent removal of stent, ureteral spasm, ureteral injury or stricture discussed with patient. Patient agrees to proceed. PLAN-  Remain NPO. Obtain consent for cystoscopy, left retrograde pyelogram, left ureteral stent placement. Strain all urine. S/w nursing staff and patient regarding above. All questions answered.     Khushi Marie PA-C  [facility]

## 2023-03-09 NOTE — PLAN OF CARE
Patient admitted via cart from ED. Oriented to room. Safety precautions initiated. Bed in low position. Call light in reach. Pt reporting pain to her lower left flank, given prn iv meds for adequate relief. Denies any nausea/vomiting. Reports having painful urination. Ordered iv abx q24. Npo for possibility of cysto. Blood cx pending. Urology consult for today. Plan of care discussed with patient.

## 2023-03-09 NOTE — PROGRESS NOTES
Patient refused CPAP machine during stay at the hospital    HUGO (CPAP/BIPAP) Daily Usage Summary:    Total Time Used: _ Hours _ Minutes

## 2023-03-09 NOTE — TELEPHONE ENCOUNTER
Please schedule this patient for surgery. Should be no sooner than 2-3 weeks from now as she is recovering from UTI and she should have a visit with me the week prior in the office to review procedure. OK to double book a cysto or biopsy procedure slot if no openings. Thanks,    Lashae Bey    Urology Surgery Request  Surgeon: Gloria Sapp (if known): EDW  Procedure: cysto, left flex URS, laser litho, stone removal with stent exchange   Anesthesia: General   Time Frame: as noted above  Time required: 45 minutes  Diagnosis: left renal and ureteral stones    Antibiotics: per hospital protocol unless checked below   _x_ Levaquin 500 mg IV   ___ Gemcitabine 2 g/100 mL NS bladder instillation to be given in OR    Estimated Post Op/Follow Up Appt: the following week for cysto stent removal with me or Maricel. Please schedule appointment at time of surgery scheduling.

## 2023-03-09 NOTE — ED INITIAL ASSESSMENT (HPI)
Pt c/o bilateral flank pain radiating to the abd for 2 wks, states she was seen at [address] [place] [address] yesterday, dx Kidney stones. Pt taking Ibuprofen with no relief. States urology appointment in a month, c/o nausea, no vomiting.  Pt denies hematuria

## 2023-03-09 NOTE — PLAN OF CARE
Pt alert and oriented x4. Pt c/o mild left flank pain, declined need for pain medication. VS remain stable on room air. Voiding without difficulty. Tolerating diet. IVF infusing per order. Pt c/o nausea, antiemetic given with relief shown. Up with steady gait. Plan to discharge to home when cleared. Pt updated on plan of care, all questions answered. Belongings within reach, instructed to call for assistance.

## 2023-03-09 NOTE — PROGRESS NOTES
Pt sent to procedure in stable condition. Jewelry and undergarments removed and left at bedside. Consent signed and on chart. IVF infusing per order.

## 2023-03-09 NOTE — PROGRESS NOTES
Pt cleared by all MD's for discharge. Discharge education completed at bedside with pt, all questions answered. PIV removed, belongings packed. Pt discharging to home via wheelchair.

## 2023-03-09 NOTE — OPERATIVE REPORT
Urology Operative Note    Attending Surgeon: Rk Macias MD    Patient Name: Jody Valenzuela    Date of Surgery: 03/09/23    Preoperative Diagnosis: left kidney and ureteral stones, sepsis, UTI    Postoperative Diagnosis: same    Procedure Performed: Cystoscopy, left retrograde pyelogram, left ureteral stent insertion    Indication for procedure:  Patient is a 50year old female who presented with sepsis secondary to a UTI as well an obstructing left sided ureteral stone. The patient was counseled on options and elected to undergo the aforementioned procedure. We discussed the risks and benefits to surgery. We discussed risks including, but not limited to, bleeding, infection, possible damage to surrounding structures, possible worsening of urinary sepsis. The patient understood these risks and wished to proceed with surgery. Description of the procedure: The patient was taken to the operating room and prepped and draped in lithotomy position after undergoing general anesthesia. The cystoscope was inserted and the bladder was entered. The left ureter was cannulated with an open ended catheter and a retrograde pyelogram was performed showing an obstructing left proximal ureteral stone with proximal hydronephrosis. A glidewire was then passed up into the renal pelvis which I confirmed using fluoroscopy. I then inserted a 6 x 24 F double-J ureteral stent over the wire. I confirmed there was good curl of stent in the kidney using fluoroscopy as well as a good curl of stent in the bladder using direct cystoscopic examination. There was efflux of urine around and through the stent at the end of the case. The bladder was drained and the scope was removed. The patient was awoken having tolerated the procedure well.     Specimen: none    Complications: No known complications    Condition on Discharge from the operating room was stable    Plan: f/u UCx and should be treated with culture specific antibiotics for presumed UTI. Plan for definitive stone mgmt as outpatient in a few weeks once UTI clears.     Itz Stern MD  Date: 3/9/2023  Time: 11:07 AM

## 2023-03-09 NOTE — ANESTHESIA POSTPROCEDURE EVALUATION
[address] Patient Status:  Observation   Age/Gender 50year old female MRN [id number]   Location [address] [facility] Attending Lissette Zhou MD   Hosp Day # 0 PCP Radha Tay MD       Anesthesia Post-op Note    CYSTOSCOPY, LEFT RETROGRADE PYELOGRAM, INSERTION LEFT URETERAL STENT    Procedure Summary     Date: 03/09/23 Room / Location: [address] [place] [address] OR 04 / [facility] OR    Anesthesia Start: 1031 Anesthesia Stop:     Procedure: CYSTOSCOPY, LEFT RETROGRADE PYELOGRAM, INSERTION LEFT URETERAL STENT (Left: Urethra) Diagnosis:       Ureteral calculi      (Ureteral calculi [N20.1])    Surgeons: Wanda Agarwal MD Anesthesiologist: Angel Portillo MD    Anesthesia Type: general ASA Status: 2          Anesthesia Type: general    Vitals Value Taken Time   /62 03/09/23 1118   Temp 98 03/09/23 1118   Pulse 75 03/09/23 1118   Resp 16 03/09/23 1118   SpO2 95 03/09/23 1118       Patient Location: PACU    Anesthesia Type: general    Airway Patency: patent    Postop Pain Control: adequate    Mental Status: mildly sedated but able to meaningfully participate in the post-anesthesia evaluation    Nausea/Vomiting: none    Cardiopulmonary/Hydration status: stable euvolemic    Complications: no apparent anesthesia related complications    Postop vital signs: stable    Dental Exam: Unchanged from Preop    Patient to be discharged from PACU when criteria met.

## 2023-03-09 NOTE — DISCHARGE INSTRUCTIONS
Take metformin 500 mg every other day for 1 week. If you tolerate this then increase to 500 mg daily for 1 week. Then increase to 500 mg twice a day. Follow-up with your primary care doctor within 1 week. You had a procedure called a CYSTOSCOPY today    - You may resume regular activity tomorrow. - You may have a post-operative appointment that is already scheduled for you. If the appointment date or time does not work with your schedule please call our office to reschedule ([phone]). Alternatively our office may be reaching out to you to schedule the appointment. - You may experience pain after the procedure for 1-2 days. If pain becomes intolerable please contact our office or go to the nearest Emergency Room/Immediate Care. You make take over-the counter ibuprofen for mild pain (provided you do not have a medical condition such as stomach ulcers or kidney disease which prohibits you from taking). You may take this in addition to tylenol or narcotic pain medication. Hot packs may help for discomfort as well. - If you are medically allowed to take ibuprofen then you may take 200-400 mg every 8 hours as needed for pain with plenty of fluids. You may take this in addition to tylenol or other pain medication which may be prescribed. - You may experience burning with urination and frequency of urination over the next few days.     - You may see blood in your urine that should clear up within a few days. If you have a stent in place then you may see blood in the urine until the stent is eventually removed. - Try to abstain from alcohol, coffee, tea, artificial sweeteners, and spicy food for the next 48 hours as these can irritate the bladder.     - If you develop a fever, chills, difficulty urinating or abdominal pain in the next 24 hours, call the office.     - Drink 1.5 to 2 liters of fluid today, water is preferable.  If you are on a fluid restriction due to other medical reasons then you need to adhere to your fluid restriction recommendations.

## 2023-03-09 NOTE — ED QUICK NOTES
Orders for admission, patient is aware of plan and ready to go upstairs.  Any questions, please call ED RN Yaima Reyna at extension [phone]     Patient Covid vaccination status: Fully vaccinated     COVID Test Ordered in ED: Rapid SARS-CoV-2 by PCR    COVID Suspicion at Admission: Low clinical suspicion for COVID    Running Infusions:  None    Mental Status/LOC at time of transport: alert    Other pertinent information:   CIWA score: N/A   NIH score:  N/A

## 2023-03-09 NOTE — PROGRESS NOTES
Patient seen and examined. Medically clear to discharge today. Gen: NAD  Resp: CTA  CVS: s1s2  Abd: soft, +bowel sounds  Neck: trachea is midline    A/P:    Renal colic  Obstruction of hydronephrosis  Left-sided UPJ stone  Presenting after infection  Type 2 diabetes  Migraines  Asthma  Seizure disorder  CPAP  Morbid obesity    Disposition: Patient has cystoscopy, left retrograde pyelogram and left ureteral stent insertion. She is medically stable for discharge home on a course of antibiotics. Provided new prescription for her metformin as she has not been taking this for the past several months. Patient is to uptitrate her metformin for easier tolerability and follow-up with her primary care doctor. She will see urology as outpatient for definitive stone management.     Total minutes spent on discharge plannin Norberto Navarro MD

## 2023-03-10 ENCOUNTER — PATIENT OUTREACH (OUTPATIENT)
Dept: CASE MANAGEMENT | Age: 48
End: 2023-03-10

## 2023-03-13 NOTE — TELEPHONE ENCOUNTER
Spoke with the patient. Stated that Dr. Oskar Patel had a surgery cancellation and will book her for 3/23/23. Patient doesn't use My Chart and I will mail pre-op instructions via [employer]. Liborio Momin will see patient for 1 week prior visit.

## 2023-03-14 RX ORDER — CEFAZOLIN SODIUM/WATER 2 G/20 ML
2 SYRINGE (ML) INTRAVENOUS ONCE
Status: CANCELLED | OUTPATIENT
Start: 2023-03-14 | End: 2023-03-14

## 2023-03-16 ENCOUNTER — OFFICE VISIT (OUTPATIENT)
Dept: SURGERY | Facility: CLINIC | Age: 48
End: 2023-03-16

## 2023-03-16 DIAGNOSIS — N20.0 RENAL STONE: ICD-10-CM

## 2023-03-16 DIAGNOSIS — N39.0 RECURRENT UTI: ICD-10-CM

## 2023-03-16 DIAGNOSIS — N20.1 URETERAL STONE: Primary | ICD-10-CM

## 2023-03-16 LAB
BILIRUBIN: NEGATIVE
GLUCOSE (URINE DIPSTICK): NEGATIVE MG/DL
KETONES (URINE DIPSTICK): NEGATIVE MG/DL
LEUKOCYTES: NEGATIVE
MULTISTIX LOT#: ABNORMAL NUMERIC
NITRITE, URINE: NEGATIVE
PH, URINE: 5.5 (ref 4.5–8)
PROTEIN (URINE DIPSTICK): >=300 MG/DL
SPECIFIC GRAVITY: 1.03 (ref 1–1.03)
UROBILINOGEN,SEMI-QN: 0.2 MG/DL (ref 0–1.9)

## 2023-03-16 PROCEDURE — 99214 OFFICE O/P EST MOD 30 MIN: CPT | Performed by: PHYSICIAN ASSISTANT

## 2023-03-16 PROCEDURE — 81003 URINALYSIS AUTO W/O SCOPE: CPT | Performed by: PHYSICIAN ASSISTANT

## 2023-03-20 NOTE — PROGRESS NOTES
Results reviewed. Please inform patient Ucx is positive. Recommend switching from cipro to cefdinir prior to upcoming procedure.     Thanks,  MPH

## 2023-03-21 ENCOUNTER — LAB ENCOUNTER (OUTPATIENT)
Dept: LAB | Age: 48
End: 2023-03-21
Attending: UROLOGY
Payer: MEDICAID

## 2023-03-21 DIAGNOSIS — N20.0 LEFT RENAL STONE: ICD-10-CM

## 2023-03-22 LAB — SARS-COV-2 RNA RESP QL NAA+PROBE: NOT DETECTED

## 2023-03-23 ENCOUNTER — APPOINTMENT (OUTPATIENT)
Dept: GENERAL RADIOLOGY | Facility: HOSPITAL | Age: 48
End: 2023-03-23
Attending: UROLOGY
Payer: MEDICAID

## 2023-03-23 ENCOUNTER — APPOINTMENT (OUTPATIENT)
Dept: CT IMAGING | Facility: HOSPITAL | Age: 48
End: 2023-03-23
Attending: INTERNAL MEDICINE
Payer: MEDICAID

## 2023-03-23 ENCOUNTER — HOSPITAL ENCOUNTER (OUTPATIENT)
Facility: HOSPITAL | Age: 48
Discharge: HOME | End: 2023-03-25
Attending: UROLOGY | Admitting: UROLOGY
Payer: MEDICAID

## 2023-03-23 ENCOUNTER — ANESTHESIA EVENT (OUTPATIENT)
Dept: SURGERY | Facility: HOSPITAL | Age: 48
End: 2023-03-23
Payer: MEDICAID

## 2023-03-23 ENCOUNTER — NURSE ONLY (OUTPATIENT)
Dept: ELECTROPHYSIOLOGY | Facility: HOSPITAL | Age: 48
End: 2023-03-23
Attending: INTERNAL MEDICINE
Payer: MEDICAID

## 2023-03-23 ENCOUNTER — ANESTHESIA (OUTPATIENT)
Dept: SURGERY | Facility: HOSPITAL | Age: 48
End: 2023-03-23
Payer: MEDICAID

## 2023-03-23 DIAGNOSIS — N20.1 LEFT URETERAL STONE: ICD-10-CM

## 2023-03-23 DIAGNOSIS — N20.0 LEFT RENAL STONE: Primary | ICD-10-CM

## 2023-03-23 LAB
ALBUMIN SERPL-MCNC: 3.6 G/DL (ref 3.4–5)
ALBUMIN/GLOB SERPL: 1.1 {RATIO} (ref 1–2)
ALP LIVER SERPL-CCNC: 56 U/L
ALT SERPL-CCNC: 33 U/L
ANION GAP SERPL CALC-SCNC: 11 MMOL/L (ref 0–18)
AST SERPL-CCNC: 23 U/L (ref 15–37)
B-HCG UR QL: NEGATIVE
BASOPHILS # BLD AUTO: 0.04 X10(3) UL (ref 0–0.2)
BASOPHILS NFR BLD AUTO: 0.4 %
BILIRUB SERPL-MCNC: 0.5 MG/DL (ref 0.1–2)
BUN BLD-MCNC: 13 MG/DL (ref 7–18)
CALCIUM BLD-MCNC: 8.1 MG/DL (ref 8.5–10.1)
CHLORIDE SERPL-SCNC: 107 MMOL/L (ref 98–112)
CO2 SERPL-SCNC: 21 MMOL/L (ref 21–32)
CREAT BLD-MCNC: 0.81 MG/DL
EOSINOPHIL # BLD AUTO: 0.12 X10(3) UL (ref 0–0.7)
EOSINOPHIL NFR BLD AUTO: 1.3 %
ERYTHROCYTE [DISTWIDTH] IN BLOOD BY AUTOMATED COUNT: 12.6 %
GFR SERPLBLD BASED ON 1.73 SQ M-ARVRAT: 89 ML/MIN/1.73M2 (ref 60–?)
GLOBULIN PLAS-MCNC: 3.3 G/DL (ref 2.8–4.4)
GLUCOSE BLD-MCNC: 164 MG/DL (ref 70–99)
GLUCOSE BLD-MCNC: 173 MG/DL (ref 70–99)
GLUCOSE BLD-MCNC: 265 MG/DL (ref 70–99)
HCT VFR BLD AUTO: 42.8 %
HGB BLD-MCNC: 13.9 G/DL
IMM GRANULOCYTES # BLD AUTO: 0.09 X10(3) UL (ref 0–1)
IMM GRANULOCYTES NFR BLD: 1 %
LACTATE SERPL-SCNC: 0.9 MMOL/L (ref 0.4–2)
LACTATE SERPL-SCNC: 4.3 MMOL/L (ref 0.4–2)
LYMPHOCYTES # BLD AUTO: 1.18 X10(3) UL (ref 1–4)
LYMPHOCYTES NFR BLD AUTO: 12.5 %
MCH RBC QN AUTO: 30.8 PG (ref 26–34)
MCHC RBC AUTO-ENTMCNC: 32.5 G/DL (ref 31–37)
MCV RBC AUTO: 94.7 FL
MONOCYTES # BLD AUTO: 0.2 X10(3) UL (ref 0.1–1)
MONOCYTES NFR BLD AUTO: 2.1 %
NEUTROPHILS # BLD AUTO: 7.81 X10 (3) UL (ref 1.5–7.7)
NEUTROPHILS # BLD AUTO: 7.81 X10(3) UL (ref 1.5–7.7)
NEUTROPHILS NFR BLD AUTO: 82.7 %
OSMOLALITY SERPL CALC.SUM OF ELEC: 292 MOSM/KG (ref 275–295)
PLATELET # BLD AUTO: 269 10(3)UL (ref 150–450)
POTASSIUM SERPL-SCNC: 3.9 MMOL/L (ref 3.5–5.1)
PROT SERPL-MCNC: 6.9 G/DL (ref 6.4–8.2)
RBC # BLD AUTO: 4.52 X10(6)UL
SODIUM SERPL-SCNC: 139 MMOL/L (ref 136–145)
WBC # BLD AUTO: 9.4 X10(3) UL (ref 4–11)

## 2023-03-23 PROCEDURE — 52356 CYSTO/URETERO W/LITHOTRIPSY: CPT | Performed by: UROLOGY

## 2023-03-23 PROCEDURE — 0TC78ZZ EXTIRPATION OF MATTER FROM LEFT URETER, VIA NATURAL OR ARTIFICIAL OPENING ENDOSCOPIC: ICD-10-PCS | Performed by: UROLOGY

## 2023-03-23 PROCEDURE — 0T778DZ DILATION OF LEFT URETER WITH INTRALUMINAL DEVICE, VIA NATURAL OR ARTIFICIAL OPENING ENDOSCOPIC: ICD-10-PCS | Performed by: UROLOGY

## 2023-03-23 PROCEDURE — 99291 CRITICAL CARE FIRST HOUR: CPT | Performed by: INTERNAL MEDICINE

## 2023-03-23 PROCEDURE — 70450 CT HEAD/BRAIN W/O DYE: CPT | Performed by: INTERNAL MEDICINE

## 2023-03-23 DEVICE — URETERAL STENT
Type: IMPLANTABLE DEVICE | Site: URETER | Status: FUNCTIONAL
Brand: ASCERTA™

## 2023-03-23 RX ORDER — MIDAZOLAM HYDROCHLORIDE 1 MG/ML
INJECTION INTRAMUSCULAR; INTRAVENOUS AS NEEDED
Status: DISCONTINUED | OUTPATIENT
Start: 2023-03-23 | End: 2023-03-23 | Stop reason: SURG

## 2023-03-23 RX ORDER — LORAZEPAM 2 MG/ML
INJECTION INTRAMUSCULAR
Status: DISCONTINUED
Start: 2023-03-23 | End: 2023-03-23 | Stop reason: WASHOUT

## 2023-03-23 RX ORDER — DEXTROSE MONOHYDRATE 25 G/50ML
50 INJECTION, SOLUTION INTRAVENOUS
Status: DISCONTINUED | OUTPATIENT
Start: 2023-03-23 | End: 2023-03-25

## 2023-03-23 RX ORDER — HYDROCODONE BITARTRATE AND ACETAMINOPHEN 5; 325 MG/1; MG/1
1 TABLET ORAL EVERY 6 HOURS PRN
Qty: 30 TABLET | Refills: 0 | Status: SHIPPED | OUTPATIENT
Start: 2023-03-23

## 2023-03-23 RX ORDER — DIPHENHYDRAMINE HYDROCHLORIDE 50 MG/ML
12.5 INJECTION INTRAMUSCULAR; INTRAVENOUS AS NEEDED
Status: DISCONTINUED | OUTPATIENT
Start: 2023-03-23 | End: 2023-03-23 | Stop reason: HOSPADM

## 2023-03-23 RX ORDER — SENNOSIDES 8.6 MG
17.2 TABLET ORAL NIGHTLY PRN
Status: DISCONTINUED | OUTPATIENT
Start: 2023-03-23 | End: 2023-03-25

## 2023-03-23 RX ORDER — LEVOFLOXACIN 5 MG/ML
500 INJECTION, SOLUTION INTRAVENOUS ONCE
Status: COMPLETED | OUTPATIENT
Start: 2023-03-23 | End: 2023-03-23

## 2023-03-23 RX ORDER — ONDANSETRON 2 MG/ML
4 INJECTION INTRAMUSCULAR; INTRAVENOUS EVERY 6 HOURS PRN
Status: DISCONTINUED | OUTPATIENT
Start: 2023-03-23 | End: 2023-03-25

## 2023-03-23 RX ORDER — ALBUTEROL SULFATE 2.5 MG/3ML
2.5 SOLUTION RESPIRATORY (INHALATION) AS NEEDED
Status: DISCONTINUED | OUTPATIENT
Start: 2023-03-23 | End: 2023-03-23 | Stop reason: HOSPADM

## 2023-03-23 RX ORDER — SODIUM CHLORIDE, SODIUM LACTATE, POTASSIUM CHLORIDE, CALCIUM CHLORIDE 600; 310; 30; 20 MG/100ML; MG/100ML; MG/100ML; MG/100ML
INJECTION, SOLUTION INTRAVENOUS CONTINUOUS
Status: DISCONTINUED | OUTPATIENT
Start: 2023-03-23 | End: 2023-03-25

## 2023-03-23 RX ORDER — LORAZEPAM 2 MG/ML
INJECTION INTRAMUSCULAR
Status: COMPLETED
Start: 2023-03-23 | End: 2023-03-23

## 2023-03-23 RX ORDER — POLYETHYLENE GLYCOL 3350 17 G/17G
17 POWDER, FOR SOLUTION ORAL DAILY PRN
Status: DISCONTINUED | OUTPATIENT
Start: 2023-03-23 | End: 2023-03-25

## 2023-03-23 RX ORDER — BISACODYL 10 MG
10 SUPPOSITORY, RECTAL RECTAL
Status: DISCONTINUED | OUTPATIENT
Start: 2023-03-23 | End: 2023-03-25

## 2023-03-23 RX ORDER — KETOROLAC TROMETHAMINE 30 MG/ML
INJECTION, SOLUTION INTRAMUSCULAR; INTRAVENOUS AS NEEDED
Status: DISCONTINUED | OUTPATIENT
Start: 2023-03-23 | End: 2023-03-23 | Stop reason: SURG

## 2023-03-23 RX ORDER — ACETAMINOPHEN 500 MG
1000 TABLET ORAL ONCE
Status: DISCONTINUED | OUTPATIENT
Start: 2023-03-23 | End: 2023-03-23 | Stop reason: HOSPADM

## 2023-03-23 RX ORDER — NICOTINE POLACRILEX 4 MG
15 LOZENGE BUCCAL
Status: DISCONTINUED | OUTPATIENT
Start: 2023-03-23 | End: 2023-03-25

## 2023-03-23 RX ORDER — SODIUM PHOSPHATE, DIBASIC AND SODIUM PHOSPHATE, MONOBASIC 7; 19 G/133ML; G/133ML
1 ENEMA RECTAL ONCE AS NEEDED
Status: DISCONTINUED | OUTPATIENT
Start: 2023-03-23 | End: 2023-03-25

## 2023-03-23 RX ORDER — NALOXONE HYDROCHLORIDE 0.4 MG/ML
80 INJECTION, SOLUTION INTRAMUSCULAR; INTRAVENOUS; SUBCUTANEOUS AS NEEDED
Status: DISCONTINUED | OUTPATIENT
Start: 2023-03-23 | End: 2023-03-23 | Stop reason: HOSPADM

## 2023-03-23 RX ORDER — DOCUSATE SODIUM 100 MG/1
100 CAPSULE, LIQUID FILLED ORAL 2 TIMES DAILY PRN
Qty: 30 CAPSULE | Refills: 0 | Status: SHIPPED | OUTPATIENT
Start: 2023-03-23

## 2023-03-23 RX ORDER — LORAZEPAM 2 MG/ML
1 INJECTION INTRAMUSCULAR
Status: DISCONTINUED | OUTPATIENT
Start: 2023-03-23 | End: 2023-03-25

## 2023-03-23 RX ORDER — SODIUM CHLORIDE, SODIUM LACTATE, POTASSIUM CHLORIDE, CALCIUM CHLORIDE 600; 310; 30; 20 MG/100ML; MG/100ML; MG/100ML; MG/100ML
INJECTION, SOLUTION INTRAVENOUS CONTINUOUS
Status: DISCONTINUED | OUTPATIENT
Start: 2023-03-23 | End: 2023-03-23

## 2023-03-23 RX ORDER — MELATONIN
3 NIGHTLY PRN
Status: DISCONTINUED | OUTPATIENT
Start: 2023-03-23 | End: 2023-03-25

## 2023-03-23 RX ORDER — ALBUTEROL SULFATE 2.5 MG/3ML
2.5 SOLUTION RESPIRATORY (INHALATION) EVERY 4 HOURS PRN
Status: DISCONTINUED | OUTPATIENT
Start: 2023-03-23 | End: 2023-03-25

## 2023-03-23 RX ORDER — PROCHLORPERAZINE EDISYLATE 5 MG/ML
5 INJECTION INTRAMUSCULAR; INTRAVENOUS EVERY 8 HOURS PRN
Status: DISCONTINUED | OUTPATIENT
Start: 2023-03-23 | End: 2023-03-23 | Stop reason: HOSPADM

## 2023-03-23 RX ORDER — ONDANSETRON 2 MG/ML
INJECTION INTRAMUSCULAR; INTRAVENOUS AS NEEDED
Status: DISCONTINUED | OUTPATIENT
Start: 2023-03-23 | End: 2023-03-23 | Stop reason: SURG

## 2023-03-23 RX ORDER — ACETAMINOPHEN 500 MG
500 TABLET ORAL EVERY 4 HOURS PRN
Status: DISCONTINUED | OUTPATIENT
Start: 2023-03-23 | End: 2023-03-25

## 2023-03-23 RX ORDER — SCOLOPAMINE TRANSDERMAL SYSTEM 1 MG/1
1 PATCH, EXTENDED RELEASE TRANSDERMAL ONCE
Status: DISCONTINUED | OUTPATIENT
Start: 2023-03-23 | End: 2023-03-23 | Stop reason: HOSPADM

## 2023-03-23 RX ORDER — DEXAMETHASONE SODIUM PHOSPHATE 4 MG/ML
VIAL (ML) INJECTION AS NEEDED
Status: DISCONTINUED | OUTPATIENT
Start: 2023-03-23 | End: 2023-03-23 | Stop reason: SURG

## 2023-03-23 RX ORDER — NICOTINE POLACRILEX 4 MG
30 LOZENGE BUCCAL
Status: DISCONTINUED | OUTPATIENT
Start: 2023-03-23 | End: 2023-03-25

## 2023-03-23 RX ORDER — ENOXAPARIN SODIUM 100 MG/ML
40 INJECTION SUBCUTANEOUS DAILY
Status: DISCONTINUED | OUTPATIENT
Start: 2023-03-24 | End: 2023-03-25

## 2023-03-23 RX ORDER — ACETAMINOPHEN 500 MG
1000 TABLET ORAL ONCE AS NEEDED
Status: DISCONTINUED | OUTPATIENT
Start: 2023-03-23 | End: 2023-03-23 | Stop reason: HOSPADM

## 2023-03-23 RX ORDER — MIDAZOLAM HYDROCHLORIDE 1 MG/ML
1 INJECTION INTRAMUSCULAR; INTRAVENOUS EVERY 5 MIN PRN
Status: DISCONTINUED | OUTPATIENT
Start: 2023-03-23 | End: 2023-03-23 | Stop reason: HOSPADM

## 2023-03-23 RX ORDER — ONDANSETRON 2 MG/ML
4 INJECTION INTRAMUSCULAR; INTRAVENOUS EVERY 6 HOURS PRN
Status: DISCONTINUED | OUTPATIENT
Start: 2023-03-23 | End: 2023-03-23 | Stop reason: HOSPADM

## 2023-03-23 RX ORDER — PROCHLORPERAZINE EDISYLATE 5 MG/ML
5 INJECTION INTRAMUSCULAR; INTRAVENOUS EVERY 8 HOURS PRN
Status: DISCONTINUED | OUTPATIENT
Start: 2023-03-23 | End: 2023-03-25

## 2023-03-23 RX ADMIN — LEVOFLOXACIN 500 MG: 5 INJECTION, SOLUTION INTRAVENOUS at 11:06:00

## 2023-03-23 RX ADMIN — SODIUM CHLORIDE, SODIUM LACTATE, POTASSIUM CHLORIDE, CALCIUM CHLORIDE: 600; 310; 30; 20 INJECTION, SOLUTION INTRAVENOUS at 11:00:00

## 2023-03-23 RX ADMIN — ONDANSETRON 4 MG: 2 INJECTION INTRAMUSCULAR; INTRAVENOUS at 11:21:00

## 2023-03-23 RX ADMIN — DEXAMETHASONE SODIUM PHOSPHATE 8 MG: 4 MG/ML VIAL (ML) INJECTION at 11:09:00

## 2023-03-23 RX ADMIN — MIDAZOLAM HYDROCHLORIDE 2 MG: 1 INJECTION INTRAMUSCULAR; INTRAVENOUS at 10:59:00

## 2023-03-23 RX ADMIN — SODIUM CHLORIDE, SODIUM LACTATE, POTASSIUM CHLORIDE, CALCIUM CHLORIDE: 600; 310; 30; 20 INJECTION, SOLUTION INTRAVENOUS at 12:23:00

## 2023-03-23 RX ADMIN — KETOROLAC TROMETHAMINE 30 MG: 30 INJECTION, SOLUTION INTRAMUSCULAR; INTRAVENOUS at 11:21:00

## 2023-03-23 NOTE — PROCEDURES
ELECTROENCEPHALOGRAM REPORT    Patient Name: Danna Richmond  SFLKY Date:03/23/23  Patient Diagnosis: Seizure    TECHNICAL SUMMARY:  An ambulatory EEG was obtained. There was no sedation given. The routine 16 channel EEG was performed with bipolar connections using an anterior to posterior double-banana montage. Seizure software stored files described as automatic seizure detections, automated interictal epileptiform detections (spikes). BACKGROUND:  The dominant posterior rhythm consisted of 8.5 Hertz, medium amplitude, and well-regulated, symmetric waves. There was normal attenuation to eye opening maneuvers. Sleep stages were identified patient had stage I-II    SLOWING:  None    EPILEPTIC DISCHARGES:  None    INTERPRETATION:  This is a normal awake and asleep EEG. No seizures or epileptiform discharges seen during this recording.      Clinical correlation is advised    Tessie Burdick MD  03/23/23 5:40 PM

## 2023-03-23 NOTE — DISCHARGE INSTRUCTIONS
You had a procedure called a CYSTOSCOPY today    - You may resume regular activity tomorrow. - You may have a post-operative appointment that is already scheduled for you. If the appointment date or time does not work with your schedule please call our office to reschedule ([phone]). Alternatively our office may be reaching out to you to schedule the appointment. - You may experience pain after the procedure for 1-2 days. If pain becomes intolerable please contact our office or go to the nearest Emergency Room/Immediate Care. You make take over-the counter ibuprofen for mild pain (provided you do not have a medical condition such as stomach ulcers or kidney disease which prohibits you from taking). You may take this in addition to tylenol or narcotic pain medication. Hot packs may help for discomfort as well. - If you are medically allowed to take ibuprofen then you may take 200-400 mg every 8 hours as needed for pain with plenty of fluids. You may take this in addition to tylenol or other pain medication which may be prescribed. - You may experience burning with urination and frequency of urination over the next few days.     - You may see blood in your urine that should clear up within a few days. If you have a stent in place then you may see blood in the urine until the stent is eventually removed. - Try to abstain from alcohol, coffee, tea, artificial sweeteners, and spicy food for the next 48 hours as these can irritate the bladder.     - If you develop a fever, chills, difficulty urinating or abdominal pain in the next 24 hours, call the office.     - Drink 1.5 to 2 liters of fluid today, water is preferable. If you are on a fluid restriction due to other medical reasons then you need to adhere to your fluid restriction recommendations.

## 2023-03-23 NOTE — OPERATIVE REPORT
Urology Operative Note    Attending Surgeon: Eb Roque MD    Patient Name: Maria G Vegas    Date of Surgery: 3/23/2023    Preoperative Diagnosis: left sided kidney and ureteral stones    Postoperative Diagnosis: same    Procedure Performed: Cystoscopy, left flexible ureteroscopy with Holmium laser lithotripsy and basket extraction of stone fragments, left ureteral stent exchange    Indication for procedure:  Patient is a 50year old female who presented with a left sided ureteral and kidney stones. The patient was counseled on options and elected to undergo the aforementioned procedure. We discussed the risks and benefits to surgery. We discussed risks including, but not limited to, bleeding, infection, possible damage to surrounding structures, possible need for repeat procedure(s). The patient understood these risks and wished to proceed with surgery. Description of the procedure: The patient was taken to the operating room and prepped and draped in lithotomy position after undergoing general anesthesia. The cystoscope was inserted and the bladder was inspected and drained. The left ureteral stent was grasped and this was pulled to the meatus. This was cannulated with a wire and the wire was passed up into the renal pelvis which I confirmed using fluoroscopy. The old stent was removed. I then inserted a flexible sheath and a second wire as a safety wire. I then reinserted the flexible sheath. We then proceeded with flexible ureteroscopy. I was easily able to identify the stone burden with the largest stone measuring ~ 11 mm. We were able to fragment the stone easily using the Holmium laser and the stone fragments were removed. I carefully inspected all the renal calices and the ureter in its entirety. No significant residual stone burden was remaining. I then removed the flexible scope and sheath. I inserted a 6 x 24 cm JJ ureteral stent over the safety wire.    I confirmed there was good curl of stent in the kidney using fluoroscopy as well as good curl of stent in the bladder using direct cystoscopic examination. The bladder was drained and the scope was removed. The stone fragments were sent for analysis. The patient was awoken having tolerated the procedure well. Specimens: Stone fragments    Complications: No known complications.     Condition on Discharge from the operating room was stable    Plan: The patient will follow up in the office for stent removal.    Evelia Hernandez MD    Date: 3/23/2023 Time: 12:39 PM

## 2023-03-23 NOTE — ANESTHESIA POSTPROCEDURE EVALUATION
[address] Patient Status:  Hospital Outpatient Surgery   Age/Gender 50year old female MRN [id number]   Location [address] [facility] Attending Octavia Moody MD   Hosp Day # 0 PCP Maribel Velez MD       Anesthesia Post-op Note    CYSTOSCOPY, LEFT FLEXIBLE URETEROSCOPY, LASER LITHOTRIPSY, STONE REMOVAL WITH STENT EXCHANGE    Procedure Summary     Date: 03/23/23 Room / Location: [address] [place] [address] / [address] [place] [address] MAIN OR    Anesthesia Start: 7606 Anesthesia Stop: 8417    Procedure: CYSTOSCOPY, LEFT FLEXIBLE URETEROSCOPY, LASER LITHOTRIPSY, STONE REMOVAL WITH STENT EXCHANGE (Left: Urethra) Diagnosis:       Left renal stone      Left ureteral stone      (Left renal stone [N20. 0]Left ureteral stone [N20.1])    Surgeons: Octavia Moody MD Anesthesiologist: Melania Harvey MD    Anesthesia Type: general ASA Status: 3          Anesthesia Type: general    Vitals Value Taken Time   /86 03/23/23 1233   Temp 98.1 03/23/23 1233   Pulse 85 03/23/23 1233   Resp 20 03/23/23 1233   SpO2 95% 03/23/23 1233       Patient Location: PACU    Anesthesia Type: general    Airway Patency: patent    Postop Pain Control: adequate    Mental Status: mildly sedated but able to meaningfully participate in the post-anesthesia evaluation    Nausea/Vomiting: none    Cardiopulmonary/Hydration status: stable euvolemic    Complications: no apparent anesthesia related complications    Postop vital signs: stable    Dental Exam: Unchanged from Preop    Patient to be discharged from PACU when criteria met.

## 2023-03-23 NOTE — ANESTHESIA PROCEDURE NOTES
Airway  Date/Time: 3/23/2023 11:04 AM  Urgency: elective      General Information and Staff    Patient location during procedure: OR  Anesthesiologist: Cruz Ewing MD  Resident/CRNA: Jerome Quick CRNA  Performed: CRNA   Performed by: Jerome Quick CRNA  Authorized by: Cruz Ewing MD      Indications and Patient Condition  Indications for airway management: anesthesia  Sedation level: deep  Preoxygenated: yes  Patient position: sniffing  Mask difficulty assessment: 1 - vent by mask    Final Airway Details  Final airway type: supraglottic airway      Successful airway: classic  Size 3       Number of attempts at approach: 1

## 2023-03-23 NOTE — PROGRESS NOTES
RRT Note:     50year old female with a history of asthma, nephrolithiasis, migraines, seizure disorder, HUGO underwent outpatient cystoscopy and ureteroscopy with left ureteral stent placement for stones today. Intra-op and PACU stay without events. She was being assisted to the bathroom and while urinating she felt unwell and per nursing eyes appeared glassy. She subsequently had whole body shaking. She was assisted back to bed with a candi lift. RRT was called. She had 3 episodes of shaking and 2-3 more after my arrival.       /96, HR 82. Patient with her eyes closed throughout encounter. She intermittently nodded yes and no to questions. She intermittently followed commands. Normal S1 and S2. No obvious murmurs. Lungs CTAB. Abdomen with active bowel sounds, non-tender, non-distended. When arm is raised over her face and dropped, she slowly lowered it to behind the head. A/P:   # Whole body shaking   DDx: Post-op tremor, seizure vs. pseudoseizure   - 1 mg IV ativan pushed during an episode   - CBC, CMP, lactic acid  - Admit as observation to neuro-tele   - Plan for CT head, EEG and neuro consult upon admission     32 minutes critical care time.      Traci Norwood MD

## 2023-03-24 LAB
ALBUMIN SERPL-MCNC: 3 G/DL (ref 3.4–5)
ALBUMIN/GLOB SERPL: 0.9 {RATIO} (ref 1–2)
ALP LIVER SERPL-CCNC: 47 U/L
ALT SERPL-CCNC: 27 U/L
ANION GAP SERPL CALC-SCNC: 6 MMOL/L (ref 0–18)
AST SERPL-CCNC: 23 U/L (ref 15–37)
BILIRUB SERPL-MCNC: 0.6 MG/DL (ref 0.1–2)
BUN BLD-MCNC: 9 MG/DL (ref 7–18)
CALCIUM BLD-MCNC: 7.9 MG/DL (ref 8.5–10.1)
CHLORIDE SERPL-SCNC: 111 MMOL/L (ref 98–112)
CO2 SERPL-SCNC: 23 MMOL/L (ref 21–32)
CREAT BLD-MCNC: 0.52 MG/DL
ERYTHROCYTE [DISTWIDTH] IN BLOOD BY AUTOMATED COUNT: 12.4 %
GFR SERPLBLD BASED ON 1.73 SQ M-ARVRAT: 114 ML/MIN/1.73M2 (ref 60–?)
GLOBULIN PLAS-MCNC: 3.2 G/DL (ref 2.8–4.4)
GLUCOSE BLD-MCNC: 141 MG/DL (ref 70–99)
GLUCOSE BLD-MCNC: 143 MG/DL (ref 70–99)
GLUCOSE BLD-MCNC: 162 MG/DL (ref 70–99)
GLUCOSE BLD-MCNC: 177 MG/DL (ref 70–99)
GLUCOSE BLD-MCNC: 188 MG/DL (ref 70–99)
HCT VFR BLD AUTO: 36.2 %
HGB BLD-MCNC: 12.2 G/DL
MAGNESIUM SERPL-MCNC: 1.9 MG/DL (ref 1.6–2.6)
MCH RBC QN AUTO: 30.5 PG (ref 26–34)
MCHC RBC AUTO-ENTMCNC: 33.7 G/DL (ref 31–37)
MCV RBC AUTO: 90.5 FL
OSMOLALITY SERPL CALC.SUM OF ELEC: 291 MOSM/KG (ref 275–295)
PHOSPHATE SERPL-MCNC: 2.4 MG/DL (ref 2.5–4.9)
PLATELET # BLD AUTO: 270 10(3)UL (ref 150–450)
POTASSIUM SERPL-SCNC: 3.7 MMOL/L (ref 3.5–5.1)
PROT SERPL-MCNC: 6.2 G/DL (ref 6.4–8.2)
RBC # BLD AUTO: 4 X10(6)UL
SODIUM SERPL-SCNC: 140 MMOL/L (ref 136–145)
WBC # BLD AUTO: 12.9 X10(3) UL (ref 4–11)

## 2023-03-24 PROCEDURE — 99223 1ST HOSP IP/OBS HIGH 75: CPT | Performed by: OTHER

## 2023-03-24 PROCEDURE — 99231 SBSQ HOSP IP/OBS SF/LOW 25: CPT | Performed by: PHYSICIAN ASSISTANT

## 2023-03-24 RX ORDER — DIVALPROEX SODIUM 250 MG/1
250 TABLET, EXTENDED RELEASE ORAL 2 TIMES DAILY
Status: DISCONTINUED | OUTPATIENT
Start: 2023-03-24 | End: 2023-03-25

## 2023-03-24 RX ORDER — CEFDINIR 300 MG/1
300 CAPSULE ORAL 2 TIMES DAILY
Status: DISCONTINUED | OUTPATIENT
Start: 2023-03-24 | End: 2023-03-25

## 2023-03-24 RX ORDER — BUTALBITAL, ACETAMINOPHEN AND CAFFEINE 50; 325; 40 MG/1; MG/1; MG/1
1 TABLET ORAL EVERY 4 HOURS PRN
Status: DISCONTINUED | OUTPATIENT
Start: 2023-03-24 | End: 2023-03-25

## 2023-03-24 RX ORDER — DIVALPROEX SODIUM 250 MG/1
250 TABLET, EXTENDED RELEASE ORAL 2 TIMES DAILY
Qty: 60 TABLET | Refills: 0 | Status: SHIPPED | OUTPATIENT
Start: 2023-03-24

## 2023-03-24 NOTE — PLAN OF CARE
A&Ox4  Room air  No seizures noted today  Starting Depakote tonight per neurology  PO abx per urology  NA phos supps given today  L flank pain- improved with Tylenol/heat  Headache- improved with Fiorcet

## 2023-03-24 NOTE — PROGRESS NOTES
[facility] called to clarify Depakote dosing as either 250 mg ER daily or 250 mg BID. Currently ordered as 250 mg ER BID, which cannot be filled as ordered per their pharmacy. RN clarified with Dr. Oksana Link and MD does want this given as ordered, as this is appropriate dosing. Attempted to reach [address] to clarify this, no answer, so LM.

## 2023-03-25 VITALS
BODY MASS INDEX: 45.77 KG/M2 | SYSTOLIC BLOOD PRESSURE: 115 MMHG | WEIGHT: 227.06 LBS | HEIGHT: 59 IN | TEMPERATURE: 99 F | DIASTOLIC BLOOD PRESSURE: 59 MMHG | OXYGEN SATURATION: 97 % | HEART RATE: 75 BPM | RESPIRATION RATE: 18 BRPM

## 2023-03-25 LAB
GLUCOSE BLD-MCNC: 125 MG/DL (ref 70–99)
PHOSPHATE SERPL-MCNC: 2.5 MG/DL (ref 2.5–4.9)

## 2023-03-25 NOTE — PLAN OF CARE
Assumed patient care at 0730  SHANNON VIVAR  Tele- NSR  Patient report pain 5/10- Tylenol given  Patient reports no n/v/d  Depakote administered with am meds  Lovenox not administered as patient is ambulatory  Bed in lowest position  Call light within reach  Needs met at this time.     Problem: PAIN - ADULT  Goal: Verbalizes/displays adequate comfort level or patient's stated pain goal  Description: INTERVENTIONS:  - Encourage pt to monitor pain and request assistance  - Assess pain using appropriate pain scale  - Administer analgesics based on type and severity of pain and evaluate response  - Implement non-pharmacological measures as appropriate and evaluate response  - Consider cultural and social influences on pain and pain management  - Manage/alleviate anxiety  - Utilize distraction and/or relaxation techniques  - Monitor for opioid side effects  - Notify MD/LIP if interventions unsuccessful or patient reports new pain  - Anticipate increased pain with activity and pre-medicate as appropriate  Outcome: Progressing     Problem: NEUROLOGICAL - ADULT  Goal: Absence of seizures  Description: INTERVENTIONS  - Monitor for seizure activity  - Administer anti-seizure medications as ordered  - Monitor neurological status  Outcome: Progressing

## 2023-03-25 NOTE — PLAN OF CARE
Pt is aox4. No seizure activity overnight. Medicated per orders. Medicated for flank pain with tylenol. Pt took shower. Pt removed IV. Pt is to discharge today and will check with urology about not replacing IV.       Problem: PAIN - ADULT  Goal: Verbalizes/displays adequate comfort level or patient's stated pain goal  Description: INTERVENTIONS:  - Encourage pt to monitor pain and request assistance  - Assess pain using appropriate pain scale  - Administer analgesics based on type and severity of pain and evaluate response  - Implement non-pharmacological measures as appropriate and evaluate response  - Consider cultural and social influences on pain and pain management  - Manage/alleviate anxiety  - Utilize distraction and/or relaxation techniques  - Monitor for opioid side effects  - Notify MD/LIP if interventions unsuccessful or patient reports new pain  - Anticipate increased pain with activity and pre-medicate as appropriate  Outcome: Progressing     Problem: NEUROLOGICAL - ADULT  Goal: Absence of seizures  Description: INTERVENTIONS  - Monitor for seizure activity  - Administer anti-seizure medications as ordered  - Monitor neurological status  Outcome: Progressing

## 2023-03-25 NOTE — PLAN OF CARE
NURSING DISCHARGE NOTE    Discharged Home via Wheelchair. Accompanied by Support staff  Belongings Taken by patient/family. Patient given dc paperwork.  at bedside. Verbalizes understanding.

## 2023-03-29 LAB — CALCULI MASS: 179 MG

## 2023-03-30 ENCOUNTER — PROCEDURE (OUTPATIENT)
Dept: SURGERY | Facility: CLINIC | Age: 48
End: 2023-03-30

## 2023-03-30 DIAGNOSIS — R82.90 URINE FINDING: Primary | ICD-10-CM

## 2023-03-30 DIAGNOSIS — N20.0 NEPHROLITHIASIS: ICD-10-CM

## 2023-03-30 LAB
APPEARANCE: CLEAR
BILIRUBIN: NEGATIVE
GLUCOSE (URINE DIPSTICK): NEGATIVE MG/DL
KETONES (URINE DIPSTICK): NEGATIVE MG/DL
MULTISTIX LOT#: ABNORMAL NUMERIC
NITRITE, URINE: NEGATIVE
PH, URINE: 5.5 (ref 4.5–8)
PROTEIN (URINE DIPSTICK): 100 MG/DL
SPECIFIC GRAVITY: >=1.03 (ref 1–1.03)
URINE-COLOR: YELLOW
UROBILINOGEN,SEMI-QN: 0.2 MG/DL (ref 0–1.9)

## 2023-03-30 PROCEDURE — 52310 CYSTOSCOPY AND TREATMENT: CPT | Performed by: PHYSICIAN ASSISTANT

## 2023-03-30 PROCEDURE — 81003 URINALYSIS AUTO W/O SCOPE: CPT | Performed by: PHYSICIAN ASSISTANT

## 2023-04-24 RX ORDER — DIVALPROEX SODIUM 250 MG/1
TABLET, EXTENDED RELEASE ORAL
Qty: 60 TABLET | Refills: 0 | OUTPATIENT
Start: 2023-04-24

## 2023-05-03 RX ORDER — DIVALPROEX SODIUM 250 MG/1
TABLET, EXTENDED RELEASE ORAL
Qty: 60 TABLET | Refills: 0 | Status: SHIPPED | OUTPATIENT
Start: 2023-05-03

## 2023-05-14 ENCOUNTER — HOSPITAL ENCOUNTER (OUTPATIENT)
Age: 48
Discharge: HOME | End: 2023-05-14
Payer: MEDICAID

## 2023-05-14 VITALS
BODY MASS INDEX: 43.34 KG/M2 | WEIGHT: 215 LBS | SYSTOLIC BLOOD PRESSURE: 112 MMHG | OXYGEN SATURATION: 96 % | TEMPERATURE: 98 F | RESPIRATION RATE: 18 BRPM | HEART RATE: 75 BPM | HEIGHT: 59 IN | DIASTOLIC BLOOD PRESSURE: 74 MMHG

## 2023-05-14 DIAGNOSIS — R09.81 NASAL CONGESTION: Primary | ICD-10-CM

## 2023-05-14 RX ORDER — MOMETASONE FUROATE 50 UG/1
1 SPRAY, METERED NASAL DAILY
Qty: 1 EACH | Refills: 0 | Status: SHIPPED | OUTPATIENT
Start: 2023-05-14 | End: 2023-05-14

## 2023-05-14 RX ORDER — MOMETASONE FUROATE 50 UG/1
1 SPRAY, METERED NASAL DAILY
Qty: 1 EACH | Refills: 0 | Status: SHIPPED | OUTPATIENT
Start: 2023-05-14

## 2023-05-14 NOTE — DISCHARGE INSTRUCTIONS
Follow-up with your primary care physician in one week if symptoms have not improved or symptoms are starting to get worse. Increase fluids, keep well-hydrated. Take Tylenol and Motrin for fever and pain. Take the Nasonex as read by the pharmacy  Over-the-counter instruments such as Zyrtec nightly  Return to the emergency room if your symptoms or concerns.

## 2023-06-03 ENCOUNTER — OFFICE VISIT (OUTPATIENT)
Dept: FAMILY MEDICINE CLINIC | Facility: CLINIC | Age: 48
End: 2023-06-03
Payer: MEDICAID

## 2023-06-03 VITALS
RESPIRATION RATE: 18 BRPM | HEIGHT: 59 IN | OXYGEN SATURATION: 98 % | HEART RATE: 78 BPM | TEMPERATURE: 97 F | WEIGHT: 234 LBS | DIASTOLIC BLOOD PRESSURE: 80 MMHG | BODY MASS INDEX: 47.17 KG/M2 | SYSTOLIC BLOOD PRESSURE: 102 MMHG

## 2023-06-03 DIAGNOSIS — G43.001 MIGRAINE WITHOUT AURA AND WITH STATUS MIGRAINOSUS, NOT INTRACTABLE: ICD-10-CM

## 2023-06-03 DIAGNOSIS — G47.33 OSA (OBSTRUCTIVE SLEEP APNEA): Primary | ICD-10-CM

## 2023-06-03 DIAGNOSIS — R21 RASH: ICD-10-CM

## 2023-06-03 PROCEDURE — 3074F SYST BP LT 130 MM HG: CPT | Performed by: FAMILY MEDICINE

## 2023-06-03 PROCEDURE — 99214 OFFICE O/P EST MOD 30 MIN: CPT | Performed by: FAMILY MEDICINE

## 2023-06-03 PROCEDURE — 3079F DIAST BP 80-89 MM HG: CPT | Performed by: FAMILY MEDICINE

## 2023-06-03 PROCEDURE — 3008F BODY MASS INDEX DOCD: CPT | Performed by: FAMILY MEDICINE

## 2023-06-04 RX ORDER — MUPIROCIN CALCIUM 20 MG/G
CREAM TOPICAL
Qty: 15 G | Refills: 0 | Status: SHIPPED | OUTPATIENT
Start: 2023-06-04

## 2023-06-05 ENCOUNTER — TELEPHONE (OUTPATIENT)
Dept: FAMILY MEDICINE CLINIC | Facility: CLINIC | Age: 48
End: 2023-06-05

## 2023-06-05 NOTE — TELEPHONE ENCOUNTER
Pt went to the pharmacy to  her rx. Informed pt that we are waiting on insurance to approve. Pt would like a call when the PA has been approved.

## 2023-06-05 NOTE — TELEPHONE ENCOUNTER
PA completed electronically through Epic. Fax received stating the Mupirocin was denied. Please advise - any other meds/recs?

## 2023-06-20 ENCOUNTER — TELEPHONE (OUTPATIENT)
Dept: FAMILY MEDICINE CLINIC | Facility: CLINIC | Age: 48
End: 2023-06-20

## 2023-06-20 NOTE — TELEPHONE ENCOUNTER
Pt will be faxing a medical clearance form for Dr Jose Barrientos to complete regarding hearing aids. Pt wants the form faxed to the fax # on the form     And she wants to form back after its been completed.

## 2023-07-02 ENCOUNTER — HOSPITAL ENCOUNTER (OUTPATIENT)
Age: 48
Discharge: HOME | End: 2023-07-02
Payer: MEDICAID

## 2023-07-02 ENCOUNTER — APPOINTMENT (OUTPATIENT)
Dept: CT IMAGING | Age: 48
End: 2023-07-02
Attending: NURSE PRACTITIONER
Payer: MEDICAID

## 2023-07-02 VITALS
WEIGHT: 210 LBS | BODY MASS INDEX: 42.33 KG/M2 | OXYGEN SATURATION: 98 % | HEIGHT: 59 IN | DIASTOLIC BLOOD PRESSURE: 93 MMHG | HEART RATE: 82 BPM | TEMPERATURE: 98 F | SYSTOLIC BLOOD PRESSURE: 129 MMHG | RESPIRATION RATE: 18 BRPM

## 2023-07-02 DIAGNOSIS — N89.8 VAGINAL DISCHARGE: ICD-10-CM

## 2023-07-02 DIAGNOSIS — N30.00 ACUTE CYSTITIS WITHOUT HEMATURIA: Primary | ICD-10-CM

## 2023-07-02 DIAGNOSIS — R10.9 LEFT FLANK PAIN: ICD-10-CM

## 2023-07-02 LAB
#MXD IC: 0.6 X10ˆ3/UL (ref 0.1–1)
B-HCG UR QL: NEGATIVE
BUN BLD-MCNC: 13 MG/DL (ref 7–18)
CHLORIDE BLD-SCNC: 101 MMOL/L (ref 98–112)
CO2 BLD-SCNC: 29 MMOL/L (ref 21–32)
CREAT BLD-MCNC: 0.7 MG/DL
GFR SERPLBLD BASED ON 1.73 SQ M-ARVRAT: 106 ML/MIN/1.73M2 (ref 60–?)
GLUCOSE BLD-MCNC: 125 MG/DL (ref 70–99)
HCT VFR BLD AUTO: 42.7 %
HCT VFR BLD CALC: 43 %
HGB BLD-MCNC: 14.1 G/DL
ISTAT IONIZED CALCIUM FOR CHEM 8: 1.12 MMOL/L (ref 1.12–1.32)
LYMPHOCYTES # BLD AUTO: 2 X10ˆ3/UL (ref 1–4)
LYMPHOCYTES NFR BLD AUTO: 22.5 %
MCH RBC QN AUTO: 30.1 PG (ref 26–34)
MCHC RBC AUTO-ENTMCNC: 33 G/DL (ref 31–37)
MCV RBC AUTO: 91.2 FL (ref 80–100)
MIXED CELL %: 7 %
NEUTROPHILS # BLD AUTO: 6.3 X10ˆ3/UL (ref 1.5–7.7)
NEUTROPHILS NFR BLD AUTO: 70.5 %
PLATELET # BLD AUTO: 294 X10ˆ3/UL (ref 150–450)
POCT BILIRUBIN URINE: NEGATIVE
POCT GLUCOSE URINE: NEGATIVE MG/DL
POCT KETONE URINE: NEGATIVE MG/DL
POCT NITRITE URINE: NEGATIVE
POCT PH URINE: 5.5 (ref 5–8)
POCT PROTEIN URINE: 30 MG/DL
POCT SPECIFIC GRAVITY URINE: 1.03
POCT URINE COLOR: YELLOW
POCT UROBILINOGEN URINE: 0.2 MG/DL
POTASSIUM BLD-SCNC: 4.2 MMOL/L (ref 3.6–5.1)
RBC # BLD AUTO: 4.68 X10ˆ6/UL
SODIUM BLD-SCNC: 138 MMOL/L (ref 136–145)
WBC # BLD AUTO: 8.9 X10ˆ3/UL (ref 4–11)

## 2023-07-02 PROCEDURE — 85025 COMPLETE CBC W/AUTO DIFF WBC: CPT | Performed by: NURSE PRACTITIONER

## 2023-07-02 PROCEDURE — 81025 URINE PREGNANCY TEST: CPT | Performed by: NURSE PRACTITIONER

## 2023-07-02 PROCEDURE — 99214 OFFICE O/P EST MOD 30 MIN: CPT | Performed by: NURSE PRACTITIONER

## 2023-07-02 PROCEDURE — 80047 BASIC METABLC PNL IONIZED CA: CPT | Performed by: NURSE PRACTITIONER

## 2023-07-02 PROCEDURE — 81002 URINALYSIS NONAUTO W/O SCOPE: CPT | Performed by: PHYSICIAN ASSISTANT

## 2023-07-02 PROCEDURE — 74176 CT ABD & PELVIS W/O CONTRAST: CPT | Performed by: NURSE PRACTITIONER

## 2023-07-02 RX ORDER — SODIUM CHLORIDE 9 MG/ML
1000 INJECTION, SOLUTION INTRAVENOUS ONCE
Status: DISCONTINUED | OUTPATIENT
Start: 2023-07-02 | End: 2023-07-02

## 2023-07-02 RX ORDER — IBUPROFEN 600 MG/1
600 TABLET ORAL ONCE
Status: COMPLETED | OUTPATIENT
Start: 2023-07-02 | End: 2023-07-02

## 2023-07-02 RX ORDER — FLUCONAZOLE 150 MG/1
TABLET ORAL
Qty: 2 TABLET | Refills: 0 | Status: SHIPPED | OUTPATIENT
Start: 2023-07-02

## 2023-07-02 RX ORDER — CEPHALEXIN 500 MG/1
500 CAPSULE ORAL 2 TIMES DAILY
Qty: 14 CAPSULE | Refills: 0 | Status: SHIPPED | OUTPATIENT
Start: 2023-07-02 | End: 2023-07-09

## 2023-07-02 RX ORDER — KETOROLAC TROMETHAMINE 30 MG/ML
30 INJECTION, SOLUTION INTRAMUSCULAR; INTRAVENOUS ONCE
Status: DISCONTINUED | OUTPATIENT
Start: 2023-07-02 | End: 2023-07-02

## 2023-07-03 ENCOUNTER — TELEPHONE (OUTPATIENT)
Dept: SURGERY | Facility: CLINIC | Age: 48
End: 2023-07-03

## 2023-07-03 LAB
C TRACH DNA SPEC QL NAA+PROBE: NEGATIVE
N GONORRHOEA DNA SPEC QL NAA+PROBE: NEGATIVE

## 2023-07-03 RX ORDER — METRONIDAZOLE 500 MG/1
500 TABLET ORAL 2 TIMES DAILY
Qty: 14 TABLET | Refills: 0 | Status: SHIPPED | OUTPATIENT
Start: 2023-07-03 | End: 2023-07-10

## 2023-07-07 ENCOUNTER — OFFICE VISIT (OUTPATIENT)
Dept: SURGERY | Facility: CLINIC | Age: 48
End: 2023-07-07

## 2023-07-07 DIAGNOSIS — N39.0 RECURRENT UTI: ICD-10-CM

## 2023-07-07 DIAGNOSIS — N20.1 URETERAL STONE: Primary | ICD-10-CM

## 2023-07-07 DIAGNOSIS — R10.9 FLANK PAIN: ICD-10-CM

## 2023-07-07 DIAGNOSIS — N20.0 RECURRENT KIDNEY STONES: ICD-10-CM

## 2023-07-07 LAB
APPEARANCE: CLEAR
GLUCOSE (URINE DIPSTICK): NEGATIVE MG/DL
KETONES (URINE DIPSTICK): 15 MG/DL
MULTISTIX LOT#: ABNORMAL NUMERIC
NITRITE, URINE: POSITIVE
PH, URINE: 8.5 (ref 4.5–8)
PROTEIN (URINE DIPSTICK): >=300 MG/DL
SPECIFIC GRAVITY: 1.01 (ref 1–1.03)
UROBILINOGEN,SEMI-QN: 1 MG/DL (ref 0–1.9)

## 2023-07-07 PROCEDURE — 81003 URINALYSIS AUTO W/O SCOPE: CPT | Performed by: UROLOGY

## 2023-07-07 PROCEDURE — 99214 OFFICE O/P EST MOD 30 MIN: CPT | Performed by: UROLOGY

## 2023-07-07 NOTE — PROGRESS NOTES
HPI:     Yanira Gerber is a 50year old female with a PMH of HUGO, seizure d/o. Following for:  1. Recurrent kidney stones  - s/p urgent left stent 3/8/23, left URS 3/23/23  - passed one ~ 27 y ago  2. Recurrent UTIs  - one every few years    PCP - Emily    Has had left flank pain x 3 weeks c/f kidney stone but has chronic pain on that side and also with right flank pain. Also with dysuria. Went to [facility] 7/2/23 with left flank pain, frequency, vaginal discharge  She is taking flagyl, keflex, diflucan. Dysuria has improved    UA is + for nitrates, large blood/leuks  UCx 3/16/23: 50-100K Staph and Corynebacterium    CT SP 7/2/23: 1 and 1 LUP which may be IP, FLD  CT SP 3/6/23: 5 L prox ureteral, 12 L renal pelvis stones    Drinks ~ 20-30 oz water, 20 soda, 20 juice with light yellow urine. We discussed stone prevention strategies at today's visit and I provided and reviewed educational materials for this. I recommend drinking at least 40-60 ounces of water per day or enough water to keep urine clear. I also recommend the patient avoid a high sodium diet. I also recommend avoiding foods high in oxalate and provided a list of foods high in oxalate. We discussed that the pain is likely musculoskeletal at this time. I encouraged hot packs, light stretching, massage, NSAID use if the patient is able to take with plenty of fluids. If above strategies fail to alleviate pain I recommend f/u with PCP to discuss further. She will try to double water intake to help prevent UTIs and kidney stones. Observation for tiny stones (which may be IP). MSK strategies reviewed for flank pain. F/u in 6 mo with KUB for stone surveillance with Stacy Cano or me. HISTORY:  Past Medical History:   Diagnosis Date    Asthma     COVID-19 05/2020    loss of taste and smell.  No hospitalization required    Extrinsic asthma, unspecified     last attack a couple of months ago    Genital herpes simplex     Gonorrhea     H. pylori infection 2018    Herpes     last outbreak years ago    Migraines     Osteoarthritis     mild in back    Pneumonia, organism unspecified(486) 1 year ago    PONV (postoperative nausea and vomiting)     mild     labor     pt. had 20 week loss    Seizure disorder ([address] [id number]    patient states Carolyn Valenzuela was from [place]     Sleep apnea     c-pap but dont use all the time      Past Surgical History:   Procedure Laterality Date          , , ,     COLONOSCOPY  2014    Procedure: COLONOSCOPY;  Surgeon: Lennox Sax, MD;  Location: [facility] ENDOSCOPY    COLONOSCOPY N/A 2019    Procedure: COLONOSCOPY;  Surgeon: Noemy Marie MD;  Location: [facility] ENDOSCOPY    HERNIA SURGERY      HYSTEROSCOPY,BIOPSY  2021    Hysteroscopy, D&C, endometrial polypectomy    LAPAROSCOPIC CHOLECYSTECTOMY  2 weeks ago    LASIK Bilateral     done in [address] [place] [address] 1 SITE LEFT (CPT=19281)      Benign    SCARLETT LOCALIZATION WIRE 1 SITE RIGHT (CPT=19281)      Benign    OTHER      baker cyst     OTHER      bunion bilateral - right foot metal    REDUCTION LEFT  1999    REDUCTION RIGHT      REMOVAL [facility]  2013    Procedure:  HERNIA VENTRAL REPAIR;  Surgeon: Ignacio Ocampo MD;  Location: [facility] MAIN OR      Family History   Problem Relation Age of Onset    Hypertension Mother     Glaucoma Mother     No Known Problems Maternal Grandmother     No Known Problems Maternal Grandfather     Diabetes Neg     Cancer Neg     Macular degeneration Neg       Social History:   Social History     Socioeconomic History    Marital status:    Tobacco Use    Smoking status: Never    Smokeless tobacco: Never    Tobacco comments:     NON-SMOKER   Vaping Use    Vaping Use: Never used   Substance and Sexual Activity    Alcohol use: Yes     Comment: occ    Drug use: No    Sexual activity: Yes     Partners: Male   Other Topics Concern    Caffeine Concern Yes     Comment: 3-4x cup daily     Exercise Yes     Comment: walking daily    Seat Belt Yes   Social History Narrative     worker, for emergency . Medications (Active prior to today's visit):  Current Outpatient Medications   Medication Sig Dispense Refill    metRONIDAZOLE 500 MG Oral Tab Take 1 tablet (500 mg total) by mouth in the morning and 1 tablet (500 mg total) before bedtime. Do all this for 7 days. 14 tablet 0    cephalexin 500 MG Oral Cap Take 1 capsule (500 mg total) by mouth 2 (two) times daily for 7 days. 14 capsule 0    fluconazole (DIFLUCAN) 150 MG Oral Tab Take the 2 doses 72 hours apart. 2 tablet 0    mupirocin 2 % External Cream Apply bid to affected on face x 7 days 15 g 0    albuterol (PROAIR HFA) 108 (90 Base) MCG/ACT Inhalation Aero Soln Inhale 2 puffs into the lungs every 4 (four) hours as needed for Wheezing. 6.7 g 0    albuterol (2.5 MG/3ML) 0.083% Inhalation Nebu Soln Take 3 mL (2.5 mg total) by nebulization every 4 (four) hours as needed for Wheezing. (Patient not taking: Reported on 7/7/2023) 30 each 0       Allergies:    Morphine Sulfate        SWELLING  Radiology Contrast *    SWELLING    Comment:Swelling--hands and feet      ROS:     A comprehensive 10 point review of systems was completed. Pertinent positives and negatives noted in the the HPI. PHYSICAL EXAM:     GENERAL APPEARANCE: well, developed, well nourished, in no acute distress  NEUROLOGIC: nonfocal, alert and oriented  HEAD: normocephalic, atraumatic  EYES: sclera non-icteric  EARS: hearing intact  ORAL CAVITY: mucosa moist  NECK/THYROID: no obvious goiter or masses  LUNGS: nonlabored breathing  ABDOMEN: soft, no obvious masses or tenderness  SKIN: no obvious rashes    : as noted above     ASSESSMENT/PLAN:   Diagnoses and all orders for this visit:    Ureteral stone  -     URINALYSIS, AUTO, W/O SCOPE  -     URINE CULTURE, ROUTINE    Recurrent kidney stones  -     XR ABDOMEN (1 VIEW) (CPT=74018);  Future    Flank pain    Recurrent UTI      - as noted above. Thanks again for this consult.     Senthil Tenorio MD, [facility]  Urologist  [place] [address]  Office: [phone]

## 2023-07-13 ENCOUNTER — HOSPITAL ENCOUNTER (OUTPATIENT)
Age: 48
Discharge: HOME | End: 2023-07-13
Payer: MEDICAID

## 2023-07-13 VITALS
OXYGEN SATURATION: 97 % | DIASTOLIC BLOOD PRESSURE: 77 MMHG | RESPIRATION RATE: 18 BRPM | HEART RATE: 92 BPM | SYSTOLIC BLOOD PRESSURE: 108 MMHG | TEMPERATURE: 98 F

## 2023-07-13 DIAGNOSIS — J02.0 STREPTOCOCCAL SORE THROAT: Primary | ICD-10-CM

## 2023-07-13 LAB — S PYO AG THROAT QL: POSITIVE

## 2023-07-13 PROCEDURE — 99213 OFFICE O/P EST LOW 20 MIN: CPT | Performed by: NURSE PRACTITIONER

## 2023-07-13 PROCEDURE — 87880 STREP A ASSAY W/OPTIC: CPT | Performed by: NURSE PRACTITIONER

## 2023-07-13 RX ORDER — IBUPROFEN 600 MG/1
600 TABLET ORAL ONCE
Status: COMPLETED | OUTPATIENT
Start: 2023-07-13 | End: 2023-07-13

## 2023-07-13 RX ORDER — PENICILLIN V POTASSIUM 500 MG/1
500 TABLET ORAL 3 TIMES DAILY
Qty: 30 TABLET | Refills: 0 | Status: SHIPPED | OUTPATIENT
Start: 2023-07-13 | End: 2023-07-23

## 2023-07-13 RX ORDER — FLUCONAZOLE 150 MG/1
150 TABLET ORAL DAILY
Qty: 2 TABLET | Refills: 0 | Status: SHIPPED | OUTPATIENT
Start: 2023-07-13 | End: 2023-07-15

## 2023-07-14 NOTE — DISCHARGE INSTRUCTIONS
Follow-up with your primary care physician in one week if symptoms have not improved or symptoms are starting to get worse. Please take and finish the full course of antibiotics for 10  days. Increase fluids, keep well-hydrated. Take Tylenol and Motrin for fever and pain. Eat and drink anything that is soothing to the back of the throat. Gargle with warm salt water, throat lozenges will be helpful. In 2 days please throw away her toothbrush and start with a new one.

## 2023-08-15 ENCOUNTER — OFFICE VISIT (OUTPATIENT)
Facility: LOCATION | Age: 48
End: 2023-08-15

## 2023-08-15 VITALS — HEART RATE: 88 BPM | TEMPERATURE: 98 F

## 2023-08-15 DIAGNOSIS — K43.9 VENTRAL HERNIA WITHOUT OBSTRUCTION OR GANGRENE: Primary | ICD-10-CM

## 2023-08-15 PROCEDURE — 99024 POSTOP FOLLOW-UP VISIT: CPT | Performed by: STUDENT IN AN ORGANIZED HEALTH CARE EDUCATION/TRAINING PROGRAM

## 2023-08-24 ENCOUNTER — TELEPHONE (OUTPATIENT)
Dept: FAMILY MEDICINE CLINIC | Facility: CLINIC | Age: 48
End: 2023-08-24

## 2023-08-24 DIAGNOSIS — Z12.31 BREAST CANCER SCREENING BY MAMMOGRAM: Primary | ICD-10-CM

## 2023-08-24 NOTE — TELEPHONE ENCOUNTER
Pt would like an order for her Mammogram,   Last Mammogram was 8/25/22.   Pt has a px scheduled   9/27/23     Future Appointments   Date Time Provider Erick Sravanhti   9/27/2023  1:15 PM Meliton Carpio MD [facility] [place]   10/16/2023  2:20 PM MAYA Mccartney [facility] [address]   1/8/2024  3:30 PM Mraicel Chu, PAVALENTE [id number] [facility] [address]

## 2023-08-24 NOTE — TELEPHONE ENCOUNTER
Per [facility] on 8/25/22:  RECOMMENDATIONS:    ROUTINE MAMMOGRAM AND CLINICAL EVALUATION IN 12 MONTHS. Order placed per protocol  Patient notified and verbalized understanding.

## 2023-08-29 ENCOUNTER — HOSPITAL ENCOUNTER (OUTPATIENT)
Dept: MAMMOGRAPHY | Facility: HOSPITAL | Age: 48
Discharge: HOME | End: 2023-08-29
Attending: FAMILY MEDICINE
Payer: MEDICAID

## 2023-08-29 DIAGNOSIS — Z12.31 BREAST CANCER SCREENING BY MAMMOGRAM: ICD-10-CM

## 2023-08-29 PROCEDURE — 77063 BREAST TOMOSYNTHESIS BI: CPT | Performed by: FAMILY MEDICINE

## 2023-08-29 PROCEDURE — 77067 SCR MAMMO BI INCL CAD: CPT | Performed by: FAMILY MEDICINE

## 2023-08-31 ENCOUNTER — TELEPHONE (OUTPATIENT)
Dept: FAMILY MEDICINE CLINIC | Facility: CLINIC | Age: 48
End: 2023-08-31

## 2023-09-27 ENCOUNTER — OFFICE VISIT (OUTPATIENT)
Dept: FAMILY MEDICINE CLINIC | Facility: CLINIC | Age: 48
End: 2023-09-27
Payer: MEDICAID

## 2023-09-27 VITALS
DIASTOLIC BLOOD PRESSURE: 84 MMHG | BODY MASS INDEX: 47.86 KG/M2 | SYSTOLIC BLOOD PRESSURE: 126 MMHG | TEMPERATURE: 97 F | HEIGHT: 58 IN | WEIGHT: 228 LBS | HEART RATE: 84 BPM | OXYGEN SATURATION: 98 %

## 2023-09-27 DIAGNOSIS — Z00.00 ANNUAL PHYSICAL EXAM: Primary | ICD-10-CM

## 2023-09-27 DIAGNOSIS — Z23 NEED FOR VACCINATION: ICD-10-CM

## 2023-09-27 DIAGNOSIS — J45.20 MILD INTERMITTENT ASTHMA WITHOUT COMPLICATION: ICD-10-CM

## 2023-09-27 PROCEDURE — 3074F SYST BP LT 130 MM HG: CPT | Performed by: FAMILY MEDICINE

## 2023-09-27 PROCEDURE — 99396 PREV VISIT EST AGE 40-64: CPT | Performed by: FAMILY MEDICINE

## 2023-09-27 PROCEDURE — 3008F BODY MASS INDEX DOCD: CPT | Performed by: FAMILY MEDICINE

## 2023-09-27 PROCEDURE — 90686 IIV4 VACC NO PRSV 0.5 ML IM: CPT | Performed by: FAMILY MEDICINE

## 2023-09-27 PROCEDURE — 90471 IMMUNIZATION ADMIN: CPT | Performed by: FAMILY MEDICINE

## 2023-09-27 PROCEDURE — 3079F DIAST BP 80-89 MM HG: CPT | Performed by: FAMILY MEDICINE

## 2023-09-27 PROCEDURE — 86580 TB INTRADERMAL TEST: CPT | Performed by: FAMILY MEDICINE

## 2023-09-27 RX ORDER — ALBUTEROL SULFATE 90 UG/1
2 AEROSOL, METERED RESPIRATORY (INHALATION) EVERY 4 HOURS PRN
Qty: 6.7 G | Refills: 2 | Status: SHIPPED | OUTPATIENT
Start: 2023-09-27

## 2023-09-29 ENCOUNTER — NURSE ONLY (OUTPATIENT)
Dept: FAMILY MEDICINE CLINIC | Facility: CLINIC | Age: 48
End: 2023-09-29
Payer: MEDICAID

## 2023-09-29 ENCOUNTER — TELEPHONE (OUTPATIENT)
Dept: FAMILY MEDICINE CLINIC | Facility: CLINIC | Age: 48
End: 2023-09-29

## 2023-09-29 DIAGNOSIS — B37.49 CANDIDA INFECTION OF GENITAL REGION: ICD-10-CM

## 2023-09-29 LAB — INDURATION (): 0 MM (ref 0–11)

## 2023-09-29 RX ORDER — NYSTATIN 100000 [USP'U]/G
1 POWDER TOPICAL 2 TIMES DAILY
Qty: 60 G | Refills: 0 | Status: SHIPPED | OUTPATIENT
Start: 2023-09-29 | End: 2023-10-13

## 2023-09-29 NOTE — TELEPHONE ENCOUNTER
Patient states she was here the other day and was supposed to get 2 inhalers and a powder sent over to the pharmacy. It doesn't look like it was sent.  Please advise

## 2023-09-29 NOTE — TELEPHONE ENCOUNTER
Albuterol MDI sent 9/27  Powder refilled  Not sure name of 2nd inhaler she is talking about.  Have pt send refill request via pharmacy if she doesn't know name

## 2023-09-29 NOTE — PROGRESS NOTES
Patient here for Tb read  0.0 induration test is negative  Form returned to patient  Left office in stable condition

## 2023-10-16 ENCOUNTER — OFFICE VISIT (OUTPATIENT)
Dept: INTERNAL MEDICINE CLINIC | Facility: CLINIC | Age: 48
End: 2023-10-16
Payer: MEDICAID

## 2023-10-16 ENCOUNTER — LAB ENCOUNTER (OUTPATIENT)
Dept: LAB | Age: 48
End: 2023-10-16
Attending: FAMILY MEDICINE
Payer: MEDICAID

## 2023-10-16 VITALS
RESPIRATION RATE: 18 BRPM | WEIGHT: 230 LBS | HEART RATE: 102 BPM | SYSTOLIC BLOOD PRESSURE: 118 MMHG | BODY MASS INDEX: 46.37 KG/M2 | DIASTOLIC BLOOD PRESSURE: 80 MMHG | HEIGHT: 59 IN

## 2023-10-16 DIAGNOSIS — F31.81 BIPOLAR 2 DISORDER (HCC): ICD-10-CM

## 2023-10-16 DIAGNOSIS — Z51.81 THERAPEUTIC DRUG MONITORING: Primary | ICD-10-CM

## 2023-10-16 DIAGNOSIS — G47.33 OSA ON CPAP: ICD-10-CM

## 2023-10-16 DIAGNOSIS — Z00.00 ANNUAL PHYSICAL EXAM: ICD-10-CM

## 2023-10-16 DIAGNOSIS — R73.03 PREDIABETES: ICD-10-CM

## 2023-10-16 DIAGNOSIS — M17.12 PRIMARY OSTEOARTHRITIS OF LEFT KNEE: ICD-10-CM

## 2023-10-16 DIAGNOSIS — E66.01 MORBID OBESITY (HCC): ICD-10-CM

## 2023-10-16 DIAGNOSIS — G40.909 SEIZURE DISORDER (HCC): ICD-10-CM

## 2023-10-16 LAB
ALBUMIN SERPL-MCNC: 3.7 G/DL (ref 3.4–5)
ALBUMIN/GLOB SERPL: 1.1 {RATIO} (ref 1–2)
ALP LIVER SERPL-CCNC: 65 U/L
ALT SERPL-CCNC: 47 U/L
ANION GAP SERPL CALC-SCNC: 8 MMOL/L (ref 0–18)
AST SERPL-CCNC: 25 U/L (ref 15–37)
BASOPHILS # BLD AUTO: 0.05 X10(3) UL (ref 0–0.2)
BASOPHILS NFR BLD AUTO: 0.6 %
BILIRUB SERPL-MCNC: 0.5 MG/DL (ref 0.1–2)
BUN BLD-MCNC: 8 MG/DL (ref 7–18)
CALCIUM BLD-MCNC: 8.5 MG/DL (ref 8.5–10.1)
CHLORIDE SERPL-SCNC: 106 MMOL/L (ref 98–112)
CHOLEST SERPL-MCNC: 179 MG/DL (ref ?–200)
CO2 SERPL-SCNC: 25 MMOL/L (ref 21–32)
CREAT BLD-MCNC: 0.86 MG/DL
EGFRCR SERPLBLD CKD-EPI 2021: 82 ML/MIN/1.73M2 (ref 60–?)
EOSINOPHIL # BLD AUTO: 0.23 X10(3) UL (ref 0–0.7)
EOSINOPHIL NFR BLD AUTO: 2.8 %
ERYTHROCYTE [DISTWIDTH] IN BLOOD BY AUTOMATED COUNT: 13 %
FASTING PATIENT LIPID ANSWER: YES
FASTING STATUS PATIENT QL REPORTED: YES
GLOBULIN PLAS-MCNC: 3.5 G/DL (ref 2.8–4.4)
GLUCOSE BLD-MCNC: 146 MG/DL (ref 70–99)
HCT VFR BLD AUTO: 40.8 %
HDLC SERPL-MCNC: 42 MG/DL (ref 40–59)
HGB BLD-MCNC: 13.8 G/DL
IMM GRANULOCYTES # BLD AUTO: 0.04 X10(3) UL (ref 0–1)
IMM GRANULOCYTES NFR BLD: 0.5 %
LDLC SERPL CALC-MCNC: 98 MG/DL (ref ?–100)
LYMPHOCYTES # BLD AUTO: 2.12 X10(3) UL (ref 1–4)
LYMPHOCYTES NFR BLD AUTO: 25.9 %
MCH RBC QN AUTO: 30.7 PG (ref 26–34)
MCHC RBC AUTO-ENTMCNC: 33.8 G/DL (ref 31–37)
MCV RBC AUTO: 90.7 FL
MONOCYTES # BLD AUTO: 0.56 X10(3) UL (ref 0.1–1)
MONOCYTES NFR BLD AUTO: 6.8 %
NEUTROPHILS # BLD AUTO: 5.2 X10 (3) UL (ref 1.5–7.7)
NEUTROPHILS # BLD AUTO: 5.2 X10(3) UL (ref 1.5–7.7)
NEUTROPHILS NFR BLD AUTO: 63.4 %
NONHDLC SERPL-MCNC: 137 MG/DL (ref ?–130)
OSMOLALITY SERPL CALC.SUM OF ELEC: 289 MOSM/KG (ref 275–295)
PLATELET # BLD AUTO: 244 10(3)UL (ref 150–450)
POTASSIUM SERPL-SCNC: 3.4 MMOL/L (ref 3.5–5.1)
PROT SERPL-MCNC: 7.2 G/DL (ref 6.4–8.2)
RBC # BLD AUTO: 4.5 X10(6)UL
SODIUM SERPL-SCNC: 139 MMOL/L (ref 136–145)
TRIGL SERPL-MCNC: 230 MG/DL (ref 30–149)
TSI SER-ACNC: 0.6 MIU/ML (ref 0.36–3.74)
VLDLC SERPL CALC-MCNC: 38 MG/DL (ref 0–30)
WBC # BLD AUTO: 8.2 X10(3) UL (ref 4–11)

## 2023-10-16 PROCEDURE — 3074F SYST BP LT 130 MM HG: CPT | Performed by: NURSE PRACTITIONER

## 2023-10-16 PROCEDURE — 99214 OFFICE O/P EST MOD 30 MIN: CPT | Performed by: NURSE PRACTITIONER

## 2023-10-16 PROCEDURE — 84443 ASSAY THYROID STIM HORMONE: CPT

## 2023-10-16 PROCEDURE — 80061 LIPID PANEL: CPT

## 2023-10-16 PROCEDURE — 3008F BODY MASS INDEX DOCD: CPT | Performed by: NURSE PRACTITIONER

## 2023-10-16 PROCEDURE — 85025 COMPLETE CBC W/AUTO DIFF WBC: CPT

## 2023-10-16 PROCEDURE — 3079F DIAST BP 80-89 MM HG: CPT | Performed by: NURSE PRACTITIONER

## 2023-10-16 PROCEDURE — 80053 COMPREHEN METABOLIC PANEL: CPT

## 2023-10-16 PROCEDURE — 36415 COLL VENOUS BLD VENIPUNCTURE: CPT

## 2023-10-16 RX ORDER — METFORMIN HYDROCHLORIDE 500 MG/1
500 TABLET, EXTENDED RELEASE ORAL 2 TIMES DAILY WITH MEALS
Qty: 60 TABLET | Refills: 1 | Status: SHIPPED | OUTPATIENT
Start: 2023-10-16

## 2023-10-16 NOTE — PATIENT INSTRUCTIONS
Next steps:  1. Fill your prescribed medication and take as discussed and prescribed: metformin 500mg (1 tablet with breakfast and 1 tablet with dinner)  2. Schedule a personal nutrition consultation with one of our registered dieticians  3. Check out [facility] for surgery options   4. Get blood work done     Please try to work on the following dietary changes:  Daily protein recommendation to start:  grams  Daily carbohydrate: <110g  Daily calories: 1,400-1,500  1. Drink water with meals and throughout the day, cut down on soda and/or juice if consumed. Consider flavored water options like Bubbly, Spindrift, Hint and Jose Alberto. 2.  Eat breakfast daily and focus on having protein with each meal, examples include: greek yogurt, cottage cheese, hard boiled egg, whole grain toast with peanut butter. 3.  Reduce refined carbohydrates and sugars which includes items such as sweets, as well as rice, pasta, and bread and make sure to choose whole grain options when having them with just 1 serving per meal about the size of your inner palm. 4.  Consume non starchy veggies daily working towards making them a good 50% of your daily food intake. Add them to lunch and dinner consistently. 5.  Start a daily probiotic: VSL#3 is recommended, (order on line at www.vsl3. com). Take 1 capsule daily with water for 30 days, then reduce to 1 every other day (this will reduce the cost). Capsules can be left out for 2 weeks, but then must be refrigerated. Please download rudy My Fitness Inge Reading! Or Net Diary to monitor daily dietary intake and you will be able to see if you are eating the right amount of calories or too much or too little which would hinder weight loss. Additionally this will help to see your daily carbohydrate and protein intake.  When you set the rudy up choose 1-2 lbs/week as a goal.  Keeping a paper food journal is an option as well to remain accountable for your choices- this is the start to mindful eating! A low calorie diet has been consistently shown to support weight loss. Continue or start exercising to help establish a routine. If not already exercising begin with 1 day and progress as able with long-term goal of 30 minutes 5 days a week at a minimum. Meditation daily can help manage and control stress. Chronic stress can make weight loss difficult. Exercising is one way to help with stress, but meditation using the CALM Yolis or another comparable alternative can be done in your home or place of work with little time commitment. This Yolis can also help work on behavior change and improve sleep. Check out the segment under Calm Masterclass and listen to The 4 Pillars of Health. A great way to begin learning about the foundation of lifestyle with practical tips to use in your every day. Check out www.yourweightmatters. org blog for continued daily support and education along this weight loss journey! Patient Resources:     Personal Training/Fitness Classes/Health Coaching     Kylie Moraes and Orellana Franklin @ [email or web address]/ Full fitness center with group fitness and personal training. Discount available as client of [facility]. Health Coaching and Personal Training with Gil Torres at our [facility]- individual weekly coaching with option to add personal training and small group fitness classes targeted at weight loss- [phone] and/or email @ Flora Macias. Evraisto@"[employer]. org  360FIT Gino [email or web address] Group Fitness [phone] and/or email Deon Dick at Mindy@"[employer]. com  [address] with multiple locations: Aetna (www.Value Investment Group), Eat The Rizzoma Fitness (www.Telnic. Rage Frameworks), Fit Body Bootcamp (www.Value Investment Groupp.Rage Frameworks), TopCat Research (www.MyColorScreen), The Exercise  (www.exercisecoach.Rage Frameworks)     Online Fitness  Fitness  on [employer] in 10 DVD series- www. igayc76WXR. HapYak Interactive Video  Sit and Be Fit - Chair exercise series Www.sitandbefit. org  Hip Hop Fit with Adams Palafox at www.hiphopfit. net     Apps for on the Kaleo Software 7 Minute Workout (orange box with white 7) - free on the go HIIT training yolis  Peloton Yolis @ wwwColonaryConcepts     Nutrition Trackers and Tools  LoseIT! And My Fitness Pal apps and on line for tracking nutrition  NOOM - virtual health coaching  FitFoundation (healthy meals on the go) in [employer] @ www. lbkxwzwxbyizk2v. Corinne Caba MD @ wwwECI TelecombistromdF2G and Paula Loser (keto and low carb plans recommended) @ www. YIITCH77.MUD, Metabolic Meals @ www. MoBankMetabolicMeals. com - individual prepared meals to go  Vodio Labs, TheLadders, International Business Machines, Every Plate, Reflect Systems- on line meal delivery programs for preparation at home  AK orangutrans in [place] for homemade meals to go @ wwwiSpecimen  Diet Doctor @ www. dietdoctor. HapYak Interactive Video - low carb swaps  InboxQ - meal prep and planning yolis (www.yummly. com)     Stress Management/Behavior/Mindful Eating  CALM meditation yolis (www.calmRocketBux)  Headspace  Am I Hungry? Mindful eating virtual  yolis  Www.yourweightmatters. org - Obesity Action Coalition sponsored Blog posts daily  Motivation yolis (black box with white \")- daily supportive messages sent to your phone     Books/Video Education/Podcasts  Mindless Eating by Mark Park  Why We Get Sick by Shy Greene (a book about insulin resistance)  Atomic Habits by Rick Stahl (a book about taking small steps to promote greater behavior change)   Can't Hurt Me by Mely Mcneil (a book exploring the power of discipline in achieving your goals)  The End of Dieting: How to Live for Life by Dr. Tracie Sampson M.D. or listen to The [address] [facility] [address] Episode 61: Understanding \"Nutritarian\" Eating w/Dr. Tracei Sampson  Your Body in Balance:  The Vahna of Food, Hormones, and Health by Dr. Majo Ferro  The Menopause Diet Plan by [facility] and [facility]  The Complete Guide to fasting by Dr. Jose Carlos Reina, [address] [facility] by Rocío Schultz, Ph.D, R.D. Weight Loss Surgery Will Not Treat Food Addiction by Odell Espinosa Ph.D  The [address] [place] [address] on plant based nutrition  Fed Up - documentary about obesity (Free on [facility])  The Truth About Sugar - documentary on sugar (Free on Faveous, https://youConferu. be/8T8zjcfYS2e)  The Dr. Carreno Nail by Dr. Tanja Rios MD  Fitlosophy Fitspiration - journal to better health (found at [employer] in fitness aisle)  What Happened to You?- a look at the impact trauma has on behavior written by Elen Shaikh and Dr. Mariana Bowen Again by Ashlyn Arango - discovering your true self after trauma  Burke Markham talk on Sag[employer] Media, The Call to Courage  Podcasts: The Exam Room by the Physician's Committee, Nutrition Facts by Dr. Lonell Claude    We are here to support you with weight loss, but please remember that you still need your primary care provider for your routine health maintenance.

## 2023-10-18 LAB — HGBA1C: 6.2 %

## 2023-10-25 ENCOUNTER — HOSPITAL ENCOUNTER (OUTPATIENT)
Age: 48
Discharge: HOME | End: 2023-10-25

## 2023-10-25 VITALS
HEIGHT: 58 IN | RESPIRATION RATE: 16 BRPM | WEIGHT: 220 LBS | DIASTOLIC BLOOD PRESSURE: 91 MMHG | HEART RATE: 80 BPM | BODY MASS INDEX: 46.18 KG/M2 | OXYGEN SATURATION: 98 % | SYSTOLIC BLOOD PRESSURE: 147 MMHG | TEMPERATURE: 97 F

## 2023-10-25 DIAGNOSIS — R05.1 ACUTE COUGH: Primary | ICD-10-CM

## 2023-10-25 LAB
S PYO AG THROAT QL: NEGATIVE
SARS-COV-2 RNA RESP QL NAA+PROBE: NOT DETECTED

## 2023-10-25 PROCEDURE — U0002 COVID-19 LAB TEST NON-CDC: HCPCS | Performed by: NURSE PRACTITIONER

## 2023-10-25 PROCEDURE — 99213 OFFICE O/P EST LOW 20 MIN: CPT | Performed by: NURSE PRACTITIONER

## 2023-10-25 PROCEDURE — 87880 STREP A ASSAY W/OPTIC: CPT | Performed by: NURSE PRACTITIONER

## 2023-10-25 RX ORDER — PREDNISONE 20 MG/1
20 TABLET ORAL 2 TIMES DAILY
Qty: 10 TABLET | Refills: 0 | Status: SHIPPED | OUTPATIENT
Start: 2023-10-25 | End: 2023-10-30

## 2023-10-25 NOTE — DISCHARGE INSTRUCTIONS
Follow-up with your primary care physician in one week if symptoms have not improved or symptoms are starting to get worse. Increase fluids, keep well-hydrated. Take Tylenol and Motrin for fever and pain. Continue using your inhaler at home  Take the steroids twice a day for next 5 days  Return to the emergency room if any worse symptoms or concerns.

## 2023-11-07 ENCOUNTER — OFFICE VISIT (OUTPATIENT)
Dept: OBGYN CLINIC | Facility: CLINIC | Age: 48
End: 2023-11-07
Payer: MEDICAID

## 2023-11-07 VITALS
HEIGHT: 59 IN | BODY MASS INDEX: 46.16 KG/M2 | DIASTOLIC BLOOD PRESSURE: 76 MMHG | SYSTOLIC BLOOD PRESSURE: 114 MMHG | HEART RATE: 87 BPM | WEIGHT: 229 LBS

## 2023-11-07 DIAGNOSIS — Z12.4 CERVICAL CANCER SCREENING: ICD-10-CM

## 2023-11-07 DIAGNOSIS — Z01.419 WELL WOMAN EXAM WITH ROUTINE GYNECOLOGICAL EXAM: Primary | ICD-10-CM

## 2023-11-07 PROCEDURE — 3008F BODY MASS INDEX DOCD: CPT | Performed by: NURSE PRACTITIONER

## 2023-11-07 PROCEDURE — 3074F SYST BP LT 130 MM HG: CPT | Performed by: NURSE PRACTITIONER

## 2023-11-07 PROCEDURE — 3078F DIAST BP <80 MM HG: CPT | Performed by: NURSE PRACTITIONER

## 2023-11-07 PROCEDURE — 87624 HPV HI-RISK TYP POOLED RSLT: CPT | Performed by: NURSE PRACTITIONER

## 2023-11-07 PROCEDURE — 99396 PREV VISIT EST AGE 40-64: CPT | Performed by: NURSE PRACTITIONER

## 2023-11-07 PROCEDURE — 88175 CYTOPATH C/V AUTO FLUID REDO: CPT | Performed by: NURSE PRACTITIONER

## 2023-11-07 RX ORDER — PREDNISONE 20 MG/1
TABLET ORAL
COMMUNITY
Start: 2023-11-02

## 2023-11-07 NOTE — PROGRESS NOTES
Here for Routine Annual Exam  No concerns or questions. Menses 28-30 days. No concerns with cycles. Contraception- none  Up to date with mammogram and colonoscopy. Having gastric surgery. ROS: No Cardiac, Respiratory, GI,  or Neurological symptoms. PE:  GENERAL: well developed, well nourished, in no apparent distress  SKIN: no rashes, no suspicious lesions  HEENT: normal  NECK: supple; no thyroidmegaly, no adenopathy  LUNGS: clear to auscultation  CARDIOVASCULAR: normal S1, S2, RRR  BREASTS: firm, nontendder, no palpable masses or nodes, no nipple discharge, no skin changes, no axillary adenopathy,    ABDOMEN: Soft, non distended; non tender, no masses  GYNE/: External Genitalia: Normal without lesions or erythema                      Vagina: normal without lesions, scant discharge                      Uterus: mid, mobile, non tender, normal size                     Cervix: no lesions or CMT                     Adnexa: non tender, no masses, normal size  EXTREMITIES:  non tender without edema    A/P:   1. Well woman exam with routine gynecological exam  Regular self breast exams recommended    2.  Cervical cancer screening  - ThinPrep PAP with HPV Reflex Request; Future       Return to clinic 1 year for routine exam, or as needed with any concerns or question

## 2023-11-11 LAB
.: NORMAL
.: NORMAL

## 2023-11-13 LAB — HPV I/H RISK 1 DNA SPEC QL NAA+PROBE: NEGATIVE

## 2023-11-18 ENCOUNTER — APPOINTMENT (OUTPATIENT)
Dept: GENERAL RADIOLOGY | Age: 48
End: 2023-11-18
Attending: NURSE PRACTITIONER
Payer: MEDICAID

## 2023-11-18 ENCOUNTER — HOSPITAL ENCOUNTER (OUTPATIENT)
Age: 48
Discharge: HOME | End: 2023-11-18
Payer: MEDICAID

## 2023-11-18 VITALS
SYSTOLIC BLOOD PRESSURE: 138 MMHG | OXYGEN SATURATION: 97 % | TEMPERATURE: 97 F | DIASTOLIC BLOOD PRESSURE: 86 MMHG | HEART RATE: 84 BPM | RESPIRATION RATE: 18 BRPM | WEIGHT: 230 LBS | HEIGHT: 58 IN | BODY MASS INDEX: 48.28 KG/M2

## 2023-11-18 DIAGNOSIS — R05.9 COUGH: Primary | ICD-10-CM

## 2023-11-18 DIAGNOSIS — N30.01 ACUTE CYSTITIS WITH HEMATURIA: ICD-10-CM

## 2023-11-18 DIAGNOSIS — B35.4 TINEA CORPORIS: ICD-10-CM

## 2023-11-18 DIAGNOSIS — J06.9 VIRAL UPPER RESPIRATORY TRACT INFECTION: ICD-10-CM

## 2023-11-18 LAB
CLARITY UR: CLEAR
GLUCOSE UR STRIP-MCNC: NEGATIVE MG/DL
NITRITE UR QL STRIP: POSITIVE
PH UR STRIP: 5.5 [PH]
PROT UR STRIP-MCNC: >=300 MG/DL
S PYO AG THROAT QL: NEGATIVE
SARS-COV-2 RNA RESP QL NAA+PROBE: NOT DETECTED
SP GR UR STRIP: >=1.03
UROBILINOGEN UR STRIP-ACNC: <2 MG/DL

## 2023-11-18 PROCEDURE — 71046 X-RAY EXAM CHEST 2 VIEWS: CPT | Performed by: NURSE PRACTITIONER

## 2023-11-18 PROCEDURE — 99214 OFFICE O/P EST MOD 30 MIN: CPT | Performed by: NURSE PRACTITIONER

## 2023-11-18 PROCEDURE — U0002 COVID-19 LAB TEST NON-CDC: HCPCS | Performed by: NURSE PRACTITIONER

## 2023-11-18 PROCEDURE — 81002 URINALYSIS NONAUTO W/O SCOPE: CPT | Performed by: NURSE PRACTITIONER

## 2023-11-18 PROCEDURE — 87880 STREP A ASSAY W/OPTIC: CPT | Performed by: NURSE PRACTITIONER

## 2023-11-18 RX ORDER — FLUCONAZOLE 150 MG/1
150 TABLET ORAL ONCE
Qty: 1 TABLET | Refills: 0 | Status: SHIPPED | OUTPATIENT
Start: 2023-11-18 | End: 2023-11-18

## 2023-11-18 RX ORDER — NITROFURANTOIN 25; 75 MG/1; MG/1
100 CAPSULE ORAL 2 TIMES DAILY
Qty: 10 CAPSULE | Refills: 0 | Status: SHIPPED | OUTPATIENT
Start: 2023-11-18 | End: 2023-11-23

## 2023-11-18 NOTE — ED INITIAL ASSESSMENT (HPI)
Pt with c/o dry persistent ant cough, urinary urgency and increased frequency and red itchy rash under her breast x 1 week.      Has tried Mucinex and Lotrimin without improvement Mild

## 2023-11-18 NOTE — DISCHARGE INSTRUCTIONS
Increase water intake 2-3 liters daily   Please follow-up with your primary care provider to discuss metabolic disorder such as diabetes contributing to your fungal skin infections. You will be notified if there are any abnormalities with your urine culture that indicates the need to change treatment plan.

## 2023-11-20 ENCOUNTER — APPOINTMENT (OUTPATIENT)
Dept: GENERAL RADIOLOGY | Age: 48
End: 2023-11-20
Attending: EMERGENCY MEDICINE
Payer: MEDICAID

## 2023-11-20 ENCOUNTER — APPOINTMENT (OUTPATIENT)
Dept: CT IMAGING | Age: 48
End: 2023-11-20
Attending: EMERGENCY MEDICINE
Payer: MEDICAID

## 2023-11-20 ENCOUNTER — TELEPHONE (OUTPATIENT)
Dept: FAMILY MEDICINE CLINIC | Facility: CLINIC | Age: 48
End: 2023-11-20

## 2023-11-20 ENCOUNTER — HOSPITAL ENCOUNTER (EMERGENCY)
Age: 48
Discharge: HOME | End: 2023-11-20
Attending: EMERGENCY MEDICINE
Payer: MEDICAID

## 2023-11-20 VITALS
WEIGHT: 230 LBS | RESPIRATION RATE: 16 BRPM | OXYGEN SATURATION: 96 % | HEART RATE: 84 BPM | TEMPERATURE: 99 F | HEIGHT: 58 IN | DIASTOLIC BLOOD PRESSURE: 57 MMHG | BODY MASS INDEX: 48.28 KG/M2 | SYSTOLIC BLOOD PRESSURE: 111 MMHG

## 2023-11-20 DIAGNOSIS — J45.901 MODERATE ASTHMA WITH EXACERBATION, UNSPECIFIED WHETHER PERSISTENT: Primary | ICD-10-CM

## 2023-11-20 DIAGNOSIS — R19.7 DIARRHEA, UNSPECIFIED TYPE: ICD-10-CM

## 2023-11-20 LAB
ALBUMIN SERPL-MCNC: 3.7 G/DL (ref 3.4–5)
ALBUMIN/GLOB SERPL: 0.9 {RATIO} (ref 1–2)
ALP LIVER SERPL-CCNC: 69 U/L
ALT SERPL-CCNC: 45 U/L
ANION GAP SERPL CALC-SCNC: 5 MMOL/L (ref 0–18)
AST SERPL-CCNC: 29 U/L (ref 15–37)
BASOPHILS # BLD AUTO: 0.08 X10(3) UL (ref 0–0.2)
BASOPHILS NFR BLD AUTO: 0.8 %
BILIRUB SERPL-MCNC: 0.6 MG/DL (ref 0.1–2)
BUN BLD-MCNC: 15 MG/DL (ref 9–23)
CALCIUM BLD-MCNC: 8.4 MG/DL (ref 8.5–10.1)
CHLORIDE SERPL-SCNC: 105 MMOL/L (ref 98–112)
CO2 SERPL-SCNC: 28 MMOL/L (ref 21–32)
CREAT BLD-MCNC: 0.85 MG/DL
EGFRCR SERPLBLD CKD-EPI 2021: 83 ML/MIN/1.73M2 (ref 60–?)
EOSINOPHIL # BLD AUTO: 0.39 X10(3) UL (ref 0–0.7)
EOSINOPHIL NFR BLD AUTO: 3.8 %
ERYTHROCYTE [DISTWIDTH] IN BLOOD BY AUTOMATED COUNT: 13.4 %
GLOBULIN PLAS-MCNC: 3.9 G/DL (ref 2.8–4.4)
GLUCOSE BLD-MCNC: 123 MG/DL (ref 70–99)
HCT VFR BLD AUTO: 46 %
HGB BLD-MCNC: 15.2 G/DL
IMM GRANULOCYTES # BLD AUTO: 0.05 X10(3) UL (ref 0–1)
IMM GRANULOCYTES NFR BLD: 0.5 %
LYMPHOCYTES # BLD AUTO: 2.78 X10(3) UL (ref 1–4)
LYMPHOCYTES NFR BLD AUTO: 27.3 %
MCH RBC QN AUTO: 30.8 PG (ref 26–34)
MCHC RBC AUTO-ENTMCNC: 33 G/DL (ref 31–37)
MCV RBC AUTO: 93.1 FL
MONOCYTES # BLD AUTO: 0.61 X10(3) UL (ref 0.1–1)
MONOCYTES NFR BLD AUTO: 6 %
NEUTROPHILS # BLD AUTO: 6.28 X10 (3) UL (ref 1.5–7.7)
NEUTROPHILS # BLD AUTO: 6.28 X10(3) UL (ref 1.5–7.7)
NEUTROPHILS NFR BLD AUTO: 61.6 %
OSMOLALITY SERPL CALC.SUM OF ELEC: 288 MOSM/KG (ref 275–295)
PLATELET # BLD AUTO: 303 10(3)UL (ref 150–450)
POTASSIUM SERPL-SCNC: 3.6 MMOL/L (ref 3.5–5.1)
PROT SERPL-MCNC: 7.6 G/DL (ref 6.4–8.2)
RBC # BLD AUTO: 4.94 X10(6)UL
SODIUM SERPL-SCNC: 138 MMOL/L (ref 136–145)
WBC # BLD AUTO: 10.2 X10(3) UL (ref 4–11)

## 2023-11-20 PROCEDURE — 96361 HYDRATE IV INFUSION ADD-ON: CPT

## 2023-11-20 PROCEDURE — 80053 COMPREHEN METABOLIC PANEL: CPT | Performed by: EMERGENCY MEDICINE

## 2023-11-20 PROCEDURE — 96372 THER/PROPH/DIAG INJ SC/IM: CPT

## 2023-11-20 PROCEDURE — 85025 COMPLETE CBC W/AUTO DIFF WBC: CPT | Performed by: EMERGENCY MEDICINE

## 2023-11-20 PROCEDURE — 99284 EMERGENCY DEPT VISIT MOD MDM: CPT

## 2023-11-20 PROCEDURE — 96374 THER/PROPH/DIAG INJ IV PUSH: CPT

## 2023-11-20 PROCEDURE — 99285 EMERGENCY DEPT VISIT HI MDM: CPT

## 2023-11-20 PROCEDURE — 71045 X-RAY EXAM CHEST 1 VIEW: CPT | Performed by: EMERGENCY MEDICINE

## 2023-11-20 PROCEDURE — 74176 CT ABD & PELVIS W/O CONTRAST: CPT | Performed by: EMERGENCY MEDICINE

## 2023-11-20 RX ORDER — KETOROLAC TROMETHAMINE 15 MG/ML
15 INJECTION, SOLUTION INTRAMUSCULAR; INTRAVENOUS ONCE
Status: COMPLETED | OUTPATIENT
Start: 2023-11-20 | End: 2023-11-20

## 2023-11-20 RX ORDER — DICYCLOMINE HCL 20 MG
20 TABLET ORAL 4 TIMES DAILY PRN
Qty: 30 TABLET | Refills: 0 | Status: SHIPPED | OUTPATIENT
Start: 2023-11-20 | End: 2023-12-20

## 2023-11-20 RX ORDER — DICYCLOMINE HYDROCHLORIDE 10 MG/ML
10 INJECTION INTRAMUSCULAR ONCE
Status: COMPLETED | OUTPATIENT
Start: 2023-11-20 | End: 2023-11-20

## 2023-11-20 RX ORDER — ALBUTEROL SULFATE 2.5 MG/3ML
2.5 SOLUTION RESPIRATORY (INHALATION) EVERY 4 HOURS PRN
Qty: 30 EACH | Refills: 0 | Status: SHIPPED | OUTPATIENT
Start: 2023-11-20 | End: 2023-12-20

## 2023-11-20 NOTE — TELEPHONE ENCOUNTER
Pt was seen @ [facility], for asthma and UTI,  pt has been having a lot of diarrhea and has been nauseated. Pt spoke with a co-worker and co-worker mentioned she may have H-pylori. Pt is concerned she may have this and worried because it is very contagious. She would like to discuss this with a nurse. Pt scheduled a f/u with Amelie on Wednesday.   Future Appointments   Date Time Provider Erick Fishman   11/22/2023  2:20 PM MAYA Ibrahim [facility] [place]   1/8/2024  3:30 PM Maricel Chu PA-C [facility] [address]

## 2023-11-20 NOTE — TELEPHONE ENCOUNTER
She needs to return to IC or go to the ER. Urine culture was negative. She may have a a kidney stone.  Had lithotripsy earlier this year

## 2023-11-21 NOTE — DISCHARGE INSTRUCTIONS
Stop antibiotic for UTI. Start probiotics and okay to use Imodium as needed. Use Bentyl as needed for cramping. Push oral fluids and bland diet. Return for worsening symptoms.

## 2023-11-21 NOTE — ED INITIAL ASSESSMENT (HPI)
Patient presents with umbilical abd pain, cough, and headache x1 week. Seen by urgent care two days ago and diagnosed with UTI. Called PCP because of increased abd pain, instructed to come to ED for evaluation.

## 2023-11-24 ENCOUNTER — HOSPITAL ENCOUNTER (OUTPATIENT)
Age: 48
Discharge: HOME | End: 2023-11-24
Payer: MEDICAID

## 2023-11-24 VITALS
TEMPERATURE: 98 F | SYSTOLIC BLOOD PRESSURE: 158 MMHG | RESPIRATION RATE: 18 BRPM | HEART RATE: 94 BPM | OXYGEN SATURATION: 98 % | DIASTOLIC BLOOD PRESSURE: 89 MMHG

## 2023-11-24 DIAGNOSIS — J45.41 MODERATE PERSISTENT ASTHMA WITH EXACERBATION: Primary | ICD-10-CM

## 2023-11-24 RX ORDER — PREDNISONE 20 MG/1
20 TABLET ORAL 2 TIMES DAILY
Qty: 10 TABLET | Refills: 0 | Status: SHIPPED | OUTPATIENT
Start: 2023-11-25 | End: 2023-11-30

## 2023-11-24 RX ORDER — ALBUTEROL SULFATE 2.5 MG/3ML
2.5 SOLUTION RESPIRATORY (INHALATION) EVERY 4 HOURS PRN
Qty: 100 EACH | Refills: 0 | Status: SHIPPED | OUTPATIENT
Start: 2023-11-24

## 2023-11-24 RX ORDER — IPRATROPIUM BROMIDE AND ALBUTEROL SULFATE 2.5; .5 MG/3ML; MG/3ML
3 SOLUTION RESPIRATORY (INHALATION) ONCE
Status: COMPLETED | OUTPATIENT
Start: 2023-11-24 | End: 2023-11-24

## 2023-11-24 RX ORDER — PREDNISONE 20 MG/1
60 TABLET ORAL ONCE
Status: COMPLETED | OUTPATIENT
Start: 2023-11-24 | End: 2023-11-24

## 2023-11-24 NOTE — DISCHARGE INSTRUCTIONS
Follow-up with your primary care physician for all of your healthcare needs  Increase fluids keep well-hydrated  Continue your albuterol inhaler going  Start using the Atrovent inhaler 4 times daily  Start the steroids tomorrow we gave you a high-dose here today  Increase fluids keep hydrated  Return to the emergency room for symptoms or concerns

## 2023-11-24 NOTE — ED INITIAL ASSESSMENT (HPI)
Pt c/o cough that she has has for several weeks. Pt was seen on 10/25, 11/18, and again on 11/20. Pt was treated with prednisone 10/25 and completed treatment.

## 2023-12-03 ENCOUNTER — APPOINTMENT (OUTPATIENT)
Dept: GENERAL RADIOLOGY | Age: 48
End: 2023-12-03
Attending: EMERGENCY MEDICINE
Payer: MEDICAID

## 2023-12-03 ENCOUNTER — HOSPITAL ENCOUNTER (EMERGENCY)
Age: 48
Discharge: HOME | End: 2023-12-03
Attending: EMERGENCY MEDICINE
Payer: MEDICAID

## 2023-12-03 VITALS
TEMPERATURE: 98 F | RESPIRATION RATE: 18 BRPM | OXYGEN SATURATION: 97 % | SYSTOLIC BLOOD PRESSURE: 136 MMHG | HEART RATE: 90 BPM | DIASTOLIC BLOOD PRESSURE: 92 MMHG | BODY MASS INDEX: 48 KG/M2 | WEIGHT: 230 LBS

## 2023-12-03 DIAGNOSIS — J45.901 MILD ASTHMA WITH EXACERBATION, UNSPECIFIED WHETHER PERSISTENT: ICD-10-CM

## 2023-12-03 DIAGNOSIS — U07.1 COVID-19: Primary | ICD-10-CM

## 2023-12-03 LAB — SARS-COV-2 RNA RESP QL NAA+PROBE: DETECTED

## 2023-12-03 PROCEDURE — 99284 EMERGENCY DEPT VISIT MOD MDM: CPT

## 2023-12-03 PROCEDURE — 87430 STREP A AG IA: CPT | Performed by: EMERGENCY MEDICINE

## 2023-12-03 PROCEDURE — 71046 X-RAY EXAM CHEST 2 VIEWS: CPT | Performed by: EMERGENCY MEDICINE

## 2023-12-03 PROCEDURE — 87430 STREP A AG IA: CPT

## 2023-12-03 RX ORDER — ALBUTEROL SULFATE 90 UG/1
2 AEROSOL, METERED RESPIRATORY (INHALATION) EVERY 4 HOURS PRN
Qty: 1 EACH | Refills: 0 | Status: SHIPPED | OUTPATIENT
Start: 2023-12-03 | End: 2024-01-02

## 2023-12-03 NOTE — ED INITIAL ASSESSMENT (HPI)
Cough x 1 month, seen here and at immediate care last week- not improving,  Hears \"rattle\" in chest.  Given prednisone course (completed) and albuterol- no improvement.

## 2023-12-19 ENCOUNTER — HOSPITAL ENCOUNTER (OUTPATIENT)
Age: 48
Discharge: HOME | End: 2023-12-19
Payer: MEDICAID

## 2023-12-19 VITALS
HEART RATE: 94 BPM | RESPIRATION RATE: 18 BRPM | TEMPERATURE: 98 F | OXYGEN SATURATION: 96 % | DIASTOLIC BLOOD PRESSURE: 91 MMHG | SYSTOLIC BLOOD PRESSURE: 142 MMHG

## 2023-12-19 DIAGNOSIS — B35.3 TINEA PEDIS OF LEFT FOOT: Primary | ICD-10-CM

## 2023-12-19 DIAGNOSIS — B07.9 VIRAL WART ON FINGER: ICD-10-CM

## 2023-12-19 DIAGNOSIS — S90.822A BLISTER OF LEFT FOOT, INITIAL ENCOUNTER: ICD-10-CM

## 2023-12-19 PROCEDURE — 99213 OFFICE O/P EST LOW 20 MIN: CPT | Performed by: NURSE PRACTITIONER

## 2023-12-19 RX ORDER — KETOCONAZOLE 20 MG/G
1 CREAM TOPICAL DAILY
Qty: 60 G | Refills: 1 | Status: SHIPPED | OUTPATIENT
Start: 2023-12-19 | End: 2024-01-02

## 2023-12-19 NOTE — DISCHARGE INSTRUCTIONS
Keep wound on top of the foot clean and wash with warm soapy water. Apply antibiotic ointment such as Neosporin or generic triple antibiotic.   Leave open to air when at home when out moving about keep covered with a Band-Aid to prevent further friction injury and infection

## 2024-01-05 ENCOUNTER — HOSPITAL ENCOUNTER (OUTPATIENT)
Age: 49
Discharge: HOME | End: 2024-01-05
Payer: MEDICAID

## 2024-01-05 VITALS
DIASTOLIC BLOOD PRESSURE: 95 MMHG | HEART RATE: 85 BPM | HEIGHT: 58 IN | WEIGHT: 220 LBS | RESPIRATION RATE: 18 BRPM | OXYGEN SATURATION: 96 % | BODY MASS INDEX: 46.18 KG/M2 | SYSTOLIC BLOOD PRESSURE: 129 MMHG | TEMPERATURE: 97 F

## 2024-01-05 DIAGNOSIS — N89.8 VAGINAL IRRITATION: Primary | ICD-10-CM

## 2024-01-05 LAB
B-HCG UR QL: NEGATIVE
BILIRUB UR QL STRIP: NEGATIVE
COLOR UR: YELLOW
GLUCOSE UR STRIP-MCNC: NEGATIVE MG/DL
KETONES UR STRIP-MCNC: NEGATIVE MG/DL
NITRITE UR QL STRIP: NEGATIVE
PH UR STRIP: 5.5 [PH]
PROT UR STRIP-MCNC: 100 MG/DL
SP GR UR STRIP: 1.02
UROBILINOGEN UR STRIP-ACNC: <2 MG/DL

## 2024-01-05 PROCEDURE — 81025 URINE PREGNANCY TEST: CPT | Performed by: PHYSICIAN ASSISTANT

## 2024-01-05 PROCEDURE — 99214 OFFICE O/P EST MOD 30 MIN: CPT | Performed by: PHYSICIAN ASSISTANT

## 2024-01-05 PROCEDURE — 81002 URINALYSIS NONAUTO W/O SCOPE: CPT | Performed by: PHYSICIAN ASSISTANT

## 2024-01-05 RX ORDER — PREDNISONE 20 MG/1
40 TABLET ORAL DAILY
Qty: 10 TABLET | Refills: 0 | Status: SHIPPED | OUTPATIENT
Start: 2024-01-05 | End: 2024-01-10

## 2024-01-05 NOTE — ED INITIAL ASSESSMENT (HPI)
Pt c/o cough for a few days. Hx of asthma. Pt also having vaginal irritation and odor x a couple of days. Had intercourse with new partner.

## 2024-01-05 NOTE — ED PROVIDER NOTES
Patient Seen in: [facility]      History     Chief Complaint   Patient presents with    Cough/URI    Eval-G     Stated Complaint: asthma, eval -g    Subjective:   HPI      48-year-old female presents to the  for 2 complaints.    Asthma exacerbation with mild cold and congestion symptoms the last few days.  No fever.  Using albuterol inhaler about 3 times a day.  Vaginal discharge, white.  Denies any dysuria or urinary frequency.  Recent new sexual partner.    Objective:   Past Medical History:   Diagnosis Date    Asthma     COVID-19 2020    loss of taste and smell. No hospitalization required    Extrinsic asthma, unspecified     last attack a couple of months ago    Genital herpes simplex     Gonorrhea     H. pylori infection 2018    Herpes     last outbreak years ago    History of seizure     Migraines     Osteoarthritis     mild in back    Pneumonia, organism unspecified(486) 1 year ago    PONV (postoperative nausea and vomiting)     mild     labor     pt. had 20 week loss    Seizure disorder (HCC) [id number]    patient states \"seizure was from [place]\"     Sleep apnea     c-pap but dont use all the time              Past Surgical History:   Procedure Laterality Date          , , ,     COLONOSCOPY  2014    Procedure: COLONOSCOPY;  Surgeon: Nahid Calderon MD;  Location:  ENDOSCOPY    COLONOSCOPY N/A 2019    Procedure: COLONOSCOPY;  Surgeon: Segundo Harman MD;  Location:  ENDOSCOPY    HERNIA SURGERY      HYSTEROSCOPY,BIOPSY  2021    Hysteroscopy, D&C, endometrial polypectomy    LAPAROSCOPIC CHOLECYSTECTOMY  2 weeks ago    LASIK Bilateral     done in [place]    SCARLETT LOCALIZATION WIRE 1 SITE LEFT (CPT=19281)      Benign    SCARLETT LOCALIZATION WIRE 1 SITE RIGHT (CPT=19281)      Benign    OTHER      baker cyst     OTHER      bunion bilateral - right foot metal    REDUCTION LEFT  1999    REDUCTION RIGHT      REMOVAL GALLBLADDER       VENTRAL HERNIA REPAIR  11/20/2013    Procedure: HERNIA VENTRAL REPAIR;  Surgeon: Jg Chambers MD;  Location:  MAIN OR                Social History     Socioeconomic History    Marital status:    Tobacco Use    Smoking status: Never     Passive exposure: Current    Smokeless tobacco: Never    Tobacco comments:     NON-SMOKER   Vaping Use    Vaping Use: Never used   Substance and Sexual Activity    Alcohol use: Yes     Comment: occ    Drug use: No    Sexual activity: Yes     Partners: Male   Other Topics Concern    Caffeine Concern Yes     Comment: 3-4x cup daily     Exercise Yes     Comment: walking daily    Seat Belt Yes   Social History Narrative     worker, for emergency .               Review of Systems    Positive for stated complaint: asthma, eval -g  Other systems are as noted in HPI.  Constitutional and vital signs reviewed.      All other systems reviewed and negative except as noted above.    Physical Exam     ED Triage Vitals [01/05/24 0819]   BP (!) 129/95   Pulse 85   Resp 18   Temp 97 °F (36.1 °C)   Temp src Temporal   SpO2 96 %   O2 Device None (Room air)       Current:BP (!) 129/95   Pulse 85   Temp 97 °F (36.1 °C) (Temporal)   Resp 18   Ht 147.3 cm (4' 10\")   Wt 99.8 kg   LMP 11/17/2023 (Exact Date)   SpO2 96%   BMI 45.98 kg/m²         Physical Exam  Vitals and nursing note reviewed. Exam conducted with a chaperone present (Michelle PCT).   Constitutional:       Appearance: She is well-developed.   HENT:      Head: Atraumatic.      Right Ear: External ear normal.      Left Ear: External ear normal.      Nose: Nose normal.      Mouth/Throat:      Mouth: Mucous membranes are moist.   Eyes:      Conjunctiva/sclera: Conjunctivae normal.   Cardiovascular:      Rate and Rhythm: Normal rate and regular rhythm.   Pulmonary:      Effort: Pulmonary effort is normal.      Breath sounds: Normal breath sounds.   Genitourinary:     Labia:         Right: No rash.         Left: No  rash.       Vagina: Vaginal discharge (white) present. No tenderness.   Musculoskeletal:      Cervical back: Normal range of motion and neck supple.   Skin:     General: Skin is warm and dry.      Capillary Refill: Capillary refill takes less than 2 seconds.   Neurological:      Mental Status: She is alert.   Psychiatric:         Mood and Affect: Mood normal.               ED Course     Labs Reviewed   [facility] POCT URINALYSIS DIPSTICK - Abnormal; Notable for the following components:       Result Value    Urine Clarity Cloudy (*)     Protein urine 100 (*)     Blood, Urine Small (*)     Leukocyte esterase urine Small (*)     All other components within normal limits   POCT PREGNANCY URINE - Normal   URINE CULTURE, ROUTINE   CHLAMYDIA/GONOCOCCUS, ALBARO   VAGINITIS VAGINOSIS PCR PANEL                      MDM      48-year-old female presents to the IC for evaluation of 2 complaints.    Differential diagnosis includes but not limited to:  Asthma, STD, BV    Vaginitis panel and GC culture sent.  Small leuks on UA, patient has no UTI symptoms, will defer treatment depending on culture.    Prednisone given for asthma exacerbation.                                   Medical Decision Making      Disposition and Plan     Clinical Impression:  1. Vaginal irritation         Disposition:  Discharge  1/5/2024  8:45 am    Follow-up:  Juliette Pardo MD  [address] [address]  [phone]                Medications Prescribed:  Discharge Medication List as of 1/5/2024  8:51 AM

## 2024-01-06 LAB
BV BACTERIA DNA VAG QL NAA+PROBE: NEGATIVE
C GLABRATA DNA VAG QL NAA+PROBE: NEGATIVE
C KRUSEI DNA VAG QL NAA+PROBE: NEGATIVE
CANDIDA DNA VAG QL NAA+PROBE: POSITIVE
T VAGINALIS DNA VAG QL NAA+PROBE: NEGATIVE

## 2024-01-07 RX ORDER — FLUCONAZOLE 150 MG/1
150 TABLET ORAL
Qty: 2 TABLET | Refills: 0 | Status: SHIPPED | OUTPATIENT
Start: 2024-01-07 | End: 2024-01-11

## 2024-01-08 LAB
C TRACH DNA SPEC QL NAA+PROBE: NEGATIVE
N GONORRHOEA DNA SPEC QL NAA+PROBE: NEGATIVE

## 2024-01-09 ENCOUNTER — TELEPHONE (OUTPATIENT)
Dept: FAMILY MEDICINE CLINIC | Facility: CLINIC | Age: 49
End: 2024-01-09

## 2024-01-09 ENCOUNTER — TELEMEDICINE (OUTPATIENT)
Dept: FAMILY MEDICINE CLINIC | Facility: CLINIC | Age: 49
End: 2024-01-09
Payer: MEDICAID

## 2024-01-09 DIAGNOSIS — L71.9 ROSACEA: ICD-10-CM

## 2024-01-09 DIAGNOSIS — B37.89 CANDIDIASIS OF BREAST: Primary | ICD-10-CM

## 2024-01-09 PROCEDURE — 99214 OFFICE O/P EST MOD 30 MIN: CPT | Performed by: FAMILY MEDICINE

## 2024-01-09 RX ORDER — NYSTATIN 100000 [USP'U]/G
1 POWDER TOPICAL 3 TIMES DAILY
Qty: 60 G | Refills: 0 | Status: SHIPPED | OUTPATIENT
Start: 2024-01-09

## 2024-01-09 RX ORDER — METRONIDAZOLE 7.5 MG/G
GEL TOPICAL
Qty: 45 G | Refills: 0 | Status: SHIPPED | OUTPATIENT
Start: 2024-01-09

## 2024-01-09 NOTE — TELEPHONE ENCOUNTER
Pt wants to get a Rx that dr thomas prescribed, Elvira, Pt states this is for a Rash, and Dr Thomas has seen her for this, but she doesn't know when.    Also she wants another rx refilled but she doesn know the name of, and she said we can look it up because dr thomas filled it for her.      She wants to talk to the nurse. .

## 2024-01-09 NOTE — TELEPHONE ENCOUNTER
Future Appointments   Date Time Provider Department Center   1/9/2024 12:00 PM Juliette Pardo MD [facility]

## 2024-01-09 NOTE — TELEPHONE ENCOUNTER
Call from patient  States has rosacea  States that she has current rash on her face and needs 2 medications filled by Dr Pardo that Dr Diaz used to refill  States one is called Nyseop and doesn't know the name of the other one \"but it's in my chart\". States it's a liquid alcohol for her face  No sick sx  I do see Nystop in her chart, unsure what she is referring to based on her med list  Please advise    Nystatin last prescribed 9./29./23

## 2024-01-30 NOTE — PROGRESS NOTES
No chief complaint on file.   Rosacea  This visit is conducted using Telemedicine with live, interactive video and audio.    Patient has been referred to the [facility] website at [email or web address] to review the yearly Consent to Treat document.    Patient understands and accepts financial responsibility for any deductible, co-insurance and/or co-pays associated with this service.        HPI:    Patient ID: Chani Leon is a 48 year old female.    HPI She was on med for rosacea and also fungal infection upper abdomen area need refil.    Review of Systems  Pos rash      Current Outpatient Medications   Medication Sig Dispense Refill    Nystatin [id number] UNIT/GM External Powder Apply 1 Application topically 3 (three) times daily. 60 g 0    metroNIDAZOLE 0.75 % External Gel Apply and rub a thin film twice daily, morning and evening, to entire affected areas after washing. 45 g 0    albuterol (2.5 MG/3ML) 0.083% Inhalation Nebu Soln Take 3 mL (2.5 mg total) by nebulization every 4 (four) hours as needed for Wheezing or Shortness of Breath. 100 each 0    metFORMIN  MG Oral Tablet 24 Hr Take 1 tablet (500 mg total) by mouth 2 (two) times daily with meals. 60 tablet 1     Allergies:  Allergies   Allergen Reactions    Morphine Sulfate SWELLING    Radiology Contrast Iodinated Dyes SWELLING     Swelling--hands and feet       HISTORY:  Past Medical History:   Diagnosis Date    Asthma     COVID-19 2020    loss of taste and smell. No hospitalization required    Extrinsic asthma, unspecified     last attack a couple of months ago    Genital herpes simplex     Gonorrhea     H. pylori infection 2018    Herpes     last outbreak years ago    History of seizure     Migraines     Osteoarthritis     mild in back    Pneumonia, organism unspecified(486) 1 year ago    PONV (postoperative nausea and vomiting)     mild     labor     pt. had 20 week loss    Seizure disorder (HCC) [id number]    patient states \"seizure was from  [place]\"     Sleep apnea     c-pap but dont use all the time      Past Surgical History:   Procedure Laterality Date          , , , 2016    COLONOSCOPY  2014    Procedure: COLONOSCOPY;  Surgeon: Nahid Calderon MD;  Location:  ENDOSCOPY    COLONOSCOPY N/A 2019    Procedure: COLONOSCOPY;  Surgeon: Segundo Harman MD;  Location:  ENDOSCOPY    HERNIA SURGERY      HYSTEROSCOPY,BIOPSY  2021    Hysteroscopy, D&C, endometrial polypectomy    LAPAROSCOPIC CHOLECYSTECTOMY  2 weeks ago    LASIK Bilateral 2002    done in [place]    SCARLETT LOCALIZATION WIRE 1 SITE LEFT (CPT=19281)      Benign    SCARLETT LOCALIZATION WIRE 1 SITE RIGHT (CPT=19281)      Benign    OTHER      baker cyst     OTHER      bunion bilateral - right foot metal    REDUCTION LEFT  1999    REDUCTION RIGHT      REMOVAL GALLBLADDER      VENTRAL HERNIA REPAIR  2013    Procedure: HERNIA VENTRAL REPAIR;  Surgeon: Jg Chambers MD;  Location:  MAIN OR      Family History   Problem Relation Age of Onset    Breast Cancer Mother 40        EST    Hypertension Mother     Glaucoma Mother     Ovarian Cancer Sister 37        EST    No Known Problems Maternal Grandmother     No Known Problems Maternal Grandfather     Diabetes Neg     Cancer Neg     Macular degeneration Neg       Social History:   Social History     Socioeconomic History    Marital status:    Tobacco Use    Smoking status: Never     Passive exposure: Current    Smokeless tobacco: Never    Tobacco comments:     NON-SMOKER   Vaping Use    Vaping Use: Never used   Substance and Sexual Activity    Alcohol use: Yes     Comment: occ    Drug use: No    Sexual activity: Yes     Partners: Male   Other Topics Concern    Caffeine Concern Yes     Comment: 3-4x cup daily     Exercise Yes     Comment: walking daily    Seat Belt Yes   Social History Narrative     worker, for emergency .         PHYSICAL EXAM:    LMP 2023 (Exact Date)     Physical Exam  Constitutional:       General: She is not in acute distress.     Appearance: She is not ill-appearing.   Neurological:      General: No focal deficit present.      Mental Status: She is alert. Mental status is at baseline.              ASSESSMENT/PLAN:   1. Candidiasis of breast  Med sent  - Nystatin [id number] UNIT/GM External Powder; Apply 1 Application topically 3 (three) times daily.  Dispense: 60 g; Refill: 0    2. Rosacea  Med sent  - metroNIDAZOLE 0.75 % External Gel; Apply and rub a thin film twice daily, morning and evening, to entire affected areas after washing.  Dispense: 45 g; Refill: 0             No follow-ups on file.

## 2024-03-05 ENCOUNTER — HOSPITAL ENCOUNTER (OUTPATIENT)
Age: 49
Discharge: HOME | End: 2024-03-05
Payer: MEDICAID

## 2024-03-05 VITALS
HEIGHT: 58 IN | DIASTOLIC BLOOD PRESSURE: 85 MMHG | OXYGEN SATURATION: 97 % | SYSTOLIC BLOOD PRESSURE: 126 MMHG | BODY MASS INDEX: 42.61 KG/M2 | WEIGHT: 203 LBS | RESPIRATION RATE: 18 BRPM | TEMPERATURE: 98 F | HEART RATE: 81 BPM

## 2024-03-05 DIAGNOSIS — L98.9 SKIN LESION: Primary | ICD-10-CM

## 2024-03-05 PROCEDURE — 99213 OFFICE O/P EST LOW 20 MIN: CPT | Performed by: NURSE PRACTITIONER

## 2024-03-05 RX ORDER — DOXYCYCLINE HYCLATE 100 MG/1
CAPSULE ORAL
COMMUNITY
Start: 2024-02-17

## 2024-03-05 NOTE — DISCHARGE INSTRUCTIONS
Warm compresses 10 minutes at a time, 3-4 times a day until resolution of symptoms.  Return if the area gets bigger, especially if it is softer, as by that time it may need to be drained.  As I mentioned, lesion you came in here for does not look like it needs to be drained today.

## 2024-03-05 NOTE — ED PROVIDER NOTES
Patient Seen in: [facility]      History     Chief Complaint   Patient presents with    Abscess     Stated Complaint: bump on face    Subjective:   48-year-old female here for evaluation of bump to the right side of her face just in front of her ear.  States it has been there for 1 week.  States she had her mom tried to pop it, however only blood came out.  No fevers.  Currently on an antibiotic.            Objective:   Past Medical History:   Diagnosis Date    Asthma ([facility])     COVID-19 2020    loss of taste and smell. No hospitalization required    Extrinsic asthma, unspecified     last attack a couple of months ago    Genital herpes simplex     Gonorrhea     H. pylori infection 2018    Herpes     last outbreak years ago    History of seizure     Migraines     Osteoarthritis     mild in back    Pneumonia, organism unspecified(486) 1 year ago    PONV (postoperative nausea and vomiting)     mild     labor ([facility])     pt. had 20 week loss    Seizure disorder ([facility]) [id number]    patient states \"seizure was from [place]\"     Sleep apnea     c-pap but dont use all the time              Past Surgical History:   Procedure Laterality Date          , , ,     COLONOSCOPY  2014    Procedure: COLONOSCOPY;  Surgeon: Nahid Calderon MD;  Location:  ENDOSCOPY    COLONOSCOPY N/A 2019    Procedure: COLONOSCOPY;  Surgeon: Segundo Harman MD;  Location:  ENDOSCOPY    HERNIA SURGERY      HYSTEROSCOPY,BIOPSY  2021    Hysteroscopy, D&C, endometrial polypectomy    LAPAROSCOPIC CHOLECYSTECTOMY  2 weeks ago    LASIK Bilateral     done in [place]    SCARLETT LOCALIZATION WIRE 1 SITE LEFT (CPT=19281)      Benign    SCARLETT LOCALIZATION WIRE 1 SITE RIGHT (CPT=19281)      Benign    OTHER      baker cyst     OTHER      bunion bilateral - right foot metal    REDUCTION LEFT      REDUCTION RIGHT      REMOVAL GALLBLADDER      VENTRAL HERNIA REPAIR  2013     Procedure: HERNIA VENTRAL REPAIR;  Surgeon: Jg Chambers MD;  Location:  MAIN OR                Social History     Socioeconomic History    Marital status:    Tobacco Use    Smoking status: Never     Passive exposure: Current    Smokeless tobacco: Never    Tobacco comments:     NON-SMOKER   Vaping Use    Vaping Use: Never used   Substance and Sexual Activity    Alcohol use: Yes     Comment: occ    Drug use: No    Sexual activity: Yes     Partners: Male   Other Topics Concern    Caffeine Concern Yes     Comment: 3-4x cup daily     Exercise Yes     Comment: walking daily    Seat Belt Yes   Social History Narrative     worker, for emergency .               Review of Systems   Constitutional: Negative.    Skin:         Rash/bump to the right side of the face just in front of the right ear.   All other systems reviewed and are negative.      Positive for stated complaint: bump on face  Other systems are as noted in HPI.  Constitutional and vital signs reviewed.      All other systems reviewed and negative except as noted above.    Physical Exam     ED Triage Vitals [03/05/24 1256]   /85   Pulse 81   Resp 18   Temp 97.6 °F (36.4 °C)   Temp src Temporal   SpO2 97 %   O2 Device None (Room air)       Current:/85   Pulse 81   Temp 97.6 °F (36.4 °C) (Temporal)   Resp 18   Ht 147.3 cm (4' 10\")   Wt 92.1 kg   LMP 02/13/2024 (Approximate)   SpO2 97%   BMI 42.43 kg/m²         Physical Exam  Vitals and nursing note reviewed.   Constitutional:       General: She is not in acute distress.     Appearance: Normal appearance. She is not ill-appearing, toxic-appearing or diaphoretic.   Skin:     General: Skin is warm and dry.      Findings: Lesion present.      Comments: A bump like lesion, nodule about a centimeter in diameter to the right side of the face just in front of the right ear.  No streaking lesions.  No fluctuance.  Looks slightly erythematous.   Neurological:      General: No  focal deficit present.      Mental Status: She is alert.   Psychiatric:         Mood and Affect: Mood normal.               ED Course   Labs Reviewed - No data to display                   MDM                                         Medical Decision Making  Differential diagnosis initially included but was not limited to: Cyst, abscess, cellulitis    48-year-old female, nontoxic-appearing, without pustular lesion, nonfluctuant to the right side of the face just in front of the right ear.  No streaking lesion.  No systemic symptoms.  Not consistent with cellulitis.  No area of fluctuance, unlikely to be an abscess.  Recommended warm compresses several times a day, to return if it gets bigger or get softer, as by that time it may require an incision and drainage.    Supportive/home management of diagnosis/illness/injury discussed. Red flag symptoms discussed.  Signs and symptoms/criteria that would necessitate reevaluation, including ER evaluation discussed.  Patient and/or responsible adult verbalize and agree with management and plan of care.    Speech recognition software was used during this dictation.  There may be minor errors in transcription.          Disposition and Plan     Clinical Impression:  1. Skin lesion         Disposition:  Discharge  3/5/2024  1:25 pm    Follow-up:  [facility] [address]  [phone]  Go to   As needed          Medications Prescribed:  Current Discharge Medication List

## 2024-03-08 ENCOUNTER — E-VISIT (OUTPATIENT)
Dept: FAMILY MEDICINE CLINIC | Facility: CLINIC | Age: 49
End: 2024-03-08
Payer: MEDICAID

## 2024-03-08 ENCOUNTER — TELEPHONE (OUTPATIENT)
Dept: FAMILY MEDICINE CLINIC | Facility: CLINIC | Age: 49
End: 2024-03-08

## 2024-03-08 DIAGNOSIS — R35.0 URINE FREQUENCY: ICD-10-CM

## 2024-03-08 DIAGNOSIS — N89.8 VAGINAL DISCHARGE: Primary | ICD-10-CM

## 2024-03-08 DIAGNOSIS — R30.0 DYSURIA: ICD-10-CM

## 2024-03-08 LAB
BILIRUBIN: NEGATIVE
GLUCOSE (URINE DIPSTICK): NEGATIVE MG/DL
MULTISTIX LOT#: ABNORMAL NUMERIC
NITRITE, URINE: NEGATIVE
PH, URINE: 5.5 (ref 4.5–8)
PROTEIN (URINE DIPSTICK): 30 MG/DL
SPECIFIC GRAVITY: >1.03 (ref 1–1.03)
URINE-COLOR: YELLOW
UROBILINOGEN,SEMI-QN: 0.2 MG/DL (ref 0–1.9)

## 2024-03-08 PROCEDURE — 87086 URINE CULTURE/COLONY COUNT: CPT | Performed by: NURSE PRACTITIONER

## 2024-03-08 PROCEDURE — 99421 OL DIG E/M SVC 5-10 MIN: CPT | Performed by: NURSE PRACTITIONER

## 2024-03-08 PROCEDURE — 81003 URINALYSIS AUTO W/O SCOPE: CPT | Performed by: NURSE PRACTITIONER

## 2024-03-08 RX ORDER — FLUCONAZOLE 150 MG/1
150 TABLET ORAL ONCE
Qty: 2 TABLET | Refills: 0 | Status: SHIPPED | OUTPATIENT
Start: 2024-03-08 | End: 2024-03-08

## 2024-03-08 NOTE — PROGRESS NOTES
Subjective:   Patient ID: Chani Leon is a 48 year old female.    Patient made E-Visit for possible yeast infection. + vaginal discharge, odor and discomfort. Also now reporting urine frequency and pain with urination.    History/Other:   Review of Systems   Constitutional:  Negative for fever.   Gastrointestinal:  Negative for abdominal pain.   Genitourinary:  Positive for dysuria, frequency and vaginal discharge.     Current Outpatient Medications   Medication Sig Dispense Refill    fluconazole 150 MG Oral Tab Take 1 tablet (150 mg total) by mouth once for 1 dose. 2 tablet 0    doxycycline 100 MG Oral Cap TAKE 1 CAPSULE BY MOUTH FOUR TIMES DAILY WITH FLUID OR DRINK      metFORMIN 500 MG Oral Tab       Nystatin [id number] UNIT/GM External Powder Apply 1 Application topically 3 (three) times daily. (Patient not taking: Reported on 3/5/2024) 60 g 0    metroNIDAZOLE 0.75 % External Gel Apply and rub a thin film twice daily, morning and evening, to entire affected areas after washing. 45 g 0    albuterol (2.5 MG/3ML) 0.083% Inhalation Nebu Soln Take 3 mL (2.5 mg total) by nebulization every 4 (four) hours as needed for Wheezing or Shortness of Breath. (Patient not taking: Reported on 3/5/2024) 100 each 0    metFORMIN  MG Oral Tablet 24 Hr Take 1 tablet (500 mg total) by mouth 2 (two) times daily with meals. 60 tablet 1     Allergies:  Allergies   Allergen Reactions    Morphine Sulfate SWELLING    Radiology Contrast Iodinated Dyes SWELLING     Swelling--hands and feet       Objective:   Unable to complete physical exam as this is an on-demand E Visit    Assessment & Plan:   1. Vaginal discharge    2. Urine frequency    3. Dysuria        Orders Placed This Encounter   Procedures    Urine Dip, auto without Micro    Urine Culture, Routine [E]       Meds This Visit:  Requested Prescriptions     Signed Prescriptions Disp Refills    fluconazole 150 MG Oral Tab 2 tablet 0     Sig: Take 1 tablet (150 mg total) by mouth once  for 1 dose.     Patient needs to return to office for UA and urine culture to also rule out UTI  Antifungal sent to pharmacy

## 2024-03-08 NOTE — TELEPHONE ENCOUNTER
Pt called and states she is taking a medication that she is pretty sure is giving her a yeast infection. Per pt she is very itchy and would like to know if something can be prescribed for her to help or if theres something OTC she can take. Please advise

## 2024-03-08 NOTE — TELEPHONE ENCOUNTER
Patient started doxyclinine for infection on her face - started it one week ago  Patient has vaginal itching for a couple days  Vaginal discharge  Vaginal odor  No fever  Denies abdominal pain  Denies pelvic pain  Per verbal Amelie e-visit.  Patient agreeable to e visit

## 2024-03-11 ENCOUNTER — TELEPHONE (OUTPATIENT)
Dept: FAMILY MEDICINE CLINIC | Facility: CLINIC | Age: 49
End: 2024-03-11

## 2024-04-04 ENCOUNTER — HOSPITAL ENCOUNTER (OUTPATIENT)
Age: 49
Discharge: HOME | End: 2024-04-04
Payer: MEDICAID

## 2024-04-04 VITALS
WEIGHT: 200 LBS | RESPIRATION RATE: 16 BRPM | OXYGEN SATURATION: 97 % | HEART RATE: 82 BPM | HEIGHT: 58 IN | BODY MASS INDEX: 41.98 KG/M2 | SYSTOLIC BLOOD PRESSURE: 117 MMHG | TEMPERATURE: 98 F | DIASTOLIC BLOOD PRESSURE: 81 MMHG

## 2024-04-04 DIAGNOSIS — L08.9 SKIN PUSTULE: ICD-10-CM

## 2024-04-04 DIAGNOSIS — H11.31 SUBCONJUNCTIVAL HEMORRHAGE OF RIGHT EYE: ICD-10-CM

## 2024-04-04 DIAGNOSIS — J06.9 VIRAL URI: Primary | ICD-10-CM

## 2024-04-04 PROCEDURE — 99214 OFFICE O/P EST MOD 30 MIN: CPT | Performed by: NURSE PRACTITIONER

## 2024-04-04 RX ORDER — PREDNISONE 20 MG/1
40 TABLET ORAL DAILY
Qty: 10 TABLET | Refills: 0 | Status: SHIPPED | OUTPATIENT
Start: 2024-04-04 | End: 2024-04-09

## 2024-04-04 RX ORDER — ALBUTEROL SULFATE 90 UG/1
2 AEROSOL, METERED RESPIRATORY (INHALATION) EVERY 4 HOURS PRN
Qty: 1 EACH | Refills: 0 | Status: SHIPPED | OUTPATIENT
Start: 2024-04-04 | End: 2024-05-04

## 2024-04-04 RX ORDER — FLUTICASONE PROPIONATE 44 UG/1
1 AEROSOL, METERED RESPIRATORY (INHALATION) 2 TIMES DAILY
Qty: 10.6 G | Refills: 0 | Status: SHIPPED | OUTPATIENT
Start: 2024-04-04

## 2024-04-04 NOTE — ED INITIAL ASSESSMENT (HPI)
Pt sts redness to cornea to right eye, pain to left ear began yesterday, Tightness in chest for the past several days- relief with Albuterol MDI, Also with abscess to right groin for the past several days. Kacy sanon.

## 2024-04-04 NOTE — DISCHARGE INSTRUCTIONS
Rest and drink plenty of fluids.   Take your inhalers as prescribed.  You can increase the albuterol up to 8 puffs every 4 hours if needed.   Only use the prednisone if needed.  Make sure to take it with food.   Use triple antibiotic ointment twice a day to the wound on your leg for 4-5 days.   Follow up with Dr. Pardo in 1 week.

## 2024-04-04 NOTE — ED PROVIDER NOTES
Patient Seen in: [facility]      History     Chief Complaint   Patient presents with    Cough/URI    Rash Skin Problem    Eye Problem     Stated Complaint: abscess, L ear pain, asthma issues    Subjective:   48 year old female with hx or asthma presents to the immediate care with complaint of red right eye, left ear pain, chest tightness, and abscess to right inner thigh. Patient reports absence has been present for the past several days, it started off small and has been growing. She reports it is red and pimple like, she denies fevers, chills, malaise, or warmth. Patient reports hx of migraines, states she had a migraine due to stress a few days ago and right eye redness appeared suddenly yesterday along with left ear pain. She denies pain, visual changes, dizziness, or current headache, congestion, sinus pain or sinus pressure. Patient has hx of asthma, she reports feeling some chest tightness recently with the change in weather, she denies chest pain, shortness of breath, no wheezing, no fevers, or cough. She does need refills for her asthma medications. She has had relief of her chest tightness with Albuterol MDI, has not attempted any treatment of any of her other symptoms.    The history is provided by the patient.       Objective:   Past Medical History:   Diagnosis Date    Asthma (Formerly [facility])     COVID-19 2020    loss of taste and smell. No hospitalization required    Extrinsic asthma, unspecified     last attack a couple of months ago    Genital herpes simplex     Gonorrhea     H. pylori infection 2018    Herpes     last outbreak years ago    History of seizure     Migraines     Osteoarthritis     mild in back    Pneumonia, organism unspecified(486) 1 year ago    PONV (postoperative nausea and vomiting)     mild     labor (Formerly [facility])     pt. had 20 week loss    Seizure disorder (Formerly [facility]) [id number]    patient states \"seizure was from [place]\"     Sleep apnea     c-pap but dont use all the time               Past Surgical History:   Procedure Laterality Date          , , , 2016    COLONOSCOPY  2014    Procedure: COLONOSCOPY;  Surgeon: Nahid Calderon MD;  Location:  ENDOSCOPY    COLONOSCOPY N/A 2019    Procedure: COLONOSCOPY;  Surgeon: Segundo Harman MD;  Location:  ENDOSCOPY    HERNIA SURGERY      HYSTEROSCOPY,BIOPSY  2021    Hysteroscopy, D&C, endometrial polypectomy    LAPAROSCOPIC CHOLECYSTECTOMY  2 weeks ago    LASIK Bilateral     done in [facility] LOCALIZATION WIRE 1 SITE LEFT (CPT=19281)      Benign    [facility] LOCALIZATION WIRE 1 SITE RIGHT (CPT=19281)      Benign    OTHER      baker cyst     OTHER      bunion bilateral - right foot metal    REDUCTION LEFT      REDUCTION RIGHT      REMOVAL GALLBLADDER      VENTRAL HERNIA REPAIR  2013    Procedure: HERNIA VENTRAL REPAIR;  Surgeon: Jg Chambers MD;  Location:  MAIN OR                No pertinent social history.            Review of Systems   Constitutional: Negative.  Negative for appetite change, chills, fatigue and fever.   HENT:  Positive for ear pain. Negative for congestion, postnasal drip, rhinorrhea, sinus pressure, sinus pain, sneezing and sore throat.         Throbbing right ear pain 5-6/10   Eyes:  Positive for redness. Negative for photophobia, pain, discharge, itching and visual disturbance.   Respiratory:  Positive for chest tightness. Negative for cough, shortness of breath and wheezing.    Cardiovascular:  Positive for leg swelling.   Gastrointestinal: Negative.    Musculoskeletal:  Negative for arthralgias and myalgias.   All other systems reviewed and are negative.      Positive for stated complaint: abscess, L ear pain, asthma issues  Other systems are as noted in HPI.  Constitutional and vital signs reviewed.      All other systems reviewed and negative except as noted above.    Physical Exam     ED Triage Vitals [24 1610]   /81   Pulse 82   Resp  16   Temp 98 °F (36.7 °C)   Temp src Temporal   SpO2 97 %   O2 Device None (Room air)       Current:/81   Pulse 82   Temp 98 °F (36.7 °C) (Temporal)   Resp 16   Ht 147.3 cm (4' 10\")   Wt 90.7 kg   LMP 03/18/2024 (Approximate)   SpO2 97%   BMI 41.80 kg/m²         Physical Exam  Vitals and nursing note reviewed.   Constitutional:       Appearance: Normal appearance. She is obese.   HENT:      Head: Normocephalic and atraumatic.      Right Ear: Tympanic membrane, ear canal and external ear normal.      Left Ear: Tympanic membrane, ear canal and external ear normal.      Nose: Nose normal. No congestion or rhinorrhea.      Mouth/Throat:      Mouth: Mucous membranes are moist.      Pharynx: Oropharynx is clear.   Eyes:      General: Lids are normal. Vision grossly intact. Gaze aligned appropriately. No visual field deficit.     Extraocular Movements: Extraocular movements intact.      Conjunctiva/sclera:      Right eye: Hemorrhage present.      Pupils: Pupils are equal, round, and reactive to light.      Comments: Subconjunctival hemorrhage to the lateral aspect of the right eye.    Cardiovascular:      Rate and Rhythm: Normal rate and regular rhythm.      Pulses: Normal pulses.      Heart sounds: Normal heart sounds.   Pulmonary:      Effort: Pulmonary effort is normal. No respiratory distress.      Breath sounds: Normal breath sounds. No wheezing or rhonchi.   Abdominal:      General: Abdomen is flat. Bowel sounds are normal.      Palpations: Abdomen is soft.   Skin:     General: Skin is warm and dry.      Comments: 1 cm pustule to right medial upper thigh.  Small amount of purulent drainage released with minimal manual expression.  No surrounding erythema, induration, or tunneling.    Neurological:      General: No focal deficit present.      Mental Status: She is alert and oriented to person, place, and time.   Psychiatric:         Mood and Affect: Mood normal.         Behavior: Behavior normal.          Thought Content: Thought content normal.         Judgment: Judgment normal.           ED Course   Labs Reviewed - No data to display                 MDM                               Medical Decision Making  48 year old female with hx or asthma presents to the immediate care with complaint of red right eye, left ear pain, chest tightness, and abscess to right inner thigh. Hx and exam are consistent with a viral infection, skin pustule, and subconjunctival hemorrhage of right eye.  No indication for any other labs, imaging, or abx.  Viral infection: No evidence of sepsis, strep, otitis, pneumonia, bacterial sinusitis, or other bacterial etiology.  Counseled patient on supportive management at home, refills for anti-asthmatic medications sent to the pharmacy. Education on dosing and side effects discussed. Skin pustule: No evidence of sepsis, cellulitis, abscess or herpatic lesion. Subconjunctival hemorrhage of right eye:No evidence of keratitis, uveitis, corneal abrasion, or bacterial conjunctivitis. Patient educated supportive care at home. Follow up with primary care provider in a week or as needed.     Note was completed by NP student.  I have reviewed and agree with HPI, assessment, plan and treatment.        F/u with PCP in a few days.  Verbalized understanding and agreement.         Risk  OTC drugs.  Prescription drug management.        Disposition and Plan     Clinical Impression:  1. Viral URI    2. Subconjunctival hemorrhage of right eye    3. Skin pustule         Disposition:  Discharge  4/4/2024  4:48 pm    Follow-up:  Juliette Pardo MD  [address] [address]  [phone]    In 1 week  As needed          Medications Prescribed:  Discharge Medication List as of 4/4/2024  4:49 PM        START taking these medications    Details   predniSONE 20 MG Oral Tab Take 2 tablets (40 mg total) by mouth daily for 5 days., Normal, Disp-10 tablet, R-0      albuterol 108 (90 Base) MCG/ACT Inhalation Aero  Soln Inhale 2 puffs into the lungs every 4 (four) hours as needed for Wheezing., Normal, Disp-1 each, R-0      fluticasone propionate 44 MCG/ACT Inhalation Aerosol Inhale 1 puff into the lungs 2 (two) times daily., Normal, Disp-10.6 g, R-0

## 2024-04-30 ENCOUNTER — TELEPHONE (OUTPATIENT)
Dept: FAMILY MEDICINE CLINIC | Facility: CLINIC | Age: 49
End: 2024-04-30

## 2024-04-30 LAB — AMB EXT HGBA1C: 5.9 %

## 2024-04-30 NOTE — TELEPHONE ENCOUNTER
Pt having Gastric Bypass in June or July  Pt does not have know her surgeons name   Having surgery in [facility]   Pt will call back with more information.    Pt wanted to schedule her pre-op  Scheduled for 6/19/24  Future Appointments   Date Time Provider Department Center   6/19/2024  8:00 AM Mei Bryan APRN [facility]

## 2024-05-23 ENCOUNTER — HOSPITAL ENCOUNTER (OUTPATIENT)
Age: 49
Discharge: HOME | End: 2024-05-23

## 2024-05-23 VITALS
TEMPERATURE: 98 F | SYSTOLIC BLOOD PRESSURE: 148 MMHG | DIASTOLIC BLOOD PRESSURE: 97 MMHG | HEART RATE: 89 BPM | RESPIRATION RATE: 18 BRPM | OXYGEN SATURATION: 98 %

## 2024-05-23 DIAGNOSIS — R10.9 ABDOMINAL PAIN OF UNKNOWN ETIOLOGY: Primary | ICD-10-CM

## 2024-05-23 LAB
B-HCG UR QL: NEGATIVE
BILIRUB UR QL STRIP: NEGATIVE
GLUCOSE UR STRIP-MCNC: NEGATIVE MG/DL
LEUKOCYTE ESTERASE UR QL STRIP: NEGATIVE
NITRITE UR QL STRIP: NEGATIVE
PH UR STRIP: 5.5 [PH]
SP GR UR STRIP: >=1.03
UROBILINOGEN UR STRIP-ACNC: <2 MG/DL

## 2024-05-23 NOTE — ED INITIAL ASSESSMENT (HPI)
Pt sts intermittent nausea, mid abd pain for the past 2-3 days. Last BM- this morning- constipated. Denies fever.

## 2024-05-23 NOTE — ED PROVIDER NOTES
Patient Seen in: [facility]      History     Chief Complaint   Patient presents with    Abdomen/Flank Pain    Nausea/Vomiting/Diarrhea     Stated Complaint: stomach pain    Subjective:   HPI    48-year-old female presents to the immediate care with complaints of generalized mid epigastric abdominal pain for last couple days.  Patient she is 8 days late on her cycle and was made sure she is not pregnant.  Or she has a UTI.  Did have a normal bowel movement yesterday.  Denies any fever denies any vaginal discharge denies any flank pain.  Patient has no other issues complaints or concerns.  The patient's medication list, past medical history and social history elements as listed in today's nurse's notes were reviewed and agreed (except as otherwise stated in the HPI).  The patient's family history reviewed and determined to be noncontributory to the presenting problem.      Objective:   Past Medical History:    Asthma (HCC)    COVID-19    loss of taste and smell. No hospitalization required    Extrinsic asthma, unspecified    last attack a couple of months ago    Genital herpes simplex    Gonorrhea    H. pylori infection    Herpes    last outbreak years ago    History of seizure    Migraines    Osteoarthritis    mild in back    Pneumonia, organism unspecified(486)    PONV (postoperative nausea and vomiting)    mild     labor (HCC)    pt. had 20 week loss    Seizure disorder (HCC)    patient states \"seizure was from [place]\"     Sleep apnea    c-pap but dont use all the time              Past Surgical History:   Procedure Laterality Date          , , ,     Colonoscopy  2014    Procedure: COLONOSCOPY;  Surgeon: Nahid Calderon MD;  Location:  ENDOSCOPY    Colonoscopy N/A 2019    Procedure: COLONOSCOPY;  Surgeon: Segundo Harman MD;  Location:  ENDOSCOPY    Hernia surgery      Hysteroscopy,biopsy  2021    Hysteroscopy, D&C, endometrial polypectomy     Laparoscopic cholecystectomy  2 weeks ago    Lasik Bilateral 2002    done in [facility] localization wire 1 site left (cpt=19281)  2008    Benign    [facility] localization wire 1 site right (cpt=19281)  2008    Benign    Other      baker cyst     Other      bunion bilateral - right foot metal    Reduction left  1999    Reduction right  1999    Removal gallbladder      Ventral hernia repair  11/20/2013    Procedure: HERNIA VENTRAL REPAIR;  Surgeon: Jg Chambers MD;  Location:  MAIN OR                No pertinent social history.            Review of Systems    Positive for stated complaint: stomach pain  Other systems are as noted in HPI.  Constitutional and vital signs reviewed.      All other systems reviewed and negative except as noted above.    Physical Exam     ED Triage Vitals [05/23/24 1439]   BP (!) 148/97   Pulse 89   Resp 18   Temp 98 °F (36.7 °C)   Temp src Temporal   SpO2 98 %   O2 Device None (Room air)       Current Vitals:   Vital Signs  BP: (!) 148/97  Pulse: 89  Resp: 18  Temp: 98 °F (36.7 °C)  Temp src: Temporal    Oxygen Therapy  SpO2: 98 %  O2 Device: None (Room air)            Physical Exam  GENERAL: The patient is well-developed well-nourished, nontoxic non-ill-appearing.  HEENT: Normocephalic.  Atraumatic.  Extraocular motions are intact.  Patient has moist mucous membranes.  NECK: Supple.  No meningitic signs are noted.  There is no adenopathy noted.  CHEST/LUNGS:   There is no respiratory distress noted.  HEART/CARDIOVASCULAR: Regular.  There is no tachycardia.  ABDOMEN: Abdomen is soft, nontender.  Patient has normal bowel sounds.  There is no abdominal distention.  SKIN: There is no rash.  There is no edema.  There is no diaphoresis.  NEURO: The patient is awake, alert, and oriented.  The patient is cooperative.      ED Course     Labs Reviewed   [facility] POCT URINALYSIS DIPSTICK - Abnormal; Notable for the following components:       Result Value    Urine Clarity Slightly cloudy (*)      Protein urine Trace (*)     Ketone, Urine Trace (*)     Blood, Urine Trace-Intact (*)     All other components within normal limits   POCT PREGNANCY URINE - Normal     Patient was offered imaging labs nausea medicine patient declines just wanted a pregnancy test and to make sure she not have a UTI.            MDM   Pertinent Labs & Imaging studies reviewed. (See chart for details)  Differential diagnosis considered but not limited to: UTI early pregnancy constipation acute surgical abdomen  Patient coming in with abdominal pain for couple days. Patient provided with pain medication. Labs reviewed no signs of a urine infection no pregnancy.  Will treat for possible abdominal pain unknown etiology. Will discharge on over-the-counter supportive care see discharge note for instructions. Patient is comfortable with this plan.  Overall Pt looks good. Non-toxic, well-hydrated and in no respiratory distress. Vital signs are reassuring. Exam is reassuring. I do not believe pt  requires and additional  diagnostic studiesor intervention. I believe pt  can be discharged home to continue evaluation as an outpatient. Follow-up provider given. Discharge instructions given and reviewed. Return for any problems. All understand and agreewith the plan.    Please note that this report has been produced using speech recognition software and may contain errors related to that system including, but not limited to, errors in grammar, punctuation, and spelling, as well as words and phrases that possibly may have been recognized inappropriately.  If there are any questions or concerns, contact the dictating provider for clarification.    Note to patient: The 21st Century Cures Act makes medical notes like these available to patients in the interest of transparency. However, this is a medical document intended as peer to peer communication. It is written in medical language and may contain abbreviations or verbiage that are unfamiliar. It may  appear blunt or direct. Medical documents are intended to carry relevant information, facts as evident, and the clinical opinion of the practitioner.                                    Medical Decision Making      Disposition and Plan     Clinical Impression:  1. Abdominal pain of unknown etiology         Disposition:  Discharge  5/23/2024  3:12 pm    Follow-up:  No follow-up provider specified.        Medications Prescribed:  Discharge Medication List as of 5/23/2024  3:12 PM

## 2024-05-24 ENCOUNTER — HOSPITAL ENCOUNTER (OUTPATIENT)
Age: 49
Discharge: HOME | End: 2024-05-24

## 2024-05-24 ENCOUNTER — LAB ENCOUNTER (OUTPATIENT)
Dept: LAB | Age: 49
End: 2024-05-24
Attending: NURSE PRACTITIONER

## 2024-05-24 VITALS
WEIGHT: 200 LBS | BODY MASS INDEX: 41.98 KG/M2 | HEART RATE: 81 BPM | OXYGEN SATURATION: 98 % | HEIGHT: 58 IN | RESPIRATION RATE: 18 BRPM | SYSTOLIC BLOOD PRESSURE: 136 MMHG | DIASTOLIC BLOOD PRESSURE: 94 MMHG | TEMPERATURE: 98 F

## 2024-05-24 DIAGNOSIS — R11.0 NAUSEA: Primary | ICD-10-CM

## 2024-05-24 DIAGNOSIS — Z32.00 POSSIBLE PREGNANCY: Primary | ICD-10-CM

## 2024-05-24 DIAGNOSIS — Z32.00 POSSIBLE PREGNANCY: ICD-10-CM

## 2024-05-24 DIAGNOSIS — N91.2 AMENORRHEA, UNSPECIFIED: ICD-10-CM

## 2024-05-24 LAB — B-HCG SERPL-ACNC: <1 MIU/ML

## 2024-05-24 PROCEDURE — 84702 CHORIONIC GONADOTROPIN TEST: CPT

## 2024-05-24 PROCEDURE — 36415 COLL VENOUS BLD VENIPUNCTURE: CPT

## 2024-05-24 PROCEDURE — 99214 OFFICE O/P EST MOD 30 MIN: CPT | Performed by: PHYSICIAN ASSISTANT

## 2024-05-24 RX ORDER — ONDANSETRON 4 MG/1
4 TABLET, ORALLY DISINTEGRATING ORAL EVERY 4 HOURS PRN
Qty: 10 TABLET | Refills: 0 | Status: SHIPPED | OUTPATIENT
Start: 2024-05-24 | End: 2024-05-31

## 2024-05-24 NOTE — ED PROVIDER NOTES
Patient Seen in: [facility]      History     Chief Complaint   Patient presents with    Nausea/Vomiting/Diarrhea     Stated Complaint: nauseau    Subjective:   The history is provided by the patient.       48-year-old female with past med history of obesity presents to the immediate care due to amenorrhea.  Patient states her last menstrual cycle was roughly 40 days prior.  Her menstrual cycles usually are rather regular.  Patient with last few days has noticed some breast soreness.  No swelling, redness, nipple discharge.  Feels somewhat similar to her previous pregnancies. (Admits breast tenderness at times prior to menstrual cycles) patient also has noticed some mild nausea.  Denies any fevers abdominal pain vomiting diarrhea.  No urinary complaints.  Denies any abnormal vaginal discharge.  Patient denies any chest pain, shortness of breath or exertional dyspnea.  Further denies any night sweats, abnormal weight loss.  Patient is currently taking metformin and additional weight loss medication -states he has been on it for a few months does not believe this is related.  Patient is concerned she could be pregnant therefore reported to the urgent care yesterday -urine dip and hCG urine was negative.  Patient states in previous pregnancies her urine pregnancy has been falsely negative concerned there still could be potential for pregnancy and wanted to verify due to her current weight loss medications since they are not compatible with pregnancy.   Objective:   No pertinent past medical history.            No pertinent past surgical history.              No pertinent social history.            Review of Systems   Constitutional: Negative.    HENT: Negative.     Respiratory: Negative.     Cardiovascular: Negative.    Gastrointestinal:  Positive for nausea. Negative for abdominal pain, diarrhea and rectal pain.   Genitourinary: Negative.    Musculoskeletal: Negative.    Skin: Negative.        Positive for  stated complaint: nauseau  Other systems are as noted in HPI.  Constitutional and vital signs reviewed.      All other systems reviewed and negative except as noted above.    Physical Exam     ED Triage Vitals   BP 05/24/24 0914 (!) 136/94   Pulse 05/24/24 0914 81   Resp 05/24/24 0914 18   Temp 05/24/24 0914 98 °F (36.7 °C)   Temp src 05/24/24 0914 Temporal   SpO2 05/24/24 0914 98 %   O2 Device 05/24/24 0913 None (Room air)       Current Vitals:   Vital Signs  BP: (!) 136/94  Pulse: 81  Resp: 18  Temp: 98 °F (36.7 °C)  Temp src: Temporal    Oxygen Therapy  SpO2: 98 %  O2 Device: None (Room air)            Physical Exam  Vitals and nursing note reviewed.   Constitutional:       General: She is not in acute distress.     Appearance: She is well-developed. She is not toxic-appearing.   HENT:      Head: Normocephalic.   Eyes:      Extraocular Movements: Extraocular movements intact.      Pupils: Pupils are equal, round, and reactive to light.   Cardiovascular:      Rate and Rhythm: Normal rate and regular rhythm.   Pulmonary:      Effort: Pulmonary effort is normal.      Breath sounds: Normal breath sounds.   Abdominal:      General: Abdomen is flat. Bowel sounds are normal.      Palpations: Abdomen is soft.      Tenderness: There is no abdominal tenderness. There is no guarding.   Skin:     General: Skin is warm.   Neurological:      General: No focal deficit present.      Mental Status: She is alert and oriented to person, place, and time.   Psychiatric:         Mood and Affect: Mood normal.         Behavior: Behavior normal.               ED Course   Labs Reviewed - No data to display                   MDM   On exam the patient is afebrile and nontoxic.  Vital signs are stable.  She is in no acute distress.  Moist mucous membranes.  Her abdomen is completely soft and nontender.  Patient was evaluated yesterday UA including dip and pregnancy were negative for acute findings.  Patient still concerned due to her  previous pregnancies testing negative on urine screening.  Due to age her amenorrhea could be related more to perimenopausal or abnormal menstrual cycles clinically low suspicion due to negative urine pregnancy yesterday.  Discussed other medical conditions that could be causing nausea including ACS, abdominal infection, viral illness-however patient states she feels well otherwise and reported to the immediate care for hCG testing via blood.  I did discuss I am unable to obtain this lab testing and  she does have an appointment with her gynecologist in 2 weeks in regards to this amenorrhea.  Luckily the patient's primary care doctor is in the same office I did contact her PCP who is willing to put in an outpatient order for a beta hCG level which the patient will have performed outpatient.  Clinically her abdomen is soft and nontender I have low suspicion for intra-abdominal infection.  Patient is denying any chest pain, shortness of breath dyspnea palpitations once again clinically low suspicion for ACS.  Shared decision-making was performed with the patient she would prefer to just obtainthe outpatient lab and will be prescribed Zofran as needed for nausea.  Strict return precautions were discussed and the patient is to keep her appointment with a gynecologist if the amenorrhea or abnormalities in her menstrual cycles continue.                                 Medical Decision Making  Problems Addressed:  Amenorrhea, unspecified: acute illness or injury  Nausea: acute illness or injury    Risk  OTC drugs.  Prescription drug management.        Disposition and Plan     Clinical Impression:  1. Nausea    2. Amenorrhea, unspecified         Disposition:  Discharge  5/24/2024  9:42 am    Follow-up:  No follow-up provider specified.        Medications Prescribed:  Discharge Medication List as of 5/24/2024  9:40 AM

## 2024-05-24 NOTE — DISCHARGE INSTRUCTIONS
Keep your appointment for your gynecologist in 2 weeks for recheck if your menstrual cycle does not begin  Report to the lab for your lab testing that was provided by your primary care doctor  If your testing is negative for pregnancy okay to take an Zofran however continue to monitor for worsening symptoms  As we discussed chest pain shortness of breath abdominal pain diarrhea would be concerning to be reevaluated

## 2024-05-24 NOTE — ED QUICK NOTES
Pt thinks that she could be pregnant because she is having nausea and breast pain. She is also 10 days late with her period. She wants blood work. I explained to the patient that we can not do that blood work from here. I asked her if she followed up with her gyne. She said they can not get her in for 2 weeks. Pt is taking medication that she should not be taking if she is pregnant and states that she has never tested positive on a urine preg. Test with any of her pregnancies.

## 2024-05-25 ENCOUNTER — TELEPHONE (OUTPATIENT)
Dept: FAMILY MEDICINE CLINIC | Facility: CLINIC | Age: 49
End: 2024-05-25

## 2024-05-25 NOTE — TELEPHONE ENCOUNTER
----- Message from Mei Bryan sent at 5/25/2024  8:29 AM CDT -----  Results reviewed. Pregnancy testing negative

## 2024-06-19 ENCOUNTER — OFFICE VISIT (OUTPATIENT)
Dept: FAMILY MEDICINE CLINIC | Facility: CLINIC | Age: 49
End: 2024-06-19

## 2024-06-19 VITALS
HEIGHT: 58 IN | SYSTOLIC BLOOD PRESSURE: 134 MMHG | BODY MASS INDEX: 44.29 KG/M2 | OXYGEN SATURATION: 93 % | DIASTOLIC BLOOD PRESSURE: 84 MMHG | HEART RATE: 88 BPM | WEIGHT: 211 LBS | TEMPERATURE: 98 F

## 2024-06-19 DIAGNOSIS — Z11.3 SCREEN FOR STD (SEXUALLY TRANSMITTED DISEASE): ICD-10-CM

## 2024-06-19 DIAGNOSIS — E78.2 ELEVATED CHOLESTEROL WITH ELEVATED TRIGLYCERIDES: ICD-10-CM

## 2024-06-19 DIAGNOSIS — F31.81 BIPOLAR 2 DISORDER (HCC): ICD-10-CM

## 2024-06-19 DIAGNOSIS — R73.03 PREDIABETES: ICD-10-CM

## 2024-06-19 DIAGNOSIS — Z12.31 BREAST CANCER SCREENING BY MAMMOGRAM: ICD-10-CM

## 2024-06-19 DIAGNOSIS — Z01.818 PREOP EXAMINATION: Primary | ICD-10-CM

## 2024-06-19 DIAGNOSIS — J45.20 MILD INTERMITTENT ASTHMA WITHOUT COMPLICATION (HCC): ICD-10-CM

## 2024-06-19 DIAGNOSIS — G47.33 OSA (OBSTRUCTIVE SLEEP APNEA): ICD-10-CM

## 2024-06-19 DIAGNOSIS — M17.12 PRIMARY OSTEOARTHRITIS OF LEFT KNEE: ICD-10-CM

## 2024-06-19 DIAGNOSIS — F40.243 FEAR OF FLYING: ICD-10-CM

## 2024-06-19 DIAGNOSIS — M17.11 PRIMARY OSTEOARTHRITIS OF RIGHT KNEE: ICD-10-CM

## 2024-06-19 DIAGNOSIS — N89.8 VAGINAL DISCHARGE: ICD-10-CM

## 2024-06-19 DIAGNOSIS — E66.01 MORBID OBESITY (HCC): ICD-10-CM

## 2024-06-19 PROBLEM — N23 RENAL COLIC: Status: RESOLVED | Noted: 2023-03-08 | Resolved: 2024-06-19

## 2024-06-19 PROBLEM — R87.619 ENDOMETRIAL CELLS ON CERVICAL PAP SMEAR INCONSISTENT W/LMP: Status: RESOLVED | Noted: 2020-02-08 | Resolved: 2024-06-19

## 2024-06-19 PROBLEM — G43.001 MIGRAINE WITHOUT AURA AND WITH STATUS MIGRAINOSUS, NOT INTRACTABLE: Status: RESOLVED | Noted: 2020-06-13 | Resolved: 2024-06-19

## 2024-06-19 PROBLEM — A04.8 H. PYLORI INFECTION: Status: RESOLVED | Noted: 2019-05-15 | Resolved: 2024-06-19

## 2024-06-19 PROBLEM — N20.0 LEFT RENAL STONE: Status: RESOLVED | Noted: 2023-03-23 | Resolved: 2024-06-19

## 2024-06-19 PROCEDURE — 87491 CHLMYD TRACH DNA AMP PROBE: CPT | Performed by: NURSE PRACTITIONER

## 2024-06-19 PROCEDURE — 87591 N.GONORRHOEAE DNA AMP PROB: CPT | Performed by: NURSE PRACTITIONER

## 2024-06-19 PROCEDURE — 81514 NFCT DS BV&VAGINITIS DNA ALG: CPT | Performed by: NURSE PRACTITIONER

## 2024-06-19 PROCEDURE — 99215 OFFICE O/P EST HI 40 MIN: CPT | Performed by: NURSE PRACTITIONER

## 2024-06-19 RX ORDER — PHENTERMINE HYDROCHLORIDE 37.5 MG/1
TABLET ORAL
COMMUNITY
Start: 2024-05-15

## 2024-06-19 RX ORDER — ALPRAZOLAM 0.5 MG/1
0.5 TABLET, ORALLY DISINTEGRATING ORAL
Qty: 4 TABLET | Refills: 0 | Status: SHIPPED | OUTPATIENT
Start: 2024-06-19

## 2024-06-19 NOTE — PROGRESS NOTES
Chani Leon is a 48 year old female who presents for a pre-operative physical exam.     Patient is planning gastric sleeve procedure at [facility] with Dr Calderon Rodriguez.   Indication: Morbid Obesity     Current diet: avoid bread, soda, sweets  Exercise: walking    She has had previous anesthesia:  Yes  Previous complications:  No  Personal or Family History Malignant Hyperthermia: No    Respiratory Disease: Yes, Asthma  Cardiac Disease: Yes, elevated triglycerides  Endocrine Disease: Yes, prediabetes  Sleep Apnea: Yes, not currently using CPAP because her machine was recalled. Has not seen sleep med in a long time.    Other Concerns:   having yellow vaginal discharge. No urinary symptoms. Wonders if she has a yeast infection. Has also had new sexual partner since last STD screen  Needs refill of anxiety medication for flying    Social History     Socioeconomic History    Marital status:    Tobacco Use    Smoking status: Never     Passive exposure: Current    Smokeless tobacco: Never    Tobacco comments:     NON-SMOKER   Vaping Use    Vaping status: Never Used   Substance and Sexual Activity    Alcohol use: Yes     Comment: occ    Drug use: No    Sexual activity: Yes     Partners: Male   Other Topics Concern    Caffeine Concern Yes     Comment: 3-4x cup daily     Exercise Yes     Comment: walking daily    Seat Belt Yes   Social History Narrative     worker, for emergency .      Social Determinants of Health      [facility]    Social Connections    Received from [facility], [facility]    Housing Stability      Past Medical History:    Asthma (HCC)    COVID-19    loss of taste and smell. No hospitalization required    Endometrial cells on cervical Pap smear inconsistent w/LMP    Extrinsic asthma, unspecified    last attack a couple of months ago    Genital herpes simplex    Gonorrhea     H. pylori infection    H. pylori infection    PREVIOUS HISTORY      Herpes    last outbreak years ago    History of seizure    Left renal stone    Migraines    Obstruction of left ureteropelvic junction (UPJ) due to stone    Osteoarthritis    mild in back    Pneumonia, organism unspecified(486)    PONV (postoperative nausea and vomiting)    mild     labor (HCC)    pt. had 20 week loss    Renal colic    Seizure disorder (HCC)    patient states \"seizure was from [place]\"     Seizure disorder (HCC)    Seizure-like activity (HCC)    Sleep apnea    c-pap but dont use all the time     Past Surgical History:   Procedure Laterality Date          , , ,     Colonoscopy  2014    Procedure: COLONOSCOPY;  Surgeon: Nahid Calderon MD;  Location:  ENDOSCOPY    Colonoscopy N/A 2019    Procedure: COLONOSCOPY;  Surgeon: Segundo Harman MD;  Location:  ENDOSCOPY    Hernia surgery      Hysteroscopy,biopsy  2021    Hysteroscopy, D&C, endometrial polypectomy    Laparoscopic cholecystectomy  2 weeks ago    Lasik Bilateral     done in [place]    Kaushal localization wire 1 site left (cpt=19281)      Benign    Kaushal localization wire 1 site right (cpt=19281)      Benign    Other      baker cyst     Other      bunion bilateral - right foot metal    Reduction left  1999    Reduction right      Removal gallbladder      Ventral hernia repair  2013    Procedure: HERNIA VENTRAL REPAIR;  Surgeon: Jg Chambers MD;  Location:  MAIN OR     Allergies:   Allergies   Allergen Reactions    Morphine Sulfate SWELLING    Radiology Contrast Iodinated Dyes SWELLING     Swelling--hands and feet     Current Outpatient Medications   Medication Sig Dispense Refill    Phentermine HCl 37.5 MG Oral Tab       ALPRAZolam 0.5 MG Oral Tablet Dispersible Take 1 tablet (0.5 mg total) by mouth daily as needed for Anxiety (for flying). 4 tablet 0    fluticasone propionate 44 MCG/ACT Inhalation  Aerosol Inhale 1 puff into the lungs 2 (two) times daily. 10.6 g 0    albuterol (2.5 MG/3ML) 0.083% Inhalation Nebu Soln Take 3 mL (2.5 mg total) by nebulization every 4 (four) hours as needed for Wheezing or Shortness of Breath. 100 each 0    metFORMIN  MG Oral Tablet 24 Hr Take 1 tablet (500 mg total) by mouth 2 (two) times daily with meals. 60 tablet 1        LABORATORY DATA:   CBC:  Lab Results   Component Value Date    WBC 10.2 11/20/2023    WBC 8.2 10/16/2023    WBC 12.9 (H) 03/24/2023     Lab Results   Component Value Date    HEMOGLOBIN 14.1 07/02/2023    HEMOGLOBIN 13.5 03/06/2023    HEMOGLOBIN 14.0 02/01/2022    HGB 15.2 11/20/2023    HGB 13.8 10/16/2023    HGB 12.2 03/24/2023      Lab Results   Component Value Date    .0 11/20/2023    .0 10/16/2023    .0 03/24/2023      BMP:   No results found for: \"GLUCOSE\"  Lab Results   Component Value Date    K 3.6 11/20/2023    K 3.4 (L) 10/16/2023    K 3.7 03/24/2023     Lab Results   Component Value Date    BUN 15 11/20/2023    BUN 8 10/16/2023    BUN 9 03/24/2023     Lab Results   Component Value Date    CREATSERUM 0.85 11/20/2023    CREATSERUM 0.86 10/16/2023    CREATSERUM 0.52 (L) 03/24/2023      PT/INR:   Lab Results   Component Value Date    PT 12.4 03/10/2012    INR 1.03 01/23/2015    INR 0.95 03/10/2012        REVIEW OF SYSTEMS:   Review of Systems   Constitutional:  Negative for chills, fever and malaise/fatigue.   HENT:  Negative for congestion, ear pain, sinus pain and sore throat.    Respiratory:  Negative for cough and shortness of breath.    Cardiovascular:  Negative for chest pain, palpitations and orthopnea.   Gastrointestinal:  Negative for abdominal pain, constipation, diarrhea, nausea and vomiting.   Genitourinary:  Negative for dysuria, frequency and hematuria.        + vaginal discharge   Musculoskeletal:  Negative for joint pain.   Skin:  Negative for itching and rash.   Neurological:  Negative for headaches.    Endo/Heme/Allergies:  Negative for polydipsia.        PHYSICAL EXAM:   /84   Pulse 88   Temp 97.9 °F (36.6 °C)   Ht 4' 10\" (1.473 m)   Wt 211 lb (95.7 kg)   LMP 05/24/2024 (Exact Date)   SpO2 93%   BMI 44.10 kg/m²    Physical Exam  Chaperone present: declined chaperone.   Constitutional:       Appearance: Normal appearance. She is obese. She is not ill-appearing.   HENT:      Right Ear: Tympanic membrane, ear canal and external ear normal.      Left Ear: Tympanic membrane, ear canal and external ear normal.      Nose: Nose normal.      Mouth/Throat:      Mouth: Mucous membranes are moist.      Pharynx: Oropharynx is clear.   Eyes:      Conjunctiva/sclera: Conjunctivae normal.      Pupils: Pupils are equal, round, and reactive to light.   Cardiovascular:      Rate and Rhythm: Normal rate and regular rhythm.      Heart sounds: Normal heart sounds.   Pulmonary:      Effort: Pulmonary effort is normal.      Breath sounds: Normal breath sounds.   Abdominal:      General: Abdomen is flat. Bowel sounds are normal.      Palpations: Abdomen is soft.   Genitourinary:     Labia:         Right: No rash, tenderness or lesion.         Left: No rash, tenderness or lesion.       Vagina: No foreign body. Vaginal discharge (scant, thin, white) present. No tenderness or lesions.   Skin:     General: Skin is warm and dry.      Findings: No rash.   Neurological:      General: No focal deficit present.      Mental Status: She is alert.   Psychiatric:         Mood and Affect: Mood normal.          ASSESSMENT AND PLAN:   Chani was seen today for pre-op exam, medication request and yeast infection.    Diagnoses and all orders for this visit:    Preop examination    Morbid obesity (HCC)    BMI 45.0-49.9, adult (HCC)    Prediabetes    Mild intermittent asthma without complication (HCC)    HUGO (obstructive sleep apnea)  -     [facility]  -     [facility]    Primary  osteoarthritis of left knee    Primary osteoarthritis of right knee    Bipolar 2 disorder (HCC)  Comments:  Not on medication currently. No psychiatrist. Seeing counselor weekly    Elevated cholesterol with elevated triglycerides    Fear of flying  -     ALPRAZolam 0.5 MG Oral Tablet Dispersible; Take 1 tablet (0.5 mg total) by mouth daily as needed for Anxiety (for flying).    Vaginal discharge  -     Vaginitis Vaginosis PCR Panel; Future  -     Vaginitis Vaginosis PCR Panel    Screen for STD (sexually transmitted disease)  -     Chlamydia/Gc Amplification [E]; Future  -     Chlamydia/Gc Amplification [E]    Breast cancer screening by mammogram  -     [facility]RIT 2D+3D SCREENING BILAT (CPT=77067/98561); Future    Patient is medically optimized for proposed bariatric surgery.

## 2024-06-20 LAB
BV BACTERIA DNA VAG QL NAA+PROBE: NEGATIVE
C GLABRATA DNA VAG QL NAA+PROBE: NEGATIVE
C KRUSEI DNA VAG QL NAA+PROBE: NEGATIVE
C TRACH DNA SPEC QL NAA+PROBE: NEGATIVE
CANDIDA DNA VAG QL NAA+PROBE: POSITIVE
N GONORRHOEA DNA SPEC QL NAA+PROBE: NEGATIVE
T VAGINALIS DNA VAG QL NAA+PROBE: NEGATIVE

## 2024-06-20 RX ORDER — FLUCONAZOLE 100 MG/1
100 TABLET ORAL DAILY
Qty: 7 TABLET | Refills: 0 | Status: SHIPPED | OUTPATIENT
Start: 2024-06-20 | End: 2024-06-27

## 2024-07-11 ENCOUNTER — TELEMEDICINE (OUTPATIENT)
Dept: FAMILY MEDICINE CLINIC | Facility: CLINIC | Age: 49
End: 2024-07-11
Payer: MEDICAID

## 2024-07-11 DIAGNOSIS — R73.03 PREDIABETES: ICD-10-CM

## 2024-07-11 DIAGNOSIS — E78.2 ELEVATED CHOLESTEROL WITH ELEVATED TRIGLYCERIDES: ICD-10-CM

## 2024-07-11 DIAGNOSIS — Z31.69 PRE-CONCEPTION COUNSELING: Primary | ICD-10-CM

## 2024-07-11 NOTE — PROGRESS NOTES
Virtual/Telephone Check-In    Chani Leon  verbally consents to a Virtual/Telephone Check-In service on 7/11/2024 .  Patient understands and accepts financial responsibility for any deductible, co-insurance and/or co-pays associated with this service.    this was a phone telemed visit    Duration of the service: 7    Problem List Items Addressed This Visit          HCC Problems    BMI 45.0-49.9, adult (HCC)       Cardiac and Vasculature    Elevated cholesterol with elevated triglycerides       Endocrine and Metabolic    Prediabetes     Other Visit Diagnoses       Pre-conception counseling    -  Primary           Chief Complaint   Patient presents with    Follow - Up       Medications allergies and chart reviewed  COVID-19 protocol    HPI:   Chani Leon is a 48 year old female who schedules a virtual visit to discuss personal issue. She is going through process to have bariatric surgery. She wants advice about pregnancy. Considering having another child. Wants to know if she should wait on bariatric surgery.      Allergies:  Allergies   Allergen Reactions    Morphine Sulfate SWELLING    Radiology Contrast Iodinated Dyes SWELLING     Swelling--hands and feet       Current Outpatient Medications   Medication Sig Dispense Refill    Phentermine HCl 37.5 MG Oral Tab       ALPRAZolam 0.5 MG Oral Tablet Dispersible Take 1 tablet (0.5 mg total) by mouth daily as needed for Anxiety (for flying). 4 tablet 0    fluticasone propionate 44 MCG/ACT Inhalation Aerosol Inhale 1 puff into the lungs 2 (two) times daily. 10.6 g 0    albuterol (2.5 MG/3ML) 0.083% Inhalation Nebu Soln Take 3 mL (2.5 mg total) by nebulization every 4 (four) hours as needed for Wheezing or Shortness of Breath. 100 each 0    metFORMIN  MG Oral Tablet 24 Hr Take 1 tablet (500 mg total) by mouth 2 (two) times daily with meals. 60 tablet 1      Past Medical History:    Asthma (HCC)    COVID-19    loss of taste and smell. No hospitalization required     Endometrial cells on cervical Pap smear inconsistent w/LMP    Extrinsic asthma, unspecified    last attack a couple of months ago    Genital herpes simplex    Gonorrhea    H. pylori infection    H. pylori infection    PREVIOUS HISTORY      Herpes    last outbreak years ago    History of seizure    Left renal stone    Migraines    Obstruction of left ureteropelvic junction (UPJ) due to stone    Osteoarthritis    mild in back    Pneumonia, organism unspecified(486)    PONV (postoperative nausea and vomiting)    mild     labor (HCC)    pt. had 20 week loss    Renal colic    Seizure disorder (HCC)    patient states \"seizure was from [place]\"     Seizure disorder (HCC)    Seizure-like activity (HCC)    Sleep apnea    c-pap but dont use all the time      Past Surgical History:   Procedure Laterality Date          , , ,     Colonoscopy  2014    Procedure: COLONOSCOPY;  Surgeon: Nahid Calderon MD;  Location:  ENDOSCOPY    Colonoscopy N/A 2019    Procedure: COLONOSCOPY;  Surgeon: Segundo Harman MD;  Location:  ENDOSCOPY    Hernia surgery      Hysteroscopy,biopsy  2021    Hysteroscopy, D&C, endometrial polypectomy    Laparoscopic cholecystectomy  2 weeks ago    Lasik Bilateral 2002    done in [place]    Kaushal localization wire 1 site left (cpt=19281)      Benign    Kaushal localization wire 1 site right (cpt=19281)      Benign    Other      baker cyst     Other      bunion bilateral - right foot metal    Reduction left      Reduction right      Removal gallbladder      Ventral hernia repair  2013    Procedure: HERNIA VENTRAL REPAIR;  Surgeon: Jg Chambers MD;  Location:  MAIN OR      Family History   Problem Relation Age of Onset    Breast Cancer Mother 40        EST    Hypertension Mother     Glaucoma Mother     Ovarian Cancer Sister 37        EST    No Known Problems Maternal Grandmother     No Known Problems Maternal Grandfather     Diabetes Neg      Cancer Neg     Macular degeneration Neg       Social History     Socioeconomic History    Marital status:    Tobacco Use    Smoking status: Never     Passive exposure: Current    Smokeless tobacco: Never    Tobacco comments:     NON-SMOKER   Vaping Use    Vaping status: Never Used   Substance and Sexual Activity    Alcohol use: Yes     Comment: occ    Drug use: No    Sexual activity: Yes     Partners: Male   Other Topics Concern    Caffeine Concern Yes     Comment: 3-4x cup daily     Exercise Yes     Comment: walking daily    Seat Belt Yes   Social History Narrative     worker, for emergency .      Social Determinants of Health      [facility], [facility]    Social Connections    Received from [facility], [facility]    Housing Stability           EXAM:   VITALS: not available as this is a telemed visit    LUNG: No dyspnea with conversation  rest of physical exam unable to perform as this is a telemed visit    ASSESSMENT AND PLAN:     Encounter Diagnoses   Name Primary?    Pre-conception counseling Yes    BMI 45.0-49.9, adult (HCC)     Prediabetes     Elevated cholesterol with elevated triglycerides      Problem List Items Addressed This Visit          HCC Problems    BMI 45.0-49.9, adult (HCC)       Cardiac and Vasculature    Elevated cholesterol with elevated triglycerides       Endocrine and Metabolic    Prediabetes     Other Visit Diagnoses       Pre-conception counseling    -  Primary          Recommend scheduling an appointment with OB to discuss. She would be considered AMA. Given her co-morbidities, she would be a high risk pregnancy. May also consider meeting with MFM for preconception visit.  I also recommend reaching out to her bariatric surgeon. Unsure how long she should wait after bariatric surgery before becoming pregnant.     The patient indicates understanding of these issues and  agrees to the plan.    Patient had an opportunity to ask questions and they were answered to her satisfaction.    \"Please note that this visit was completed using two-way, real-time interactive audio and/or video communication.  This has been done in good bernadine to provide continuity of care in the best interest of the provider-patient relationship, due to the ongoing public health crisis/national emergency and because of restrictions of visitation.  There are limitations of this visit as no physical exam could be performed.  Every conscious effort was taken to allow for sufficient and adequate time.  This billing was spent on reviewing labs, medications, radiology tests and decision making.  Appropriate medical decision-making and tests are ordered as detailed in the plan of care above.\"      Mei TREJO

## 2024-07-12 ENCOUNTER — TELEPHONE (OUTPATIENT)
Dept: FAMILY MEDICINE CLINIC | Facility: CLINIC | Age: 49
End: 2024-07-12

## 2024-07-12 DIAGNOSIS — Z31.69 PRE-CONCEPTION COUNSELING: Primary | ICD-10-CM

## 2024-07-12 NOTE — TELEPHONE ENCOUNTER
Gyne recommend reproductive endocrinology referral. It was placed. If not in her network, needs to check with her insurance to see who is in network

## 2024-07-12 NOTE — TELEPHONE ENCOUNTER
Patient advised. Verbalized understanding.     Dr Ronak Cardoza [phone]  Fax: [phone]

## 2024-07-12 NOTE — TELEPHONE ENCOUNTER
Call from patient  States she had televisit yesterday with Linsey Bryan, our nurse practitioner and she was supposed to get a referral for high risk OB  Please advise

## 2024-07-24 ENCOUNTER — TELEPHONE (OUTPATIENT)
Dept: FAMILY MEDICINE CLINIC | Facility: CLINIC | Age: 49
End: 2024-07-24

## 2024-07-24 ENCOUNTER — TELEPHONE (OUTPATIENT)
Dept: OBGYN CLINIC | Facility: CLINIC | Age: 49
End: 2024-07-24

## 2024-07-24 NOTE — TELEPHONE ENCOUNTER
Pt dropped off MR request to be sent to [facility]. Request faxed to [facility] Records Dept. [phone]

## 2024-07-24 NOTE — TELEPHONE ENCOUNTER
Patient filled out a medical records request,   Records going to [facility] in [facility]. [phone]  Fax # [phone]    Sent to Juan

## 2024-08-29 ENCOUNTER — TELEPHONE (OUTPATIENT)
Dept: FAMILY MEDICINE CLINIC | Facility: CLINIC | Age: 49
End: 2024-08-29

## 2024-08-29 RX ORDER — FLUCONAZOLE 150 MG/1
150 TABLET ORAL ONCE
Qty: 2 TABLET | Refills: 0 | Status: SHIPPED | OUTPATIENT
Start: 2024-08-29 | End: 2024-08-29

## 2024-08-29 NOTE — TELEPHONE ENCOUNTER
Call from patient  States is on antibiotic for rosacea and has yeast infection symptoms  Vaginal itching and discharge  No pain with urination  No back or flank pain  Doesn't want to have an appointment  Please advise

## 2024-08-29 NOTE — TELEPHONE ENCOUNTER
Patient wants to talk to the nurse,  states dr thomas has prescribed her medication in the past without being seen. Wants to know if she can get this again.  Did not give any additional details.

## 2024-08-29 NOTE — TELEPHONE ENCOUNTER
Patient advised. Verbalized understanding.   Advised that if still having symptoms after medication to schedule follow up visit

## 2024-09-29 PROBLEM — M77.12 LATERAL EPICONDYLITIS OF LEFT ELBOW: Status: ACTIVE | Noted: 2023-02-22

## 2024-09-29 PROBLEM — M77.01 MEDIAL EPICONDYLITIS OF RIGHT ELBOW: Status: ACTIVE | Noted: 2023-02-22

## 2024-09-29 PROBLEM — M77.02 MEDIAL EPICONDYLITIS OF LEFT ELBOW: Status: ACTIVE | Noted: 2023-02-22

## 2024-09-29 PROBLEM — G47.30 SLEEP APNEA: Status: RESOLVED | Noted: 2023-11-15 | Resolved: 2024-09-29

## 2024-09-29 PROBLEM — R73.03 PRE-DIABETES: Status: ACTIVE | Noted: 2023-11-15

## 2024-09-29 PROBLEM — G47.30 SLEEP APNEA: Status: ACTIVE | Noted: 2023-11-15

## 2024-09-29 PROBLEM — M77.11 LATERAL EPICONDYLITIS OF RIGHT ELBOW: Status: ACTIVE | Noted: 2023-02-22

## 2024-09-29 NOTE — PROGRESS NOTES
Chief Complaint   Patient presents with    Physical    Care Gap Management     Mammogram   Asthma control test          HPI  Chani Leon is a 48 year old female here for physical.      Last colon cancer screen:  2019    Last mammo: 2023    Last Pap: 2023    Acute concerns:   Patient was scheduled to do a gastric bypass but paused this plan as she is trying to get to get pregnant. Sees Dr. Cardoza, fertility specialist    F/u Asthma: well controlled, no exacerbation in 1 year    F/u migraine: reports having symptoms every other day    F/u DM: Last A1c value was 5.9% done 2024. Stopped taking metformin    Discussed:  - diet: regular diet   - exercise: walking   - sleep: adequate  - stress: manageable  - gyne: LMP: , monthly, heavy bleeding, lasts 5 days  - vision: UTD  - dentist visit: UTD    Owner of  and restaurant    ROS  As per HPI      Depression Screening (PHQ-2/PHQ-9): Over the LAST 2 WEEKS   Little interest or pleasure in doing things: Not at all    Feeling down, depressed, or hopeless: Not at all    PHQ-2 SCORE: 0        Past Medical History:    Asthma (HCC)    COVID-19    loss of taste and smell. No hospitalization required    Endometrial cells on cervical Pap smear inconsistent w/LMP    Extrinsic asthma, unspecified    last attack a couple of months ago    Genital herpes simplex    Gonorrhea    H. pylori infection    H. pylori infection    PREVIOUS HISTORY      Herpes    last outbreak years ago    History of seizure    Left renal stone    Migraines    Obstruction of left ureteropelvic junction (UPJ) due to stone    Osteoarthritis    mild in back    Pneumonia, organism unspecified(486)    PONV (postoperative nausea and vomiting)    mild     labor (HCC)    pt. had 20 week loss    Renal colic    Seizure disorder (HCC)    patient states \"seizure was from [place]\"     Seizure disorder (HCC)    Seizure-like activity (HCC)    Sleep apnea    c-pap but dont use all the time        Past Surgical History:   Procedure Laterality Date          , , , 2016    Colonoscopy  2014    Procedure: COLONOSCOPY;  Surgeon: Nahid Calderon MD;  Location:  ENDOSCOPY    Colonoscopy N/A 2019    Procedure: COLONOSCOPY;  Surgeon: Segundo Harman MD;  Location:  ENDOSCOPY    Hernia surgery      Hysteroscopy,biopsy  2021    Hysteroscopy, D&C, endometrial polypectomy    Laparoscopic cholecystectomy  2 weeks ago    Lasik Bilateral 2002    done in [place]    Kaushal localization wire 1 site left (cpt=19281)      Benign    Kaushal localization wire 1 site right (cpt=19281)      Benign    Other      baker cyst     Other      bunion bilateral - right foot metal    Reduction left  1999    Reduction right      Removal gallbladder      Ventral hernia repair  2013    Procedure: HERNIA VENTRAL REPAIR;  Surgeon: Jg Chambers MD;  Location:  MAIN OR       Social History     Socioeconomic History    Marital status:    Tobacco Use    Smoking status: Never     Passive exposure: Current    Smokeless tobacco: Never    Tobacco comments:     NON-SMOKER   Vaping Use    Vaping status: Never Used   Substance and Sexual Activity    Alcohol use: Yes     Comment: occ    Drug use: No    Sexual activity: Yes     Partners: Male   Other Topics Concern    Caffeine Concern Yes     Comment: 3-4x cup daily     Exercise Yes     Comment: walking daily    Seat Belt Yes       Family History   Problem Relation Age of Onset    Breast Cancer Mother 40        EST    Hypertension Mother     Glaucoma Mother     Ovarian Cancer Sister 37        EST    No Known Problems Maternal Grandmother     No Known Problems Maternal Grandfather     Diabetes Neg     Cancer Neg     Macular degeneration Neg         Current Outpatient Medications on File Prior to Visit   Medication Sig Dispense Refill    ALPRAZolam 0.5 MG Oral Tablet Dispersible Take 1 tablet (0.5 mg total) by mouth daily as needed  for Anxiety (for flying). 4 tablet 0     No current facility-administered medications on file prior to visit.       Immunization History   Administered Date(s) Administered    Covid-19 Vaccine Pfizer 30 mcg/0.3 ml 02/10/2021, 03/03/2021, 10/25/2021    FLULAVAL 6 months & older 0.5 ml Prefilled syringe (85029) 11/13/2019    FLUZONE 6 months and older PFS 0.5 ml (85201) 11/20/2021, 09/27/2023    Influenza Vaccine, trivalent (IIV3), PF 0.5mL (15807) 09/30/2024    MMR 09/21/2016, 03/21/2018    Pneumovax 08/23/2013    Pneumovax 23 07/27/2016    TDAP 08/23/2013, 07/26/2016, 09/21/2016    Tb Intradermal Test 11/13/2019, 09/08/2021, 09/27/2023            Objective  Vitals:    09/30/24 0954 09/30/24 1040   BP: (!) 148/94 134/86   Pulse: 91    Resp: 16    Temp: 97.4 °F (36.3 °C)    SpO2: 97%    Weight: 217 lb (98.4 kg)    Height: 4' 10\" (1.473 m)    Body mass index is 45.35 kg/m².    Physical Exam  Constitutional:       Appearance: Normal appearance. Obese  HEENT:      Head: Normocephalic and atraumatic.      Eyes: PERRLA no notable nystagmus     Ears: normal on observation     Nose: Nose normal.      Mouth: Mucous membranes are moist.      Neck: no lymphadenopathy  Cardiovascular:      Rate and Rhythm: Normal rate and regular rhythm.   Pulmonary:      Effort: Pulmonary effort is normal.      Breath sounds: Normal breath sounds.   Abdominal:      General: Bowel sounds are normal.      Palpations: Abdomen is soft. There is no mass.   Musculoskeletal:         General: Normal range of motion.      Cervical back: Normal range of motion.   Skin:     General: Skin is warm and dry.   Neurological:      General: No focal deficit present.      Mental Status: Alert and oriented to person, place, and time.   Psychiatric:         Mood and Affect: Mood normal.         Thought Content: Thought content normal.       Assessment and Plan  Chani was seen today for physical and care gap management.    Diagnoses and all orders for this  visit:    Encounter for occupational physical examination  -     Quantiferon TB Plus; Future    Pre-conception counseling  Comments:  Sees,fertility specialist  Discussed pregnancy complications with advanced maternal age  Handout on pre-eclampsia given  Contine PNV, follow up with Ob/gyn    Class 3 severe obesity without serious comorbidity with body mass index (BMI) of 45.0 to 49.9 in adult, unspecified obesity type (HCC)  Comments:  Discussed diet and exercise    Prediabetes  Comments:  Last A1c value was 5.9% done 4/30/2024  Recommend to continue metformin daily  Medication refill provided  Orders:  -     metFORMIN  MG Oral Tablet 24 Hr; Take 1 tablet (500 mg total) by mouth 2 (two) times daily with meals.  -     Comp Metabolic Panel (14) [E]; Future    Elevated cholesterol with elevated triglycerides  -     Lipid Panel [E]; Future    Mild intermittent asthma without complication (HCC)  Comments:  Stable, uses albuterol and flonase as needed, continue  Orders:  -     albuterol (2.5 MG/3ML) 0.083% Inhalation Nebu Soln; Take 3 mL (2.5 mg total) by nebulization every 4 (four) hours as needed for Wheezing or Shortness of Breath.  -     fluticasone propionate 44 MCG/ACT Inhalation Aerosol; Inhale 1 puff into the lungs 2 (two) times daily.    HUGO (obstructive sleep apnea)  Comments:  No cpap, recommended calling insurance for new cpap machine    Menorrhagia with regular cycle  Comments:  Sees ob/gyn, continue    Chronic migraine with aura without status migrainosus, not intractable  Comments:  Reports frequent headaches recently, does not improve with ibuprofen  Start naproxen 500mg BID  Discussed risks/benefits/alternatives of starting medication  Orders:  -     naproxen 500 MG Oral Tab; Take 1 tablet (500 mg total) by mouth 2 (two) times daily with meals.    Ganglion cyst of wrist, left  Comments:  Stable, no concerns    S/P hernia repair  Comments:  Stable, no concerns    History of seizure  Comments:  No  seizures in years, no current medication  Continue to monitor    Breast cancer screening by mammogram  -     [facility] AMRIT 2D+3D SCREENING AUGMT BILAT(50184/69866); Future    Need for vaccination  -     Fluzone trivalent vaccine, PF 0.5mL, 6mo+ (21690)         Patient presents for annual exam  - General labs: recent lab results reviewed  - CMP, lipid and TB quantiferon ordered, awaiting results  - Discussed weight loss, healthy diet and 150 min aerobic exercise weekly  - Discussed signing up for patient portal as means of communicating with me directly  - Discussed importance of communicating with me if has not heard back with results, etc  - Discussed age-appropriate vaccinations and screenings  - Pt verbalizes understanding, all questions/concerns addressed, in agreement w/plan        Follow up  Return in about 1 year (around 9/30/2025) for annual physical.      Patient Instructions  Patient Instructions   It was nice to meet you!  I will reach out via [facility]      Handout on pre-eclampsia given      Dolores Ellis MD

## 2024-09-30 ENCOUNTER — LAB ENCOUNTER (OUTPATIENT)
Dept: LAB | Age: 49
End: 2024-09-30
Payer: MEDICAID

## 2024-09-30 ENCOUNTER — OFFICE VISIT (OUTPATIENT)
Dept: FAMILY MEDICINE CLINIC | Facility: CLINIC | Age: 49
End: 2024-09-30
Payer: MEDICAID

## 2024-09-30 VITALS
HEIGHT: 58 IN | TEMPERATURE: 97 F | OXYGEN SATURATION: 97 % | BODY MASS INDEX: 45.55 KG/M2 | HEART RATE: 91 BPM | RESPIRATION RATE: 16 BRPM | WEIGHT: 217 LBS | DIASTOLIC BLOOD PRESSURE: 86 MMHG | SYSTOLIC BLOOD PRESSURE: 134 MMHG

## 2024-09-30 DIAGNOSIS — G47.33 OSA (OBSTRUCTIVE SLEEP APNEA): ICD-10-CM

## 2024-09-30 DIAGNOSIS — Z98.890 S/P HERNIA REPAIR: ICD-10-CM

## 2024-09-30 DIAGNOSIS — E78.2 ELEVATED CHOLESTEROL WITH ELEVATED TRIGLYCERIDES: ICD-10-CM

## 2024-09-30 DIAGNOSIS — R73.03 PREDIABETES: ICD-10-CM

## 2024-09-30 DIAGNOSIS — Z87.898 HISTORY OF SEIZURE: ICD-10-CM

## 2024-09-30 DIAGNOSIS — E66.01 CLASS 3 SEVERE OBESITY WITHOUT SERIOUS COMORBIDITY WITH BODY MASS INDEX (BMI) OF 45.0 TO 49.9 IN ADULT, UNSPECIFIED OBESITY TYPE (HCC): ICD-10-CM

## 2024-09-30 DIAGNOSIS — Z02.89 ENCOUNTER FOR OCCUPATIONAL PHYSICAL EXAMINATION: ICD-10-CM

## 2024-09-30 DIAGNOSIS — Z23 NEED FOR VACCINATION: ICD-10-CM

## 2024-09-30 DIAGNOSIS — Z87.19 S/P HERNIA REPAIR: ICD-10-CM

## 2024-09-30 DIAGNOSIS — M67.432 GANGLION CYST OF WRIST, LEFT: ICD-10-CM

## 2024-09-30 DIAGNOSIS — Z02.89 ENCOUNTER FOR OCCUPATIONAL PHYSICAL EXAMINATION: Primary | ICD-10-CM

## 2024-09-30 DIAGNOSIS — Z12.31 BREAST CANCER SCREENING BY MAMMOGRAM: ICD-10-CM

## 2024-09-30 DIAGNOSIS — N92.0 MENORRHAGIA WITH REGULAR CYCLE: ICD-10-CM

## 2024-09-30 DIAGNOSIS — J45.20 MILD INTERMITTENT ASTHMA WITHOUT COMPLICATION (HCC): ICD-10-CM

## 2024-09-30 DIAGNOSIS — Z31.69 PRE-CONCEPTION COUNSELING: ICD-10-CM

## 2024-09-30 DIAGNOSIS — G43.E09 CHRONIC MIGRAINE WITH AURA WITHOUT STATUS MIGRAINOSUS, NOT INTRACTABLE: ICD-10-CM

## 2024-09-30 PROBLEM — M77.02 MEDIAL EPICONDYLITIS OF LEFT ELBOW: Status: RESOLVED | Noted: 2023-02-22 | Resolved: 2024-09-30

## 2024-09-30 PROBLEM — H52.4 PRESBYOPIA OF BOTH EYES: Status: RESOLVED | Noted: 2022-04-19 | Resolved: 2024-09-30

## 2024-09-30 PROBLEM — H04.203 TEARING EYES: Status: RESOLVED | Noted: 2022-04-19 | Resolved: 2024-09-30

## 2024-09-30 PROBLEM — K59.01 SLOW TRANSIT CONSTIPATION: Status: RESOLVED | Noted: 2017-10-24 | Resolved: 2024-09-30

## 2024-09-30 PROBLEM — L81.9 PIGMENTED SKIN LESION OF UNCERTAIN NATURE: Status: RESOLVED | Noted: 2020-07-21 | Resolved: 2024-09-30

## 2024-09-30 PROBLEM — M17.12 PRIMARY OSTEOARTHRITIS OF LEFT KNEE: Status: RESOLVED | Noted: 2021-12-15 | Resolved: 2024-09-30

## 2024-09-30 PROBLEM — M77.11 LATERAL EPICONDYLITIS OF RIGHT ELBOW: Status: RESOLVED | Noted: 2023-02-22 | Resolved: 2024-09-30

## 2024-09-30 PROBLEM — E66.813 CLASS 3 SEVERE OBESITY WITHOUT SERIOUS COMORBIDITY WITH BODY MASS INDEX (BMI) OF 45.0 TO 49.9 IN ADULT: Status: ACTIVE | Noted: 2019-11-11

## 2024-09-30 PROBLEM — M77.12 LATERAL EPICONDYLITIS OF LEFT ELBOW: Status: RESOLVED | Noted: 2023-02-22 | Resolved: 2024-09-30

## 2024-09-30 PROBLEM — M17.11 PRIMARY OSTEOARTHRITIS OF RIGHT KNEE: Status: RESOLVED | Noted: 2021-12-15 | Resolved: 2024-09-30

## 2024-09-30 PROBLEM — D23.9 DERMATOFIBROMA: Status: RESOLVED | Noted: 2020-09-21 | Resolved: 2024-09-30

## 2024-09-30 PROBLEM — E66.813 CLASS 3 SEVERE OBESITY WITHOUT SERIOUS COMORBIDITY WITH BODY MASS INDEX (BMI) OF 45.0 TO 49.9 IN ADULT (HCC): Status: ACTIVE | Noted: 2019-11-11

## 2024-09-30 PROBLEM — M77.01 MEDIAL EPICONDYLITIS OF RIGHT ELBOW: Status: RESOLVED | Noted: 2023-02-22 | Resolved: 2024-09-30

## 2024-09-30 LAB
ALBUMIN SERPL-MCNC: 4.6 G/DL (ref 3.2–4.8)
ALBUMIN/GLOB SERPL: 1.7 {RATIO} (ref 1–2)
ALP LIVER SERPL-CCNC: 62 U/L
ALT SERPL-CCNC: 16 U/L
ANION GAP SERPL CALC-SCNC: 8 MMOL/L (ref 0–18)
AST SERPL-CCNC: 15 U/L (ref ?–34)
BILIRUB SERPL-MCNC: 0.4 MG/DL (ref 0.3–1.2)
BUN BLD-MCNC: 11 MG/DL (ref 9–23)
CALCIUM BLD-MCNC: 9.3 MG/DL (ref 8.7–10.4)
CHLORIDE SERPL-SCNC: 104 MMOL/L (ref 98–112)
CHOLEST SERPL-MCNC: 189 MG/DL (ref ?–200)
CO2 SERPL-SCNC: 25 MMOL/L (ref 21–32)
CREAT BLD-MCNC: 0.85 MG/DL
EGFRCR SERPLBLD CKD-EPI 2021: 83 ML/MIN/1.73M2 (ref 60–?)
FASTING PATIENT LIPID ANSWER: NO
FASTING STATUS PATIENT QL REPORTED: NO
GLOBULIN PLAS-MCNC: 2.7 G/DL (ref 2–3.5)
GLUCOSE BLD-MCNC: 163 MG/DL (ref 70–99)
HDLC SERPL-MCNC: 48 MG/DL (ref 40–59)
LDLC SERPL CALC-MCNC: 115 MG/DL (ref ?–100)
NONHDLC SERPL-MCNC: 141 MG/DL (ref ?–130)
OSMOLALITY SERPL CALC.SUM OF ELEC: 287 MOSM/KG (ref 275–295)
POTASSIUM SERPL-SCNC: 3.9 MMOL/L (ref 3.5–5.1)
PROT SERPL-MCNC: 7.3 G/DL (ref 5.7–8.2)
SODIUM SERPL-SCNC: 137 MMOL/L (ref 136–145)
TRIGL SERPL-MCNC: 147 MG/DL (ref 30–149)
VLDLC SERPL CALC-MCNC: 26 MG/DL (ref 0–30)

## 2024-09-30 PROCEDURE — 86480 TB TEST CELL IMMUN MEASURE: CPT

## 2024-09-30 PROCEDURE — 80053 COMPREHEN METABOLIC PANEL: CPT

## 2024-09-30 PROCEDURE — 80061 LIPID PANEL: CPT

## 2024-09-30 PROCEDURE — 36415 COLL VENOUS BLD VENIPUNCTURE: CPT

## 2024-09-30 RX ORDER — ALBUTEROL SULFATE 0.83 MG/ML
2.5 SOLUTION RESPIRATORY (INHALATION) EVERY 4 HOURS PRN
Qty: 100 EACH | Refills: 0 | Status: SHIPPED | OUTPATIENT
Start: 2024-09-30

## 2024-09-30 RX ORDER — METFORMIN HCL 500 MG
500 TABLET, EXTENDED RELEASE 24 HR ORAL 2 TIMES DAILY WITH MEALS
Qty: 60 TABLET | Refills: 1 | Status: SHIPPED | OUTPATIENT
Start: 2024-09-30

## 2024-09-30 RX ORDER — FLUTICASONE PROPIONATE 44 UG/1
1 AEROSOL, METERED RESPIRATORY (INHALATION) 2 TIMES DAILY
Qty: 10.6 G | Refills: 0 | Status: SHIPPED | OUTPATIENT
Start: 2024-09-30

## 2024-09-30 RX ORDER — NAPROXEN 500 MG/1
500 TABLET ORAL 2 TIMES DAILY WITH MEALS
Qty: 30 TABLET | Refills: 1 | Status: SHIPPED | OUTPATIENT
Start: 2024-09-30

## 2024-10-02 LAB
M TB IFN-G CD4+ T-CELLS BLD-ACNC: 0.01 IU/ML
M TB TUBERC IFN-G BLD QL: NEGATIVE
M TB TUBERC IGNF/MITOGEN IGNF CONTROL: >10 IU/ML
QFT TB1 AG MINUS NIL: -0.01 IU/ML
QFT TB2 AG MINUS NIL: -0.01 IU/ML

## 2024-10-05 ENCOUNTER — PATIENT MESSAGE (OUTPATIENT)
Dept: FAMILY MEDICINE CLINIC | Facility: CLINIC | Age: 49
End: 2024-10-05

## 2024-10-05 NOTE — TELEPHONE ENCOUNTER
----- Message from Dolores Ellis sent at 10/5/2024  9:50 AM CDT -----  Mychart message sent  CMP (metabolic panel) shows elevated blood glucose and last hemoglobin A1C was 5.9. I recommend low carbohydrate diet and exercise   Lipid panel shows elevated LDL, work on diet and weight loss. Avoid fatty foods, focus on lean protein, non starchy vegetables and fruits while limiting carbohydrates (bread, flour, rice, pasta and white potatoes). Choose whole grain.  Quantiferon TB is negative

## 2024-10-07 ENCOUNTER — TELEPHONE (OUTPATIENT)
Dept: FAMILY MEDICINE CLINIC | Facility: CLINIC | Age: 49
End: 2024-10-07

## 2024-10-25 ENCOUNTER — HOSPITAL ENCOUNTER (OUTPATIENT)
Age: 49
Discharge: HOME | End: 2024-10-25
Payer: MEDICAID

## 2024-10-25 VITALS
BODY MASS INDEX: 41.98 KG/M2 | OXYGEN SATURATION: 98 % | WEIGHT: 200 LBS | DIASTOLIC BLOOD PRESSURE: 91 MMHG | HEART RATE: 81 BPM | HEIGHT: 58 IN | TEMPERATURE: 97 F | SYSTOLIC BLOOD PRESSURE: 139 MMHG | RESPIRATION RATE: 18 BRPM

## 2024-10-25 DIAGNOSIS — B37.2 CUTANEOUS CANDIDIASIS: ICD-10-CM

## 2024-10-25 DIAGNOSIS — R30.0 DYSURIA: ICD-10-CM

## 2024-10-25 DIAGNOSIS — N89.8 VAGINAL DISCHARGE: Primary | ICD-10-CM

## 2024-10-25 LAB
B-HCG UR QL: NEGATIVE
BILIRUB UR QL STRIP: NEGATIVE
CLARITY UR: CLEAR
COLOR UR: YELLOW
GLUCOSE UR STRIP-MCNC: NEGATIVE MG/DL
HGB UR QL STRIP: NEGATIVE
KETONES UR STRIP-MCNC: NEGATIVE MG/DL
LEUKOCYTE ESTERASE UR QL STRIP: NEGATIVE
NITRITE UR QL STRIP: NEGATIVE
PH UR STRIP: 7 [PH]
PROT UR STRIP-MCNC: NEGATIVE MG/DL
SP GR UR STRIP: >=1.03
UROBILINOGEN UR STRIP-ACNC: <2 MG/DL

## 2024-10-25 PROCEDURE — 81025 URINE PREGNANCY TEST: CPT | Performed by: PHYSICIAN ASSISTANT

## 2024-10-25 PROCEDURE — 99214 OFFICE O/P EST MOD 30 MIN: CPT | Performed by: PHYSICIAN ASSISTANT

## 2024-10-25 PROCEDURE — 81002 URINALYSIS NONAUTO W/O SCOPE: CPT | Performed by: PHYSICIAN ASSISTANT

## 2024-10-25 RX ORDER — FLUCONAZOLE 200 MG/1
200 TABLET ORAL DAILY
Qty: 2 TABLET | Refills: 0 | Status: SHIPPED | OUTPATIENT
Start: 2024-10-25

## 2024-10-25 RX ORDER — CLOTRIMAZOLE AND BETAMETHASONE DIPROPIONATE 10; .64 MG/G; MG/G
1 CREAM TOPICAL 2 TIMES DAILY
Qty: 1 EACH | Refills: 0 | Status: SHIPPED | OUTPATIENT
Start: 2024-10-25

## 2024-10-25 NOTE — DISCHARGE INSTRUCTIONS
Please return to the ER/clinic if symptoms worsen. Follow-up with your PCP in 24-48 hours as needed.    Take 1 diflucan  today and 1 in 72 hours.  You may apply the clotrimazole cream to the inner thighs etc.  Recommend no shaving.  Do not engage in any sexual activity until all labs are back.  Push fluids.  Will contact you with results of the urine culture.  At this time we will not put you on any prophylactic antibiotics for UTI and may exacerbate the vaginitis we will see what the culture shows.  Follow-up with your primary care physician and/or gynecologist as needed.

## 2024-10-25 NOTE — ED PROVIDER NOTES
Patient Seen in: [facility]      History     Chief Complaint   Patient presents with    Eval-G     Stated Complaint: eval -g would like female provider    Subjective:   HPI      49-year-old female here with complaint of a several day history of dysuria in tandem with vaginal discharge.  Patient denies chest pain, shortness of breath, cough, abdominal pain, nausea, vomiting or diarrhea.  Patient unaware of hematuria denies flank pain.  Afebrile.    Objective:     Past Medical History:    Asthma (HCC)    COVID-19    loss of taste and smell. No hospitalization required    Endometrial cells on cervical Pap smear inconsistent w/LMP    Extrinsic asthma, unspecified    last attack a couple of months ago    Genital herpes simplex    Gonorrhea    H. pylori infection    H. pylori infection    PREVIOUS HISTORY      Herpes    last outbreak years ago    History of seizure    Left renal stone    Migraines    Obstruction of left ureteropelvic junction (UPJ) due to stone    Osteoarthritis    mild in back    Pneumonia, organism unspecified(486)    PONV (postoperative nausea and vomiting)    mild     labor (HCC)    pt. had 20 week loss    Renal colic    Seizure disorder (HCC)    patient states \"seizure was from [place]\"     Seizure disorder (HCC)    Seizure-like activity (HCC)    Sleep apnea    c-pap but dont use all the time            The patient's medication list, past medical history and social history elements  as listed in today's nurse's notes are reviewed and agree.   The patient's family history is reviewed and is noncontributory to the presenting problem, except as indicated as above.     Past Surgical History:   Procedure Laterality Date          , , ,     Colonoscopy  2014    Procedure: COLONOSCOPY;  Surgeon: Nahid Calderon MD;  Location:  ENDOSCOPY    Colonoscopy N/A 2019    Procedure: COLONOSCOPY;  Surgeon: Segundo Harman MD;  Location:  ENDOSCOPY     Hernia surgery      Hysteroscopy,biopsy  05/28/2021    Hysteroscopy, D&C, endometrial polypectomy    Laparoscopic cholecystectomy  2 weeks ago    Lasik Bilateral 2002    done in [facility] localization wire 1 site left (cpt=19281)  2008    Benign    Kaushal localization wire 1 site right (cpt=19281)  2008    Benign    Other      baker cyst     Other      bunion bilateral - right foot metal    Reduction left  1999    Reduction right  1999    Removal gallbladder      Ventral hernia repair  11/20/2013    Procedure: HERNIA VENTRAL REPAIR;  Surgeon: Jg Chambers MD;  Location:  MAIN OR                Social History     Socioeconomic History    Marital status:    Tobacco Use    Smoking status: Never     Passive exposure: Current    Smokeless tobacco: Never    Tobacco comments:     NON-SMOKER   Vaping Use    Vaping status: Never Used   Substance and Sexual Activity    Alcohol use: Yes     Comment: occ    Drug use: No    Sexual activity: Yes     Partners: Male   Other Topics Concern    Caffeine Concern Yes     Comment: 3-4x cup daily     Exercise Yes     Comment: walking daily    Seat Belt Yes   Social History Narrative     worker, for emergency .      Social Drivers of Health      [facility], [facility]    Social Connections    Received from [facility], [facility]    Housing Stability              Review of Systems    Positive for stated complaint: eval -g would like female provider  Other systems are as noted in HPI.  Constitutional and vital signs reviewed.      All other systems reviewed and negative except as noted above.    Physical Exam     ED Triage Vitals [10/25/24 0814]   BP (!) 139/91   Pulse 81   Resp 18   Temp 97.2 °F (36.2 °C)   Temp src Temporal   SpO2 98 %   O2 Device None (Room air)       Current Vitals:   Vital Signs  BP: (!) 139/91  Pulse: 81  Resp: 18  Temp: 97.2 °F (36.2 °C)  Temp src:  Temporal    Oxygen Therapy  SpO2: 98 %  O2 Device: None (Room air)        Physical Exam  Vitals and nursing note reviewed. Exam conducted with a chaperone present.   Constitutional:       Appearance: Normal appearance. She is well-developed.   HENT:      Head: Normocephalic.      Right Ear: External ear normal.      Left Ear: External ear normal.      Nose: Nose normal.      Mouth/Throat:      Mouth: Mucous membranes are moist.   Eyes:      Conjunctiva/sclera: Conjunctivae normal.      Pupils: Pupils are equal, round, and reactive to light.   Cardiovascular:      Rate and Rhythm: Normal rate and regular rhythm.      Heart sounds: Normal heart sounds.   Pulmonary:      Effort: Pulmonary effort is normal.      Breath sounds: Normal breath sounds.   Abdominal:      Tenderness: There is no right CVA tenderness or left CVA tenderness.   Genitourinary:     Exam position: Knee-chest position.      Vagina: Vaginal discharge present.      Cervix: Normal.          Comments: Bilateral cutaneous candidiasis inner thighs: whitish discharge noted  Musculoskeletal:      Cervical back: Normal range of motion and neck supple.   Skin:     General: Skin is warm.      Capillary Refill: Capillary refill takes less than 2 seconds.   Neurological:      General: No focal deficit present.      Mental Status: She is alert and oriented to person, place, and time.   Psychiatric:         Mood and Affect: Mood normal.         Behavior: Behavior normal.         Thought Content: Thought content normal.         Judgment: Judgment normal.           ED Course     Labs Reviewed   POCT PREGNANCY URINE - Normal   [facility] POCT URINALYSIS DIPSTICK   CHLAMYDIA/GONOCOCCUS, ALBARO   VAGINITIS VAGINOSIS PCR PANEL   URINE CULTURE, ROUTINE                   MDM   Clinical Impression: cutaneous candidiasis/vaginal discharge/dysuria  Course of Treatment:   Take 1 diflucan  today and 1 in 72 hours.  You may apply the clotrimazole cream to the inner thighs etc.  Recommend  no shaving.  Do not engage in any sexual activity until all labs are back.  Push fluids.  Will contact you with results of the urine culture.  At this time we will not put you on any prophylactic antibiotics for UTI and may exacerbate the vaginitis we will see what the culture shows.  Follow-up with your primary care physician and/or gynecologist as needed.    The patient is encouraged to return if any concerning symptoms arise. Additional verbal discharge instructions are given and discussed. Discharge medications are discussed. The patient is in good condition throughout the visit today and remains so upon discharge. I discuss the plan of care with the patient, who expresses understanding. All questions and concerns are addressed to the patient's satisfaction prior to discharge today.  Previous conversations with PCP and charts were reviewed.                Disposition and Plan     Clinical Impression:  1. Vaginal discharge    2. Dysuria    3. Cutaneous candidiasis         Disposition:  Discharge  10/25/2024  8:51 am    Follow-up:  Juliette Pardo MD  [address] [id number] [phone]                Medications Prescribed:  Current Discharge Medication List        START taking these medications    Details   clotrimazole-betamethasone 1-0.05 % External Cream Apply 1 Application topically 2 (two) times daily.  Qty: 1 each, Refills: 0                 Supplementary Documentation:

## 2024-10-25 NOTE — ED INITIAL ASSESSMENT (HPI)
Patient has vaginal itching and yellowish discharge. It seems to be getting worse instead of better.

## 2024-10-26 LAB
BV BACTERIA DNA VAG QL NAA+PROBE: NEGATIVE
C GLABRATA DNA VAG QL NAA+PROBE: NEGATIVE
C KRUSEI DNA VAG QL NAA+PROBE: NEGATIVE
CANDIDA DNA VAG QL NAA+PROBE: NEGATIVE
T VAGINALIS DNA VAG QL NAA+PROBE: NEGATIVE

## 2024-10-28 LAB
C TRACH DNA SPEC QL NAA+PROBE: NEGATIVE
N GONORRHOEA DNA SPEC QL NAA+PROBE: NEGATIVE

## 2024-10-30 ENCOUNTER — HOSPITAL ENCOUNTER (OUTPATIENT)
Dept: MAMMOGRAPHY | Facility: HOSPITAL | Age: 49
Discharge: HOME | End: 2024-10-30
Payer: MEDICAID

## 2024-10-30 DIAGNOSIS — Z12.31 BREAST CANCER SCREENING BY MAMMOGRAM: ICD-10-CM

## 2024-10-30 PROCEDURE — 77063 BREAST TOMOSYNTHESIS BI: CPT | Performed by: NURSE PRACTITIONER

## 2024-10-30 PROCEDURE — 77067 SCR MAMMO BI INCL CAD: CPT | Performed by: NURSE PRACTITIONER

## 2024-11-23 ENCOUNTER — HOSPITAL ENCOUNTER (EMERGENCY)
Facility: HOSPITAL | Age: 49
Discharge: HOME | End: 2024-11-23
Attending: EMERGENCY MEDICINE
Payer: MEDICAID

## 2024-11-23 ENCOUNTER — APPOINTMENT (OUTPATIENT)
Dept: ULTRASOUND IMAGING | Facility: HOSPITAL | Age: 49
End: 2024-11-23
Attending: EMERGENCY MEDICINE
Payer: MEDICAID

## 2024-11-23 VITALS
DIASTOLIC BLOOD PRESSURE: 86 MMHG | RESPIRATION RATE: 20 BRPM | OXYGEN SATURATION: 100 % | SYSTOLIC BLOOD PRESSURE: 126 MMHG | HEART RATE: 85 BPM | HEIGHT: 58 IN | BODY MASS INDEX: 41.98 KG/M2 | WEIGHT: 200 LBS | TEMPERATURE: 98 F

## 2024-11-23 DIAGNOSIS — N93.8 DYSFUNCTIONAL UTERINE BLEEDING: Primary | ICD-10-CM

## 2024-11-23 LAB
ALBUMIN SERPL-MCNC: 4.2 G/DL (ref 3.2–4.8)
ALBUMIN/GLOB SERPL: 1.6 {RATIO} (ref 1–2)
ALP LIVER SERPL-CCNC: 59 U/L
ALT SERPL-CCNC: 17 U/L
ANION GAP SERPL CALC-SCNC: 7 MMOL/L (ref 0–18)
AST SERPL-CCNC: 20 U/L (ref ?–34)
B-HCG SERPL-ACNC: <2.6 MIU/ML
BASOPHILS # BLD AUTO: 0.06 X10(3) UL (ref 0–0.2)
BASOPHILS NFR BLD AUTO: 0.8 %
BILIRUB SERPL-MCNC: 0.5 MG/DL (ref 0.3–1.2)
BUN BLD-MCNC: 15 MG/DL (ref 9–23)
CALCIUM BLD-MCNC: 9.3 MG/DL (ref 8.7–10.4)
CHLORIDE SERPL-SCNC: 108 MMOL/L (ref 98–112)
CO2 SERPL-SCNC: 26 MMOL/L (ref 21–32)
CREAT BLD-MCNC: 0.81 MG/DL
EGFRCR SERPLBLD CKD-EPI 2021: 88 ML/MIN/1.73M2 (ref 60–?)
EOSINOPHIL # BLD AUTO: 0.25 X10(3) UL (ref 0–0.7)
EOSINOPHIL NFR BLD AUTO: 3.5 %
ERYTHROCYTE [DISTWIDTH] IN BLOOD BY AUTOMATED COUNT: 13.1 %
GLOBULIN PLAS-MCNC: 2.6 G/DL (ref 2–3.5)
GLUCOSE BLD-MCNC: 121 MG/DL (ref 70–99)
HCT VFR BLD AUTO: 40.7 %
HGB BLD-MCNC: 13.9 G/DL
IMM GRANULOCYTES # BLD AUTO: 0.02 X10(3) UL (ref 0–1)
IMM GRANULOCYTES NFR BLD: 0.3 %
LYMPHOCYTES # BLD AUTO: 1.71 X10(3) UL (ref 1–4)
LYMPHOCYTES NFR BLD AUTO: 23.9 %
MCH RBC QN AUTO: 31 PG (ref 26–34)
MCHC RBC AUTO-ENTMCNC: 34.2 G/DL (ref 31–37)
MCV RBC AUTO: 90.8 FL
MONOCYTES # BLD AUTO: 0.47 X10(3) UL (ref 0.1–1)
MONOCYTES NFR BLD AUTO: 6.6 %
NEUTROPHILS # BLD AUTO: 4.63 X10 (3) UL (ref 1.5–7.7)
NEUTROPHILS # BLD AUTO: 4.63 X10(3) UL (ref 1.5–7.7)
NEUTROPHILS NFR BLD AUTO: 64.9 %
OSMOLALITY SERPL CALC.SUM OF ELEC: 294 MOSM/KG (ref 275–295)
PLATELET # BLD AUTO: 280 10(3)UL (ref 150–450)
POTASSIUM SERPL-SCNC: 3.9 MMOL/L (ref 3.5–5.1)
PROT SERPL-MCNC: 6.8 G/DL (ref 5.7–8.2)
RBC # BLD AUTO: 4.48 X10(6)UL
SODIUM SERPL-SCNC: 141 MMOL/L (ref 136–145)
WBC # BLD AUTO: 7.1 X10(3) UL (ref 4–11)

## 2024-11-23 PROCEDURE — 80053 COMPREHEN METABOLIC PANEL: CPT

## 2024-11-23 PROCEDURE — 85025 COMPLETE CBC W/AUTO DIFF WBC: CPT

## 2024-11-23 PROCEDURE — 76830 TRANSVAGINAL US NON-OB: CPT | Performed by: EMERGENCY MEDICINE

## 2024-11-23 PROCEDURE — 93975 VASCULAR STUDY: CPT | Performed by: EMERGENCY MEDICINE

## 2024-11-23 PROCEDURE — 84702 CHORIONIC GONADOTROPIN TEST: CPT

## 2024-11-23 PROCEDURE — 96374 THER/PROPH/DIAG INJ IV PUSH: CPT

## 2024-11-23 PROCEDURE — 80053 COMPREHEN METABOLIC PANEL: CPT | Performed by: EMERGENCY MEDICINE

## 2024-11-23 PROCEDURE — 76856 US EXAM PELVIC COMPLETE: CPT | Performed by: EMERGENCY MEDICINE

## 2024-11-23 PROCEDURE — 85025 COMPLETE CBC W/AUTO DIFF WBC: CPT | Performed by: EMERGENCY MEDICINE

## 2024-11-23 PROCEDURE — 84702 CHORIONIC GONADOTROPIN TEST: CPT | Performed by: EMERGENCY MEDICINE

## 2024-11-23 PROCEDURE — 99285 EMERGENCY DEPT VISIT HI MDM: CPT

## 2024-11-23 PROCEDURE — 96361 HYDRATE IV INFUSION ADD-ON: CPT

## 2024-11-23 RX ORDER — ACETAMINOPHEN 500 MG
1000 TABLET ORAL ONCE
Status: COMPLETED | OUTPATIENT
Start: 2024-11-23 | End: 2024-11-23

## 2024-11-23 RX ORDER — KETOROLAC TROMETHAMINE 30 MG/ML
30 INJECTION, SOLUTION INTRAMUSCULAR; INTRAVENOUS ONCE
Status: COMPLETED | OUTPATIENT
Start: 2024-11-23 | End: 2024-11-23

## 2024-11-23 NOTE — ED PROVIDER NOTES
Patient Seen in: [facility] Emergency Department      History     Chief Complaint   Patient presents with    Eval-VANNESA     Stated Complaint: vaginal bleeding    Subjective:   HPI      This is a 49-year-old  female who states that she has had heavier than normal menstrual cycles.  She started her period.  It was about 3 days ago just gotten worse.  The patient states he has been having cramping lower abdominal pain.  Also.  She says a couple years ago he had something similar and had to have D&C.  She has gone through at least a pad an hour for the last several hours.  She says she feels a little dizzy and lightheaded when she stands up.  She has no chest pain or shortness of breath she states the pain is more cramping in nature is not present now.  She is not on blood thinners.  She does have a history of of renal colic, seizure disorders, does have a history of previous history of herpes, asthma.    Objective:     Past Medical History:    Asthma (HCC)    COVID-19    loss of taste and smell. No hospitalization required    Endometrial cells on cervical Pap smear inconsistent w/LMP    Extrinsic asthma, unspecified    last attack a couple of months ago    Genital herpes simplex    Gonorrhea    H. pylori infection    H. pylori infection    PREVIOUS HISTORY      Herpes    last outbreak years ago    History of seizure    Left renal stone    Migraines    Obstruction of left ureteropelvic junction (UPJ) due to stone    Osteoarthritis    mild in back    Pneumonia, organism unspecified(486)    PONV (postoperative nausea and vomiting)    mild     labor (HCC)    pt. had 20 week loss    Renal colic    Seizure disorder (HCC)    patient states \"seizure was from [place]\"     Seizure disorder (HCC)    Seizure-like activity (HCC)    Sleep apnea    c-pap but dont use all the time              Past Surgical History:   Procedure Laterality Date          , , , 2016    Colonoscopy  2014    Procedure:  COLONOSCOPY;  Surgeon: Nahid Calderon MD;  Location:  ENDOSCOPY    Colonoscopy N/A 5/24/2019    Procedure: COLONOSCOPY;  Surgeon: Segundo Harman MD;  Location:  ENDOSCOPY    Hernia surgery      Hysteroscopy,biopsy  05/28/2021    Hysteroscopy, D&C, endometrial polypectomy    Laparoscopic cholecystectomy  2 weeks ago    Lasik Bilateral 2002    done in [place]    Kaushal localization wire 1 site left (cpt=19281)  2008    Benign    Kaushal localization wire 1 site right (cpt=19281)  2008    Benign    Other      baker cyst     Other      bunion bilateral - right foot metal    Reduction left  1999    Reduction right  1999    Removal gallbladder      Ventral hernia repair  11/20/2013    Procedure: HERNIA VENTRAL REPAIR;  Surgeon: Jg Chambers MD;  Location:  MAIN OR                Social History     Socioeconomic History    Marital status:    Tobacco Use    Smoking status: Never     Passive exposure: Current    Smokeless tobacco: Never    Tobacco comments:     NON-SMOKER   Vaping Use    Vaping status: Never Used   Substance and Sexual Activity    Alcohol use: Yes     Comment: occ    Drug use: No    Sexual activity: Yes     Partners: Male   Other Topics Concern    Caffeine Concern Yes     Comment: 3-4x cup daily     Exercise Yes     Comment: walking daily    Seat Belt Yes   Social History Narrative     worker, for emergency .      Social Drivers of Health      [facility]    Social Connections    [facility]    Housing Stability                  Physical Exam     ED Triage Vitals [11/23/24 1451]   BP (!) 143/94   Pulse 88   Resp 20   Temp 98.1 °F (36.7 °C)   Temp src Temporal   SpO2 96 %   O2 Device None (Room air)       Current Vitals:   Vital Signs  BP: 126/86  Pulse: 85  Resp: 20  Temp: 98.1 °F (36.7 °C)  Temp src: Temporal  MAP (mmHg): 99    Oxygen  Therapy  SpO2: 100 %  O2 Device: None (Room air)        Physical Exam     General: Patient is in no respiratory distress at this present time.  Looks comfortable she is laying on the cot..    The patient is in no respiratory distress    HEENT: Atraumatic, conjunctiva are not pale.  There is no icterus.  Oral mucosa Is wet.  No facial trauma.  The neck is supple.    LUNGS: Clear to auscultation, there is no wheezing or retraction.  No crackles.    CV: Cardiovascular is regular without murmurs or rubs.    ABD: The abdomen is soft nondistended nontender.  There is no rebound.  There is no guarding.  I cannot reproduce any specific tenderness in her abdomen  EXT: There is good pulses bilaterally.  There is no calf tenderness.  There is no rash noted.  There is no edema    NEURO: Alert and oriented x4.  Muscle strength and sensory exam is grossly normal.  And the patient is neurologically intact with no focal findings.    ED Course     Labs Reviewed   COMP METABOLIC PANEL (14) - Abnormal; Notable for the following components:       Result Value    Glucose 121 (*)     All other components within normal limits   HCG, BETA SUBUNIT (QUANT PREGNANCY TEST) - Normal   CBC WITH DIFFERENTIAL WITH PLATELET   RAINBOW DRAW LAVENDER   RAINBOW DRAW LIGHT GREEN   RAINBOW DRAW BLUE          ER the patient was placed on monitors, IV was started, blood was drawn.       MDM      Workup was done to rule out an electrolyte imbalance, acute anemia, pregnancy was less likely we will get a beta-hCG.  Will also do an ultrasound to see if there is any retained material in the uterus.  Will give her IV fluids  Patient's beta-hCG was not elevated, comprehensive was grossly negative the patient CBC shows a hemoglobin 13, white count 7.9.  Platelet count of 280.    I personally reviewed the radiographs and my individual interpretation shows    .  No large mass in the uterine Edward  Also reviewed official report and it shows    US PELVIS EV W DOPPLER  (RXC=28732/30088/33520)    Result Date: 11/23/2024  PROCEDURE:  US PELVIS EV W DOPPLER (CPT=76856/72587/94867)  COMPARISON:  None.  INDICATIONS:  vaginal bleeding  TECHNIQUE:  Ultrasound of the pelvis was performed with a transvaginal and transabdominal probe.  Doppler evaluation of the ovaries was performed of the ovarian arteries and veins.  B-mode images, Doppler color flow, and spectral waveform analysis were performed.  Transvaginal ultrasound was used to better evaluate adnexal and endometrial detail.  PATIENT STATED HISTORY: (As transcribed by Technologist)  Patient states heavy vaginal bleeding for two days.   Exam limited by body habitus and poor sonographic penetration. MEASUREMENTS:        Uterus Length:                      11.76 cm x 8.83 cm x 6.26 cm        Endometrium Thickness:      2.73 cm Right Ovary:                         2.98 cm x 2.06 cm x 2.41 cm Left Ovary:                           Not visualized  FINDINGS:  PELVIS  UTERUS:  Limited assessment.  Endometrial thickness 27 mm heterogeneous.  10 mm uterine fibroid anterior uterine body nabothian cysts are present largest measuring 1.8 cm. RIGHT OVARY:  The right ovary appears normal in size, shape, and echogenicity.  No significant masses are identified. LEFT OVARY:  Not visualized  CUL-DE-SAC:  Negative.  DOPPLER WAVE FORMS FLOW:  There is normal arterial and venous Doppler wave forms in right ovaries.  Left ovary not seen.  The spectral analysis is within normal limits.  No sign right ovarian torsion. For this examination, arterial and venous color flow, and arterial and venous spectral wave form analysis with velocities performed.  Real-time images also performed.  OTHER:  Negative.            CONCLUSION:  Heterogeneous thickened endometrium 27 mm, in a perimenopausal patient with dysfunctional bleeding, consider endometrial biopsy.  Left ovary not seen.  Normal appearing right ovary.  Small uterine fibroid 10 mm.  No free fluid.   LOCATION:   [place]     Dictated by (CST): Tha Pham MD on 11/23/2024 at 7:05 PM     Finalized by (CST): Tha Pham MD on 11/23/2024 at 7:07 PM           I went back and reexamined her and did a vaginal exam with a female nurse Wilma as a chaperone.  Pelvic exam shows small amount of blood.  Not significant amount of blood.  Is old blood it was not actively bleeding I did go back and reexamined her abdomen is completely soft nontender there is no rebound, guarding.  The patient's OB had moved to [place] so I did talk to her partner who is on-call.  She did recommend that the patient follow-up in their office for endometrial biopsy and talk to the patient at length.  I did discuss there is some abnormalities on ultrasound need some close follow-up with an OB discussed the things to return here including increasing pain or discomfort I reexamined her abdomen soft nontender she is not dizzy or lightheaded her vitals are stable.  She feels comfortable going home.  Discussed with her to I did talk to the OB about TXA but because of her BMI being elevated and the fact that it will there is there is a slight chance of increased risk of VTE it be reasonable to try just the Motrin which seems to help here because she is not significant bleeding she is to bleed slow down after Toradol.  She feels comfortable with going home I discussed he had significant bleeding or discomfort return here she verbalized understanding agreed treatment plan        I discussed with the patient that were seeing them  in a short period of time.  I discussed with them that there is always a possibility that things can change and a need reevaluation with their primary care physician as soon as possible.  I've also discussed with them that if the pain gets worse to return to the emergency room immediately.  Medical Decision Making      Disposition and Plan     Clinical Impression:  1. Dysfunctional uterine bleeding          Disposition:  Discharge  11/23/2024  7:48 pm    Follow-up:  Juliette Pardo MD  [address] [address] [phone]    Follow up in 2 day(s)      Nitin Gay MD  [address]   [address] [id number] [phone]    Follow up in 2 day(s)            Medications Prescribed:  Discharge Medication List as of 11/23/2024  7:50 PM              Supplementary Documentation:

## 2024-11-24 NOTE — DISCHARGE INSTRUCTIONS
Take Motrin 8 hours along with Tylenol.  Follow-up with OB for endometrial biopsy.  If you have significant bleeding greater than 1 pad every hour you need to return to the emergency room    Follow-up with OB    You were seen in the emergency room in a limited time.  There is a possibility that although we do not see any acute process at this present time that things can change with time.  Is therefore imperative that you follow-up with primary care physician for close follow-up.  If there is any significant progression of your pain  or other symptoms you to return immediately to the emergency room.

## 2024-11-25 ENCOUNTER — TELEPHONE (OUTPATIENT)
Dept: OBGYN CLINIC | Facility: CLINIC | Age: 49
End: 2024-11-25

## 2024-11-25 NOTE — TELEPHONE ENCOUNTER
Called and spoke with pt.  Made appointment. for ER F/U with Dr. Chandana Baltazar. 11-26-24  and to discuss EMB procedure that is already schedule for 12/2/24

## 2024-11-26 ENCOUNTER — OFFICE VISIT (OUTPATIENT)
Dept: OBGYN CLINIC | Facility: CLINIC | Age: 49
End: 2024-11-26
Payer: MEDICAID

## 2024-11-26 VITALS
BODY MASS INDEX: 45.97 KG/M2 | HEART RATE: 90 BPM | DIASTOLIC BLOOD PRESSURE: 80 MMHG | SYSTOLIC BLOOD PRESSURE: 122 MMHG | WEIGHT: 219 LBS | HEIGHT: 58 IN

## 2024-11-26 DIAGNOSIS — Z01.818 PREPROCEDURAL EXAMINATION: ICD-10-CM

## 2024-11-26 DIAGNOSIS — Z12.4 CERVICAL CANCER SCREENING: ICD-10-CM

## 2024-11-26 DIAGNOSIS — N93.8 DUB (DYSFUNCTIONAL UTERINE BLEEDING): Primary | ICD-10-CM

## 2024-11-26 DIAGNOSIS — N92.0 EXCESSIVE OR FREQUENT MENSTRUATION: ICD-10-CM

## 2024-11-26 LAB
CONTROL LINE PRESENT WITH A CLEAR BACKGROUND (YES/NO): YES YES/NO
KIT LOT #: NORMAL NUMERIC
PREGNANCY TEST, URINE: NEGATIVE

## 2024-11-26 PROCEDURE — 58100 BIOPSY OF UTERUS LINING: CPT | Performed by: OBSTETRICS & GYNECOLOGY

## 2024-11-26 PROCEDURE — 88342 IMHCHEM/IMCYTCHM 1ST ANTB: CPT | Performed by: OBSTETRICS & GYNECOLOGY

## 2024-11-26 PROCEDURE — 81025 URINE PREGNANCY TEST: CPT | Performed by: OBSTETRICS & GYNECOLOGY

## 2024-11-26 PROCEDURE — 88305 TISSUE EXAM BY PATHOLOGIST: CPT | Performed by: OBSTETRICS & GYNECOLOGY

## 2024-11-26 RX ORDER — IBUPROFEN 600 MG/1
600 TABLET, FILM COATED ORAL ONCE
Status: COMPLETED | OUTPATIENT
Start: 2024-11-26 | End: 2024-11-26

## 2024-11-26 RX ADMIN — IBUPROFEN 600 MG: 600 TABLET, FILM COATED ORAL at 16:48:00

## 2024-11-26 NOTE — PROGRESS NOTES
[facility]  Obstetrics and Gynecology  GYN Visit    Subjective:     Chani Leon is a 49 year old  female who presents for ER follow up for heavy menstrual bleeding. She is here with her 6 yo daughter. Pt states that her periods of late have been heavier than usual; has been going through a pad an hour and sometimes will feel dizzy..     Regular periods; 5-6 days. Just heavy bleeding. States in  had similar; got better after D&C but then heavy bleeding returned.   Unsure as to when her family goes through menopause.    4 prior c-sections.     She does have a history of of renal colic, seizure disorders, does have a history of previous history of herpes, asthma.     OB History    Para Term  AB Living   8 4 4 0 4 4   SAB IAB Ectopic Multiple Live Births   4 0 0 0 4      # Outcome Date GA Lbr Nick/2nd Weight Sex Type Anes PTL Lv   8 Term 16 40w0d  7 lb (3.175 kg) F Caesarean Gen  KRISSY   7 Term 13 39w0d  6 lb 14.6 oz (3.135 kg) M CS-LTranv Gen N KRISSY   6 Term 11 40w0d  7 lb (3.175 kg) M Caesarean Spinal  KRISSY   5 Term 07 40w0d  6 lb (2.722 kg) M Caesarean Spinal  KRISSY   4 SAB            3 SAB            2 SAB            1 SAB                Gyn History:     Patient's last menstrual period was 2024 (exact date).      Meds:  Medications Ordered Prior to Encounter[1]    All:  Allergies[2]    PMH:  Past Medical History:    Asthma (HCC)    COVID-19    loss of taste and smell. No hospitalization required    Endometrial cells on cervical Pap smear inconsistent w/LMP    Extrinsic asthma, unspecified    last attack a couple of months ago    Genital herpes simplex    Gonorrhea    H. pylori infection    H. pylori infection    PREVIOUS HISTORY      Herpes    last outbreak years ago    History of seizure    Left renal stone    Migraines    Obstruction of left ureteropelvic junction (UPJ) due to stone    Osteoarthritis    mild in back    Pneumonia, organism unspecified(486)     PONV (postoperative nausea and vomiting)    mild     labor (HCC)    pt. had 20 week loss    Renal colic    Seizure disorder (HCC)    patient states \"seizure was from Dye\"     Seizure disorder (HCC)    Seizure-like activity (HCC)    Sleep apnea    c-pap but dont use all the time       PSH:  Past Surgical History:   Procedure Laterality Date          , , , 2016    Colonoscopy  2014    Procedure: COLONOSCOPY;  Surgeon: Nahid Calderon MD;  Location:  ENDOSCOPY    Colonoscopy N/A 2019    Procedure: COLONOSCOPY;  Surgeon: Segundo Harman MD;  Location:  ENDOSCOPY    Hernia surgery      Hysteroscopy,biopsy  2021    Hysteroscopy, D&C, endometrial polypectomy    Laparoscopic cholecystectomy  2 weeks ago    Lasik Bilateral     done in [place]    Kaushal localization wire 1 site left (cpt=19281)      Benign    Kaushal localization wire 1 site right (cpt=19281)      Benign    Other      baker cyst     Other      bunion bilateral - right foot metal    Reduction left      Reduction right      Removal gallbladder      Ventral hernia repair  2013    Procedure: HERNIA VENTRAL REPAIR;  Surgeon: Jg Chambers MD;  Location:  MAIN OR         Objective:     Vitals:    24 1131   BP: 122/80   Pulse: 90   Weight: 219 lb (99.3 kg)   Height: 58\"     Body mass index is 45.77 kg/m².    General: AAO.NAD.   CVS exam: normal peripheral perfusion  Chest: non-labored breathing, no tachypnea   Abdominal exam: soft, nontender, nondistended  Pelvic exam:   VULVA: normal appearing vulva with no masses, tenderness or lesions  PERINEUM: normal appearing, no lesions   URETHRAL MEATUS: normal appearing, no lesions   VAGINA: normal appearing vagina with normal color and discharge, no lesions  CERVIX: normal appearing cervix without discharge or lesions  UTERUS: uterus is normal size, shape, consistency and nontender  ADNEXA: normal adnexa in size, nontender and no  masses  PERIRECTAL: normal appearing, no lesions   Ext: non-tender, no edema    Labs:  Hgb 13     Imagin/23 Pelvic US  Uterus Length:                      11.76 cm x 8.83 cm x 6.26 cm          Endometrium Thickness:      2.73 cm  Right Ovary:                         2.98 cm x 2.06 cm x 2.41 cm  Left Ovary:                           Not visualized     FINDINGS:     PELVIS    UTERUS:  Limited assessment.  Endometrial thickness 27 mm heterogeneous.  10 mm uterine fibroid anterior uterine body nabothian cysts are present largest measuring 1.8 cm.  RIGHT OVARY:  The right ovary appears normal in size, shape, and echogenicity.  No significant masses are identified.  LEFT OVARY:  Not visualized   CUL-DE-SAC:  Negative.     DOPPLER WAVE FORMS  FLOW:  There is normal arterial and venous Doppler wave forms in right ovaries.  Left ovary not seen.  The spectral analysis is within normal limits.  No sign right ovarian torsion. For this examination, arterial and venous color flow, and arterial and  venous spectral wave form analysis with velocities performed.  Real-time images also performed.     OTHER:  Negative.                   Impression   CONCLUSION:  Heterogeneous thickened endometrium 27 mm, in a perimenopausal patient with dysfunctional bleeding, consider endometrial biopsy.  Left ovary not seen.  Normal appearing right ovary.  Small uterine fibroid 10 mm.  No free fluid.          Assessment/Plan:     Chani Leon is a 49 year old  female who is here for a GYN visit for heavy menstrual bleeding    Problem List Items Addressed This Visit    None      AUB  HMB  - VSS  - Bleeding stopped today  - Regular menses; heavy bleeding; a pad an hour  - Hgb 13  -d/w patient possible etiologies including but not limited to benign, abnormal and malignant uterine anomalies   -recommend endometrial biopsy   -discussion held with the patient regarding EMB procedure including risks, benefits and  alternatives  -discussion included but not limited to risk of infection, injury, bleeding and inadequate sampling or limited sampling   - pt agreeable   - Will discuss management options for menorrhagia at next visit  - see procedure note for EMB    GYN Health Maintenance  - Periods regular; heavy menstrual bleeding.   - STI screening G/C neg 10/2024  - Conception planning: is still interested in getting pregnant; has seen Cardoza who did not recommend it  - Last mammogram 10/2024 birads 2; repeat in one year  - Last Pap 11/2023 NILM, neg HPV  - Last colonoscopy 5/2019 normal; repeat in 10 years      All of the findings and plan were discussed with the patient.  She notes understanding and agrees with the plan of care.  All questions were answered to the best of my ability at this time.      Total patient time was 45 minutes in evaluation, consultation, and coordination of care. This included face to face and non-face to face actions. The patient's questions and concerns were addressed.       Future Appointments   Date Time Provider Department Center   12/2/2024 10:00 AM Kailee Ellington MD [facility] [address]         Kailee Ellington MD   [facility] - OBGYN       Discussed with patient that there will not be further notification of normal or benign results other than receiving results on [employer]. A [employer] message or telephone call will be placed by the physician and/or office staff if results are abnormal.     Note to patient and family   The 21st Century Cures Act makes medical notes available to patients in the interest of transparency.  However, please be advised that this is a medical document.  It is intended as akgr-zy-uahs communication.  It is written and medical language may contain abbreviations or verbiage that are technical and unfamiliar.  It may appear blunt or direct.  Medical documents are intended to carry relevant information, facts as evident, and the clinical opinion of the practitioner.        This  note could include assistance by Dragon voice recognition. Errors in content may be related to improper recognition by the system; efforts to review and correct have been done but errors may still exist.          [1]   Current Outpatient Medications on File Prior to Visit   Medication Sig Dispense Refill    albuterol (2.5 MG/3ML) 0.083% Inhalation Nebu Soln Take 3 mL (2.5 mg total) by nebulization every 4 (four) hours as needed for Wheezing or Shortness of Breath. 100 each 0    fluticasone propionate 44 MCG/ACT Inhalation Aerosol Inhale 1 puff into the lungs 2 (two) times daily. 10.6 g 0    clotrimazole-betamethasone 1-0.05 % External Cream Apply 1 Application topically 2 (two) times daily. (Patient not taking: Reported on 11/26/2024) 1 each 0    fluconazole (DIFLUCAN) 200 MG Oral Tab Take 1 tablet (200 mg total) by mouth daily. Take 1 today and 1 in 72 hours (Patient not taking: Reported on 11/26/2024) 2 tablet 0    metFORMIN  MG Oral Tablet 24 Hr Take 1 tablet (500 mg total) by mouth 2 (two) times daily with meals. (Patient not taking: Reported on 10/25/2024) 60 tablet 1    naproxen 500 MG Oral Tab Take 1 tablet (500 mg total) by mouth 2 (two) times daily with meals. (Patient not taking: Reported on 10/25/2024) 30 tablet 1    ALPRAZolam 0.5 MG Oral Tablet Dispersible Take 1 tablet (0.5 mg total) by mouth daily as needed for Anxiety (for flying). (Patient not taking: Reported on 11/26/2024) 4 tablet 0     No current facility-administered medications on file prior to visit.   [2]   Allergies  Allergen Reactions    Morphine Sulfate SWELLING    Radiology Contrast Iodinated Dyes SWELLING     Swelling--hands and feet

## 2024-11-26 NOTE — PATIENT INSTRUCTIONS
Please notify or report to your nearest emergency department if you experience severe abdominal pain, heavy vaginal bleeding (soaking more than 2 large pads an hour for two hours), fever and chills.     Please return to clinic in 1-2 weeks for follow up exam and results discussion.

## 2024-11-26 NOTE — PROCEDURES
Procedure: Endometrial biopsy     Date of Procedure: 24    Pre-procedure diagnosis:   AUB    Post-procedure diagnosis:    AUB    Indications:   49 year old female  who presents for endometrial biopsy   AUB    Procedure details:   Urine pregnancy test negative.  Patient is premenopausal.  The procedure, risks, benefits and alternatives were discussed with the patient. The patient was informed of risks including but not limited to the risk of bleeding, infection, perforation, injury and insufficient tissue collection. All questions and concerns were addressed. The patient provided verbal and written consent.     The patient was placed in a dorsal lithotomy position.  Bimanual exam showed the uterus to be in the anteverted position. A sterile speculum was placed into the vagina and the cervix was visualized with findings noted below. The cervix was cleaned and prepped with betadine.  2% Lidocaine jellly was applied to the anterior cervix and a tenaculum placed.    Using sterile technique, there was some difficulty in advancing through cervical canal. Cervix was dilated with osman dilators. The endometrial Pipelle was then advanced through the cervical canal. The uterus sounded to 8 cm. The Pipelle was then engaged with suction force noted and advance in various angles along the uterine cavity. A good amount of endometrial tissue was noted and collected to be sent to pathology. This was repeated for 1 more pass.  All instruments were removed. Good hemostasis noted. The patient tolerated the procedure well.     Findings:   Normal cervix, no lesions   Normal uterus, sounded to 8 cm   Good amount of endometrial tissue   S/p EMB  Good hemostasis     Disposition: Stable    Complications: None    Follow up:  .     The patient was advised to call for any fever or for prolonged or severe pain or bleeding. She was advised to use ibuprofen as needed for mild to moderate pain. She was advised to avoid  vaginal intercourse for 48 hours or until the bleeding has completely stopped.    Kailee Ellington MD   [facility] - OBGYN      Discussed with patient that there will not be further notification of normal or benign results other than receiving results on mychart. A [employer] message or telephone call will be placed by the physician and/or office staff if results are abnormal.     Note to patient and family   The 21st Century Cures Act makes medical notes available to patients in the interest of transparency.  However, please be advised that this is a medical document.  It is intended as ikqm-ry-scxa communication.  It is written and medical language may contain abbreviations or verbiage that are technical and unfamiliar.  It may appear blunt or direct.  Medical documents are intended to carry relevant information, facts as evident, and the clinical opinion of the practitioner.      This note could include assistance by Dragon voice recognition. Errors in content may be related to improper recognition by the system; efforts to review and correct have been done but errors may still exist.

## 2024-12-17 ENCOUNTER — TELEPHONE (OUTPATIENT)
Dept: OBGYN CLINIC | Facility: CLINIC | Age: 49
End: 2024-12-17

## 2024-12-17 DIAGNOSIS — N92.0 MENORRHAGIA WITH REGULAR CYCLE: Primary | ICD-10-CM

## 2024-12-17 NOTE — TELEPHONE ENCOUNTER
----- Message from Kailee Ellington sent at 12/11/2024  5:01 PM CST -----  Regarding: Surgery - D&C, diagnostic hysteroscopy  Surgeon: Dr. Kailee Ellington  Assistant: none     Type of Admit: Hospital outpatient, does not require admission following surgery   Procedure Location: Main OR    PreOp Dx: heavy menstrual bleeding     Procedure: Dilation & curettage , Diagnostic hysteroscopy    Anesthesia: MAC    Special Equipment/Comments: Diagnostic hysteroscopy    Antibiotics: None  Pre Op Orders: initiate my Pre-op standing orders, if none exist please use [facility]'s Standing Order   Labs: per protocol     Medical clearance: none    Can we please schedule an appointment prior to the procedure?

## 2024-12-17 NOTE — TELEPHONE ENCOUNTER
Surgery scheduled for 1/9/25 at 1245  Pre and post op   Future Appointments   Date Time Provider Department Center   1/3/2025 11:30 AM Kailee Ellington MD [facility] [place]   1/28/2025 11:30 AM Kailee Ellington MD [facility] [place]     Orders entered  Added to calendar  PA - no auth needed  Reference Number  [id number]    CPT 20822

## 2024-12-20 ENCOUNTER — OFF PREMISE (OUTPATIENT)
Dept: HEALTH INFORMATION MANAGEMENT | Facility: OTHER | Age: 49
End: 2024-12-20

## 2024-12-20 PROCEDURE — 93010 ELECTROCARDIOGRAM REPORT: CPT | Performed by: INTERNAL MEDICINE

## 2024-12-28 ENCOUNTER — HOSPITAL ENCOUNTER (OUTPATIENT)
Age: 49
Discharge: HOME | End: 2024-12-28
Payer: MEDICAID

## 2024-12-28 VITALS
TEMPERATURE: 98 F | OXYGEN SATURATION: 98 % | DIASTOLIC BLOOD PRESSURE: 91 MMHG | HEART RATE: 78 BPM | HEIGHT: 58 IN | BODY MASS INDEX: 43.03 KG/M2 | SYSTOLIC BLOOD PRESSURE: 147 MMHG | RESPIRATION RATE: 18 BRPM | WEIGHT: 205 LBS

## 2024-12-28 DIAGNOSIS — R30.0 DYSURIA: Primary | ICD-10-CM

## 2024-12-28 DIAGNOSIS — N39.0 URINARY TRACT INFECTION WITHOUT HEMATURIA, SITE UNSPECIFIED: ICD-10-CM

## 2024-12-28 DIAGNOSIS — B35.3 TINEA PEDIS OF BOTH FEET: ICD-10-CM

## 2024-12-28 DIAGNOSIS — B37.31 CANDIDAL VAGINITIS: ICD-10-CM

## 2024-12-28 LAB
B-HCG UR QL: NEGATIVE
BILIRUB UR QL STRIP: NEGATIVE
CLARITY UR: CLEAR
COLOR UR: YELLOW
GLUCOSE BLD-MCNC: 184 MG/DL (ref 70–99)
GLUCOSE UR STRIP-MCNC: NEGATIVE MG/DL
HGB UR QL STRIP: NEGATIVE
KETONES UR STRIP-MCNC: NEGATIVE MG/DL
NITRITE UR QL STRIP: NEGATIVE
PH UR STRIP: 6 [PH]
PROT UR STRIP-MCNC: NEGATIVE MG/DL
SP GR UR STRIP: >=1.03
UROBILINOGEN UR STRIP-ACNC: <2 MG/DL

## 2024-12-28 PROCEDURE — 81025 URINE PREGNANCY TEST: CPT | Performed by: NURSE PRACTITIONER

## 2024-12-28 PROCEDURE — 99214 OFFICE O/P EST MOD 30 MIN: CPT | Performed by: NURSE PRACTITIONER

## 2024-12-28 PROCEDURE — 82962 GLUCOSE BLOOD TEST: CPT | Performed by: NURSE PRACTITIONER

## 2024-12-28 PROCEDURE — 81002 URINALYSIS NONAUTO W/O SCOPE: CPT | Performed by: NURSE PRACTITIONER

## 2024-12-28 RX ORDER — CEPHALEXIN 500 MG/1
500 CAPSULE ORAL 2 TIMES DAILY
Qty: 14 CAPSULE | Refills: 0 | Status: SHIPPED | OUTPATIENT
Start: 2024-12-28 | End: 2025-01-04

## 2024-12-28 RX ORDER — FLUCONAZOLE 150 MG/1
150 TABLET ORAL ONCE
Qty: 1 TABLET | Refills: 0 | Status: SHIPPED | OUTPATIENT
Start: 2024-12-28 | End: 2024-12-28

## 2024-12-28 RX ORDER — CLOTRIMAZOLE 1 %
1 CREAM (GRAM) TOPICAL 2 TIMES DAILY
Qty: 60 G | Refills: 0 | Status: SHIPPED | OUTPATIENT
Start: 2024-12-28

## 2024-12-28 NOTE — ED PROVIDER NOTES
Patient Seen in: [facility]    History     Chief Complaint   Patient presents with    Urinary Symptoms     Stated Complaint: POSS UTI    HPI    Patient is a 49-year-old female who is here today with complaints of dysuria, clear vaginal discharge and athlete's foot.  She reports that she has been treating her athlete's foot over the last couple of weeks however nothing has been helping.  She started having a clear discharge a couple of days ago, now she has dysuria.  No abdominal pain.   Patient denies being dizzy, light headed or short of breath.  No fever, or chills,  No nausea or vomiting.  Patient also reports being 10 days late on her menstrual period.    Past Medical History:    Asthma (HCC)    COVID-19    loss of taste and smell. No hospitalization required    Endometrial cells on cervical Pap smear inconsistent w/LMP    Extrinsic asthma, unspecified    last attack a couple of months ago    Fatty liver    Genital herpes simplex    Gonorrhea    H. pylori infection    H. pylori infection    PREVIOUS HISTORY      Herpes    last outbreak years ago    History of seizure    Left renal stone    Migraines    Obstruction of left ureteropelvic junction (UPJ) due to stone    Osteoarthritis    mild in back    Pneumonia, organism unspecified(486)    PONV (postoperative nausea and vomiting)    mild     labor (HCC)    pt. had 20 week loss    Renal colic    Seizure disorder (HCC)    patient states \"seizure was from [place]\"     Seizure disorder (HCC)    Seizure-like activity (HCC)    Sleep apnea       Past Surgical History:   Procedure Laterality Date          , , ,     Cholecystectomy      Colonoscopy  2014    Procedure: COLONOSCOPY;  Surgeon: Nahid Calderon MD;  Location:  ENDOSCOPY    Colonoscopy N/A 2019    Procedure: COLONOSCOPY;  Surgeon: Segundo Harman MD;  Location:  ENDOSCOPY    Hernia surgery      Hysteroscopy,biopsy  2021    Hysteroscopy,  D&C, endometrial polypectomy    Laparoscopic cholecystectomy  2 weeks ago    Lasik Bilateral 2002    done in [place]    [place] localization wire 1 site left (cpt=19281)  2008    Benign    Kaushal localization wire 1 site right (cpt=19281)  2008    Benign    Other      baker cyst     Other      bunion bilateral - right foot metal    Reduction left  1999    Reduction right  1999    Removal gallbladder      Ventral hernia repair  11/20/2013    Procedure: HERNIA VENTRAL REPAIR;  Surgeon: Jg Chambers MD;  Location:  MAIN OR            Family History   Problem Relation Age of Onset    Breast Cancer Mother 40        EST    Hypertension Mother     Glaucoma Mother     Ovarian Cancer Sister 37        EST    No Known Problems Maternal Grandmother     No Known Problems Maternal Grandfather     Diabetes Neg     Cancer Neg     Macular degeneration Neg        Social History     Socioeconomic History    Marital status:    Tobacco Use    Smoking status: Never     Passive exposure: Current    Smokeless tobacco: Never    Tobacco comments:     NON-SMOKER   Vaping Use    Vaping status: Never Used   Substance and Sexual Activity    Alcohol use: Yes     Comment: occ    Drug use: No    Sexual activity: Yes     Partners: Male   Other Topics Concern    Caffeine Concern Yes     Comment: 3-4x cup daily     Exercise Yes     Comment: walking daily    Seat Belt Yes   Social History Narrative     worker, for emergency .      Social Drivers of Health      [facility]    Social Connections    [facility] from [facility], [facility]    Housing Stability       Review of Systems    Positive for stated complaint: POSS UTI  Other systems are as noted in HPI.  Constitutional and vital signs reviewed.      All other systems reviewed and negative except as noted above.    PSFH elements reviewed from today and agreed except as otherwise  stated in HPI.    Physical Exam     ED Triage Vitals [12/28/24 1320]   BP (!) 147/91   Pulse 78   Resp 18   Temp 97.8 °F (36.6 °C)   Temp src Oral   SpO2 98 %   O2 Device None (Room air)       Current:BP (!) 147/91   Pulse 78   Temp 97.8 °F (36.6 °C) (Oral)   Resp 18   Ht 147.3 cm (4' 10\")   Wt 93 kg   LMP 11/23/2024 (Exact Date)   SpO2 98%   BMI 42.85 kg/m²     Female  physical exam:     VS: Vital signs reviewed. O2 saturation within normal limits for this patient     General: Patient is awake and alert, oriented to person, place and time. Not in acute distress.      HEENT: Head is normocephalic atraumatic.    Neck: No cervical lymphadenopathy.      Heart: S1-S2.  Regular rate and rhythm.       Lungs: No respiratory distress noted    : Deferred per patient    Extremities: No edema.  Pulses 2+ extremities.  Patient has what appears to be athlete's foot to the feet bilaterally mostly on the ball of the foot.  In between the third, fourth, fifth digits.    Skin: No rash noted.  No ecchymosis.       CNS: Moves all 4 extremities.  Interacts appropriately.             ED Course     Labs Reviewed   [facility] POCT URINALYSIS DIPSTICK - Abnormal; Notable for the following components:       Result Value    Leukocyte esterase urine Small (*)     All other components within normal limits   POCT GLUCOSE - Abnormal; Notable for the following components:    POC Glucose 184 (*)     All other components within normal limits   POCT PREGNANCY URINE - Normal   URINE CULTURE, ROUTINE           I have personally  reviewed available prior medical records for any recent pertinent discharge summaries/testing. Patient/family updated on results and plan, a verbalized understanding and agreement with the plan.  I explained to the patient that emergent conditions may arise and to go to the ER for new, worsening or any persistent conditions. I've explained the importance of taking all medicatons as prescribed, follow up, and return  precuations,  All questions answered.  MDM       Patient is well-appearing, well-hydrated, well-nourished, nontoxic, no distress noted.  Patient given Keflex, Diflucan and clotrimazole for her feet.  Patient discharged home in stable condition to follow-up with a primary care physician and to return to the emergency department with any worsening symptoms or concern  Disposition and Plan     Clinical Impression:  1. Dysuria    2. Tinea pedis of both feet    3. Candidal vaginitis    4. Urinary tract infection without hematuria, site unspecified        Disposition:  Discharge    Follow-up:  Juliette Pardo MD  [address] [address]  [phone]          Juliette Pardo MD  [address] [address]  [phone]            Medications Prescribed:  Discharge Medication List as of 12/28/2024  1:45 PM        START taking these medications    Details   fluconazole (DIFLUCAN) 150 MG Oral Tab Take 1 tablet (150 mg total) by mouth once for 1 dose. Take one tablet today, and one tablet when you have completed your antibiotics, Normal, Disp-1 tablet, R-0      cephALEXin 500 MG Oral Cap Take 1 capsule (500 mg total) by mouth 2 (two) times daily for 7 days., Normal, Disp-14 capsule, R-0      clotrimazole (ATHLETES FOOT, CLOTRIMAZOLE,) 1 % External Cream Apply 1 Application topically 2 (two) times daily., Normal, Disp-60 g, R-0

## 2024-12-28 NOTE — ED INITIAL ASSESSMENT (HPI)
Patient here with c/o possible UTI. States she is having clear discharge and some burning with urination for the last couple of days. Denies fever, chills or body aches.

## 2024-12-30 NOTE — ED NOTES
Spoke with patient regarding urine culture results. Culture is negative. Pt is to stop antibiotics and follow up with pcp if symptoms do not resolve or improve. Pt v/u of information given.

## 2025-01-02 ENCOUNTER — HOSPITAL ENCOUNTER (OUTPATIENT)
Age: 50
Discharge: HOME | End: 2025-01-02
Payer: MEDICAID

## 2025-01-02 VITALS
HEART RATE: 78 BPM | DIASTOLIC BLOOD PRESSURE: 81 MMHG | TEMPERATURE: 98 F | OXYGEN SATURATION: 95 % | RESPIRATION RATE: 16 BRPM | SYSTOLIC BLOOD PRESSURE: 126 MMHG

## 2025-01-02 DIAGNOSIS — N97.9 FEMALE FERTILITY PROBLEM: ICD-10-CM

## 2025-01-02 DIAGNOSIS — Z87.898 HISTORY OF ABDOMINAL PAIN: Primary | ICD-10-CM

## 2025-01-02 LAB
B-HCG UR QL: NEGATIVE
BILIRUB UR QL STRIP: NEGATIVE
CLARITY UR: CLEAR
COLOR UR: YELLOW
GLUCOSE UR STRIP-MCNC: NEGATIVE MG/DL
HGB UR QL STRIP: NEGATIVE
KETONES UR STRIP-MCNC: NEGATIVE MG/DL
LEUKOCYTE ESTERASE UR QL STRIP: NEGATIVE
NITRITE UR QL STRIP: NEGATIVE
PH UR STRIP: 5.5 [PH]
PROT UR STRIP-MCNC: NEGATIVE MG/DL
SP GR UR STRIP: >=1.03
UROBILINOGEN UR STRIP-ACNC: <2 MG/DL

## 2025-01-02 NOTE — ED INITIAL ASSESSMENT (HPI)
Pt sts intermittent mid abd pain for the past several weeks. Feels like pain in increasing, nausea. Denies emesis or diarrhea. Scheduled for a D&C on 1/9.    [facility]

## 2025-01-02 NOTE — ED PROVIDER NOTES
Patient Seen in: [facility]      History     Chief Complaint   Patient presents with    Abdomen/Flank Pain     Stated Complaint: abd pain    Subjective:   HPI    49 year old female with asthma, history of genital herpes, seizure disorder presents today with c/o epigastric pain. Pt is requesting a blood pregnancy test.  Patient states that she is trying to conceive.  Patient states that she is scheduled for D&C on the ninth due to dysfunctional uterine bleeding.  Patient states that she still has a menstrual cycle every month.  Patient states that she has had 8 pregnancies for living and 4 miscarriages.  Patient states her youngest child is 8 years old.  Patient denies any COVID or flu exposure.      Objective:     Past Medical History:    Asthma (HCC)    COVID-19    loss of taste and smell. No hospitalization required    Endometrial cells on cervical Pap smear inconsistent w/LMP    Extrinsic asthma, unspecified    last attack a couple of months ago    Fatty liver    Genital herpes simplex    Gonorrhea    H. pylori infection    H. pylori infection    PREVIOUS HISTORY      Herpes    last outbreak years ago    History of seizure    Left renal stone    Migraines    Obstruction of left ureteropelvic junction (UPJ) due to stone    Osteoarthritis    mild in back    Pneumonia, organism unspecified(486)    PONV (postoperative nausea and vomiting)    mild     labor (HCC)    pt. had 20 week loss    Renal colic    Seizure disorder (HCC)    patient states \"seizure was from [place]\"     Seizure disorder (HCC)    Seizure-like activity (HCC)    Sleep apnea              Past Surgical History:   Procedure Laterality Date          , , , 2016    Cholecystectomy      Colonoscopy  2014    Procedure: COLONOSCOPY;  Surgeon: Nahid Calderon MD;  Location:  ENDOSCOPY    Colonoscopy N/A 2019    Procedure: COLONOSCOPY;  Surgeon: Segundo Harman MD;  Location:  ENDOSCOPY    Hernia  surgery      Hysteroscopy,biopsy  05/28/2021    Hysteroscopy, D&C, endometrial polypectomy    Laparoscopic cholecystectomy  2 weeks ago    Lasik Bilateral 2002    done in [facility] localization wire 1 site left (cpt=19281)  2008    Benign    Kaushal localization wire 1 site right (cpt=19281)  2008    Benign    Other      baker cyst     Other      bunion bilateral - right foot metal    Reduction left  1999    Reduction right  1999    Removal gallbladder      Ventral hernia repair  11/20/2013    Procedure: HERNIA VENTRAL REPAIR;  Surgeon: Jg Chambers MD;  Location:  MAIN OR                No pertinent social history.            Review of Systems   Constitutional: Negative.    HENT: Negative.     Eyes: Negative.    Respiratory: Negative.     Cardiovascular: Negative.    Gastrointestinal:  Positive for abdominal pain.   Endocrine: Negative.    Genitourinary: Negative.    Musculoskeletal: Negative.    Skin: Negative.    Allergic/Immunologic: Negative.    Neurological: Negative.    Hematological: Negative.    Psychiatric/Behavioral: Negative.         Positive for stated complaint: abd pain  Other systems are as noted in HPI.  Constitutional and vital signs reviewed.      All other systems reviewed and negative except as noted above.    Physical Exam     ED Triage Vitals [01/02/25 1245]   /81   Pulse 78   Resp 16   Temp 98.4 °F (36.9 °C)   Temp src Oral   SpO2 95 %   O2 Device None (Room air)       Current Vitals:   Vital Signs  BP: 126/81  Pulse: 78  Resp: 16  Temp: 98.4 °F (36.9 °C)  Temp src: Oral    Oxygen Therapy  SpO2: 95 %  O2 Device: None (Room air)        Physical Exam  Vitals and nursing note reviewed.   Constitutional:       Appearance: She is well-developed.   HENT:      Head: Normocephalic.      Mouth/Throat:      Mouth: Mucous membranes are moist.      Pharynx: Oropharynx is clear.   Eyes:      Extraocular Movements: Extraocular movements intact.      Pupils: Pupils are equal, round, and reactive  to light.   Cardiovascular:      Rate and Rhythm: Normal rate and regular rhythm.      Heart sounds: Normal heart sounds.   Pulmonary:      Effort: Pulmonary effort is normal.      Breath sounds: Normal breath sounds.   Abdominal:      General: Abdomen is protuberant. Bowel sounds are normal.      Palpations: Abdomen is soft.      Tenderness: There is no abdominal tenderness.   Neurological:      Mental Status: She is alert.   Psychiatric:         Mood and Affect: Mood normal.           ED Course     Labs Reviewed   POCT PREGNANCY URINE - Normal   [facility] POCT URINALYSIS DIPSTICK                   MDM      49 year old female with asthma, history of genital herpes, seizure disorder presents today with c/o epigastric pain. Pt is requesting a blood pregnancy test.  Patient states that she is trying to conceive.  Patient states that she is scheduled for D&C on the ninth due to dysfunctional uterine bleeding.  Patient states that she still has a menstrual cycle every month.  Patient states that she has had 8 pregnancies for living and 4 miscarriages.  Patient states her youngest child is 8 years old.   Vital signs: Please see EMR.  Physical exam: Please see exam.  Differential diagnosis: Cystitis, pregnancy, history of abdominal pain.  Recent Results (from the past 24 hours)   POCT Urinalysis Dipstick    Collection Time: 01/02/25  1:05 PM   Result Value Ref Range    Urine Color Yellow Yellow    Urine Clarity Clear Clear    Specific Gravity, Urine >=1.030 1.005 - 1.030    PH, Urine 5.5 5.0 - 8.0    Protein urine Negative Negative mg/dL    Glucose, Urine Negative Negative mg/dL    Ketone, Urine Negative Negative mg/dL    Bilirubin, Urine Negative Negative    Blood, Urine Negative Negative    Nitrite Urine Negative Negative    Urobilinogen urine <2.0 <2.0 mg/dL    Leukocyte esterase urine Negative Negative   POCT Pregnancy, Urine    Collection Time: 01/02/25  1:10 PM   Result Value Ref Range    POCT Urine Pregnancy Negative  Negative     Based on physical exam and HPI will diagnosed with history of abdominal pain.  Patient is to follow-up with her OB/GYN.  ED precautions given.        Medical Decision Making  49 year old female with asthma, history of genital herpes, seizure disorder presents today with c/o epigastric pain. Pt is requesting a blood pregnancy test.  Patient states that she is trying to conceive.  Patient states that she is scheduled for D&C on the ninth due to dysfunctional uterine bleeding.  Patient states that she still has a menstrual cycle every month.  Patient states that she has had 8 pregnancies for living and 4 miscarriages.  Patient states her youngest child is 8 years old.     Problems Addressed:  Female fertility problem: acute illness or injury  History of abdominal pain: acute illness or injury    Amount and/or Complexity of Data Reviewed  Labs: ordered. Decision-making details documented in ED Course.        Disposition and Plan     Clinical Impression:  1. History of abdominal pain    2. Female fertility problem         Disposition:  Discharge  1/2/2025  1:15 pm    Follow-up:  Maria Victoria Jarvis MD  [address]  [address] [address]  [phone]                Medications Prescribed:  Current Discharge Medication List              Supplementary Documentation:

## 2025-01-06 NOTE — H&P
[facility]  Obstetrics and Gynecology  History & Physical    Chani Leon Patient Status:  Hospital Outpatient Surgery    10/10/1975 MRN [id number]   Location [facility] Attending Kailee Ellington MD   Hospital Day 0 PCP Juliette Pardo MD     CC: Patient is here for  hysteroscopy, D&c    SUBJECTIVE:    Chani Leon is a 49 year old  female who presents for hysteroscopy, d&c. The patient was seen in office with complaint of heavy menstrual bleeding. Patient reported not wanting to try anything hormonal to stop bleeding due to her desire for pregnancy; plan was for an EMB. During procedure, cervical stenosis was found and difficulty was encountered trying to get a biopsy. Pathology returned as endocervical curetting only that was benign. Decision was made to head to OR for hysteroscopy, D&C to get a good sampling. Pelvic US was performed and noted for thickened endometrium on .      Pt states that her periods of late have been heavier than usual; has been going through a pad an hour and sometimes will feel dizzy. Has hx of heavy periods her whole life.     Regular periods; 5-6 days. Just heavy bleeding. States in  had similar; got better after D&C but then heavy bleeding returned.   Unsure as to when her family goes through menopause.     4 prior c-sections.      She does have a history of of renal colic, seizure disorders, does have a history of previous history of herpes, asthma.         A discussion was held with the patient regarding findings and recommendations. The patient was offered surgical management. The patient was agreeable with recommendations and presents today for scheduled procedure.            ROS: Denies fever, chills, chest pain, SOB, nausea, vomiting, constipation, diarrhea, LH, dizziness, urinary changes.         Contraception: none  Last Pap: 2023 NILM, neg HPV.       Obstetric History:   OB History    Para Term  AB Living   8 4 4 0 4  4   SAB IAB Ectopic Multiple Live Births   4 0 0 0 4      # Outcome Date GA Lbr Nick/2nd Weight Sex Type Anes PTL Lv   8 Term 16 40w0d  7 lb (3.175 kg) F Caesarean Gen  KRISSY   7 Term 13 39w0d  6 lb 14.6 oz (3.135 kg) M CS-LTranv Gen N KRISSY   6 Term 11 40w0d  7 lb (3.175 kg) M Caesarean Spinal  KRISSY   5 Term 07 40w0d  6 lb (2.722 kg) M Caesarean Spinal  KRISSY   4 SAB            3 SAB            2 SAB            1 SAB              Past Medical History:   Past Medical History:    Asthma (HCC)    COVID-19    loss of taste and smell. No hospitalization required    Endometrial cells on cervical Pap smear inconsistent w/LMP    Extrinsic asthma, unspecified    last attack a couple of months ago    Fatty liver    Genital herpes simplex    Gonorrhea    H. pylori infection    H. pylori infection    PREVIOUS HISTORY      Herpes    last outbreak years ago    History of seizure    Left renal stone    Migraines    Obstruction of left ureteropelvic junction (UPJ) due to stone    Osteoarthritis    mild in back    Pneumonia, organism unspecified(486)    PONV (postoperative nausea and vomiting)    mild     labor (HCC)    pt. had 20 week loss    Renal colic    Seizure disorder (HCC)    patient states \"seizure was from [place]\"     Seizure disorder (HCC)    Seizure-like activity (HCC)    Sleep apnea     Past Surgical History:   Past Surgical History:   Procedure Laterality Date          , , , 2016    Cholecystectomy      Colonoscopy  2014    Procedure: COLONOSCOPY;  Surgeon: Nahid Calderon MD;  Location:  ENDOSCOPY    Colonoscopy N/A 2019    Procedure: COLONOSCOPY;  Surgeon: Segundo Harman MD;  Location:  ENDOSCOPY    Hernia surgery      Hysteroscopy,biopsy  2021    Hysteroscopy, D&C, endometrial polypectomy    Laparoscopic cholecystectomy  2 weeks ago    Lasik Bilateral     done in [facility] localization wire 1 site left (cpt=19281)      Benign     [place] localization wire 1 site right (cpt=19281)  2008    Benign    Other      baker cyst     Other      bunion bilateral - right foot metal    Reduction left  1999    Reduction right  1999    Removal gallbladder      Ventral hernia repair  11/20/2013    Procedure: HERNIA VENTRAL REPAIR;  Surgeon: Jg Chambers MD;  Location:  MAIN OR     Family History:   Family History   Problem Relation Age of Onset    Breast Cancer Mother 40        EST    Hypertension Mother     Glaucoma Mother     Ovarian Cancer Sister 37        EST    No Known Problems Maternal Grandmother     No Known Problems Maternal Grandfather     Diabetes Neg     Cancer Neg     Macular degeneration Neg      Social History:   Social History     Tobacco Use    Smoking status: Never     Passive exposure: Current    Smokeless tobacco: Never    Tobacco comments:     NON-SMOKER   Substance Use Topics    Alcohol use: Yes     Comment: occ       Home Meds: Prescriptions Prior to Admission[1]  Allergies: Allergies[2]    OBJECTIVE:    Temp:  [98 °F (36.7 °C)-98.1 °F (36.7 °C)] 98 °F (36.7 °C)  Pulse:  [70-72] 70  Resp:  [17-18] 17  BP: (124-127)/(74-83) 124/83  SpO2:  [97 %] 97 %  Body mass index is 44.73 kg/m².    General: AAO. NAD.   Lungs: No labored breathing.  CV: Regular rate.   Abdomen: soft, nontender, nondistended,  Gu: deferred to OR  Extremities: negative edema bilaterally  Labs:  Pertinent Labs (past 24 hours):   Recent Results (from the past 24 hours)   POCT Pregnancy, Urine    Collection Time: 01/09/25  2:20 PM   Result Value Ref Range    POCT Urine Pregnancy Negative Negative       Imaging:  Pelvic US 11/23     FINDINGS:     PELVIS    UTERUS:  Limited assessment.  Endometrial thickness 27 mm heterogeneous.  10 mm uterine fibroid anterior uterine body nabothian cysts are present largest measuring 1.8 cm.  RIGHT OVARY:  The right ovary appears normal in size, shape, and echogenicity.  No significant masses are identified.  LEFT OVARY:  Not  visualized   CUL-DE-SAC:  Negative.     DOPPLER WAVE FORMS  FLOW:  There is normal arterial and venous Doppler wave forms in right ovaries.  Left ovary not seen.  The spectral analysis is within normal limits.  No sign right ovarian torsion. For this examination, arterial and venous color flow, and arterial and  venous spectral wave form analysis with velocities performed.  Real-time images also performed.     OTHER:  Negative.                   Impression   CONCLUSION:  Heterogeneous thickened endometrium 27 mm, in a perimenopausal patient with dysfunctional bleeding, consider endometrial biopsy.  Left ovary not seen.  Normal appearing right ovary.  Small uterine fibroid 10 mm.  No free fluid.       ASSESSMENT/ PLAN:    Chani Leon is a 49 year old  female who presents for hysteroscopy, D&C being done for HMB.     Problem List Items Addressed This Visit    None  Visit Diagnoses       Pre-op testing    -  Primary    Relevant Orders    CBC, Platelet; No Differential          Conjunctivitis, right eye  - pt states that she had some itching in her right eye; nothing significant but when to urgent care; urgent care told her possible conjunctivitis and gave her eye drops  - pt feeling better already  - will proceed with procedure as scheduled    AUB  HMB  - Regular menses; heavy bleeding; a pad an hour  - Hgb 13  -d/w patient possible etiologies including but not limited to benign, abnormal and malignant uterine anomalies   - Patient's last menstrual period was 2024 (exact date). ; discussed importance of abstinence prior to procedure; has been abstinent since discussion on        - Admit for outpatient surgical procedure   - IVF: LR @ 100 cc/hr   - Diet: NPO  - Meds: n/a   - VTE prophylaxis: SCDs, per protocol   - Anesthesia to evaluate   - Consent obtained and placed in chart       Proposed Procedure: D&C hysteroscopy  The intended goal of the procedure is to sample lining  The procedure, assistant  roles, LOS in hospital and usual recovery times and post op restrictions are reviewed in detail. NPO, preop bloodwork, hibiclens washing and preop nurse phone call are reviewed.   Risks associated with this procedure include (but are not limited to); bleeding, infection, anesthetic complications (such as rash, nausea, throat irritation, dental injury), and possible injury to neighboring organs such as the bladder, ureter, blood vessels, nerves, muscle, skin and intestine. We discussed the worst complication is one not recognized at the time of surgery and, should a major complication occur, she may require a different surgery than the one planned.    Other risks associated with any major surgery include formation of blood clots in the blood vessels of the legs or pelvis that could move to the heart or lungs and cause damage or death. The possibility of adhesion formation after surgery, possibly causing impaired bowel function, was also reviewed. The potential for persistent pelvic pain after surgery was discussed.            Anticipated recovery after this procedure includes abdominal & pelvic pain for approximately 2 weeks after the procedure, a few weeks of fatigue, and days of nausea related to anesthesia exposure. Full activity would be at 2 weeks.  Post-operative pain management plan: Motrin, Tylenol  Anesthetic options for the procedure are per anesthesia     Risks, benefits, alternatives and possible complications have been discussed in detail with the patient.  Pre-admission, admission, and post admission procedures and expectations were discussed in detail.  All questions answered, all appropriate consents will be signed at the Hospital. Previously stated procedure is planned for today and anticipated    Kailee Ellington MD   [facility] - OBGYN          Note to patient and family   The 21st Century Cures Act makes medical notes available to patients in the interest of transparency.  However, please be advised that  this is a medical document.  It is intended as hgla-uw-yptm communication.  It is written and medical language may contain abbreviations or verbiage that are technical and unfamiliar.  It may appear blunt or direct.  Medical documents are intended to carry relevant information, facts as evident, and the clinical opinion of the practitioner.               [1]   Facility-Administered Medications Prior to Admission   Medication Dose Route Frequency Provider Last Rate Last Admin    [COMPLETED] ibuprofen (Motrin) tab 600 mg  600 mg Oral Once    600 mg at 24 1648     Medications Prior to Admission   Medication Sig Dispense Refill Last Dose/Taking    polymyxin B-trimethoprim 42007-6.1 UNIT/ML-% Ophthalmic Solution Apply 1 drop to eye Q3H While Awake for 5 days. 10 mL 0 2025    clotrimazole (ATHLETES FOOT, CLOTRIMAZOLE,) 1 % External Cream Apply 1 Application topically 2 (two) times daily. 60 g 0 2025    [] fluconazole (DIFLUCAN) 150 MG Oral Tab Take 1 tablet (150 mg total) by mouth once for 1 dose. Take one tablet today, and one tablet when you have completed your antibiotics 1 tablet 0     [] cephALEXin 500 MG Oral Cap Take 1 capsule (500 mg total) by mouth 2 (two) times daily for 7 days. (Patient not taking: Reported on 2025) 14 capsule 0     albuterol (2.5 MG/3ML) 0.083% Inhalation Nebu Soln Take 3 mL (2.5 mg total) by nebulization every 4 (four) hours as needed for Wheezing or Shortness of Breath. (Patient not taking: Reported on 2024) 100 each 0 Unknown    fluticasone propionate 44 MCG/ACT Inhalation Aerosol Inhale 1 puff into the lungs 2 (two) times daily. (Patient not taking: Reported on 2024) 10.6 g 0 Unknown   [2]   Allergies  Allergen Reactions    Morphine Sulfate SWELLING    Radiology Contrast Iodinated Dyes SWELLING     Swelling--hands and feet

## 2025-01-09 ENCOUNTER — APPOINTMENT (OUTPATIENT)
Dept: ULTRASOUND IMAGING | Facility: HOSPITAL | Age: 50
End: 2025-01-09
Attending: OBSTETRICS & GYNECOLOGY
Payer: MEDICAID

## 2025-01-09 ENCOUNTER — HOSPITAL ENCOUNTER (OUTPATIENT)
Facility: HOSPITAL | Age: 50
Setting detail: HOSPITAL OUTPATIENT SURGERY
Discharge: HOME | End: 2025-01-09
Attending: OBSTETRICS & GYNECOLOGY | Admitting: OBSTETRICS & GYNECOLOGY
Payer: MEDICAID

## 2025-01-09 ENCOUNTER — ANESTHESIA (OUTPATIENT)
Dept: SURGERY | Facility: HOSPITAL | Age: 50
End: 2025-01-09
Payer: MEDICAID

## 2025-01-09 ENCOUNTER — HOSPITAL ENCOUNTER (OUTPATIENT)
Age: 50
Discharge: HOME | End: 2025-01-09
Payer: MEDICAID

## 2025-01-09 ENCOUNTER — ANESTHESIA EVENT (OUTPATIENT)
Dept: SURGERY | Facility: HOSPITAL | Age: 50
End: 2025-01-09
Payer: MEDICAID

## 2025-01-09 VITALS
RESPIRATION RATE: 18 BRPM | OXYGEN SATURATION: 97 % | TEMPERATURE: 98 F | DIASTOLIC BLOOD PRESSURE: 74 MMHG | SYSTOLIC BLOOD PRESSURE: 127 MMHG | HEART RATE: 72 BPM

## 2025-01-09 VITALS
WEIGHT: 214 LBS | SYSTOLIC BLOOD PRESSURE: 107 MMHG | TEMPERATURE: 97 F | OXYGEN SATURATION: 95 % | BODY MASS INDEX: 44.92 KG/M2 | RESPIRATION RATE: 18 BRPM | HEIGHT: 58 IN | HEART RATE: 67 BPM | DIASTOLIC BLOOD PRESSURE: 67 MMHG

## 2025-01-09 DIAGNOSIS — H10.31 ACUTE CONJUNCTIVITIS OF RIGHT EYE, UNSPECIFIED ACUTE CONJUNCTIVITIS TYPE: Primary | ICD-10-CM

## 2025-01-09 DIAGNOSIS — Z01.818 PRE-OP TESTING: Primary | ICD-10-CM

## 2025-01-09 DIAGNOSIS — N92.0 MENORRHAGIA WITH REGULAR CYCLE: ICD-10-CM

## 2025-01-09 DIAGNOSIS — H01.001 BLEPHARITIS OF RIGHT UPPER EYELID, UNSPECIFIED TYPE: ICD-10-CM

## 2025-01-09 LAB — B-HCG UR QL: NEGATIVE

## 2025-01-09 PROCEDURE — 99213 OFFICE O/P EST LOW 20 MIN: CPT | Performed by: NURSE PRACTITIONER

## 2025-01-09 PROCEDURE — 58558 HYSTEROSCOPY BIOPSY: CPT | Performed by: OBSTETRICS & GYNECOLOGY

## 2025-01-09 PROCEDURE — 0UDB8ZZ EXTRACTION OF ENDOMETRIUM, VIA NATURAL OR ARTIFICIAL OPENING ENDOSCOPIC: ICD-10-PCS | Performed by: OBSTETRICS & GYNECOLOGY

## 2025-01-09 PROCEDURE — 76998 US GUIDE INTRAOP: CPT | Performed by: OBSTETRICS & GYNECOLOGY

## 2025-01-09 RX ORDER — SODIUM CHLORIDE, SODIUM LACTATE, POTASSIUM CHLORIDE, CALCIUM CHLORIDE 600; 310; 30; 20 MG/100ML; MG/100ML; MG/100ML; MG/100ML
INJECTION, SOLUTION INTRAVENOUS CONTINUOUS
Status: DISCONTINUED | OUTPATIENT
Start: 2025-01-09 | End: 2025-01-09

## 2025-01-09 RX ORDER — HYDROCODONE BITARTRATE AND ACETAMINOPHEN 5; 325 MG/1; MG/1
1 TABLET ORAL EVERY 6 HOURS PRN
Status: DISCONTINUED | OUTPATIENT
Start: 2025-01-09 | End: 2025-01-09

## 2025-01-09 RX ORDER — MIDAZOLAM HYDROCHLORIDE 1 MG/ML
INJECTION INTRAMUSCULAR; INTRAVENOUS AS NEEDED
Status: DISCONTINUED | OUTPATIENT
Start: 2025-01-09 | End: 2025-01-09 | Stop reason: SURG

## 2025-01-09 RX ORDER — DEXAMETHASONE SODIUM PHOSPHATE 4 MG/ML
VIAL (ML) INJECTION AS NEEDED
Status: DISCONTINUED | OUTPATIENT
Start: 2025-01-09 | End: 2025-01-09 | Stop reason: SURG

## 2025-01-09 RX ORDER — NALOXONE HYDROCHLORIDE 0.4 MG/ML
0.08 INJECTION, SOLUTION INTRAMUSCULAR; INTRAVENOUS; SUBCUTANEOUS AS NEEDED
Status: DISCONTINUED | OUTPATIENT
Start: 2025-01-09 | End: 2025-01-09

## 2025-01-09 RX ORDER — ONDANSETRON 2 MG/ML
INJECTION INTRAMUSCULAR; INTRAVENOUS AS NEEDED
Status: DISCONTINUED | OUTPATIENT
Start: 2025-01-09 | End: 2025-01-09 | Stop reason: SURG

## 2025-01-09 RX ORDER — IBUPROFEN 600 MG/1
600 TABLET, FILM COATED ORAL EVERY 8 HOURS PRN
Qty: 30 TABLET | Refills: 0 | Status: SHIPPED | OUTPATIENT
Start: 2025-01-09

## 2025-01-09 RX ORDER — SCOPOLAMINE 1 MG/3D
1 PATCH, EXTENDED RELEASE TRANSDERMAL ONCE
Status: DISCONTINUED | OUTPATIENT
Start: 2025-01-09 | End: 2025-01-09 | Stop reason: HOSPADM

## 2025-01-09 RX ORDER — KETOROLAC TROMETHAMINE 30 MG/ML
INJECTION, SOLUTION INTRAMUSCULAR; INTRAVENOUS AS NEEDED
Status: DISCONTINUED | OUTPATIENT
Start: 2025-01-09 | End: 2025-01-09 | Stop reason: SURG

## 2025-01-09 RX ORDER — POLYMYXIN B SULFATE AND TRIMETHOPRIM 1; 10000 MG/ML; [USP'U]/ML
1 SOLUTION OPHTHALMIC
Qty: 10 ML | Refills: 0 | Status: SHIPPED | OUTPATIENT
Start: 2025-01-09 | End: 2025-01-14

## 2025-01-09 RX ORDER — LIDOCAINE HYDROCHLORIDE 10 MG/ML
INJECTION, SOLUTION EPIDURAL; INFILTRATION; INTRACAUDAL; PERINEURAL AS NEEDED
Status: DISCONTINUED | OUTPATIENT
Start: 2025-01-09 | End: 2025-01-09 | Stop reason: SURG

## 2025-01-09 RX ORDER — HYDROMORPHONE HYDROCHLORIDE 1 MG/ML
0.2 INJECTION, SOLUTION INTRAMUSCULAR; INTRAVENOUS; SUBCUTANEOUS EVERY 5 MIN PRN
Status: DISCONTINUED | OUTPATIENT
Start: 2025-01-09 | End: 2025-01-09

## 2025-01-09 RX ORDER — HYDROMORPHONE HYDROCHLORIDE 1 MG/ML
0.6 INJECTION, SOLUTION INTRAMUSCULAR; INTRAVENOUS; SUBCUTANEOUS EVERY 5 MIN PRN
Status: DISCONTINUED | OUTPATIENT
Start: 2025-01-09 | End: 2025-01-09

## 2025-01-09 RX ORDER — PROCHLORPERAZINE EDISYLATE 5 MG/ML
5 INJECTION INTRAMUSCULAR; INTRAVENOUS EVERY 8 HOURS PRN
Status: DISCONTINUED | OUTPATIENT
Start: 2025-01-09 | End: 2025-01-09

## 2025-01-09 RX ORDER — HYDROMORPHONE HYDROCHLORIDE 1 MG/ML
0.4 INJECTION, SOLUTION INTRAMUSCULAR; INTRAVENOUS; SUBCUTANEOUS EVERY 5 MIN PRN
Status: DISCONTINUED | OUTPATIENT
Start: 2025-01-09 | End: 2025-01-09

## 2025-01-09 RX ORDER — ACETAMINOPHEN 500 MG
1000 TABLET ORAL ONCE
Status: DISCONTINUED | OUTPATIENT
Start: 2025-01-09 | End: 2025-01-09 | Stop reason: HOSPADM

## 2025-01-09 RX ORDER — ONDANSETRON 2 MG/ML
4 INJECTION INTRAMUSCULAR; INTRAVENOUS EVERY 6 HOURS PRN
Status: DISCONTINUED | OUTPATIENT
Start: 2025-01-09 | End: 2025-01-09

## 2025-01-09 RX ADMIN — SODIUM CHLORIDE, SODIUM LACTATE, POTASSIUM CHLORIDE, CALCIUM CHLORIDE: 600; 310; 30; 20 INJECTION, SOLUTION INTRAVENOUS at 17:16:00

## 2025-01-09 RX ADMIN — MIDAZOLAM HYDROCHLORIDE 2 MG: 1 INJECTION INTRAMUSCULAR; INTRAVENOUS at 17:17:00

## 2025-01-09 RX ADMIN — ONDANSETRON 4 MG: 2 INJECTION INTRAMUSCULAR; INTRAVENOUS at 17:40:00

## 2025-01-09 RX ADMIN — KETOROLAC TROMETHAMINE 30 MG: 30 INJECTION, SOLUTION INTRAMUSCULAR; INTRAVENOUS at 17:57:00

## 2025-01-09 RX ADMIN — LIDOCAINE HYDROCHLORIDE 100 MG: 10 INJECTION, SOLUTION EPIDURAL; INFILTRATION; INTRACAUDAL; PERINEURAL at 17:23:00

## 2025-01-09 RX ADMIN — DEXAMETHASONE SODIUM PHOSPHATE 8 MG: 4 MG/ML VIAL (ML) INJECTION at 17:31:00

## 2025-01-09 NOTE — DISCHARGE INSTRUCTIONS
[facility] GROUP DISCHARGE INSTRUCTIONS     You have had an operation called a D&C.      What to expect:  You may be somewhat fatigued because of the anesthetic  Bleeding is usually light to moderate.  You may pass small blood clots  Light bleeding may last up to 2-3 weeks  Mild to moderate lower abdominal cramping     Activities:  Rest until tomorrow  No driving for 1-2 days  Drink plenty of fluids, but avoid alcohol  No tampons, douching or intercourse for 2-3 weeks  You may shower immediately, but avoid tub baths for 1 week    Call if you experience any of the following:  Persistent heavy bleeding  Severe pelvic pain  Temperature 101 F or higher    Follow Up:  If you do not have a postoperative appointment, please call our office at [phone] within the next few days to schedule an appointment for 2-3 weeks from now.

## 2025-01-09 NOTE — ANESTHESIA PREPROCEDURE EVALUATION
PRE-OP EVALUATION    Patient Name: Chani Leon    Admit Diagnosis: Menorrhagia with regular cycle [N92.0]    Pre-op Diagnosis: Menorrhagia with regular cycle [N92.0]    dilation and curettage, HYSTEROSCOPY DIAGNOSTIC with Truclear under ultrasound guidance    Anesthesia Procedure: dilation and curettage, HYSTEROSCOPY DIAGNOSTIC with Truclear under ultrasound guidance    Surgeons and Role:     * Kailee Ellington MD - Primary    Pre-op vitals reviewed.  Temp: 98 °F (36.7 °C)  Pulse: 70  Resp: 17  BP: 124/83  SpO2: 97 %  Body mass index is 44.73 kg/m².    Current medications reviewed.  Hospital Medications:   [Transfer Hold] acetaminophen (Tylenol Extra Strength) tab 1,000 mg  1,000 mg Oral Once    [Transfer Hold] scopolamine (Transderm-Scop) 1 MG/3DAYS patch 1 patch  1 patch Transdermal Once    lactated ringers infusion   Intravenous Continuous       Outpatient Medications:   Prescriptions Prior to Admission[1]    Allergies: Morphine sulfate and Radiology contrast iodinated dyes      Anesthesia Evaluation    Patient summary reviewed.    Anesthetic Complications  (-) history of anesthetic complications         GI/Hepatic/Renal  Comment: H. pylori                               Cardiovascular      ECG reviewed.  Exercise tolerance: good     MET: >4    (+) obesity  (-) hypertension                       (+) angina              Endo/Other  Comment: HSV  [place]  Pregnancy test neg  Antibiotic allergy years ago, mother thinks may have been gent    (-) diabetes     (-) hypothyroidism  (-) hyperthyroidism              (+) arthritis       Pulmonary      (+) asthma (no inhaler use needed recently)       Pneumonia: one year ago.  (-) shortness of breath  (-) recent URI   (+) sleep apnea (fitted one week ago, has not used) and no treatment      Neuro/Psych  Comment: Left wrist painful ganglion cyst      (+) anxiety       (+) seizures (from dye injection per patient a few years ago)    (+) headaches (migraine, more frequent recently,  ibuprofen and dims lights)                   Past Surgical History:   Procedure Laterality Date          , , 2007, 2016    Cholecystectomy      Colonoscopy  2014    Procedure: COLONOSCOPY;  Surgeon: Nahid Calderon MD;  Location:  ENDOSCOPY    Colonoscopy N/A 2019    Procedure: COLONOSCOPY;  Surgeon: Segundo Harman MD;  Location:  ENDOSCOPY    Hernia surgery      Hysteroscopy,biopsy  2021    Hysteroscopy, D&C, endometrial polypectomy    Laparoscopic cholecystectomy  2 weeks ago    Lasik Bilateral     done in [place]    [place] localization wire 1 site left (cpt=19281)      Benign    [place] localization wire 1 site right (cpt=19281)      Benign    Other      baker cyst     Other      bunion bilateral - right foot metal    Reduction left  1999    Reduction right      Removal gallbladder      Ventral hernia repair  2013    Procedure: HERNIA VENTRAL REPAIR;  Surgeon: Jg Chambers MD;  Location:  MAIN OR     Social History     Socioeconomic History    Marital status:    Tobacco Use    Smoking status: Never     Passive exposure: Current    Smokeless tobacco: Never    Tobacco comments:     NON-SMOKER   Vaping Use    Vaping status: Never Used   Substance and Sexual Activity    Alcohol use: Yes     Comment: occ    Drug use: No    Sexual activity: Yes     Partners: Male   Other Topics Concern    Caffeine Concern Yes     Comment: 3-4x cup daily     Exercise Yes     Comment: walking daily    Seat Belt Yes     History   Drug Use No     Available pre-op labs reviewed.  Lab Results   Component Value Date    WBC 7.1 2024    RBC 4.48 2024    HGB 13.9 2024    HCT 40.7 2024    MCV 90.8 2024    MCH 31.0 2024    MCHC 34.2 2024    RDW 13.1 2024    .0 2024     Lab Results   Component Value Date     2024    K 3.9 2024     2024    CO2 26.0 2024    BUN 15 2024     CREATSERUM 0.81 2024     (H) 2024    CA 9.3 2024            Airway      Mallampati: II  Mouth opening: >3 FB  TM distance: > 6 cm  Neck ROM: full Cardiovascular    Cardiovascular exam normal.         Dental    Dentition appears grossly intact         Pulmonary    Pulmonary exam normal.                 Other findings              ASA: 2   Plan: MAC  NPO status verified and patient meets guidelines.        Comment: Plan is MAC anesthesia, which likely will include deep sedation.  Implied that memory of procedure is unlikely although intraop recall, if it occurs, may be a reasonable and comfortable experience with this anesthetic.  Aware that general anesthesia is not intended though deep sedation may include brief moments of general anesthesia.      Plan/risks discussed with: patient                Present on Admission:  **None**             [1]   Facility-Administered Medications Prior to Admission   Medication Dose Route Frequency Provider Last Rate Last Admin    [COMPLETED] ibuprofen (Motrin) tab 600 mg  600 mg Oral Once    600 mg at 24 1648     Medications Prior to Admission   Medication Sig Dispense Refill Last Dose/Taking    polymyxin B-trimethoprim 03660-0.1 UNIT/ML-% Ophthalmic Solution Apply 1 drop to eye Q3H While Awake for 5 days. 10 mL 0 2025    clotrimazole (ATHLETES FOOT, CLOTRIMAZOLE,) 1 % External Cream Apply 1 Application topically 2 (two) times daily. 60 g 0 2025    [] fluconazole (DIFLUCAN) 150 MG Oral Tab Take 1 tablet (150 mg total) by mouth once for 1 dose. Take one tablet today, and one tablet when you have completed your antibiotics 1 tablet 0     [] cephALEXin 500 MG Oral Cap Take 1 capsule (500 mg total) by mouth 2 (two) times daily for 7 days. (Patient not taking: Reported on 2025) 14 capsule 0     albuterol (2.5 MG/3ML) 0.083% Inhalation Nebu Soln Take 3 mL (2.5 mg total) by nebulization every 4 (four) hours as needed for Wheezing or  Shortness of Breath. (Patient not taking: Reported on 12/28/2024) 100 each 0 Unknown    fluticasone propionate 44 MCG/ACT Inhalation Aerosol Inhale 1 puff into the lungs 2 (two) times daily. (Patient not taking: Reported on 12/28/2024) 10.6 g 0 Unknown

## 2025-01-09 NOTE — ED PROVIDER NOTES
Patient Seen in: [facility]      History     Chief Complaint   Patient presents with    Eye Problem     Stated Complaint: right eye issues: pain, red, fluid    Subjective:   49-year-old female presents today with discharged redness and swelling to the upper eyelid of the right eye.  Symptoms over last 1 to 2 days.  Denies any change in vision no pain or photosensitivity.  Alert oriented x 3.  No other symptoms or concerns.  The patient's medication list, past medical history and social history elements as listed in today's nurse's notes were reviewed and agreed (except as otherwise stated in the HPI).  The patient's family history reviewed and determined to be noncontributory to the presenting problem              Objective:     Past Medical History:    Asthma (HCC)    COVID-19    loss of taste and smell. No hospitalization required    Endometrial cells on cervical Pap smear inconsistent w/LMP    Extrinsic asthma, unspecified    last attack a couple of months ago    Fatty liver    Genital herpes simplex    Gonorrhea    H. pylori infection    H. pylori infection    PREVIOUS HISTORY      Herpes    last outbreak years ago    History of seizure    Left renal stone    Migraines    Obstruction of left ureteropelvic junction (UPJ) due to stone    Osteoarthritis    mild in back    Pneumonia, organism unspecified(486)    PONV (postoperative nausea and vomiting)    mild     labor (HCC)    pt. had 20 week loss    Renal colic    Seizure disorder (HCC)    patient states \"seizure was from [place]\"     Seizure disorder (HCC)    Seizure-like activity (HCC)    Sleep apnea              Past Surgical History:   Procedure Laterality Date          , , , 2016    Cholecystectomy      Colonoscopy  2014    Procedure: COLONOSCOPY;  Surgeon: Nahid Calderon MD;  Location:  ENDOSCOPY    Colonoscopy N/A 2019    Procedure: COLONOSCOPY;  Surgeon: Segundo Harman MD;  Location:   ENDOSCOPY    Hernia surgery      Hysteroscopy,biopsy  05/28/2021    Hysteroscopy, D&C, endometrial polypectomy    Laparoscopic cholecystectomy  2 weeks ago    Lasik Bilateral 2002    done in [facility] localization wire 1 site left (cpt=19281)  2008    Benign    [facility] localization wire 1 site right (cpt=19281)  2008    Benign    Other      baker cyst     Other      bunion bilateral - right foot metal    Reduction left  1999    Reduction right  1999    Removal gallbladder      Ventral hernia repair  11/20/2013    Procedure: HERNIA VENTRAL REPAIR;  Surgeon: Jg Chambers MD;  Location:  MAIN OR                Social History     Socioeconomic History    Marital status:    Tobacco Use    Smoking status: Never     Passive exposure: Current    Smokeless tobacco: Never    Tobacco comments:     NON-SMOKER   Vaping Use    Vaping status: Never Used   Substance and Sexual Activity    Alcohol use: Yes     Comment: occ    Drug use: No    Sexual activity: Yes     Partners: Male   Other Topics Concern    Caffeine Concern Yes     Comment: 3-4x cup daily     Exercise Yes     Comment: walking daily    Seat Belt Yes   Social History Narrative     worker, for emergency .      Social Drivers of Health      Received from [facility], [facility]    Social Connections    Received from [facility], [facility]    Housing Stability              Review of Systems    Positive for stated complaint: right eye issues: pain, red, fluid  Other systems are as noted in HPI.  Constitutional and vital signs reviewed.      All other systems reviewed and negative except as noted above.    Physical Exam     ED Triage Vitals [01/09/25 1156]   /74   Pulse 72   Resp 18   Temp 98.1 °F (36.7 °C)   Temp src Oral   SpO2 97 %   O2 Device None (Room air)       Current Vitals:   Vital Signs  BP: 127/74  Pulse: 72  Resp: 18  Temp: 98.1 °F (36.7 °C)  Temp  src: Oral    Oxygen Therapy  SpO2: 97 %  O2 Device: None (Room air)      Right Eye Chart Acuity: 20/25, Uncorrected  Left Eye Chart Acuity: 20/25, Uncorrected  Physical Exam  Vitals and nursing note reviewed.   Constitutional:       Appearance: Normal appearance.   HENT:      Mouth/Throat:      Mouth: Mucous membranes are moist.   Eyes:      General: Lids are everted, no foreign bodies appreciated.      Conjunctiva/sclera:      Right eye: Right conjunctiva is injected.      Pupils: Pupils are equal, round, and reactive to light.      Comments: Mild erythema noted to the right upper eyelid.   Cardiovascular:      Rate and Rhythm: Normal rate.   Pulmonary:      Effort: Pulmonary effort is normal.   Skin:     General: Skin is warm and dry.   Neurological:      Mental Status: She is alert and oriented to person, place, and time.             ED Course   Labs Reviewed - No data to display                MDM     Please note that this report has been produced using speech recognition software and may contain errors related to that system including, but not limited to, errors in grammar, punctuation, and spelling, as well as words and phrases that possibly may have been recognized inappropriately.  If there are any questions or concerns, contact the dictating provider for clarification.              Medical Decision Making  Differential diagnosis includes but is not limited to: Conjunctivitis, corneal abrasion, iritis, foreign body in the eye, stye      Presented today with redness to the eye with discharge and inflammation to the upper eyelid.  Will treat for possible conjunctivitis/blepharitis.  Will give prescription for Polytrim OP to use as directed.  Encourage patient to follow-up with ophthalmology in the next week if symptoms do not prove, sooner with any worsening of symptoms.  Patient verbalized understanding and agreed to plan of care.    Risk  OTC drugs.  Prescription drug management.        Disposition and Plan      Clinical Impression:  1. Acute conjunctivitis of right eye, unspecified acute conjunctivitis type    2. Blepharitis of right upper eyelid, unspecified type         Disposition:  Discharge  1/9/2025 12:05 pm    Follow-up:  [facility] EYE [facility]  [address]  [facility] [id number] [phone]  In 1 week  As needed          Medications Prescribed:  Discharge Medication List as of 1/9/2025 12:04 PM              Supplementary Documentation:

## 2025-01-10 NOTE — ANESTHESIA POSTPROCEDURE EVALUATION
[facility] Patient Status:  Hospital Outpatient Surgery   Age/Gender 49 year old female MRN [id number]   Location [facility] PERIOPERATIVE SERVICE Attending Kailee Ellington MD   Hosp Day # 0 PCP Juliette Pardo MD       Anesthesia Post-op Note    Dilation and Curettage, Operative Hysteroscopy with Truclear under ultrasound guidance    Procedure Summary       Date: 01/09/25 Room / Location:  MAIN OR [address] [place] [address] MAIN OR    Anesthesia Start: 1716 Anesthesia Stop: 1810    Procedure: Dilation and Curettage, Operative Hysteroscopy with Truclear under ultrasound guidance (Vagina ) Diagnosis:       Menorrhagia with regular cycle      (Menorrhagia with regular cycle [N92.0])    Surgeons: Kailee Ellington MD Anesthesiologist: Sherine Penn DO    Anesthesia Type: MAC ASA Status: 2            Anesthesia Type: MAC    Vitals Value Taken Time   /80 01/09/25 1827   Temp 97 °F (36.1 °C) 01/09/25 1815   Pulse 79 01/09/25 1829   Resp 18 01/09/25 1815   SpO2 96 % 01/09/25 1829   Vitals shown include unfiled device data.        Patient Location: Same Day Surgery    Anesthesia Type: MAC    Airway Patency: patent    Postop Pain Control: adequate    Mental Status: preanesthetic baseline    Nausea/Vomiting: none    Cardiopulmonary/Hydration status: stable euvolemic    Complications: no apparent anesthesia related complications    Postop vital signs: stable    Dental Exam: Unchanged from Preop    Patient to be discharged from PACU when criteria met.

## 2025-01-10 NOTE — OPERATIVE REPORT
Operative Report: Dilation & Curettage; Operative Hysteroscopy Endometrial Sampling    Dilation and Curettage, Operative Hysteroscopy with Truclear under ultrasound guidance Operative Note    Patient Name: Chani Leon  Patient : 10/10/1975  Date of Procedure: 25    Pre-op Dx: Menorrhagia with regular cycle [N92.0]  Post-op Dx: Menorrhagia with regular cycle [N92.0]    Procedure:  Procedure(s):  Dilation and Curettage, Operative Hysteroscopy with Truclear under ultrasound guidance and endocervical currettings  Surgeon:  Kailee Ellington MD  Anesthesia: Anesthesiologist.: Sherine Penn DO  Staff: Circulating Nurse.: Paulette Raymond RN  Scrub Person.: Ne Ramos RN  Ultrasound Tech: Raul Cunha    Anesthesia: MAC  Drains: none  EBL: minimal  Specimen:   ID Type Source Tests Collected by Time Destination   1 : endometrial curettings Tissue Endometrial curettage SURGICAL PATHOLOGY TISSUE Kailee Ellington MD 2025  5:54 PM      Fluids: crystalloid per anesthesia  Uterine Distension Medium: Normal Saline 0.9%  Deficit: 400 mL  Complications: none  Disposition: PACU - hemodynamically stable    INDICATIONS: 49 year old  with heavy menstrual bleeding; unable to get sufficient tissue from [facility] in the office. Patient was evaluated in the pre-operative period and found to have no contraindications for the procedure. Pregnancy test was negative. The risks, benefits, complications, treatment options, and expected outcomes were discussed with the patient. The patient verbalized understanding and gave written consent.     FINDINGS:  Normal external genitalia, no lesions. Normal multiparous cervix, no lesions. Anteverted uterus. Cervix dilated to 7 mm under direct visualization. The ostia were visualized bilaterally. Normal appearing endometrium. No polyps. No fibroids.  Specimens sent to pathology. Good hemostasis.      DESCRIPTION OF PROCEDURE:   This procedure was fully reviewed with the patient and  written informed consent was obtained after discussing risks, benefits, indication and alternatives. All questions were answered.     The patient was taken to operative room and MAC anesthesia was initiated. She was placed in dorsal lithotomy position. She was prepped and draped in normal sterile fashion. The bladder was emptied using a straight catheter. A sterile speculum was placed in the vagina. A single tooth tenaculum was used to grasp the anterior lip of the cervix. The cervix was gently dilated with hegar dilators to 7 mm. After the [employer] hysteroscope system was calibrated, the hysteroscope was then gently advanced into the endometrial cavity. The findings are noted above. The TRUCLEAR hysteroscopic rotary blade was then advanced through the hysteroscope under direct visualization. The window lock was set to minimize fluid use and maintain uterine distention. The rotary blade was placed against the endometrial tissue and the system was activated by stepping on one pedal. The TRUCLEAR hysteroscopic rotary blade simultaneously aspirated and cut the endometrial tissue without any complications. This specimen was collected and sent as separate sample. This was repeated along the anterior, posterior and lateral surface of the endometrial cavity for a thorough sampling of the endometrium. This tissue was collected and sent to pathology as a separate sample.  The TRUCLEAR hysteroscopic rotary blade and hysteroscope were then removed from the uterus. A curette was then introduced into the cavity, sweeps with the curette were done to ensure all areas of the uterus were covered and tissue was obtained. The tissue was then sent to pathology as endometrial tissue. The single tooth tenaculum was removed and good hemostasis was noted. Rings forceps were placed along the previous tenaculum site for pressure; silver nitrate was used to achieve good hemostasis. Sponge, lap, needle, and instrument counts correct by two  counts. The patient was taken to recovery room in stable condition. She was instructed to avoid anything in vagina for two weeks.       DISPOSITION:  To recovery room in stable condition        CONDITION: Stable   Note to patient and family   The 21st Century Cures Act makes medical notes available to patients in the interest of transparency.  However, please be advised that this is a medical document.  It is intended as [id number]-[id number] communication.  It is written and medical language may contain abbreviations or verbiage that are technical and unfamiliar.  It may appear blunt or direct.  Medical documents are intended to carry relevant information, facts as evident, and the clinical opinion of the practitioner    Kailee Ellington MD   [facility] - OBGYN

## 2025-01-13 ENCOUNTER — HOSPITAL ENCOUNTER (EMERGENCY)
Age: 50
Discharge: HOME | End: 2025-01-13
Payer: MEDICAID

## 2025-01-13 VITALS
HEART RATE: 71 BPM | RESPIRATION RATE: 16 BRPM | WEIGHT: 195 LBS | OXYGEN SATURATION: 97 % | BODY MASS INDEX: 40.93 KG/M2 | DIASTOLIC BLOOD PRESSURE: 70 MMHG | HEIGHT: 58 IN | SYSTOLIC BLOOD PRESSURE: 100 MMHG | TEMPERATURE: 99 F

## 2025-01-13 DIAGNOSIS — H00.011 HORDEOLUM EXTERNUM OF RIGHT UPPER EYELID: Primary | ICD-10-CM

## 2025-01-13 PROCEDURE — 99283 EMERGENCY DEPT VISIT LOW MDM: CPT

## 2025-01-13 PROCEDURE — 99284 EMERGENCY DEPT VISIT MOD MDM: CPT

## 2025-01-13 RX ORDER — ERYTHROMYCIN 5 MG/G
1 OINTMENT OPHTHALMIC EVERY 6 HOURS
Qty: 1 G | Refills: 0 | Status: SHIPPED | OUTPATIENT
Start: 2025-01-13 | End: 2025-01-20

## 2025-01-13 RX ORDER — ACETAMINOPHEN 500 MG
1000 TABLET ORAL ONCE
Status: COMPLETED | OUTPATIENT
Start: 2025-01-13 | End: 2025-01-13

## 2025-01-14 NOTE — ED INITIAL ASSESSMENT (HPI)
R eyelid swelling since Thursday. No visual changes, no fever. No drainage apparent. Pt states she had a bit of watery drainage this morning. Pt prescribed eye drops Thursday. Pt is having rosacea flare up.

## 2025-01-14 NOTE — DISCHARGE INSTRUCTIONS
Switch to erythromycin eye ointment and apply every 6 hours while awake for the next 7 days, take to completion.  Apply warm compresses frequently as possible.  Consider switching out make-up brushes that you use in this area if they are possibly contaminated with bacteria.  Return to the ER for any other new or worsening symptoms.

## 2025-01-14 NOTE — ED PROVIDER NOTES
Patient Seen in: [facility] [place]      History     Chief Complaint   Patient presents with    Swelling Edema     Stated Complaint: R eye swelling since thursday    Subjective:   HPI    49-year-old female with past medical history of asthma, kidney stones, herpes, seizure disorder who presents to the ER for eyelid swelling for the past 5 days.  Reports that swelling is localized only to the right upper eyelid with associated mild discomfort.  Reports she was seen in urgent care and told it was related to a \"bacterial infection\" and was started on antibiotic eyedrops which she does not recall the name of.  Denies any fever, vision changes, pain with extraocular movement.  No other recent trauma to eye.  No other complaints    Objective:     Past Medical History:    Asthma (HCC)    COVID-19    loss of taste and smell. No hospitalization required    Endometrial cells on cervical Pap smear inconsistent w/LMP    Extrinsic asthma, unspecified    last attack a couple of months ago    Fatty liver    Genital herpes simplex    Gonorrhea    H. pylori infection    H. pylori infection    PREVIOUS HISTORY      Herpes    last outbreak years ago    History of seizure    Left renal stone    Migraines    Obstruction of left ureteropelvic junction (UPJ) due to stone    Osteoarthritis    mild in back    Pneumonia, organism unspecified(486)    PONV (postoperative nausea and vomiting)    mild     labor (HCC)    pt. had 20 week loss    Renal colic    Seizure disorder (HCC)    patient states \"seizure was from [place]\"     Seizure disorder (HCC)    Seizure-like activity (HCC)    Sleep apnea              Past Surgical History:   Procedure Laterality Date          , , , 2016    Cholecystectomy      Colonoscopy  2014    Procedure: COLONOSCOPY;  Surgeon: Nahid Calderon MD;  Location:  ENDOSCOPY    Colonoscopy N/A 2019    Procedure: COLONOSCOPY;  Surgeon: Segudno Harman  MD;  Location:  ENDOSCOPY    Hernia surgery      Hysteroscopy,biopsy  05/28/2021    Hysteroscopy, D&C, endometrial polypectomy    Laparoscopic cholecystectomy  2 weeks ago    Lasik Bilateral 2002    done in [facility] localization wire 1 site left (cpt=19281)  2008    Benign    [facility] localization wire 1 site right (cpt=19281)  2008    Benign    Other      baker cyst     Other      bunion bilateral - right foot metal    Reduction left  1999    Reduction right  1999    Removal gallbladder      Ventral hernia repair  11/20/2013    Procedure: HERNIA VENTRAL REPAIR;  Surgeon: Jg Chambers MD;  Location:  MAIN OR                Social History     Socioeconomic History    Marital status:    Tobacco Use    Smoking status: Never     Passive exposure: Current    Smokeless tobacco: Never    Tobacco comments:     NON-SMOKER   Vaping Use    Vaping status: Never Used   Substance and Sexual Activity    Alcohol use: Yes     Comment: occ    Drug use: No    Sexual activity: Yes     Partners: Male   Other Topics Concern    Caffeine Concern Yes     Comment: 3-4x cup daily     Exercise Yes     Comment: walking daily    Seat Belt Yes   Social History Narrative     worker, for emergency .      Social Drivers of Health      [facility]    [facility]ctions    [facility]    Housing Stability                  Physical Exam     ED Triage Vitals [01/13/25 1815]   /86   Pulse 76   Resp 15   Temp 99.3 °F (37.4 °C)   Temp src Oral   SpO2 98 %   O2 Device None (Room air)       Current Vitals:   Vital Signs  BP: 100/70  Pulse: 71  Resp: 16  Temp: 99.3 °F (37.4 °C)  Temp src: Oral    Oxygen Therapy  SpO2: 97 %  O2 Device: None (Room air)        Physical Exam  GENERAL APPEARANCE:  AxOx4, generally well-appearing, no acute distress.  HEENT:  NC, AT. MMM. EOMI, clear conjunctiva,  oropharynx clear.  Right upper eyelid is slightly swollen, erythematous with nodular area localized to the medial aspect of the eyelid consistent with stye.  No pain with extraocular movement, PERRLA.  NECK:  Supple without lymphadenopathy.  No stiffness or restricted ROM.  HEART:  Normal rate and regular rhythm, normal S1/S1, no m/r/g  LUNGS:  CTAB, moving air well. No crackles or wheezes are heard.  ABDOMEN:  Soft, nontender, nondistended with good bowel sounds heard.  BACK: No CVAT, no obvious deformity.  EXTREMITIES:  Without cyanosis, clubbing or edema.  NEUROLOGICAL:  Grossly nonfocal. Alert and oriented, moving all 4 extremities. CN not formally tested but appear grossly intact. Observed to ambulate with normal gait.  Skin:  Warm and dry without any rash.        ED Course   Labs Reviewed - No data to display            MDM      49-year-old female who presents to the ER for right upper eyelid swelling and pain.  Vitals within normal limits.  Differential diagnosis includes but does not exclude stye versus blepharitis versus periorbital cellulitis.  Patient appears nontoxic, erythema is not circumferential, not consistent with a periorbital cellulitis.  Exam consistent with likely stye, discussed continued supportive management at home as well as return precautions.  They understand and agree with plan ready for discharge.        Medical Decision Making      Disposition and Plan     Clinical Impression:  1. Hordeolum externum of right upper eyelid         Disposition:  Discharge  1/13/2025  8:03 pm    Follow-up:  Juliette Pardo MD  [address] [address]  [phone]    Follow up            Medications Prescribed:  Discharge Medication List as of 1/13/2025  8:05 PM        START taking these medications    Details   erythromycin 5 MG/GM Ophthalmic Ointment Apply 1 Application to eye every 6 (six) hours for 7 days., Normal, Disp-1 g, R-0                 Supplementary Documentation:

## 2025-02-26 ENCOUNTER — TELEPHONE (OUTPATIENT)
Dept: OBGYN CLINIC | Facility: CLINIC | Age: 50
End: 2025-02-26

## 2025-02-26 NOTE — TELEPHONE ENCOUNTER
Pt. Was to have a F/U appointment with Dr. Ellington 1/28/25 but was a NO SHOW.  So asking if she needs to come in to be seen, a MCM was sent to her regarding her pathology results but never reviewed by pt.  Please advise.  Thanks

## 2025-02-26 NOTE — TELEPHONE ENCOUNTER
Patient had post op appointment scheduled for 1/28 that she missed. She would like to know if she can get any results over the phone if possible or does  want to see her in office? Thank you

## 2025-03-03 ENCOUNTER — TELEPHONE (OUTPATIENT)
Dept: FAMILY MEDICINE CLINIC | Facility: CLINIC | Age: 50
End: 2025-03-03

## 2025-03-03 RX ORDER — SULFAMETHOXAZOLE AND TRIMETHOPRIM 800; 160 MG/1; MG/1
160 TABLET ORAL 2 TIMES DAILY
COMMUNITY
Start: 2025-02-24

## 2025-03-03 NOTE — TELEPHONE ENCOUNTER
Patient called said her derm gave her medication that make her get a yeast infection. Per patient last time they sent her medication without being seen would like to medication sent again. Asked patient what medication but patient didn't know. Let patient know she might have to be seen.

## 2025-03-03 NOTE — TELEPHONE ENCOUNTER
Called patient back about her results. Discussed she only needs to follow up if continued concerns with her bleeding.

## 2025-03-03 NOTE — TELEPHONE ENCOUNTER
Last visit in this office on 9/30/2024 with Dr. Ellis    Patient was started on sulfa med on 2/24/2025    Last Diflucan prescribed on 12/28/2024 by [facility]

## 2025-03-03 NOTE — TELEPHONE ENCOUNTER
Patient missed 1/28 office visit. Asking if she needs to r/s or okay to just relay negative pathology result?

## 2025-03-05 ENCOUNTER — HOSPITAL ENCOUNTER (OUTPATIENT)
Age: 50
Discharge: HOME | End: 2025-03-05
Payer: MEDICAID

## 2025-03-05 VITALS
DIASTOLIC BLOOD PRESSURE: 75 MMHG | OXYGEN SATURATION: 97 % | TEMPERATURE: 99 F | HEIGHT: 58 IN | WEIGHT: 191.38 LBS | HEART RATE: 70 BPM | RESPIRATION RATE: 16 BRPM | BODY MASS INDEX: 40.17 KG/M2 | SYSTOLIC BLOOD PRESSURE: 119 MMHG

## 2025-03-05 DIAGNOSIS — N89.8 VAGINAL ITCHING: Primary | ICD-10-CM

## 2025-03-05 DIAGNOSIS — B37.9 YEAST INFECTION: ICD-10-CM

## 2025-03-05 PROCEDURE — 99213 OFFICE O/P EST LOW 20 MIN: CPT | Performed by: NURSE PRACTITIONER

## 2025-03-05 RX ORDER — FLUCONAZOLE 150 MG/1
TABLET ORAL
Qty: 2 TABLET | Refills: 0 | Status: SHIPPED | OUTPATIENT
Start: 2025-03-05

## 2025-03-05 NOTE — ED INITIAL ASSESSMENT (HPI)
Patient concerned for yeast infection, vaginal itching/redness/irritation x 2 days, pt states that she is taking Bactrim 4 times daily and generally develops a yeast infection when taking this medication, denies vaginal discharge, currently menstruating

## 2025-03-05 NOTE — ED PROVIDER NOTES
Patient Seen in: [facility]      History     Chief Complaint   Patient presents with    Eval-G     Stated Complaint: Gyne issue    Subjective:   HPI      49-year-old female presents today with complaints of vaginal itching.  Patient states she was placed on Bactrim for rosacea and is developed vaginal itching.  Patient states that she is currently on her menstrual cycle.    Objective:     Past Medical History:    Asthma (HCC)    COVID-19    loss of taste and smell. No hospitalization required    Endometrial cells on cervical Pap smear inconsistent w/LMP    Extrinsic asthma, unspecified    last attack a couple of months ago    Fatty liver    Genital herpes simplex    Gonorrhea    H. pylori infection    H. pylori infection    PREVIOUS HISTORY      Herpes    last outbreak years ago    History of seizure    Left renal stone    Migraines    Obstruction of left ureteropelvic junction (UPJ) due to stone    Osteoarthritis    mild in back    Pneumonia, organism unspecified(486)    PONV (postoperative nausea and vomiting)    mild     labor (HCC)    pt. had 20 week loss    Renal colic    Seizure disorder (HCC)    patient states \"seizure was from [place]\"     Seizure disorder (HCC)    Seizure-like activity (HCC)    Sleep apnea              Past Surgical History:   Procedure Laterality Date          , , , 2016    Cholecystectomy      Colonoscopy  2014    Procedure: COLONOSCOPY;  Surgeon: Nahid Calderon MD;  Location:  ENDOSCOPY    Colonoscopy N/A 2019    Procedure: COLONOSCOPY;  Surgeon: Segundo Harman MD;  Location:  ENDOSCOPY    Hernia surgery      Hysteroscopy,biopsy  2021    Hysteroscopy, D&C, endometrial polypectomy    Laparoscopic cholecystectomy  2 weeks ago    Lasik Bilateral 2002    done in [place]    Kaushal localization wire 1 site left (cpt=19281)  2008    Benign    Kaushal localization wire 1 site right (cpt=19281)      Benign    Other      baker cyst      Other      bunion bilateral - right foot metal    Reduction left  1999    Reduction right  1999    Removal gallbladder      Ventral hernia repair  11/20/2013    Procedure: HERNIA VENTRAL REPAIR;  Surgeon: Jg Chambers MD;  Location:  MAIN OR                Social History     Socioeconomic History    Marital status:    Tobacco Use    Smoking status: Never     Passive exposure: Current    Smokeless tobacco: Never    Tobacco comments:     NON-SMOKER   Vaping Use    Vaping status: Never Used   Substance and Sexual Activity    Alcohol use: Yes     Comment: occ    Drug use: No    Sexual activity: Yes     Partners: Male   Other Topics Concern    Caffeine Concern Yes     Comment: 3-4x cup daily     Exercise Yes     Comment: walking daily    Seat Belt Yes   Social History Narrative     worker, for emergency .      Social Drivers of Health      [facility], [facility]    Housing Stability              Review of Systems   Constitutional: Negative.    HENT: Negative.     Eyes: Negative.    Respiratory: Negative.     Cardiovascular: Negative.    Gastrointestinal: Negative.    Endocrine: Negative.    Genitourinary:  Positive for vaginal discharge.   Musculoskeletal: Negative.    Skin: Negative.    Allergic/Immunologic: Negative.    Neurological: Negative.    Hematological: Negative.    Psychiatric/Behavioral: Negative.         Positive for stated complaint: Gyne issue  Other systems are as noted in HPI.  Constitutional and vital signs reviewed.      All other systems reviewed and negative except as noted above.    Physical Exam     ED Triage Vitals [03/05/25 1001]   /75   Pulse 70   Resp 16   Temp 98.7 °F (37.1 °C)   Temp src Oral   SpO2 97 %   O2 Device None (Room air)       Current Vitals:   Vital Signs  BP: 119/75  Pulse: 70  Resp: 16  Temp: 98.7 °F (37.1 °C)  Temp src: Oral    Oxygen Therapy  SpO2: 97 %  O2 Device: None (Room  air)        Physical Exam  Vitals and nursing note reviewed.   Constitutional:       Appearance: Normal appearance. She is normal weight.   HENT:      Head: Normocephalic and atraumatic.      Right Ear: External ear normal.      Left Ear: External ear normal.   Eyes:      Extraocular Movements: Extraocular movements intact.      Conjunctiva/sclera: Conjunctivae normal.      Pupils: Pupils are equal, round, and reactive to light.   Pulmonary:      Effort: Pulmonary effort is normal.   Neurological:      Mental Status: She is alert.   Psychiatric:         Mood and Affect: Mood normal.           ED Course   Labs Reviewed - No data to display                MDM      49-year-old female presents today with complaints of vaginal itching.  Patient states she was placed on Bactrim for rosacea and is developed vaginal itching.  Patient states that she is currently on her menstrual cycle.  Vital signs: Please see EMR.  Physical exam: Please see exam.  Differential diagnosis: Yeast infection, vaginitis, vaginal discharge.  Patient declined vaginal exam as she is on her menstrual cycle.  Patient states she has developed this many times in her past before and it is likely a yeast infection.  Patient is requesting Diflucan.  Therefore based on patient's physical exam and HPI will diagnosed with vaginal discharge and yeast infection.  Will place patient on Diflucan.  Patient instructed to take over-the-counter probiotic such as Culturelle while on antibiotics.        Medical Decision Making  49-year-old female presents today with complaints of vaginal itching.  Patient states she was placed on Bactrim for rosacea and is developed vaginal itching.  Patient states that she is currently on her menstrual cycle.    Problems Addressed:  Vaginal itching: acute illness or injury  Yeast infection: acute illness or injury    Risk  Prescription drug management.        Disposition and Plan     Clinical Impression:  1. Vaginal itching    2.  Yeast infection         Disposition:  Discharge  3/5/2025 10:16 am    Follow-up:  Juliette Pardo MD  [address] [id number] [phone]                Medications Prescribed:  Current Discharge Medication List        START taking these medications    Details   fluconazole (DIFLUCAN) 150 MG Oral Tab Take 1 tablet by mouth now and repeat dose in 72 hours.  Qty: 2 tablet, Refills: 0                 Supplementary Documentation:

## 2025-03-17 ENCOUNTER — LAB ENCOUNTER (OUTPATIENT)
Dept: LAB | Facility: HOSPITAL | Age: 50
End: 2025-03-17
Attending: STUDENT IN AN ORGANIZED HEALTH CARE EDUCATION/TRAINING PROGRAM
Payer: MEDICAID

## 2025-03-17 ENCOUNTER — OFFICE VISIT (OUTPATIENT)
Dept: OBGYN CLINIC | Facility: CLINIC | Age: 50
End: 2025-03-17
Payer: MEDICAID

## 2025-03-17 VITALS
SYSTOLIC BLOOD PRESSURE: 116 MMHG | HEART RATE: 108 BPM | DIASTOLIC BLOOD PRESSURE: 74 MMHG | HEIGHT: 58 IN | BODY MASS INDEX: 40.3 KG/M2 | WEIGHT: 192 LBS

## 2025-03-17 DIAGNOSIS — N97.9 FEMALE INFERTILITY: Primary | ICD-10-CM

## 2025-03-17 DIAGNOSIS — N97.9 FEMALE INFERTILITY: ICD-10-CM

## 2025-03-17 LAB
ESTRADIOL SERPL-MCNC: 127.2 PG/ML
FSH SERPL-ACNC: 4.7 MIU/ML
PROGEST SERPL-MCNC: 5.54 NG/ML
PROLACTIN SERPL-MCNC: 9.8 NG/ML
T3FREE SERPL-MCNC: 2.62 PG/ML (ref 2.4–4.2)
T4 FREE SERPL-MCNC: 0.9 NG/DL (ref 0.8–1.7)
TSI SER-ACNC: 0.35 UIU/ML (ref 0.55–4.78)

## 2025-03-17 PROCEDURE — 84481 FREE ASSAY (FT-3): CPT

## 2025-03-17 PROCEDURE — 36415 COLL VENOUS BLD VENIPUNCTURE: CPT

## 2025-03-17 PROCEDURE — 84439 ASSAY OF FREE THYROXINE: CPT

## 2025-03-17 PROCEDURE — 84144 ASSAY OF PROGESTERONE: CPT

## 2025-03-17 PROCEDURE — 82670 ASSAY OF TOTAL ESTRADIOL: CPT

## 2025-03-17 PROCEDURE — 83520 IMMUNOASSAY QUANT NOS NONAB: CPT

## 2025-03-17 PROCEDURE — 84443 ASSAY THYROID STIM HORMONE: CPT

## 2025-03-17 PROCEDURE — 84146 ASSAY OF PROLACTIN: CPT

## 2025-03-17 PROCEDURE — 83001 ASSAY OF GONADOTROPIN (FSH): CPT

## 2025-03-17 NOTE — PROGRESS NOTES
[facility]  Obstetrics and Gynecology Referral  History & Physical    CC: Patient is a new patient and referred for female infertility    Subjective:     HPI: Chani Leon is a 49 year old  female referred for female infertility. Patient reports she is in relationship with new partner (40yo male) and they would like to try to conceive together. Patient has 4 children with prior partner. Her new partner has 3 children from prior relationship. She was seen by Dr. Rnoak Cardoza ([facility]) and told she has 3 eggs left, but also not ideal candidate for successful pregnancy. Pt unhappy with discussion and here for second opinion. I offered sympathy and discussed new referral to different [facility] provider as patient would like to try for pregnancy as soon as possible. She and current partner have been having unprotected sexual intercourse for 1 year without conceiving. She does report regular cycles, denies symptoms of menopause. Had lab work done last year for hormones that were normal per pt.     OB History:  OB History    Para Term  AB Living   8 4 4 0 4 4   SAB IAB Ectopic Multiple Live Births   4 0 0 0 4      # Outcome Date GA Lbr Nick/2nd Weight Sex Type Anes PTL Lv   8 Term 16 40w0d  7 lb (3.175 kg) F Caesarean Gen  KRSISY   7 Term 13 39w0d  6 lb 14.6 oz (3.135 kg) M CS-LTranv Gen N KRISSY   6 Term 11 40w0d  7 lb (3.175 kg) M Caesarean Spinal  KRISSY   5 Term 07 40w0d  6 lb (2.722 kg) M Caesarean Spinal  KRISSY   4 SAB            3 SAB            2 SAB            1 SAB                Gyne History:  Menarche: 10  Period Cycle (Days): 28-30 days  Period Duration (Days): 5-7 days  Period Flow: heavy  Use of Birth Control (if yes, specify type): None  Hx Prior Abnormal Pap: Yes (2021)  Pap Date: 23  Pap Result Notes: wnl  Patient's last menstrual period was 2025 (exact date).      denies history of STDs (non-HPV).    Sexual history: Active? Yes, male partner  Birth  control? None, trying to conceive    Meds:  Medications Ordered Prior to Encounter[1]    All:  Allergies[2]    PMH:  Past Medical History:    Asthma (HCC)    COVID-19    loss of taste and smell. No hospitalization required    Endometrial cells on cervical Pap smear inconsistent w/LMP    Extrinsic asthma, unspecified    last attack a couple of months ago    Fatty liver    Genital herpes simplex    Gonorrhea    H. pylori infection    H. pylori infection    PREVIOUS HISTORY      Herpes    last outbreak years ago    History of seizure    Left renal stone    Migraines    Obstruction of left ureteropelvic junction (UPJ) due to stone    Osteoarthritis    mild in back    Pneumonia, organism unspecified(486)    PONV (postoperative nausea and vomiting)    mild     labor (HCC)    pt. had 20 week loss    Renal colic    Seizure disorder (HCC)    patient states \"seizure was from [place]\"     Seizure disorder (HCC)    Seizure-like activity (HCC)    Sleep apnea       PSH:  Past Surgical History:   Procedure Laterality Date          , , ,     Cholecystectomy      Colonoscopy  2014    Procedure: COLONOSCOPY;  Surgeon: Nahid Calderon MD;  Location:  ENDOSCOPY    Colonoscopy N/A 2019    Procedure: COLONOSCOPY;  Surgeon: Segundo Harman MD;  Location: [facility]    Hernia surgery      Hysteroscopy,biopsy  2021    Hysteroscopy, D&C, endometrial polypectomy    Laparoscopic cholecystectomy  2 weeks ago    Lasik Bilateral     done in [place]    Kaushal localization wire 1 site left (cpt=19281)      Benign    Kaushal localization wire 1 site right (cpt=19281)      Benign    Other      baker cyst     Other      bunion bilateral - right foot metal    Reduction left      Reduction right      Removal gallbladder      Ventral hernia repair  2013    Procedure: HERNIA VENTRAL REPAIR;  Surgeon: Jg Chambers MD;  Location:  MAIN OR       Social History:  Social History      Socioeconomic History    Marital status:      Spouse name: Not on file    Number of children: Not on file    Years of education: Not on file    Highest education level: Not on file   Occupational History    Not on file   Tobacco Use    Smoking status: Never     Passive exposure: Current    Smokeless tobacco: Never    Tobacco comments:     NON-SMOKER   Vaping Use    Vaping status: Never Used   Substance and Sexual Activity    Alcohol use: Yes     Comment: occ    Drug use: No    Sexual activity: Yes     Partners: Male   Other Topics Concern    Caffeine Concern Yes     Comment: 3-4x cup daily     Exercise Yes     Comment: walking daily    Seat Belt Yes    Special Diet Not Asked    Stress Concern Not Asked    Weight Concern Not Asked     Service Not Asked    Blood Transfusions Not Asked    Occupational Exposure Not Asked    Hobby Hazards Not Asked    Sleep Concern Not Asked    Back Care Not Asked    Bike Helmet Not Asked    Self-Exams Not Asked   Social History Narrative     worker, for emergency .      Social Drivers of Health     Food Insecurity: Not on file   Transportation Needs: Not on file   Stress: Not on file   Housing Stability: Low Risk  (10/3/2023)    Received from [facility], [facility]    Housing Stability     Mortgage Payment Concerns?: Not on file     Number of Places Lived in the Last Year: Not on file     Unstable Housing?: Not on file         Family History:  Family History   Problem Relation Age of Onset    Breast Cancer Mother 40        EST    Hypertension Mother     Glaucoma Mother     Ovarian Cancer Sister 37        EST    No Known Problems Maternal Grandmother     No Known Problems Maternal Grandfather     Diabetes Neg     Cancer Neg     Macular degeneration Neg        Review of Systems:  General: no complaints per category. See HPI for additional information.   Breast: no complaints per category. See HPI for additional  information.   Respiratory: no complaints per category. See HPI for additional information.   Cardiovascular: no complaints per category. See HPI for additional information.   GI: no complaints per category. See HPI for additional information.   : no complaints per category. See HPI for additional information.   Heme: no complaints per category. See HPI for additional information.       Objective:     Vitals:    03/17/25 1514   BP: 116/74   Pulse: 108   Weight: 192 lb (87.1 kg)   Height: 58\"         Body mass index is 40.13 kg/m².    General: AAO.NAD.   CVS exam: normal peripheral perfusion  Chest: non-labored breathing, no tachypnea   Breast: deferred  Abdominal exam: soft, nontender, nondistended  Pelvic exam: deferred   Ext: non-tender, no edema    Imaging:  PROCEDURE:  US PELVIS EV W DOPPLER (CPT=76856/35433/07099)     COMPARISON:  None.     INDICATIONS:  vaginal bleeding     TECHNIQUE:  Ultrasound of the pelvis was performed with a transvaginal and transabdominal probe.  Doppler evaluation of the ovaries was performed of the ovarian arteries and veins.  B-mode images, Doppler color flow, and spectral waveform analysis were  performed.  Transvaginal ultrasound was used to better evaluate adnexal and endometrial detail.     PATIENT STATED HISTORY: (As transcribed by Technologist)  Patient states heavy vaginal bleeding for two days.      Exam limited by body habitus and poor sonographic penetration.  MEASUREMENTS:          Uterus Length:                      11.76 cm x 8.83 cm x 6.26 cm          Endometrium Thickness:      2.73 cm  Right Ovary:                         2.98 cm x 2.06 cm x 2.41 cm  Left Ovary:                           Not visualized     FINDINGS:     PELVIS    UTERUS:  Limited assessment.  Endometrial thickness 27 mm heterogeneous.  10 mm uterine fibroid anterior uterine body nabothian cysts are present largest measuring 1.8 cm.  RIGHT OVARY:  The right ovary appears normal in size, shape, and  echogenicity.  No significant masses are identified.  LEFT OVARY:  Not visualized   CUL-DE-SAC:  Negative.     DOPPLER WAVE FORMS  FLOW:  There is normal arterial and venous Doppler wave forms in right ovaries.  Left ovary not seen.  The spectral analysis is within normal limits.  No sign right ovarian torsion. For this examination, arterial and venous color flow, and arterial and  venous spectral wave form analysis with velocities performed.  Real-time images also performed.     OTHER:  Negative.   Impression   CONCLUSION:  Heterogeneous thickened endometrium 27 mm, in a perimenopausal patient with dysfunctional bleeding, consider endometrial biopsy.  Left ovary not seen.  Normal appearing right ovary.  Small uterine fibroid 10 mm.  No free fluid.     LOCATION:  [facility]     Dictated by (CST): Tha Pham MD on 2024 at 7:05 PM      Finalized by (CST): Tha Pham MD on 2024 at 7:07 PM        Assessment:     Chani Leon is a 49 year old  female referred for female infertility.       Plan:     Problem List Items Addressed This Visit    None  Visit Diagnoses       Female infertility    -  Primary    Relevant Orders    Anti-Müllerian Hormone (AMH) ([facility])    FSH (Completed)    Estradiol (Completed)    TSH W Reflex To Free T4 (Completed)    Prolactin (Completed)    Progesterone (Completed)    US PELVIS (TRANSABDOMINAL AND TRANSVAGINAL) (CPT=76856/07859)              Female Infertility  - Pt and male partner (42yo) having been trying to conceive for 1 year without success  - Infertility panel ordered  - Repeat Pelvic US ordered  - Referral to [facility] sent with names/clinics included in patient's MyChart  - advised starting prenatal vitamin in interim        All of the findings and plan were discussed with the patient.  She notes understanding and agrees with the plan of care.  All questions were answered to the best of my ability at this time.      Total patient time was 30 minutes in  evaluation, consultation, and coordination of care.  This included face to face and non-face to face actions. The patient's questions and concerns were addressed.     RTC in 1 year for a well woman exam or sooner if needed     DO GRACIELA Cardenas OBINDIO        Discussed with patient that there will not be further notification of normal or benign results other than receiving results on mychart. A mychart message or telephone call will be placed by the physician and/or office staff if results are abnormal.     Note to patient and family   The 21st Century Cures Act makes medical notes available to patients in the interest of transparency.  However, please be advised that this is a medical document.  It is intended as [id number] communication.  It is written and medical language may contain abbreviations or verbiage that are technical and unfamiliar.  It may appear blunt or direct.  Medical documents are intended to carry relevant information, facts as evident, and the clinical opinion of the practitioner.        This note could include assistance by Dragon voice recognition. Errors in content may be related to improper recognition by the system; efforts to review and correct have been done but errors may still exist.          [1]   Current Outpatient Medications on File Prior to Visit   Medication Sig Dispense Refill    fluconazole (DIFLUCAN) 150 MG Oral Tab Take 1 tablet by mouth now and repeat dose in 72 hours. (Patient not taking: Reported on 3/17/2025) 2 tablet 0    sulfamethoxazole-trimethoprim -160 MG Oral Tab per tablet Take 1 tablet by mouth 2 (two) times daily. (Patient not taking: Reported on 3/17/2025)      ibuprofen 600 MG Oral Tab Take 1 tablet (600 mg total) by mouth every 8 (eight) hours as needed. (Patient not taking: Reported on 3/17/2025) 30 tablet 0     No current facility-administered medications on file prior to visit.   [2]   Allergies  Allergen Reactions    Morphine Sulfate  SWELLING    Radiology Contrast Iodinated Dyes SWELLING     Swelling--hands and feet

## 2025-03-17 NOTE — PATIENT INSTRUCTIONS
Reproductive Endocrinology and Infertility Specialist ([facility]):    1) Dr. Wojciech Egan, IVF1   [address] [place] [address] [phone]    2) Dr. Ronak Cardoza   Phone: [phone]  Address: [address], [address]  [place] [address]    3) [facility]     4) [facility]  Phone: [phone]  Address: [address] [place] [address], [address] [address]

## 2025-03-19 ENCOUNTER — TELEPHONE (OUTPATIENT)
Dept: OBGYN CLINIC | Facility: CLINIC | Age: 50
End: 2025-03-19

## 2025-03-19 DIAGNOSIS — E03.8 SUBCLINICAL HYPOTHYROIDISM: Primary | ICD-10-CM

## 2025-03-19 RX ORDER — LEVOTHYROXINE SODIUM 25 UG/1
25 TABLET ORAL
Qty: 30 TABLET | Refills: 5 | Status: SHIPPED | OUTPATIENT
Start: 2025-03-19

## 2025-03-19 NOTE — TELEPHONE ENCOUNTER
Pt calling to say she contacted the fertility clinics dr had recommended , but they do not accept her due to her age. Please give pt call back with the new fertility dr who accepts her. She does not use message on [place].

## 2025-03-19 NOTE — TELEPHONE ENCOUNTER
3/17/2025- office visit for female infertility  -has been seen by Dr. Ronak Cardoza  -additional [facility] referrals provided     Detailed VM left for patient, per verbal release  -notified referrals were sent in AVS via [facility] at last office visit

## 2025-03-19 NOTE — TELEPHONE ENCOUNTER
Patient does not have access to [employer]t  -3/17/2025 test results and provider recommendations reviewed with patient.  -Informed Levothyroxine Rx sent to preferred pharmacy  -Provided contact information for [facility] recommendations per visit notes    Patient would also like to schedule in-office follow up  -Unable to recall name of provider Dr. Mari recommended  -Requesting  contact her so first available appt can be scheduled with this provider.

## 2025-03-20 LAB — MULLERIAN AMH: 0.2 NG/ML

## 2025-03-20 NOTE — ED INITIAL ASSESSMENT (HPI)
Pt c/o burning with urination and back pain. Pt c/o discharge.   Pt states for the last few days
No
21-Mar-2025 00:43

## 2025-03-24 NOTE — TELEPHONE ENCOUNTER
Patient says she spoke with the referral recommendation from Dr Mari and was told they typically see patients under the age of 49 and because of her age they would require her to use donor eggs, and she does not want to do this. She wanted to know if Dr Mari would be able to do or suggest something that could be done in our office.

## 2025-03-25 ENCOUNTER — TELEPHONE (OUTPATIENT)
Dept: OBGYN CLINIC | Facility: CLINIC | Age: 50
End: 2025-03-25

## 2025-03-25 NOTE — TELEPHONE ENCOUNTER
Spoke to patient.  Notified of all lab results by reviewing the [employer] messages that Dr. Mari sent to the patient.  Patient verbalized understanding and agrees to the Synthroid prescription.

## 2025-03-25 NOTE — TELEPHONE ENCOUNTER
Patient would like to discuss lab results with a nurse. She does not use mychart on a regular basis.

## 2025-04-09 ENCOUNTER — HOSPITAL ENCOUNTER (OUTPATIENT)
Dept: ULTRASOUND IMAGING | Age: 50
Discharge: HOME | End: 2025-04-09
Attending: STUDENT IN AN ORGANIZED HEALTH CARE EDUCATION/TRAINING PROGRAM
Payer: MEDICAID

## 2025-04-09 DIAGNOSIS — N97.9 FEMALE INFERTILITY: ICD-10-CM

## 2025-04-09 PROCEDURE — 76830 TRANSVAGINAL US NON-OB: CPT | Performed by: STUDENT IN AN ORGANIZED HEALTH CARE EDUCATION/TRAINING PROGRAM

## 2025-04-09 PROCEDURE — 76856 US EXAM PELVIC COMPLETE: CPT | Performed by: STUDENT IN AN ORGANIZED HEALTH CARE EDUCATION/TRAINING PROGRAM

## 2025-04-16 ENCOUNTER — TELEPHONE (OUTPATIENT)
Dept: OBGYN CLINIC | Facility: CLINIC | Age: 50
End: 2025-04-16

## 2025-04-16 NOTE — TELEPHONE ENCOUNTER
Pt called to ask about ultrasound results, read message from  to the pt. She is asking to speak with a nurse, as Had told she will prescribe some medication as she  trying to get pregnant

## 2025-04-16 NOTE — TELEPHONE ENCOUNTER
Spoke to patient.  Reviewed US result note and recent lab results.  Patient has already started on the levothyroxine.  No mention of any other medications in chart review.  Patient states that she discussed a medication to help her get pregnant.  Routed to Dr. Mari- please advise.  Patient is aware that Dr. Mari is not in the office today but we will get back to her once we have a response.  Patient verbalized understanding.

## 2025-04-17 NOTE — TELEPHONE ENCOUNTER
Galina Mari, DO to Me  [facility] Nurse      4/17/25 11:29 AM  This pt needs to be seen in office for follow up visit prior to prescriptions sent for fertility.  Thanks!

## 2025-04-17 NOTE — TELEPHONE ENCOUNTER
Patient notified that follow-up appointment is needed prior to prescription by detailed message left on voicemail.  Phone number provided for scheduling.

## 2025-05-28 ENCOUNTER — OFFICE VISIT (OUTPATIENT)
Dept: OBGYN CLINIC | Facility: CLINIC | Age: 50
End: 2025-05-28
Payer: MEDICAID

## 2025-05-28 VITALS
HEART RATE: 71 BPM | WEIGHT: 178 LBS | HEIGHT: 58 IN | SYSTOLIC BLOOD PRESSURE: 114 MMHG | DIASTOLIC BLOOD PRESSURE: 76 MMHG | BODY MASS INDEX: 37.36 KG/M2

## 2025-05-28 DIAGNOSIS — N97.9 FEMALE INFERTILITY: ICD-10-CM

## 2025-05-28 DIAGNOSIS — N76.0 VAGINITIS AND VULVOVAGINITIS: Primary | ICD-10-CM

## 2025-05-28 DIAGNOSIS — E03.9 HYPOTHYROIDISM, UNSPECIFIED TYPE: ICD-10-CM

## 2025-05-28 DIAGNOSIS — Z11.3 ROUTINE SCREENING FOR STI (SEXUALLY TRANSMITTED INFECTION): ICD-10-CM

## 2025-05-28 PROCEDURE — 87491 CHLMYD TRACH DNA AMP PROBE: CPT | Performed by: STUDENT IN AN ORGANIZED HEALTH CARE EDUCATION/TRAINING PROGRAM

## 2025-05-28 PROCEDURE — 81514 NFCT DS BV&VAGINITIS DNA ALG: CPT | Performed by: STUDENT IN AN ORGANIZED HEALTH CARE EDUCATION/TRAINING PROGRAM

## 2025-05-28 PROCEDURE — 99214 OFFICE O/P EST MOD 30 MIN: CPT | Performed by: STUDENT IN AN ORGANIZED HEALTH CARE EDUCATION/TRAINING PROGRAM

## 2025-05-28 PROCEDURE — 87591 N.GONORRHOEAE DNA AMP PROB: CPT | Performed by: STUDENT IN AN ORGANIZED HEALTH CARE EDUCATION/TRAINING PROGRAM

## 2025-05-28 NOTE — PATIENT INSTRUCTIONS
Understanding Fertility Problems: Obstacles to Pregnancy   The path to pregnancy is not always smooth. Age, hormones, and the health of your reproductive system can all become obstacles to pregnancy. Keep in mind that it’s common for both partners to have factors that lower fertility. And sometimes the cause is unknown. So try not to feel guilty or place blame if you are having trouble getting pregnant. Instead, share the challenge and support each other.   We understand gender is a spectrum. We may use gendered terms to talk about anatomy and health risk. Please use this sheet in a way that works best for you and your healthcare provider as you talk about your care.   Older age  Age is a major factor in female fertility. As a woman ages, the quantity and quality of her eggs decline. This becomes apparent by around age 35 and speeds up after age 40. A man makes sperm throughout his life. So age is not as much of a factor. But many problems can affect sperm no matter a man’s age.   Problems with sperm  Health or lifestyle factors can lower the number of sperm (sperm count) in a man’s ejaculate. Even if the sperm count is normal, a man may make sperm that don't work as they should. These may not be able to make the journey through the woman’s reproductive tract. Or they may not be able to fertilize an egg.   Problems with ovulation  Ovulation problems are a common cause of infertility. In some cases, an imbalance of hormones can prevent eggs from maturing. Hormone problems can also stop eggs from being released by the ovaries.   Problems with fertilization  A blockage in the male or female reproductive tract can prevent fertilization. Or sperm may be unable to swim through the cervical mucus. And even if sperm do reach an egg, they may not be able to penetrate the egg’s covering.   Problems with implantation  After fertilization, an embryo may not be able to implant in the uterus. This is often because of problems  with the lining of the uterus. It can also result from problems with the size or shape of the uterus.   [employer] last reviewed this educational content on 4/1/2024  This information is for informational purposes only. This is not intended to be a substitute for professional medical advice, diagnosis, or treatment. Always seek the advice and follow the directions from your physician or other qualified health care provider.  © 1307-9577 [employer]. All rights reserved. This information is not intended as a substitute for professional medical care. Always follow your healthcare professional's instructions.      Clomiphene  Brand Name(s): Clomid®, Milophene®, Serophene®; also available generically  WHY is this medicine prescribed?  Clomiphene is used to induce ovulation (egg production) in women who do not produce ova (eggs) but wish to become pregnant (infertility). Clomiphene is in a class of medications called ovulatory stimulants. It works similarly to estrogen, a female hormone that causes eggs to develop in the ovaries and be released.  HOW should this medicine be used?  Clomiphene comes as a tablet to take by mouth. It is usually taken once a day for 5 days, beginning on or about day 5 of the cycle. To help you remember to take clomiphene, take it around the same time every day. Follow the directions on your prescription label carefully, and ask your doctor or pharmacist to explain any part you do not understand. Take clomiphene exactly as directed. Do not take more or less of it or take it more often than prescribed by your doctor.  Are there OTHER USES for this medicine?  Clomiphene is also sometimes used to treat male infertility, menstrual abnormalities, fibrocystic breasts, and persistent breast milk production. Talk to your doctor about the possible risks of using this medication for your condition.  This medication may be prescribed for other uses; ask your doctor or pharmacist for more  information.  What SPECIAL PRECAUTIONS should I follow?  Before taking clomiphene,  tell your doctor and pharmacist if you are allergic to clomiphene or any other medications.  tell your doctor and pharmacist what prescription and nonprescription medications, vitamins, nutritional supplements, and herbal products you are taking.  tell your doctor if you have or have ever had liver disease, ovarian cysts (except those from polycystic ovary syndrome), uterine fibroids, abnormal vaginal bleeding, a pituitary tumor, or thyroid or adrenal disease.  tell your doctor if you are pregnant or breast-feeding. If you become pregnant while taking clomiphene, call your doctor immediately.  you should know that clomiphene may cause blurred vision. Do not drive a car or operate machinery, especially in poor lighting, until you know how this medication affects you.  you should know that clomiphene increases the chance of multiple pregnancy (twins or more). Talk to your doctor about the risks of multiple pregnancy.  What SPECIAL DIETARY instructions should I follow?  Unless your doctor tells you otherwise, continue your normal diet.  What should I do IF I FORGET to take a dose?  Take the missed dose as soon as you remember it. However, if it is almost time for the next dose, call your doctor for additional directions. Do not take a double dose to make up for a missed one.  What SIDE EFFECTS can this medicine cause?  Some side effects can be serious. The following symptoms are uncommon, but if you experience any of them, call your doctor immediately:   blurred vision  visual spots or flashes  double vision  stomach or lower stomach pain  stomach swelling  weight gain  shortness of breath  Long-term use of clomiphene may increase the risk of ovarian cancer. Clomiphene should not be used for more than about six cycles. Talk to your doctor about the risks of taking this medication.  Clomiphene may cause other side effects. Call your  doctor if you have any unusual problems while taking this medication.  If you experience a serious side effect, you or your doctor may send a report to the Food and Drug Administration's (FDA) MedWatch Adverse Event Reporting program online (https://www.fda.gov/Safety/MedWatch) or by phone (1-172.185.4620).  What should I know about STORAGE and DISPOSAL of this medication?  Keep this medication in the container it came in, tightly closed, and out of reach of children. Store it at room temperature and away from excess heat and moisture (not in the bathroom).  It is important to keep all medication out of sight and reach of children as many containers (such as weekly pill minders and those for eye drops, creams, patches, and inhalers) are not child-resistant and young children can open them easily. To protect young children from poisoning, always lock safety caps and immediately place the medication in a safe location - one that is up and away and out of their sight and reach. https://www.upandaway.org  Unneeded medications should be disposed of in special ways to ensure that pets, children, and other people cannot consume them. However, you should not flush this medication down the toilet. Instead, the best way to dispose of your medication is through a medicine take-back program. Talk to your pharmacist or contact your local garbage/recycling department to learn about take-back programs in your community. See the FDA's Safe Disposal of Medicines website (https://goo.gl/c4Rm4p) for more information if you do not have access to a take-back program.  What should I do in case of OVERDOSE?  In case of overdose, call the poison control helpline at 1-650.860.7436. Information is also available online at https://www.poisonhelp.org/help. If the victim has collapsed, had a seizure, has trouble breathing, or can't be awakened, immediately call emergency services at 321.  Symptoms of overdose may include:   upset  stomach  vomiting  hot flashes  blurred vision  visual spots or flashes  blind spots  stomach swelling  stomach or lower stomach pain  What OTHER INFORMATION should I know?  Keep all appointments with your doctor and laboratory. Your doctor may order certain lab tests to check your body's response to clomiphene.  Do not let anyone else take your medication. Ask your pharmacist any questions you have about refilling your prescription.  It is important for you to keep a written list of all of the prescription and nonprescription (over-the-counter) medicines you are taking, as well as any products such as vitamins, minerals, or other dietary supplements. You should bring this list with you each time you visit a doctor or if you are admitted to a hospital. It is also important information to carry with you in case of emergencies.  This report on medications is for your information only, and is not considered individual patient advice. Because of the changing nature of drug information, please consult your physician or pharmacist about specific clinical use.  The American Society of Health-System Pharmacists, Inc. represents that the information provided hereunder was formulated with a reasonable standard of care, and in conformity with professional standards in the field. The American Society of Health-System Pharmac[employer], Inc. makes no representations or warranties, express or implied, including, but not limited to, any implied warranty of merchantability and/or fitness for a particular purpose, with respect to such information and specifically disclaims all such warranties. Users are advised that decisions regarding drug therapy are complex medical decisions requiring the independent, informed decision of an appropriate health care professional, and the information is provided for informational purposes only. The entire monograph for a drug should be reviewed for a thorough understanding of the drug's actions, uses and  side effects. The American Society of Health-System Pharmacists, Inc. does not endorse or recommend the use of any drug. The information is not a substitute for medical care.  FS® Patient Medication Information™. © Copyright, 2024. The American Society of Health-System Pharmacists®, [address], [address], [place], [place]. All Rights Reserved. Duplication for commercial use must be authorized by [facility].  Selected Revisions: September 15, 2017.  This information is for informational purposes only. This is not intended to be a substitute for professional medical advice, diagnosis, or treatment. Always seek the advice and follow the directions from your physician or other qualified health care provider.  FS® Patient Medication Information™. © Copyright, 2025

## 2025-05-28 NOTE — PROGRESS NOTES
[facility]  Obstetrics and Gynecology  Follow Up Progress Note    Subjective:     Chani Leon is a 49 year old  female who was last seen in office 2025 for fertility consultation and presents today to follow up on lab work, imaging, and also with new symptoms of abnormal vaginal discharge. The patient reports she recently had Abdoulaye-en-Y procedure, doing well post-operatively. States for past few days has noticed yellow discharge with malodor. Would like STI testing. She also would like to discuss starting Clomid for fertility. Pt anxious to get pregnant as she is older and with new partner. They have been trying for 1 year without success. Recent hormone panel shows patient to NOT be in menopause, however AMH level is extremely low.    Pt has seen CARYL in past but did not have good experience. States she is unable to see other providers for CARYL due to lack on insurance coverage with Medicaid.     4 prior c-sections.      She does have a history of of renal colic, seizure disorders, does have a history of previous history of herpes, asthma.     Review of Systems:  General: no complaints per category. See HPI for additional information.   Breast: no complaints per category. See HPI for additional information.   Respiratory: no complaints per category. See HPI for additional information.   Cardiovascular: no complaints per category. See HPI for additional information.   GI: no complaints per category. See HPI for additional information.   : no complaints per category. See HPI for additional information.   Heme: no complaints per category. See HPI for additional information.     OB History    Para Term  AB Living   8 4 4 0 4 4   SAB IAB Ectopic Multiple Live Births   4 0 0 0 4      # Outcome Date GA Lbr Nick/2nd Weight Sex Type Anes PTL Lv   8 Term 16 40w0d  7 lb (3.175 kg) F Caesarean Gen  KRISSY   7 Term 13 39w0d  6 lb 14.6 oz (3.135 kg) M CS-LTranv Gen N KRISSY   6 Term  09/07/11 40w0d  7 lb (3.175 kg) M Caesarean Spinal  KRISSY   5 Term 03/02/07 40w0d  6 lb (2.722 kg) M Caesarean Spinal  KRISSY   4 SAB            3 SAB            2 SAB            1 SAB                  Gyne History:     Patient's last menstrual period was 04/24/2025 (exact date).      Meds:  Medications Ordered Prior to Encounter[1]    All:  Allergies[2]    PMH:  Past Medical History[3]    PSH:  Past Surgical History[4]      Objective:     Vitals:    05/28/25 1044   BP: 114/76   Pulse: 71   Weight: 178 lb (80.7 kg)   Height: 58\"         Body mass index is 37.2 kg/m².    General: AAO.NAD.   CVS exam: normal peripheral perfusion  Chest: non-labored breathing, no tachypnea   Breast: deferred  Abdominal exam: soft, nontender, nondistended  Pelvic exam:   VULVA: normal appearing vulva with no masses, tenderness or lesions  PERINEUM:  normal appearing, no lesions   URETHRAL MEATUS:  normal appearing, no lesions   VAGINA: normal appearing vagina with normal color, no lesions, vaginal discharge - clear, odorless, and thin, DNA probe for chlamydia and GC obtained  CERVIX: normal appearing cervix without discharge or lesions  UTERUS: uterus is normal size, shape, consistency and nontender  ADNEXA: normal adnexa in size, nontender and no masses  PERIRECTAL:  normal appearing, no lesions   Ext: non-tender, no edema    Labs:  3/17/2025: AMH 0.204 (LOW), FSH 4.7 (normal), estradiol 127 (normal), TSH 0.353 (LOW), Free T4 and T3 normal, prolactin 9.8 (normal), progesterone 5.54 (normal)    Imaging:  Pelvic US 4/9/2025  Narrative   PROCEDURE:  US PELVIS (TRANSABDOMINAL AND TRANSVAGINAL) (CPT=76856/98494)     COMPARISON:  US ENRIQUE, US PELVIS EV W DOPPLER (CPT=76856/00586/40983), 11/23/2024, 6:08 PM.  [facility], US, US PELVIS W EV (CPT=76856/06627), 1/31/2020, 12:59 PM.     INDICATIONS:  N97.9 Female infertility     TECHNIQUE:  Pelvic ultrasound using transabdominal and endovaginal technique.  Transvaginal ultrasound was used to better  evaluate adnexal and endometrial detail.     PATIENT STATED HISTORY: (As transcribed by Technologist)  Patient offered no additional history at this time.         FINDINGS:                TRANSABDOMINAL ULTRASOUND:  UTERUS: The uterus measures 13.1 x 6.5 x 0.3 cm. The endometrium appears unremarkable on transabdominal images.  Nabothian cysts.       No significant free pelvic fluid.     TRANSVAGINAL ULTRASOUND:  UTERUS:. Endometrial stripe measures:  11 mm in diameter.  The uterus has a heterogeneous appearance.     OVARIES:  RIGHT OVARY: Right ovary is obscured by bowel gas     LEFT OVARY: Left ovary is visualized and measures 3.5 x 2.7 x 2.9 cm.     VASCULARITY: Normal flow is documented with color Doppler imaging within the left ovary   Impression   CONCLUSION:    1. Mild endometrial stripe thickening measuring 11 mm.  Correlation menstrual phase is recommended.  2. The right ovary is not visualized secondary to obscuration due to bowel gas.  3. Heterogeneous appearance of the uterus which is nonspecific but could represent adenomyosis.        LOCATION:  [place]        Dictated by (CST): Shaniqua Mi MD on 4/10/2025 at 7:01 AM      Finalized by (CST): Shaniqua Mi MD on 4/10/2025 at 7:03 AM            Assessment:     Chani Leon is a 49 year old  female who presents for follow up on fertility workup as well as abnormal vaginal discharge        Plan:     Problem List Items Addressed This Visit    None  Visit Diagnoses         Vaginitis and vulvovaginitis    -  Primary    Relevant Orders    Vaginitis Vaginosis PCR Panel      Hypothyroidism, unspecified type        Relevant Orders    TSH W Reflex To Free T4      Female infertility                Abnornal vaginal discharge  - pt with 3-4 day h/o yellowish discharge and malodor  - SSE notable for thin, clear, watery discharge c/w suspected BV infection  - vaginitis panel collected and ordered  - GC/CT swab collected and ordered per pt  request    Female Infertility  - Pt and male partner (42yo) having been trying to conceive for 1 year without success  - Infertility panel reviewed, overall normal however with extremely low AMH levels  - Repeat Pelvic US reviewed and wnl  - d/w pt expectations for fertility in setting of low AMH and AMA  - pt adament about attempting to conceive with Clomid  - d/w pt importance of normalizing TSH levels to carry healthy pregnancy  - pt was prescribed synthroid but has not been taking consistently  - repeat TSH level ordered with recommendation to take Synthroid consistently for next 2 weeks and then pt to complete blood work  - advised follow up with Dr. Ellington to complete discussion on fertility and possible consideration of Clomid initiation      All of the findings and plan were discussed with the patient.  She notes understanding and agrees with the plan of care.  All questions were answered to the best of my ability at this time.      Total patient time was 30 minutes in evaluation, consultation, and coordination of care. This included face to face and non-face to face actions. The patient's questions and concerns were addressed.       RTC in 2 months or sooner if needed     Galina Mari DO   [facility] - OBGYN       Discussed with patient that there will not be further notification of normal or benign results other than receiving results on [employer]. A [employer] message or telephone call will be placed by the physician and/or office staff if results are abnormal.     Note to patient and family   The 21st Century Cures Act makes medical notes available to patients in the interest of transparency.  However, please be advised that this is a medical document.  It is intended as eyft-pd-bzbx communication.  It is written and medical language may contain abbreviations or verbiage that are technical and unfamiliar.  It may appear blunt or direct.  Medical documents are intended to carry relevant information, facts as  evident, and the clinical opinion of the practitioner.        This note could include assistance by Dragon voice recognition. Errors in content may be related to improper recognition by the system; efforts to review and correct have been done but errors may still exist.        [1]   Current Outpatient Medications on File Prior to Visit   Medication Sig Dispense Refill    levothyroxine 25 MCG Oral Tab Take 1 tablet (25 mcg total) by mouth before breakfast. 30 tablet 5    fluconazole (DIFLUCAN) 150 MG Oral Tab Take 1 tablet by mouth now and repeat dose in 72 hours. (Patient not taking: Reported on 3/17/2025) 2 tablet 0    sulfamethoxazole-trimethoprim -160 MG Oral Tab per tablet Take 1 tablet by mouth 2 (two) times daily. (Patient not taking: Reported on 3/17/2025)      ibuprofen 600 MG Oral Tab Take 1 tablet (600 mg total) by mouth every 8 (eight) hours as needed. (Patient not taking: Reported on 3/17/2025) 30 tablet 0     No current facility-administered medications on file prior to visit.   [2]   Allergies  Allergen Reactions    Morphine Sulfate SWELLING    Radiology Contrast Iodinated Dyes SWELLING     Swelling--hands and feet   [3]   Past Medical History:   Asthma ([facility])    COVID-19    loss of taste and smell. No hospitalization required    Endometrial cells on cervical Pap smear inconsistent w/LMP    Extrinsic asthma, unspecified    last attack a couple of months ago    Fatty liver    Genital herpes simplex    Gonorrhea    H. pylori infection    H. pylori infection    PREVIOUS HISTORY      Herpes    last outbreak years ago    History of seizure    Left renal stone    Migraines    Obstruction of left ureteropelvic junction (UPJ) due to stone    Osteoarthritis    mild in back    Pneumonia, organism unspecified(486)    PONV (postoperative nausea and vomiting)    mild     labor (HCC)    pt. had 20 week loss    Renal colic    Seizure disorder (HCC)    patient states \"seizure was from [place]\"     Seizure  disorder (HCC)    Seizure-like activity (HCC)    Sleep apnea   [4]   Past Surgical History:  Procedure Laterality Date          , , , 2016    Cholecystectomy      Colonoscopy  2014    Procedure: COLONOSCOPY;  Surgeon: Nahid Calderon MD;  Location:  ENDOSCOPY    Colonoscopy N/A 2019    Procedure: COLONOSCOPY;  Surgeon: Segundo Harman MD;  Location:  ENDOSCOPY    Hernia surgery      Hysteroscopy,biopsy  2021    Hysteroscopy, D&C, endometrial polypectomy    Laparoscopic cholecystectomy  2 weeks ago    Lasik Bilateral 2002    done in [place]    Kaushal localization wire 1 site left (cpt=19281)      Benign    Kaushal localization wire 1 site right (cpt=19281)      Benign    Other      baker cyst     Other      bunion bilateral - right foot metal    Reduction left  1999    Reduction right      Removal gallbladder      Ventral hernia repair  2013    Procedure: HERNIA VENTRAL REPAIR;  Surgeon: Jg Chambers MD;  Location:  MAIN OR

## 2025-05-29 LAB
BV BACTERIA DNA VAG QL NAA+PROBE: NEGATIVE
C GLABRATA DNA VAG QL NAA+PROBE: NEGATIVE
C KRUSEI DNA VAG QL NAA+PROBE: NEGATIVE
C TRACH DNA SPEC QL NAA+PROBE: NEGATIVE
CANDIDA DNA VAG QL NAA+PROBE: NEGATIVE
N GONORRHOEA DNA SPEC QL NAA+PROBE: NEGATIVE
T VAGINALIS DNA VAG QL NAA+PROBE: NEGATIVE

## 2025-06-02 ENCOUNTER — TELEPHONE (OUTPATIENT)
Dept: OBGYN CLINIC | Facility: CLINIC | Age: 50
End: 2025-06-02

## 2025-06-02 NOTE — TELEPHONE ENCOUNTER
Patient states that she was in the office 5/28 and was to be prescribed a medication for a UTI.  Nothing was sent to pharmacy

## 2025-06-08 ENCOUNTER — HOSPITAL ENCOUNTER (OUTPATIENT)
Age: 50
Discharge: HOME | End: 2025-06-08
Payer: MEDICAID

## 2025-06-08 VITALS
HEART RATE: 76 BPM | RESPIRATION RATE: 16 BRPM | DIASTOLIC BLOOD PRESSURE: 75 MMHG | OXYGEN SATURATION: 97 % | WEIGHT: 175 LBS | TEMPERATURE: 99 F | HEIGHT: 58 IN | SYSTOLIC BLOOD PRESSURE: 108 MMHG | BODY MASS INDEX: 36.73 KG/M2

## 2025-06-08 DIAGNOSIS — N89.8 VAGINA ITCHING: Primary | ICD-10-CM

## 2025-06-08 LAB
BILIRUB UR QL STRIP: NEGATIVE
CLARITY UR: CLEAR
COLOR UR: YELLOW
GLUCOSE UR STRIP-MCNC: NEGATIVE MG/DL
HGB UR QL STRIP: NEGATIVE
NITRITE UR QL STRIP: NEGATIVE
PH UR STRIP: 5.5 [PH]
PROT UR STRIP-MCNC: 30 MG/DL
SP GR UR STRIP: >=1.03
UROBILINOGEN UR STRIP-ACNC: <2 MG/DL

## 2025-06-08 PROCEDURE — 99214 OFFICE O/P EST MOD 30 MIN: CPT | Performed by: NURSE PRACTITIONER

## 2025-06-08 PROCEDURE — 81002 URINALYSIS NONAUTO W/O SCOPE: CPT | Performed by: NURSE PRACTITIONER

## 2025-06-08 NOTE — ED PROVIDER NOTES
Patient Seen in: [facility]        History  Chief Complaint   Patient presents with    Urinary Symptoms    Eval-G     Stated Complaint: Female Eval-Itching    Subjective:   49-year-old female with vaginal itching for 2 days.  Denies any other symptoms.  Denies STD concerns                      Objective:     Past Medical History:    Asthma (HCC)    COVID-19    loss of taste and smell. No hospitalization required    Endometrial cells on cervical Pap smear inconsistent w/LMP    Extrinsic asthma, unspecified    last attack a couple of months ago    Fatty liver    Genital herpes simplex    Gonorrhea    H. pylori infection    H. pylori infection    PREVIOUS HISTORY      Herpes    last outbreak years ago    History of seizure    Left renal stone    Migraines    Obstruction of left ureteropelvic junction (UPJ) due to stone    Osteoarthritis    mild in back    Pneumonia, organism unspecified(486)    PONV (postoperative nausea and vomiting)    mild     labor (HCC)    pt. had 20 week loss    Renal colic    Seizure disorder (HCC)    patient states \"seizure was from [place]\"     Seizure disorder (formerly [facility])    Seizure-like activity (formerly [facility])    Sleep apnea              Past Surgical History:   Procedure Laterality Date          , , ,     Cholecystectomy      Colonoscopy  2014    Procedure: COLONOSCOPY;  Surgeon: Nahid Calderon MD;  Location:  ENDOSCOPY    Colonoscopy N/A 2019    Procedure: COLONOSCOPY;  Surgeon: Segundo Harman MD;  Location:  ENDOSCOPY    Hernia surgery      Hysteroscopy,biopsy  2021    Hysteroscopy, D&C, endometrial polypectomy    Laparoscopic cholecystectomy  2 weeks ago    Lasik Bilateral     done in [place]    Kaushal localization wire 1 site left (cpt=19281)      Benign    Kaushal localization wire 1 site right (cpt=19281)      Benign    Other      baker cyst     Other      bunion bilateral - right foot metal    Reduction left       Reduction right  1999    Removal gallbladder      Ventral hernia repair  11/20/2013    Procedure: HERNIA VENTRAL REPAIR;  Surgeon: Jg Chambers MD;  Location:  MAIN OR                No pertinent social history.            Review of Systems   Genitourinary:         Vaginal itching   All other systems reviewed and are negative.      Positive for stated complaint: Female Eval-Itching  Other systems are as noted in HPI.  Constitutional and vital signs reviewed.      All other systems reviewed and negative except as noted above.                  Physical Exam    ED Triage Vitals [06/08/25 1106]   /75   Pulse 76   Resp 16   Temp 98.5 °F (36.9 °C)   Temp src Oral   SpO2 97 %   O2 Device None (Room air)       Current Vitals:   Vital Signs  BP: 108/75  Pulse: 76  Resp: 16  Temp: 98.5 °F (36.9 °C)  Temp src: Oral    Oxygen Therapy  SpO2: 97 %  O2 Device: None (Room air)            Physical Exam  Vitals and nursing note reviewed.   Constitutional:       General: She is not in acute distress.     Appearance: Normal appearance. She is not ill-appearing, toxic-appearing or diaphoretic.   Cardiovascular:      Rate and Rhythm: Normal rate and regular rhythm.      Pulses: Normal pulses.      Heart sounds: Normal heart sounds.   Pulmonary:      Effort: Pulmonary effort is normal. No respiratory distress.   Abdominal:      General: Abdomen is flat.      Palpations: Abdomen is soft.      Tenderness: There is no abdominal tenderness.   Skin:     General: Skin is warm.      Coloration: Skin is not jaundiced or pale.   Neurological:      Mental Status: She is alert and oriented to person, place, and time.   Psychiatric:         Behavior: Behavior normal.                 ED Course  Labs Reviewed   [facility] POCT URINALYSIS DIPSTICK - Abnormal; Notable for the following components:       Result Value    Protein urine 30 (*)     Ketone, Urine Trace (*)     Leukocyte esterase urine Small (*)     All other components within normal limits    VAGINITIS VAGINOSIS PCR PANEL   URINE CULTURE, ROUTINE                            MDM             Medical Decision Making  Differential diagnosis initially included but was not limited to: UTI, vaginitis, vaginosis    Nontoxic adult female patient with vaginal itching.  Benign abdominal exam, unlikely to be an acute abdomen.  Will obtain another vaginitis/vaginosis swab, as well as a urine culture.    She will start OTC Monistat, she is aware we will call with abnormal results.  She will follow-up with her gynecologist        Amount and/or Complexity of Data Reviewed  Labs: ordered. Decision-making details documented in ED Course.        Disposition and Plan     Clinical Impression:  1. Vagina itching         Disposition:  Discharge  6/8/2025 11:37 am    Follow-up:  No follow-up provider specified.        Medications Prescribed:  Current Discharge Medication List                Supplementary Documentation:

## 2025-06-08 NOTE — ED INITIAL ASSESSMENT (HPI)
Pt sts went to gyne on 5/28 for vaginal itching and discharge. Negative GC/Chlam. Itching and discharge have not improved. C/o frequent urination without dysuria.

## 2025-06-08 NOTE — DISCHARGE INSTRUCTIONS
Continue taking the over the counter Monistat.  We will call you with any abnormal results.  Please call your gynecologist to arrange a follow-up appointment.

## 2025-06-09 ENCOUNTER — LAB ENCOUNTER (OUTPATIENT)
Dept: LAB | Age: 50
End: 2025-06-09
Attending: FAMILY MEDICINE
Payer: MEDICAID

## 2025-06-09 DIAGNOSIS — E03.9 HYPOTHYROIDISM, UNSPECIFIED TYPE: ICD-10-CM

## 2025-06-09 LAB
BV BACTERIA DNA VAG QL NAA+PROBE: NEGATIVE
C GLABRATA DNA VAG QL NAA+PROBE: NEGATIVE
C KRUSEI DNA VAG QL NAA+PROBE: NEGATIVE
CANDIDA DNA VAG QL NAA+PROBE: POSITIVE
T VAGINALIS DNA VAG QL NAA+PROBE: NEGATIVE
TSI SER-ACNC: 0.86 UIU/ML (ref 0.55–4.78)

## 2025-06-09 PROCEDURE — 84443 ASSAY THYROID STIM HORMONE: CPT

## 2025-06-09 PROCEDURE — 36415 COLL VENOUS BLD VENIPUNCTURE: CPT

## 2025-06-09 RX ORDER — FLUCONAZOLE 150 MG/1
TABLET ORAL
Qty: 2 TABLET | Refills: 0 | Status: SHIPPED | OUTPATIENT
Start: 2025-06-09

## 2025-06-11 ENCOUNTER — TELEPHONE (OUTPATIENT)
Dept: OBGYN CLINIC | Facility: CLINIC | Age: 50
End: 2025-06-11

## 2025-06-11 NOTE — TELEPHONE ENCOUNTER
Patient notified of normal TSH results. Patient asking if she will be prescribed medication for infertility since her TSH is normal?   Patient also asking if she should continue current dose 25mcg Levothyroxine?

## 2025-06-13 NOTE — TELEPHONE ENCOUNTER
Galina Mari, DO to Me  [facility] Nurse        6/12/25  7:18 PM  Will call tomorrow to discuss with her. Thanks!    Above update sent to patient in Mychart.

## 2025-06-16 ENCOUNTER — TELEPHONE (OUTPATIENT)
Dept: OBGYN CLINIC | Facility: CLINIC | Age: 50
End: 2025-06-16

## 2025-06-16 ENCOUNTER — PATIENT MESSAGE (OUTPATIENT)
Dept: OBGYN CLINIC | Facility: CLINIC | Age: 50
End: 2025-06-16

## 2025-06-16 NOTE — TELEPHONE ENCOUNTER
Called and spoke with patient regarding Clomid therapy.  Name and  verified prior to conversation.    D/w patient recent labs, imaging, and EMB results in work up for fertility management. Although patient's lab work does support a pre-menopausal picture, her AMH level is extremely low. That, in addition to her advanced maternal age, make her a poor candidate for Clomid therapy. Pt has already seen [facility] (Dr. Ronak Cardoza) and told she has 3 eggs left, but also not ideal candidate for successful pregnancy.    I d/w pt that based on her current health and obstetrical history, a pregnancy would carry serious risks for her and the baby. Her likelihood of success with Clomid at her age is extremely low and is outweighed by the risk of complications from the medication.    I d/w pt risks and side effects of Clomid including but not limited to: hot flushes, ovarian hyperstimulation, risk of multiple gestation, symptoms from a hyperestrogenic state such as abdominal pain and discomfort, nausea/vomiting, and breast tenderness.    I offered names/contact information of other providers in [facility] for patient to obtain second opinion from and she is interested in speaking with them.    All questions answered and patient voiced good understanding.    DO GRACIELA Cardenas - OBGYN  2025 3:23 PM

## 2025-06-27 ENCOUNTER — TELEPHONE (OUTPATIENT)
Dept: OBGYN CLINIC | Facility: CLINIC | Age: 50
End: 2025-06-27

## 2025-06-27 NOTE — TELEPHONE ENCOUNTER
Pt completed ONIEL in [facility] office today for medical records to be faxed to [facility] fax #[phone]. 3 pgs total including covered sent at 2:34 PM. Copy in [facility] office

## 2025-07-02 NOTE — LETTER
Progress Note    HISTORY     CC:  RUQ pain          We are following for ESRD        Subjective/   HPI:  Doing a bit better.  Pain there but improved.  Tolerating liquid diet.  Has a sore on this back.    ROS:  Constitutional:  no more fevers, + weakness  Cardiovascular:  No palpations, + edema  Respiratory:  No wheezing, no cough  Skin:  No rash, no itching  :  No hematuria, not making much urine     Social Hx:  No Family at the bedside     Past Medical and Surgical History:  - Reviewed, no changes     EXAM       Objective/     Vitals:    06/29/25 0051 06/29/25 0057 06/29/25 0421 06/29/25 0830   BP:  112/66 126/77 118/71   Pulse:  95 90 90   Resp: 18 18 18 18   Temp:  98.1 °F (36.7 °C) 98.4 °F (36.9 °C) 97.9 °F (36.6 °C)   TempSrc:  Oral Oral Oral   SpO2:  95% 96% 96%   Weight:       Height:         24HR INTAKE/OUTPUT:  No intake or output data in the 24 hours ending 06/29/25 1601    Constitutional:  NAD  Eyes:  Pupils reactive, sclera clear   Neck:  Normal thyroid, no masses   Cardiovascular:  Regular, no rub  Respiratory:  No distress, no wheezing  Psychiatry:  Appropriate mood/affect, alert  Abdomen: +bs, soft, nt, no masses   Musculoskeletal: trace LE edema, no clubbing   Lymphatics:  No LAD in neck, no supraclavicular nodes       MEDICAL DECISION MAKING       Data/  Recent Labs     06/27/25  1040 06/28/25  0548 06/29/25  0607   WBC 9.7 8.6 11.8*   HGB 11.2* 10.1* 10.2*   HCT 33.6* 30.3* 31.3*   MCV 82.6 82.4 83.4   PLT 94* 74* 82*     Recent Labs     06/27/25  1040 06/28/25  0548 06/29/25  0607   * 131* 134*   K 4.8 4.0 4.1   CL 91* 93* 95*   CO2 20* 25 27   GLUCOSE 263* 155* 161*   PHOS  --  2.1* 1.7*   MG  --  1.89  --    BUN 74* 48* 37*   CREATININE 4.6* 3.7* 3.2*   LABGLOM 14* 18* 22*       Assessment/     ESKD on HD:                - JORGE on CKD in February when he was initiated on HD, now ESKD. Suspected due to DM, HTN, PVD, and recurrent AKIs                - HD [facility] at [facility]             Progress Note    HISTORY     CC:  RUQ pain          We are following for ESRD        Subjective/   HPI:  HD again today.  He started having abdominal pain towards the end.  Feeling better now.  Tolerated fluid removal with no cramping.      ROS:  Constitutional:  + fevers, + weakness  Cardiovascular:  No palpations, + edema  Respiratory:  No wheezing, no cough  Skin:  No rash, no itching  :  No hematuria, not making much urine     Social Hx:  No Family at the bedside     Past Medical and Surgical History:  - Reviewed, no changes     EXAM       Objective/     Vitals:    06/28/25 0821 06/28/25 0915 06/28/25 1220 06/28/25 1327   BP: 114/70 120/70 125/60 123/72   Pulse: 82 82 97 100   Resp: 18 18 18 19   Temp: 98.6 °F (37 °C) 97.8 °F (36.6 °C) 97.8 °F (36.6 °C) 98.1 °F (36.7 °C)   TempSrc: Oral   Oral   SpO2: 94%   92%   Weight:  79.1 kg (174 lb 6.1 oz) 76.6 kg (168 lb 14 oz)    Height:         24HR INTAKE/OUTPUT:    Intake/Output Summary (Last 24 hours) at 6/28/2025 1617  Last data filed at 6/27/2025 1918  Gross per 24 hour   Intake 500 ml   Output 3000 ml   Net -2500 ml     Constitutional:  NAD  Eyes:  Pupils reactive, sclera clear   Neck:  Normal thyroid, no masses   Cardiovascular:  Regular, no rub  Respiratory:  No distress, no wheezing  Psychiatry:  Appropriate mood/affect, alert  Abdomen: +bs, soft, nt, no masses   Musculoskeletal: trace LE edema, no clubbing   Lymphatics:  No LAD in neck, no supraclavicular nodes       MEDICAL DECISION MAKING       Data/  Recent Labs     06/27/25  1040 06/28/25  0548   WBC 9.7 8.6   HGB 11.2* 10.1*   HCT 33.6* 30.3*   MCV 82.6 82.4   PLT 94* 74*     Recent Labs     06/27/25  1040 06/28/25  0548   * 131*   K 4.8 4.0   CL 91* 93*   CO2 20* 25   GLUCOSE 263* 155*   PHOS  --  2.1*   MG  --  1.89   BUN 74* 48*   CREATININE 4.6* 3.7*   LABGLOM 14* 18*       Assessment/     ESKD on HD:                - JORGE on CKD in February when he was initiated on HD, now ESKD.          Nephrology Progress Note   [employer]      Sub/interval history  Feels better  Awaiting cards consult    ROS: No chest pain/shortness of breath/fever/nausea/vomiting  PSFH: No visitor    Scheduled Meds:   piperacillin-tazobactam  3,375 mg IntraVENous Q12H    atorvastatin  40 mg Oral Nightly    [Held by provider] ferrous sulfate  325 mg Oral Daily with breakfast    hydrALAZINE  10 mg Oral BID    insulin glargine  6 Units SubCUTAneous Nightly    sodium chloride flush  5-40 mL IntraVENous 2 times per day    isosorbide mononitrate  30 mg Oral Daily    sevelamer  800 mg Oral TID    torsemide  100 mg Oral Daily    insulin lispro  0-4 Units SubCUTAneous 4x Daily AC & HS    aspirin  81 mg Oral Daily     Continuous Infusions:   dextrose      sodium chloride       PRN Meds:.heparin (porcine), glucose, dextrose bolus **OR** dextrose bolus, glucagon (rDNA), dextrose, sodium chloride flush, sodium chloride, polyethylene glycol, acetaminophen **OR** acetaminophen, methocarbamol, prochlorperazine, oxyCODONE, albumin human 25%, midodrine    Objective/     Vitals:    06/30/25 0151 06/30/25 0403 06/30/25 0632 06/30/25 0745   BP:  117/73  135/80   Pulse:  84  72   Resp: 17 18  16   Temp:  98.1 °F (36.7 °C)  97.8 °F (36.6 °C)   TempSrc:  Oral  Oral   SpO2:  92%  94%   Weight:   77.3 kg (170 lb 6.7 oz)    Height:         24HR INTAKE/OUTPUT:    Intake/Output Summary (Last 24 hours) at 6/30/2025 1124  Last data filed at 6/30/2025 0632  Gross per 24 hour   Intake 200 ml   Output --   Net 200 ml     Gen: alert, awake  Neck: No JVD  Skin: Unremarkable  Cardiovascular:  S1, S2 without m/r/g   Respiratory: CTA B without w/r/r; respiratory effort normal  Abdomen:  soft, nt, nd,   Extremities: 1+ lower extremity edema; left BKA  Neuro/Psy: AAoriented times 3 ; moves all 4 ext    Data/  Recent Labs     06/28/25  0548 06/29/25  0607 06/30/25  0544   WBC 8.6 11.8* 12.3*   HGB 10.1* 10.2* 9.7*   HCT 30.3* 31.3* 29.4*   MCV 82.4 83.4 82.2          Nephrology Progress Note   [employer]      Sub/interval history  Overall doing well  MRI MRCP planned     ROS: No chest pain/shortness of breath/fever/nausea/vomiting  PSFH: No visitor    Scheduled Meds:   epoetin alicia-epbx  2,000 Units IntraVENous Once per day on Tuesday Thursday Saturday    And    epoetin alicia-epbx  3,000 Units IntraVENous Once per day on Tuesday Thursday Saturday    piperacillin-tazobactam  3,375 mg IntraVENous Q12H    atorvastatin  40 mg Oral Nightly    [Held by provider] ferrous sulfate  325 mg Oral Daily with breakfast    hydrALAZINE  10 mg Oral BID    insulin glargine  6 Units SubCUTAneous Nightly    sodium chloride flush  5-40 mL IntraVENous 2 times per day    isosorbide mononitrate  30 mg Oral Daily    [Held by provider] sevelamer  800 mg Oral TID    torsemide  100 mg Oral Daily    insulin lispro  0-4 Units SubCUTAneous 4x Daily AC & HS    aspirin  81 mg Oral Daily     Continuous Infusions:   dextrose      sodium chloride       PRN Meds:.heparin (porcine), glucose, dextrose bolus **OR** dextrose bolus, glucagon (rDNA), dextrose, sodium chloride flush, sodium chloride, polyethylene glycol, acetaminophen **OR** acetaminophen, methocarbamol, prochlorperazine, oxyCODONE, albumin human 25%, midodrine    Objective/     Vitals:    07/01/25 0334 07/01/25 0529 07/01/25 1000 07/01/25 1125   BP: 125/82  124/79 131/89   Pulse: 85  60 88   Resp: 18 16 16 16   Temp: 98.1 °F (36.7 °C)  98.4 °F (36.9 °C) 98.1 °F (36.7 °C)   TempSrc: Oral  Oral Oral   SpO2: 93%  95% 93%   Weight:       Height:         24HR INTAKE/OUTPUT:  No intake or output data in the 24 hours ending 07/01/25 1136    Gen: alert, awake  Neck: No JVD  Skin: Unremarkable  Cardiovascular:  S1, S2 without m/r/g   Respiratory: CTA B without w/r/r; respiratory effort normal  Abdomen:  soft, nt, nd,   Extremities: 1+ lower extremity edema; left BKA  Neuro/Psy: AAoriented times 3 ; moves all 4 ext    Data/  Recent Labs     06/29/25  0607          Nephrology Progress Note   [employer]      Sub/interval history  Feels better  HD on 7/1 w 1.7l net UF, post wt 76 kg     ROS: No chest pain/shortness of breath/fever/nausea/vomiting  PSFH: No visitor    Scheduled Meds:   epoetin alicia-epbx  2,000 Units IntraVENous Once per day on Tuesday Thursday Saturday    And    epoetin alicia-epbx  3,000 Units IntraVENous Once per day on Tuesday Thursday Saturday    piperacillin-tazobactam  3,375 mg IntraVENous Q12H    atorvastatin  40 mg Oral Nightly    [Held by provider] ferrous sulfate  325 mg Oral Daily with breakfast    hydrALAZINE  10 mg Oral BID    insulin glargine  6 Units SubCUTAneous Nightly    sodium chloride flush  5-40 mL IntraVENous 2 times per day    isosorbide mononitrate  30 mg Oral Daily    [Held by provider] sevelamer  800 mg Oral TID    torsemide  100 mg Oral Daily    insulin lispro  0-4 Units SubCUTAneous 4x Daily AC & HS    aspirin  81 mg Oral Daily     Continuous Infusions:   dextrose      sodium chloride       PRN Meds:.heparin (porcine), glucose, dextrose bolus **OR** dextrose bolus, glucagon (rDNA), dextrose, sodium chloride flush, sodium chloride, polyethylene glycol, acetaminophen **OR** acetaminophen, methocarbamol, prochlorperazine, oxyCODONE, albumin human 25%, midodrine    Objective/     Vitals:    07/02/25 0020 07/02/25 0504 07/02/25 0759 07/02/25 1122   BP: 134/77 135/82 (!) 143/85 132/70   Pulse: 90 89 91 94   Resp: 16 18 18 18   Temp: 99.9 °F (37.7 °C) 97.5 °F (36.4 °C) 98.4 °F (36.9 °C) 98.8 °F (37.1 °C)   TempSrc: Oral Oral Oral Oral   SpO2: 91% 93% 94% 94%   Weight:  79.6 kg (175 lb 7.8 oz)     Height:         24HR INTAKE/OUTPUT:    Intake/Output Summary (Last 24 hours) at 7/2/2025 1138  Last data filed at 7/1/2025 1653  Gross per 24 hour   Intake 500 ml   Output 2200 ml   Net -1700 ml       Gen: alert, awake  Neck: No JVD  Skin: Unremarkable  Cardiovascular:  S1, S2 without m/r/g   Respiratory: CTA B without w/r/r; respiratory      [facility] SURGERY    PATIENT NAME: Christian Connors     TODAY'S DATE: 6/30/2025    CHIEF COMPLAINT: none    INTERVAL HISTORY/HPI:    Pt denies abd pain, has an appetite     OBJECTIVE:  VITALS:  /72   Pulse 87   Temp 98.1 °F (36.7 °C) (Oral)   Resp 16   Ht 1.803 m (5' 11\")   Wt 77.3 kg (170 lb 6.7 oz)   SpO2 90%   BMI 23.77 kg/m²     INTAKE/OUTPUT:    I/O last 3 completed shifts:  In: 200 [P.O.:200]  Out: -   No intake/output data recorded.    CONSTITUTIONAL:  awake and alert  LUNGS:  no crackles or wheezes  ABDOMEN:  soft, non-distended, non-tender     Data:  CBC:   Recent Labs     06/28/25  0548 06/29/25  0607 06/30/25  0544   WBC 8.6 11.8* 12.3*   HGB 10.1* 10.2* 9.7*   HCT 30.3* 31.3* 29.4*   PLT 74* 82* 95*     BMP:    Recent Labs     06/28/25  0548 06/29/25  0607 06/30/25  0544   * 134* 130*   K 4.0 4.1 4.0   CL 93* 95* 94*   CO2 25 27 25   BUN 48* 37* 41*   CREATININE 3.7* 3.2* 3.8*   GLUCOSE 155* 161* 132*     Hepatic:   Recent Labs     06/28/25  0548 06/29/25  0607 06/30/25  0544   AST 27 28 68*   ALT 15 19 41*   BILITOT 1.2* 1.5* 1.3*   ALKPHOS 121 147* 218*     Mag:      Recent Labs     06/28/25  0548   MG 1.89      Phos:     Recent Labs     06/28/25  0548 06/29/25  0607 06/30/25  0544   PHOS 2.1* 1.7* 2.2*      INR: No results for input(s): \"INR\" in the last 72 hours.    Radiology Review:         ASSESSMENT AND PLAN:  Cholelithiasis/cholecystitis, improving    Continue with low fat diet.    awaiting Cardiology assessment of his current status and whether he would be a candidate for elective cholecystectomy      Electronically signed by Katrin Erik, APRN - CNP       SURGERY STAFF      I have personally performed a face to face diagnostic evaluation on this patient and agree with the progress note and care plan of Katrin Valencia NP.   More than half of the time spent on this encounter was completed by me including the history, physical examination and the entire medical      [facility]    PATIENT NAME: Christian Connors     TODAY'S DATE: 7/1/2025    CHIEF COMPLAINT: none    INTERVAL HISTORY/HPI:    Pt eating well, without abd pain     OBJECTIVE:  VITALS:  /89   Pulse 88   Temp 98.1 °F (36.7 °C) (Oral)   Resp 16   Ht 1.803 m (5' 11\")   Wt 77.3 kg (170 lb 6.7 oz)   SpO2 93%   BMI 23.77 kg/m²     INTAKE/OUTPUT:    I/O last 3 completed shifts:  In: 200 [P.O.:200]  Out: -   No intake/output data recorded.    CONSTITUTIONAL:  awake and alert  LUNGS:  no crackles or wheezes  ABDOMEN:  soft, non-distended, non-tender     Data:  CBC:   Recent Labs     06/29/25  0607 06/30/25  0544 07/01/25  0542   WBC 11.8* 12.3* 12.5*   HGB 10.2* 9.7* 10.7*   HCT 31.3* 29.4* 32.3*   PLT 82* 95* 127*     BMP:    Recent Labs     06/29/25  0607 06/30/25  0544 07/01/25  0542   * 130* 129*   K 4.1 4.0 4.5  4.5   CL 95* 94* 92*   CO2 27 25 24   BUN 37* 41* 54*   CREATININE 3.2* 3.8* 4.6*   GLUCOSE 161* 132* 124*     Hepatic:   Recent Labs     06/29/25  0607 06/30/25  0544 07/01/25  0542   AST 28 68* 42*   ALT 19 41* 39   BILITOT 1.5* 1.3* 1.5*   ALKPHOS 147* 218* 294*     Mag:      No results for input(s): \"MG\" in the last 72 hours.     Phos:     Recent Labs     06/29/25  0607 06/30/25  0544 07/01/25  0542   PHOS 1.7* 2.2* 2.5      INR: No results for input(s): \"INR\" in the last 72 hours.    Radiology Review:         ASSESSMENT AND PLAN:  Cholelithiasis/cholecystitis, improving    Tolerating low fat diet.    Plan to f/u in our office to discuss pro/con of surgery      Electronically signed by OJSY Watkins RADHA          [facility] SURGERY    PATIENT NAME: Christian Connors     TODAY'S DATE: 6/29/2025    CHIEF COMPLAINT: I feel better    INTERVAL HISTORY/HPI:    Pt notes improvement in pain, getting hungrier.     OBJECTIVE:  VITALS:  /71   Pulse 90   Temp 97.9 °F (36.6 °C) (Oral)   Resp 18   Ht 1.803 m (5' 11\")   Wt 76.6 kg (168 lb 14 oz)   SpO2 96%   BMI 23.55 kg/m²     INTAKE/OUTPUT:    I/O last 3 completed shifts:  In: 500   Out: 3000   No intake/output data recorded.    CONSTITUTIONAL:  awake and alert  LUNGS:    ABDOMEN:  soft, non-distended, non-tender     Data:  CBC:   Recent Labs     06/27/25  1040 06/28/25  0548 06/29/25  0607   WBC 9.7 8.6 11.8*   HGB 11.2* 10.1* 10.2*   HCT 33.6* 30.3* 31.3*   PLT 94* 74* 82*     BMP:    Recent Labs     06/27/25  1040 06/28/25  0548 06/29/25  0607   * 131* 134*   K 4.8 4.0 4.1   CL 91* 93* 95*   CO2 20* 25 27   BUN 74* 48* 37*   CREATININE 4.6* 3.7* 3.2*   GLUCOSE 263* 155* 161*     Hepatic:   Recent Labs     06/27/25  1040 06/28/25  0548 06/29/25  0607   AST 28 27 28   ALT 18 15 19   BILITOT 1.3* 1.2* 1.5*   ALKPHOS 158* 121 147*     Mag:      Recent Labs     06/28/25  0548   MG 1.89      Phos:     Recent Labs     06/28/25  0548 06/29/25  0607   PHOS 2.1* 1.7*      INR: No results for input(s): \"INR\" in the last 72 hours.    Radiology Review:         ASSESSMENT AND PLAN:  Cholelithiasis/cholecystitis, improving   - ok for full liquid diet, advance to low fat as tolerated   - awaiting Cardiology assessment of his current status and whether he would be a candidate for elective cholecystectomy      Electronically signed by DENIZ HANNA MD        Hospital Medicine Progress Note      Subjective:  He is again feeling better for perhaps the third day in a row.  No further fevers, HR normal.  He wants diet advanced (done).  Frustrated with beeping IV.    On the other hand his L-shifted leukocytosis has crept up a little.  He also has a new mild elevation in his transaminases, which I view as a manifestation of the adjacent inflamed GB rather than cholestasis.  I don't think this is enough yet to warrant a perc drain from IR, patient agrees.  We will trend labs/symptoms.       General appearance: No apparent distress, appears stated age and cooperative.  HEENT: Pupils equal, round.  Conjunctivae/corneas clear.  Neck: No jugular venous distention.   Respiratory:  Normal respiratory effort.  Bilaterally without Rales/Wheezes/Rhonchi.  Cardiovascular: Normal rate and regular rhythm with normal S1/S2 without murmurs, rubs or gallops.  Abdomen: Soft, now only mild RUQ tenderness, non-distended with normal bowel sounds.  Musculoskeletal: No cyanosis bilaterally.  RLE 2+ pitting edema.  L BKA.  Skin: No jaundice.  No rashes or lesions.  Neurologic:  Neurovascularly intact without any focal sensory/motor deficits. Cranial nerves: II-XII intact, grossly non-focal.  Psychiatric: Alert and oriented, normal insight.  Not especially anxious.  Intelligent, insightful.        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  He lives at home, his mother lives with him.                The patient isn't normally someone who gets much abdominal pain.  Starting on 6/22 he developed acute RUQ pain.  It felt like a stabbing sensation, then became a    Hospital Medicine Progress Note      Subjective:  He is again feeling better.  No further fevers, HR normal, lactate normalized.  No n/v, tolerating clear diet.  New mild leukocytosis, bili up a little.       General appearance: No apparent distress, appears stated age and cooperative.  HEENT: Pupils equal, round.  Conjunctivae/corneas clear.  Neck: No jugular venous distention.   Respiratory:  Normal respiratory effort.  Bilaterally without Rales/Wheezes/Rhonchi.  Cardiovascular: Normal rate and regular rhythm with normal S1/S2 without murmurs, rubs or gallops.  Abdomen: Soft, now only mild RUQ tenderness, non-distended with normal bowel sounds.  Musculoskeletal: No cyanosis bilaterally.  RLE 2+ pitting edema.  L BKA.  Skin: No jaundice.  No rashes or lesions.  Neurologic:  Neurovascularly intact without any focal sensory/motor deficits. Cranial nerves: II-XII intact, grossly non-focal.  Psychiatric: Alert and oriented, normal insight.  Not especially anxious.  Intelligent, insightful.        Presenting Admission History:  The patient is a pleasant 56 Y M who used to weigh about 300 lbs and has a h/o HTN, HLD, DM2, PAD with necrotizing fasciitis s/p L BKA in 11/2024, severe MV CAD on 12/2024 [facility] deemed poor candidate for CABG and arteries too small and diffusely diseased for PCI so managed medically, ESRD with initiation of TTS HD in 2/2025, and evidence of cirrhosis on CT.  He lives at home, his mother lives with him.                The patient isn't normally someone who gets much abdominal pain.  Starting on 6/22 he developed acute RUQ pain.  It felt like a stabbing sensation, then became a dull ache.  Associated with nausea but hasn't vomited.  Anorexia, so he isn't sure if pain is worse with food intake.  No fevers or chills.  He started feeling really weak and worn out.  He felt to sick to go to HD sessions, missed two of them.  He finally came to the ED today because he didn't seem to be getting any better.    Hospital Medicine Progress Note      Subjective:  He is feeling better.  Less RUQ pain, less tenderness, no longer nauseated.  Did have 101.3 fever overnight, some tachycardia, lactate hasn't yet normalized.      General appearance: No apparent distress, appears stated age and cooperative.  HEENT: Pupils equal, round.  Conjunctivae/corneas clear.  Neck: No jugular venous distention.   Respiratory:  Normal respiratory effort.  Bilaterally without Rales/Wheezes/Rhonchi.  Cardiovascular: Normal rate and regular rhythm with normal S1/S2 without murmurs, rubs or gallops.  Abdomen: Soft, now only mild RUQ tenderness, non-distended with normal bowel sounds.  Musculoskeletal: No cyanosis bilaterally.  RLE 2+ pitting edema.  L BKA.  Skin: No jaundice.  No rashes or lesions.  Neurologic:  Neurovascularly intact without any focal sensory/motor deficits. Cranial nerves: II-XII intact, grossly non-focal.  Psychiatric: Alert and oriented, normal insight.  Not especially anxious.  Intelligent, insightful.        Presenting Admission History:  The patient is a pleasant 56 Y M who used to weigh about 300 lbs and has a h/o HTN, HLD, DM2, PAD with necrotizing fasciitis s/p L BKA in 11/2024, severe MV CAD on 12/2024 [facility] deemed poor candidate for CABG and arteries too small and diffusely diseased for PCI so managed medically, ESRD with initiation of TTS HD in 2/2025, and evidence of cirrhosis on CT.  He lives at home, his mother lives with him.                The patient isn't normally someone who gets much abdominal pain.  Starting on 6/22 he developed acute RUQ pain.  It felt like a stabbing sensation, then became a dull ache.  Associated with nausea but hasn't vomited.  Anorexia, so he isn't sure if pain is worse with food intake.  No fevers or chills.  He started feeling really weak and worn out.  He felt to sick to go to HD sessions, missed two of them.  He finally came to the ED today because he didn't seem to be getting any  03/15/18    Patient: Bobby Guajardo  : 10/10/1975 Visit date: 3/15/2018    Dear  Dr. Sonya Ha MD,    Thank you for referring Bobby Guajardo to my practice. Please find my assessment and plan below.                Assessment   Ventral hernia without obstru 4 Eyes Skin Assessment     The patient is being assess for  Admission    I agree that 2 RN's have performed a thorough Head to Toe Skin Assessment on the patient. ALL assessment sites listed below have been assessed.       Areas assessed by both nurses: Juan Haas RN  [x]   Head, Face, and Ears   [x]   Shoulders, Back, and Chest  [x]   Arms, Elbows, and Hands   [x]   Coccyx, Sacrum, and Ischum  [x]   Legs, Feet, and Heels        Does the Patient have Skin Breakdown?  No         Jose Prevention initiated:  NA   Wound Care Orders initiated:  NA      [facility] nurse consulted for Pressure Injury (Stage 3,4, Unstageable, DTI, NWPT, and Complex wounds):  NA      Nurse 1 eSignature: Electronically signed by Juan Diaz RN on 6/27/25 at 3:05 PM EDT    **SHARE this note so that the co-signing nurse is able to place an eSignature**    Nurse 2 eSignature: {Esignature:[id number]}              Admission assessment completed. Patient is A&O x4. VSS. Pain 6/10 on the R side of his abd. IV site patent and saline locked. Patient is currently on bedrest and is using a bedside urinal. Patient was oriented to room, call light in reach, bed rails x2, bed lowest position and locked. Patient instructed about the schedule of the day including: vital sign frequency, lab draws, possible tests, frequency of MD and staff rounds, including RN/MD rounding together at bedside, daily weights, and I &O's.  Patient instructed about prescribed diet, how to use MENU, and television. Telemetry box  in place per orders.       Safety Measures in place:   Denies any needs at this time.   Bed/ Chair alarm on for safety.   Bed locked and in lowest position.    Call light within reach.   Gripper socks applied.   Patient in stable condition when RN leaving room.    Electronically signed by Michelle Beebe RN on 6/27/2025 at 3:45 PM    Comprehensive Nutrition Assessment    Type and Reason for Visit:  Initial, LOS    Nutrition Recommendations/Plan:   Continue 60g/meal CCHO, low fat/potassium diet. Monitor need for diet liberalization.   Encourage PO intakes.  Nepro ONS once/day.   Monitor pertinent labs, bowel habits, weight, N/V, supplement acceptance, clinical progression.       Malnutrition Assessment:  Malnutrition Status:  Moderate malnutrition (07/02/25 1223)    Context:  Chronic Illness     Findings of the 6 clinical characteristics of malnutrition:  Energy Intake:  75% or less estimated energy requirements for 1 month or longer  Weight Loss:  Mild weight loss     Body Fat Loss:  Mild body fat loss Orbital, Triceps   Muscle Mass Loss:  Mild muscle mass loss Temples (temporalis), Clavicles (pectoralis & deltoids)  Fluid Accumulation:  Unable to assess     Strength:  Not Performed    Nutrition Assessment:    Patient seen for LOS. Admitted for cholecystitis. PMHx of HTN, HLD, DM2, PAD with necrotizing fasciitis s/p L BKA in 11/2024, severe MV CAD on 12/2024 [facility] [facility], ESRD on HD. MRCP pending. Currently on 60g/meal CCHO, low fat/potassium diet. Pt seen resting in bed. Reports his appetite is \"okay.\" Consuming 26-75% of meals per flowsheets. States his antibiotics are upsetting his stomach, having some diarrhea. No nausea. Weight loss of -7% x6 months noted per EMR. Noted likely r/t fluid fluctuations r/t HD and edema. Pt states he had been drinking protein shakes at home PTA (nepro/chobani shakes). Will order Nepro once/day to better meet nutrient needs. Encouraged PO intakes. No diet questions at this time. Will continue to monitor.    Nutrition Related Findings:    +2 pitting edema RLE. Na 134, BG x24 hrs 123-212. LBM 7/1. Wound Type: None       Current Nutrition Intake & Therapies:    Average Meal Intake: 26-50%, 51-75%  Average Supplements Intake: None Ordered  ADULT DIET; Regular; 4 carb choices (60 gm/meal); Low Potassium (Less than 3000  Discharge paper work given to pt. Pt verbalized understanding of discharge instructions with no questions or concerns.Brooke Reed RN    HD tx started via R chest TDC. 250 ml ns prime given. Dr. Welsh updated on start of HD tx. Bed weight obtained, unable to stand without prosthetic. 77.7kg attempting 1.7 kg to get to EDW.  2k bath per SSK. R chest TDC dressing bloody on arrival. Dressing changed. site unremarkable. no acute distress noted or voiced.    Occupational Therapy  Facility/Department: [facility] C3 TELE/MED SURG/ONC  Occupational Therapy Initial Assessment    Name: Christian Connors  : 1969  MRN: [id number]  Date of Service: 2025    Discharge Recommendations:  Subacute/Skilled Nursing Facility (pending safe transfers)  Therapy discharge recommendations take into account each patient's current medical complexities and are subject to input/oversight from the patient's healthcare team.   Barriers to Home Discharge:   [x] Steps to access home entry or bed/bath:   [x] Unable to transfer, ambulate, or propel wheelchair household distances without assist   [x] Limited available assist at home upon discharge       [x] Patient is at risk for falls due to: generalized weakness      If pt is unable to be seen after this session, please let this note serve as discharge summary.  Please see case management note for discharge disposition.  Thank you.           Patient Diagnosis(es): The primary encounter diagnosis was Cholecystitis. Diagnoses of Acute hypoxic respiratory failure (HCC) and Hyponatremia were also pertinent to this visit.  Past Medical History:  has a past medical history of Acute blood loss anemia, Acute kidney injury superimposed on CKD, JORGE (acute kidney injury), Anemia, Cardiomyopathy (HCC), Diabetic ketoacidosis without coma associated with type 2 diabetes mellitus (HCC), Epistaxis, HFrEF (heart failure with reduced ejection fraction) (HCC), Hx of left BKA (HCC), Hypoglycemia, Leukocytosis, Limb ischemia, Limb ischemia, Moderate protein-calorie malnutrition, Necrotizing fasciitis (HCC), Pleural effusion, Sarcoidosis, Septicemia (HCC), and Type 2 diabetes mellitus (HCC).  Past Surgical History:  has a past surgical history that includes Leg amputation below knee (Left, 2024); Leg amputation below knee (Left, 2024); Cardiac procedure (N/A, 2024); Nasal sinus surgery (N/A, 2025); and IR TUNNELED CVC PLACE WO SQ PORT/PUMP > 5  Occupational Therapy/Physical Therapy  OT/PT follow up attempted, following entry to room and introduction of OT/PT staff pt interrupts to state \"I'm going to refuse therapy today. I'm having an MRI and then getting checked out.\" Pt does confirm he wants to remain in PT/OT caseload for continued services if he does not discharge today. Will continue to follow per POC.   SEGUNDO Collins/LANA Mcdaniel, PTA   Per pt he does not want bed alarm or to be helped when ambulating to bathroom. C3 manager notified.    Physical Therapy  Facility/Department: [facility] C3 TELE/MED SURG/ONC  Physical Therapy Initial Assessment    Name: Christian Connors  : 1969  MRN: [id number]  Date of Service: 2025    Discharge Recommendations:  Subacute/Skilled Nursing Facility (vs home, dependent on transfer status)   PT Equipment Recommendations  Equipment Needed: No  Therapy discharge recommendations take into account each patient's current medical complexities and are subject to input/oversight from the patient's healthcare team.   Barriers to Home Discharge:   [x] Steps to access home entry or bed/bath: 1   [x] Unable to transfer, ambulate, or propel wheelchair household distances without assist   [x] Limited available assist at home upon discharge    [] Patient or family requests d/c to post-acute facility    [] Poor cognition/safety awareness for d/c to home alone    []Unable to maintain ordered weight bearing status    [] Patient with salient signs of long-standing immobility   [x] Patient is at risk for falls     If pt is unable to be seen after this session, please let this note serve as discharge summary.  Please see case management note for discharge disposition.  Thank you.      Patient Diagnosis(es): The primary encounter diagnosis was Cholecystitis. Diagnoses of Acute hypoxic respiratory failure (HCC) and Hyponatremia were also pertinent to this visit.  Past Medical History:  has a past medical history of Acute blood loss anemia, Acute kidney injury superimposed on CKD, JORGE (acute kidney injury), Anemia, Cardiomyopathy (HCC), Diabetic ketoacidosis without coma associated with type 2 diabetes mellitus (HCC), Epistaxis, HFrEF (heart failure with reduced ejection fraction) (HCC), Hx of left BKA (HCC), Hypoglycemia, Leukocytosis, Limb ischemia, Limb ischemia, Moderate protein-calorie malnutrition, Necrotizing fasciitis (HCC), Pleural effusion, Sarcoidosis, Septicemia (HCC), and Type 2 diabetes mellitus (HCC).  Past Surgical  Physical Therapy/Occupational Therapy  Attempted to see pt for eval, pt declines secondary to fatigue and pain.  Educated pt on benefits of time spent OOB, verbalizes understanding, continues to decline.  Will reattempt as schedule allows.     -Varun Purvis, PT, DPT  Carolyn Rausch MS, OTR/L       Physical/Occupational Therapy    Attempted to see patient this date. Upon arrival pt politely refuses therapy this date stating he has to much going on with plans for dialysis and MRI today and states he is not going to be getting up right now. PT/OT will attempt at a later time/date as pt is appropriate and schedule permits. Thank you.     Geetha Dimas PT, DPT   Leila HERNÁNDEZ/LANA      Pt A/O x 4, VSS. Pt states he has not had nausea since yesterday and is tolerating clear liquid diet well. Pt reports pain of 5/10 that is \"creeping up\", with moderate R quadrant pain which radiates to shoulder.  Pt remains on 2L O2 with saturation at 94%. Pt using bedside urinal. Standard safety precautions in place. Pt voices no other needs at this time. All care per orders. Electronically signed by BEULAH MOORE RN on 6/28/25 at 8:40 AM EDT    Pt assessment completed and charted. VSS, pt on RA. Patient is a/o. IV site patent/flushed, saline locked. Medication given per MAR.     Safety Measures in place:   Bed in lowest position and wheels locked.   Call light within reach.   Bedside table within reach.   Non-skid socks in place.   Pt denies any other needs at this time.    Pt calls out appropriately.  Patient in stable condition when RN left room.    Pt fell in bathroom, no injuries noted, VSSMD CROW notified.    Pt had a fall when ambulating from the bathroom to bed. VSS. Pt reported he \"landed on his knee and rolled over to his side\". No injuries noted. Pt reported minimal discomfort. CROW GILLIS MD, was notified. Pt in stable condition when RN room. RN informed pt bed alarm is on and to notify RN if he needed to use the bathroom. Pt stated \"bed alarm will be on until I sneak it off again.\" RN informed pt the alarm is on for his safety. Bed in lowest position with wheels locked. Call light and bedside table within reach.    Pt had a fall when ambulating to the bathroom. VSS. Pt reported he \"landed on his butt and hit his elbow\". Left elbow was slightly red upon assessment. Pt reported minimal discomfort. NP, Alex Coleman, was notified. X-Ray of left elbow pending. Pt in stable condition when RN room. RN informed pt bed alarm is on and to notify RN if he needed to use the bathroom. Pt stated \"bed alarm will be on until I sneak it off again.\" RN informed pt the alarm is on for his safety. Bed in lowest position with wheels locked. Call light and bedside table within reach.    Pt refused personal care from staff. Pt would turn off his own bed alarm on his own. Pt cleaned him self up after having an incontinence episode.    Pt taken out safely by wheelchair to vehicle.Brooke Reed RN    Pt transported to .  Electronically signed by Michelle Beebe RN on 6/27/2025 at 3:06 PM    Shift assessment completed. Patient is A&O x4.  VSS.  Denies Pain.  IV site patent/ flushed, and IV abx infusing. Patient on RA. Pt educated on calling for help when ambulating, pt verbalized understanding.  Medication given per MAR.     Safety Measures in place:   Denies any needs at this time.   Bed/ Chair alarm on for safety.   Bed locked and in lowest position.    Call light within reach.   Gripper socks applied.   Patient in stable condition when RN leaving room.      Shift assessment completed. Patient is A&O x4.  VSS. Pain 8/10.  IV site patent/ flushed, and saline locked. Patient on RA. Medication given per MAR.     Safety Measures in place:   Denies any needs at this time.   Bed/ Chair alarm on for safety.   Bed locked and in lowest position.    Call light within reach.   Gripper socks applied.   Patient in stable condition when RN leaving room.      Treatment time: 3 hours     Net UF: 1.7 L    Pre weight: 77.7 kg  Post weight: 76 kg      Access used: R chest TDC   Access function: tolerated 400 BFR     Medications or blood products given: Heparin dwells     Regular outpatient schedule: Verónica TINAJERO     Summary of response to treatment: well tolerated. 2k bath per SSK. BP stable     Copy of dialysis treatment record placed in chart, to be scanned into EMR.   Treatment time: 3 hrs    Net UF: 2500 ml     Pre weight: 79.1 kg  Post weight: 76.6kg     Access used: Rtdc  Access function:  tolerated well,  BFR 400ml/min     Medications or blood products given: heparin dwells     Regular outpatient schedule: TTS     Summary of response to treatment: Pt tolerated well. Pt remained stable throughout entire treatment and upon exiting the hemodialysis suite.      Copy of dialysis treatment record placed in chart, to be scanned into EMR.       decision making.     My findings are as follows:   no pain today.    PHYSICAL EXAM:  VITALS:  /70   Pulse 94   Temp 98.8 °F (37.1 °C) (Oral)   Resp 18   Ht 1.803 m (5' 11\")   Wt 79.6 kg (175 lb 7.8 oz)   SpO2 94%   BMI 24.48 kg/m²   24HR INTAKE/OUTPUT:    I/O last 3 completed shifts:  In: 500   Out: 2200   No intake/output data recorded.    CONSTITUTIONAL:  awake and alert  LUNGS:  Respirations easy and unlabored, no crackles or wheezing  CARD:  regular rate  ABDOMEN:  normal bowel sounds, soft, non-distended, non-tender     All laboratory data and imaging personally reviewed by me.        Assessment and Plan:    55 yo with cholecystitis  Symptoms and labs improved  MRCP pending.  Diet as tolerated    Kali mcdermott MD     no palpable seroma at that site. There is no palpable recurrence of hernia at that site. The patient remains with same size hernias of the epigastrium and umbilicus. They are exactly identical to my prior visit.   The umbilical hernia is reducible, the e mg Oral BID    insulin glargine  6 Units SubCUTAneous Nightly    sodium chloride flush  5-40 mL IntraVENous 2 times per day    isosorbide mononitrate  30 mg Oral Daily    [Held by provider] sevelamer  800 mg Oral TID    torsemide  100 mg Oral Daily    insulin lispro  0-4 Units SubCUTAneous 4x Daily AC & HS    aspirin  81 mg Oral Daily     PRN Meds: heparin (porcine), glucose, dextrose bolus **OR** dextrose bolus, glucagon (rDNA), dextrose, sodium chloride flush, sodium chloride, polyethylene glycol, acetaminophen **OR** acetaminophen, methocarbamol, prochlorperazine, oxyCODONE, albumin human 25%, midodrine    /79   Pulse 60   Temp 98.4 °F (36.9 °C) (Oral)   Resp 16   Ht 1.803 m (5' 11\")   Wt 77.3 kg (170 lb 6.7 oz)   SpO2 95%   BMI 23.77 kg/m²     Telemetry:      Personally reviewed and interpreted telemetry (Rhythm Strip) on 7/1/2025 with the following findings:     Diet: ADULT DIET; Regular; 4 carb choices (60 gm/meal); Low Potassium (Less than 3000 mg/day); Low Fat (less than or equal to 50 gm/day)    DVT Prophylaxis: []PPx LMWH  []SQ Heparin  []IPC/SCDs  []Eliquis  []Xarelto  []Coumadin  [] Heparin Drip  []Other -      Code status: Full Code    PT/OT Eval Status:   []NOT yet ordered  []Ordered and Pending   []Seen with Recommendations for:   []Home independently  []Home w/ assist  []HHC  []SNF  []Acute Rehab    Multi-Disciplinary Rounds with Case Management completed on 7/1/2025 with the following recommendations:  Anticipated Discharge Location: []Home w/ []HHC vs []SNF  []Acute Rehab  []LTAC  []Hospice  []Other -    Anticipated Discharge Day/Date:    Barriers to Discharge:   --------------------------------------------------    MDM (any 2 required for High level billing)    A. Problems (any 1)  [] Acute/Chronic Illness/injury posing ongoing threat to life and/or bodily function without ongoing treatment    [] Severe exacerbation of chronic illness

## 2025-07-15 ENCOUNTER — HOSPITAL ENCOUNTER (OUTPATIENT)
Age: 50
Discharge: HOME | End: 2025-07-15
Payer: MEDICAID

## 2025-07-15 VITALS
HEART RATE: 74 BPM | BODY MASS INDEX: 35.05 KG/M2 | HEIGHT: 58 IN | DIASTOLIC BLOOD PRESSURE: 74 MMHG | OXYGEN SATURATION: 97 % | WEIGHT: 167 LBS | TEMPERATURE: 99 F | RESPIRATION RATE: 16 BRPM | SYSTOLIC BLOOD PRESSURE: 113 MMHG

## 2025-07-15 DIAGNOSIS — Z48.02 ENCOUNTER FOR STAPLE REMOVAL: Primary | ICD-10-CM

## 2025-07-15 PROCEDURE — 99212 OFFICE O/P EST SF 10 MIN: CPT | Performed by: NURSE PRACTITIONER

## 2025-07-15 NOTE — ED PROVIDER NOTES
Patient Seen in: [facility]        History  Chief Complaint   Patient presents with    Sut Stap RingRemoval     Stated Complaint: Remove stitches    Subjective:   HPI  49-year-old presents for staple removal or to her left frontal scalp that were placed on  when she collided with her child.  She denies any issues with the wound.      Objective:     Past Medical History:    Asthma (HCC)    COVID-19    loss of taste and smell. No hospitalization required    Endometrial cells on cervical Pap smear inconsistent w/LMP    Extrinsic asthma, unspecified    last attack a couple of months ago    Fatty liver    Genital herpes simplex    Gonorrhea    H. pylori infection    H. pylori infection    PREVIOUS HISTORY      Herpes    last outbreak years ago    History of seizure    Left renal stone    Migraines    Obstruction of left ureteropelvic junction (UPJ) due to stone    Osteoarthritis    mild in back    Pneumonia, organism unspecified(486)    PONV (postoperative nausea and vomiting)    mild     labor (HCC)    pt. had 20 week loss    Renal colic    Seizure disorder (HCC)    patient states \"seizure was from [place]\"     Seizure disorder (HCC)    Seizure-like activity (HCC)    Sleep apnea              Past Surgical History:   Procedure Laterality Date          , , ,     Cholecystectomy      Colonoscopy  2014    Procedure: COLONOSCOPY;  Surgeon: Nahid Calderon MD;  Location:  ENDOSCOPY    Colonoscopy N/A 2019    Procedure: COLONOSCOPY;  Surgeon: Segundo Harman MD;  Location:  ENDOSCOPY    Hernia surgery      Hysteroscopy,biopsy  2021    Hysteroscopy, D&C, endometrial polypectomy    Laparoscopic cholecystectomy  2 weeks ago    Lasik Bilateral     done in [place]    Kaushal localization wire 1 site left (cpt=19281)      Benign    Kaushal localization wire 1 site right (cpt=19281)      Benign    Other      baker cyst     Other      bunion bilateral -  right foot metal    Reduction left  1999    Reduction right  1999    Removal gallbladder      Ventral hernia repair  11/20/2013    Procedure: HERNIA VENTRAL REPAIR;  Surgeon: Jg Chambers MD;  Location:  MAIN OR                No pertinent social history.            Review of Systems   All other systems reviewed and are negative.      Positive for stated complaint: Remove stitches  Other systems are as noted in HPI.  Constitutional and vital signs reviewed.      All other systems reviewed and negative except as noted above.                  Physical Exam    ED Triage Vitals [07/15/25 1012]   /74   Pulse 74   Resp 16   Temp 98.6 °F (37 °C)   Temp src Oral   SpO2 97 %   O2 Device None (Room air)       Current Vitals:   Vital Signs  BP: 113/74  Pulse: 74  Resp: 16  Temp: 98.6 °F (37 °C)  Temp src: Oral    Oxygen Therapy  SpO2: 97 %  O2 Device: None (Room air)            Physical Exam  Vitals and nursing note reviewed.   Constitutional:       General: She is not in acute distress.     Appearance: She is well-developed. She is not ill-appearing or toxic-appearing.   HENT:      Head:        Comments: Approximate 1 cm laceration with 2 staples intact.  Cardiovascular:      Rate and Rhythm: Normal rate.   Pulmonary:      Effort: Pulmonary effort is normal.   Skin:     General: Skin is warm and dry.   Neurological:      Mental Status: She is alert and oriented to person, place, and time.                 ED Course  Labs Reviewed - No data to display                             Medical Decision Making  2 staples removed with no signs of wound infection or wound dehiscence.    Disposition and Plan     Clinical Impression:  1. Encounter for staple removal         Disposition:  Discharge  7/15/2025 10:22 am    Follow-up:  No follow-up provider specified.        Medications Prescribed:  Discharge Medication List as of 7/15/2025 10:23 AM                Supplementary Documentation:

## 2025-07-24 ENCOUNTER — OFFICE VISIT (OUTPATIENT)
Dept: OBGYN CLINIC | Facility: CLINIC | Age: 50
End: 2025-07-24
Payer: MEDICAID

## 2025-07-24 VITALS
DIASTOLIC BLOOD PRESSURE: 76 MMHG | BODY MASS INDEX: 36.84 KG/M2 | WEIGHT: 175.5 LBS | HEIGHT: 58 IN | SYSTOLIC BLOOD PRESSURE: 112 MMHG

## 2025-07-24 DIAGNOSIS — Z31.9 PROCREATIVE MANAGEMENT: ICD-10-CM

## 2025-07-24 DIAGNOSIS — Z12.31 ENCOUNTER FOR SCREENING MAMMOGRAM FOR MALIGNANT NEOPLASM OF BREAST: Primary | ICD-10-CM

## 2025-07-24 PROCEDURE — 99213 OFFICE O/P EST LOW 20 MIN: CPT | Performed by: NURSE PRACTITIONER

## 2025-07-24 NOTE — PROGRESS NOTES
Subjective:  49 year old    Chief Complaint   Patient presents with    Follow - Up     Follow up on infertility, specialists needs a note that okay to go forward, last pap mammogram.     Pt here today at advice of her CARYL  She is also requesting records of mammogram, colonoscopy and most recent pap smear  She will also need clearance from bariatric surgeon, PCP and MFM  Per pt bariatric surgeon is agreeable after she is post op 1 year which will be in January    Review of Systems:  Pertinent items are noted in the HPI.    Objective:  /76   Ht 58\"   Wt 175 lb 8 oz (79.6 kg)   LMP 2025 (Exact Date)       Physical Examination:  General appearance: Well dressed, well nourished in no apparent distress  Neurologic/Psychiatric: Alert and oriented to person, place and time, mood normal, affect appropriate      Assessment/Plan:      Diagnoses and all orders for this visit:    Encounter for screening mammogram for malignant neoplasm of breast  -     [facility] AMRIT 2D+3D SCREENING BILAT (CPT=77067/61531); Future    Procreative management  -     Maternal Fetal Medicine Referral - External  - encouraged pt to sign ONIEL from records that are needed  - referral placed to MFM  - will need to schedule with PCP for medical clearance and EKG  - to follow up with questions or concerns        No follow-ups on file.

## 2025-07-31 ENCOUNTER — TELEPHONE (OUTPATIENT)
Dept: OBGYN CLINIC | Facility: CLINIC | Age: 50
End: 2025-07-31

## 2025-08-13 ENCOUNTER — HOSPITAL ENCOUNTER (OUTPATIENT)
Age: 50
Discharge: HOME | End: 2025-08-13

## 2025-08-13 VITALS
HEART RATE: 76 BPM | SYSTOLIC BLOOD PRESSURE: 114 MMHG | DIASTOLIC BLOOD PRESSURE: 79 MMHG | BODY MASS INDEX: 35.28 KG/M2 | HEIGHT: 59 IN | RESPIRATION RATE: 16 BRPM | WEIGHT: 175 LBS | OXYGEN SATURATION: 98 % | TEMPERATURE: 99 F

## 2025-08-13 DIAGNOSIS — R82.998 LEUKOCYTES IN URINE: Primary | ICD-10-CM

## 2025-08-13 DIAGNOSIS — N89.8 VAGINAL DISCHARGE: ICD-10-CM

## 2025-08-13 LAB
B-HCG UR QL: NEGATIVE
BILIRUB UR QL STRIP: NEGATIVE
COLOR UR: YELLOW
GLUCOSE UR STRIP-MCNC: NEGATIVE MG/DL
HGB UR QL STRIP: NEGATIVE
KETONES UR STRIP-MCNC: NEGATIVE MG/DL
NITRITE UR QL STRIP: NEGATIVE
PH UR STRIP: 6
SP GR UR STRIP: >=1.03
UROBILINOGEN UR STRIP-ACNC: <2 MG/DL

## 2025-08-13 PROCEDURE — 81025 URINE PREGNANCY TEST: CPT | Performed by: NURSE PRACTITIONER

## 2025-08-13 PROCEDURE — 99214 OFFICE O/P EST MOD 30 MIN: CPT | Performed by: NURSE PRACTITIONER

## 2025-08-13 PROCEDURE — 81002 URINALYSIS NONAUTO W/O SCOPE: CPT | Performed by: NURSE PRACTITIONER

## 2025-08-13 RX ORDER — CEPHALEXIN 500 MG/1
500 CAPSULE ORAL 3 TIMES DAILY
Qty: 21 CAPSULE | Refills: 0 | Status: SHIPPED | OUTPATIENT
Start: 2025-08-13 | End: 2025-08-20

## 2025-08-14 RX ORDER — FLUCONAZOLE 150 MG/1
150 TABLET ORAL ONCE
Qty: 2 TABLET | Refills: 0 | Status: SHIPPED | OUTPATIENT
Start: 2025-08-14 | End: 2025-08-14

## (undated) DIAGNOSIS — H04.203 EXCESSIVE TEAR PRODUCTION OF BOTH LACRIMAL GLANDS: Primary | ICD-10-CM

## (undated) DIAGNOSIS — M25.561 RIGHT KNEE PAIN, UNSPECIFIED CHRONICITY: Primary | ICD-10-CM

## (undated) DIAGNOSIS — M25.562 LEFT KNEE PAIN, UNSPECIFIED CHRONICITY: ICD-10-CM

## (undated) DEVICE — DEV REMOVAL TRUCLEAR SFT MINI

## (undated) DEVICE — Device

## (undated) DEVICE — FILTERLINE NASAL ADULT O2/CO2

## (undated) DEVICE — SPONGE STICK WITH PVP-I: Brand: KENDALL

## (undated) DEVICE — PUMP SAPS 1  ACT 1 WY VLV

## (undated) DEVICE — URETERAL ACCESS SHEATH SET: Brand: NAVIGATOR HD

## (undated) DEVICE — TIGERTAIL 5F FLXTIP 70CM

## (undated) DEVICE — SOLUTION IRRIG 3000ML 0.9% NACL FLX CONT

## (undated) DEVICE — SPECIMEN SOCK - STANDARD: Brand: MEDI-VAC

## (undated) DEVICE — BLADELESS OBTURATOR: Brand: WECK VISTA

## (undated) DEVICE — SENS GUIW URO 150CM NIT SS

## (undated) DEVICE — SLEEVE COMPR MD KNEE LEN SGL USE KENDALL SCD

## (undated) DEVICE — SUTURE VICRYL 5-0 PC-1

## (undated) DEVICE — SOLUTION  .9 3000ML

## (undated) DEVICE — SINGLE-USE DIGITAL FLEXIBLE URETEROSCOPE: Brand: LITHOVUE

## (undated) DEVICE — TUBING CYSTO

## (undated) DEVICE — CYSTO CDS-LF: Brand: MEDLINE INDUSTRIES, INC.

## (undated) DEVICE — PACK GYNE CUSTOM

## (undated) DEVICE — 2000CC GUARDIAN II: Brand: GUARDIAN

## (undated) DEVICE — 3M™ RED DOT™ MONITORING ELECTRODE WITH FOAM TAPE AND STICKY GEL, 50/BAG, 20/CASE, 72/PLT 2570: Brand: RED DOT™

## (undated) DEVICE — ENDOPATH ULTRA VERESS INSUFFLATION NEEDLES WITH LUER LOCK CONNECTORS: Brand: ENDOPATH

## (undated) DEVICE — MEGA SUTURECUT ND: Brand: ENDOWRIST

## (undated) DEVICE — NITINOL STONE RETRIEVAL BASKET: Brand: ZERO TIP

## (undated) DEVICE — GLOVE SUR 6 SENSICARE PI PIP GRN PWD F

## (undated) DEVICE — SOL  .9 1000ML BAG

## (undated) DEVICE — FORCEP BIOPSY RJ4 LG CAP W/ND

## (undated) DEVICE — STERILE SYNTHETIC POLYISOPRENE POWDER-FREE SURGICAL GLOVES WITH HYDROGEL COATING: Brand: PROTEXIS

## (undated) DEVICE — SLEEVE KENDALL SCD EXPRESS MED

## (undated) DEVICE — 40580 - THE PINK PAD - ADVANCED TRENDELENBURG POSITIONING KIT: Brand: 40580 - THE PINK PAD - ADVANCED TRENDELENBURG POSITIONING KIT

## (undated) DEVICE — STERILE POLYISOPRENE POWDER-FREE SURGICAL GLOVES: Brand: PROTEXIS

## (undated) DEVICE — COLUMN DRAPE

## (undated) DEVICE — LITHOVUE STD WITH EMPOWER

## (undated) DEVICE — DRAPE LINGEMAN 1-0425

## (undated) DEVICE — GAUZE TRAY STERILE 4X4 12PLY

## (undated) DEVICE — CADIERE FORCEPS: Brand: ENDOWRIST

## (undated) DEVICE — ELITE HYSTEROSCOPE SEAL: Brand: TRUCLEAR

## (undated) DEVICE — SEAL Y BIOPSY PORT P6R SCOPE

## (undated) DEVICE — GYN CDS: Brand: MEDLINE INDUSTRIES, INC.

## (undated) DEVICE — SCD SLEEVE KNEE HI BLEND

## (undated) DEVICE — SOL  .9 1000ML BTL

## (undated) DEVICE — HYSTEROSCOPIC OUTFLOW TUBE SET

## (undated) DEVICE — COVER WAND RF DETECT

## (undated) DEVICE — Device: Brand: DEFENDO AIR/WATER/SUCTION AND BIOPSY VALVE

## (undated) DEVICE — 1200CC GUARDIAN II: Brand: GUARDIAN

## (undated) DEVICE — SYRINGE 20CC LL TIP

## (undated) DEVICE — VISUALIZATION SYSTEM: Brand: CLEARIFY

## (undated) DEVICE — TROCAR: Brand: KII SHIELDED BLADED ACCESS SYSTEM

## (undated) DEVICE — ADHESIVE MASTISOL 2/3ML

## (undated) DEVICE — HYBRID SENSOR WIRE .035 STR

## (undated) DEVICE — MEDI-VAC NON-CONDUCTIVE SUCTION TUBING: Brand: CARDINAL HEALTH

## (undated) DEVICE — ARM DRAPE

## (undated) DEVICE — ZIPWIRE GUIDEWIRE .038X150 STR

## (undated) DEVICE — PREMIUM WET SKIN PREP TRAY: Brand: MEDLINE INDUSTRIES, INC.

## (undated) DEVICE — SOFT TISSUE SHAVER MINI: Brand: TRUCLEAR

## (undated) DEVICE — ROBOTIC GENERAL: Brand: MEDLINE INDUSTRIES, INC.

## (undated) DEVICE — SUTURE VICRYL 3-0 SH

## (undated) DEVICE — SET TB INFLO FOR TRUCLEAR SYS HYSTEROLUX

## (undated) DEVICE — ENDOSCOPY PACK - LOWER: Brand: MEDLINE INDUSTRIES, INC.

## (undated) DEVICE — MOSES 200 FIBER

## (undated) DEVICE — CANNULA SEAL

## (undated) DEVICE — SUTURE VLOC NONAB 0 6\" 0306

## (undated) NOTE — LETTER
[facility]y [facility]tero [address] [facility]  [place], [address] [facility]    Consent for Operation  Date: __________________                                Time: _______________    1.  I authorize the performance upon Estee Dire the following operation:  Procedure(s procedure has been videotaped, the surgeon will obtain the original videotape. The hospital will not be responsible for storage or maintenance of this tape.   8. For the purpose of advancing medical education, I consent to the admittance of observers to the STATEMENTS REQUIRING INSERTION OR COMPLETION WERE FILLED IN.     Signature of Patient:   ___________________________    When the patient is a minor or mentally incompetent to give consent:  Signature of person authorized to consent for patient: ____________ drugs/illegal medications). Failure to inform my anesthesiologist about these medicines may increase my risk of anesthetic complications. iv. If I am allergic to anything or have had a reaction to anesthesia before.   3. I understand how the anesthesia med I have discussed the procedure and information above with the patient (or patient’s representative) and answered their questions. The patient or their representative has agreed to have anesthesia services.     _______________________________________________

## (undated) NOTE — LETTER
Betsy Montero [address]  [place], [address] [facility]    Consent for Operation  Date: __________________                                Time: _______________    1.  I authorize the performance upon Denise Slade the following operation:  Procedure(s procedure has been videotaped, the surgeon will obtain the original videotape. The hospital will not be responsible for storage or maintenance of this tape.   8. For the purpose of advancing medical education, I consent to the admittance of observers to the STATEMENTS REQUIRING INSERTION OR COMPLETION WERE FILLED IN.     Signature of Patient:   ___________________________    When the patient is a minor or mentally incompetent to give consent:  Signature of person authorized to consent for patient: ____________ drugs/illegal medications). Failure to inform my anesthesiologist about these medicines may increase my risk of anesthetic complications. iv. If I am allergic to anything or have had a reaction to anesthesia before.   3. I understand how the anesthesia med I have discussed the procedure and information above with the patient (or patient’s representative) and answered their questions. The patient or their representative has agreed to have anesthesia services.     _______________________________________________

## (undated) NOTE — LETTER
10/24/17    Patient: Torey Lin  : 10/10/1975 Visit date: 10/24/2017    Dear  Dr. Juan M Crespo MD,    Thank you for referring Torey Lin to my practice. Please find my assessment and plan below.                Assessment   Ventral hernia without obstr hernia that could be contributing to her abdominal pain.     We recommend a CT abdomen/pelvis with oral and IV contrast to further investigate this patient's abdominal wall defect which may consist of multiple hernias from her previous surgeries and pregnan

## (undated) NOTE — ED AVS SNAPSHOT
Akosua Ramosguanakito   MRN: [id number]    Department:  THE [facility] Emergency Department in [place]   Date of Visit:  9/8/2018           Disclosure     Insurance plans vary and the physician(s) referred by the ER may not be covered by your plan.  Please contact y tell this physician (or your personal doctor if your instructions are to return to your personal doctor) about any new or lasting problems. The primary care or specialist physician will see patients referred from the [facility] Emergency Department.  Robbie Jeffries

## (undated) NOTE — LETTER
05/15/19    Patient: Dewayne Thomason  : 10/10/1975 Visit date: 2019    Dear  Dr. Fatmata Smith MD,    Thank you for referring Dewayne Thomason to my practice. Please find my assessment and plan below.          Assessment   Diarrhea, unspecified type  (primar

## (undated) NOTE — LETTER
Chani Leon, :10/10/1975    CONSENT FOR PROCEDURE/SEDATION    1. I authorize the performance upon Ozzy Freitas  the following: Endometrial Biopsy    2.  I authorize MAYA Sylvester (and whomever is designated as the doctor’s assistant), to Witness: _________________________________________ Date:___________     Physician Signature: _______________________________ Date:___________

## (undated) NOTE — LETTER
19    Patient: Disha Arias  : 10/10/1975 Visit date: 2019    Dear  Dr. Kaelyn Reddy MD,    Thank you for referring Disha Arias to my practice. Please find my assessment and plan below.                 Assessment   Alternating constipation and

## (undated) NOTE — ED AVS SNAPSHOT
Mike Mendez   MRN: [id number]    Department:  THE [facility] Emergency Department in [place]   Date of Visit:  5/9/2019           Disclosure     Insurance plans vary and the physician(s) referred by the ER may not be covered by your plan.  Please contact y tell this physician (or your personal doctor if your instructions are to return to your personal doctor) about any new or lasting problems. The primary care or specialist physician will see patients referred from the [facility] Emergency Department.  Jesus Rahman

## (undated) NOTE — ED AVS SNAPSHOT
Kath Beach   MRN: [id number]    Department:  THE [facility] Emergency Department in [place]   Date of Visit:  9/1/2018           Disclosure     Insurance plans vary and the physician(s) referred by the ER may not be covered by your plan.  Please contact y tell this physician (or your personal doctor if your instructions are to return to your personal doctor) about any new or lasting problems. The primary care or specialist physician will see patients referred from the [facility] Emergency Department.  Kvng Serrano

## (undated) NOTE — MR AVS SNAPSHOT
After Visit Summary   11/22/2019    Miguel Dickson    MRN: [id number]           Visit Information     Date & Time  11/22/2019 10:30 AM Provider  Boo Duron, [address] Jace Wu, [place] [address], [facility] Dept.  Phone  [phone] [facility] now offers Video Visits through [address] for adult and pediatric patients. Video Visits are available Monday - Friday for many common conditions such as allergies, colds, cough, fever, rash, sore throat, headache and pink eye.   The cost for a Video Vi at a hospital emergency room. Average cost  $2,300*   *Cost varies based on your insurance coverage  For more information about hours, locations or appointment options available at [facility],  visit: [email or web address] or call [email or web address]. (1

## (undated) NOTE — LETTER
24    Re: Chani Leon  : 10/10/1975    To Whom it may concern:    Chani Leon has been a patient of our practice since 10/2019. Patient is 4' 10\" tall and weights 211 pounds for a calculated BMI of 44.10. This patient has been excessively overweight since establishing with our practice and will benefit from bariatric surgery.      In addition to morbid obesity, the patient is suffering from the following co-morbid conditions: prediabetes, elevated cholesterol, HUGO, and asthma.     The patient has tried many methods of weight loss including phentermine from another provider. The patient is limited due to her co-morbidities in her ability to exercise but has tried walking and light weight.      I am supportive of this patient's desire to proceed with bariatric surgery. The patient has good understanding of the risks involved and reasonable expectations and understands the importance of being compliant with all post-surgical requirements. I will also continue to support this patient's primary care needs should they proceed with surgical intervention. I am respectfully requesting consideration for bariatric surgery.    Thank you for your attention in this matter.    Sincerely,    Mei TREJO/Dr. Juliette Pardo

## (undated) NOTE — LETTER
20    Patient: Riaz Polk  : 10/10/1975 Visit date: 2020    Dear  Dr. Julisa Márquez MD,    Thank you for referring Riaz Polk to my practice. Please find my assessment and plan below.       Assessment   Diarrhea of presumed infectious origin  (p Ventral hernia remains stable from prior exam.    I discussed with the patient today with regards to her diarrhea that we will be ordering stool cultures to rule out any infectious cause of her diarrhea.   After the patient gives her stool cultures, I recom

## (undated) NOTE — MR AVS SNAPSHOT
After Visit Summary   5/4/2021    Steven Ramirez    MRN: [id number]           Visit Information     Date & Time  5/4/2021  3:00 PM Provider  [facility] Department  [place] [address], [place] [address], [facility]  Phone  [phone] AMRIT 2D+3D SCREENING BILAT (CPT=77067/79038) [COMBO CPT(R)]  5/4/2021 (Approximate) 5/4/2022    THINPREP PAP WITH HPV REFLEX REQUEST B [JVY7802 CUSTOM]  5/4/2021 5/4/2022    TSH W REFLEX TO FREE T4 [2052211 CUSTOM]  5/4/2021 (Approximate) 5/4/2022      Fut ONLINE VISIT  Primary Care Providers  Treatment for mild illness or injury that does not require immediate attention VIDEO VISITS  Average cost  $35*    e-VISTS  Average cost  $35*     SAME DAY APPOINTMENTS   Available at primary care offices    AFTER HOUR

## (undated) NOTE — LETTER
[facility] IMMEDIATE CARE  [address]  [address] [address]  Dept: [phone]  Dept Fax: [phone]         January 22, 2018    Patient: Gustav Phoenix   YOB: 1975   Date of Visit: 1/22/2018       To Whom It May Concern:     Ede

## (undated) NOTE — ED AVS SNAPSHOT
Nicole Almazan   MRN: [id number]    Department:  [facility] Emergency Department   Date of Visit:  3/8/2018           Disclosure     Insurance plans vary and the physician(s) referred by the ER may not be covered by your plan.  Please contact your i tell this physician (or your personal doctor if your instructions are to return to your personal doctor) about any new or lasting problems. The primary care or specialist physician will see patients referred from the [facility] Emergency Department.  Yoni Linton

## (undated) NOTE — LETTER
ASTHMA ACTION PLAN for Estee Oviedo     : 10/10/1975     Date: 19  Doctor:  Kirby Shin MD  Phone for doctor or clinic: [address] [address] [phone]      ACT Score: 20    A Don't forget:  · Rinse mouth after using inhaler  · Use spacer for inhaler  · Remember to get your Flu vaccine every fall! [x] Asthma Action Plan reviewed with the caregiver and patient, and a copy of the plan was given to the patient/caregiver.    []

## (undated) NOTE — ED AVS SNAPSHOT
THE [facility] Immediate Care in [facility] [address] [address]    Phone:  [phone]    Fax:  [phone]           Corey Kang   MRN: [id number]    Department:  THE [facility] Immediate Care in [facility]   Date of Visit:  6/8/2017           Diagnose - azithromycin 250 MG Tabs            Discharge References/Attachments     ACUTE BRONCHITIS, WHAT IS (ENGLISH)      Disclosure     Insurance plans vary and the physician(s) referred by the Immediate Care may not be covered by your plan.  Please contact you IF THERE IS ANY CHANGE OR WORSENING OF YOUR CONDITION, CALL YOUR PRIMARY CARE PHYSICIAN AT ONCE OR GO TO THE EMERGENCY DEPARTMENT.     If you have been prescribed any medication(s), please fill your prescription right away and begin taking the medication(s) Patient [address] to help you get signed up for insurance coverage. Patient [address] is a Federal Navigator program that can help with your Affordable Care Act coverage, as well as all types of Medicaid plans.   To get signed up and covere

## (undated) NOTE — LETTER
Chani Leon, :10/10/1975    CONSENT FOR PROCEDURE/SEDATION    1. I authorize the performance upon Chani Leon  the following: Endometrial biopsy procedure    2. I authorize Dr. Kailee Ellington MD (and whomever is designated as the doctor’s assistant), to perform the above-mentioned procedures.    3. If any unforeseen conditions arise during this procedure calling for additional  procedures, operations, or medications (including anesthesia and blood transfusion), I further request and authorize the doctor to do whatever he/she deems advisable in my interest.    4. I consent to the taking and reproduction of any photographs in the course of this procedure for professional purposes.    5. I consent to the administration of such sedation as may be considered necessary or advisable by the physician responsible for this service, with the exception of ______________________________________________________    6. I have been informed by my doctor of the nature and purpose of this procedure sedation, possible alternative methods of treatment, risk involved and possible complications.    7. If I have a Do Not Resuscitate (DNR) order in place, the physician and I (or the individual authorized to consent on my behalf) will discuss and agree as to whether the Do Not Resuscitate (DNR) order will remain in effect or will be discontinued during the performance of the procedure and the applicable recovery period. If the Do Not Resuscitate (DNR) order is discontinued and is to be reinstated following the procedure/recovery period, the physician will determine when the applicable recovery period ends for purposes of reinstating the Do Not Resuscitate (DNR) order.    Signature of Patient:_______________________________________________    Signature of person authorized to consent for patient:  _______________________________________________________________    Relationship to patient:  ____________________________________________    Witness: _________________________________________ Date:___________     Physician Signature: _______________________________ Date:___________

## (undated) NOTE — LETTER
06/20/22    1619  [phone]           Dear Merary Cochran records indicate that you have outstanding lab work and/or testing that was ordered for you and has not yet been completed:  Lab Frequency Next Occurrence    A1C  Lipid  CMP  To provide you with the best possible care, please complete these orders at your earliest convenience. If you have recently completed these orders please disregard this letter. If the above orders are for Labs please call to schedule at [phone]. If you prefer to use [employer] for your labs please let us know so we can fax your orders. If the above orders are for Imaging or Procedures please call Central Scheduling at [phone]. If you have any questions please call the office at [phone].      Thank you,     [facility]

## (undated) NOTE — LETTER
17    Patient: Lennox Portillo  : 10/10/1975 Visit date: 2017    Dear  Dr. Vinay Drew MD,    Thank you for referring Lennox Bandar to my practice. Please find my assessment and plan below.                Assessment   Ventral hernia without obstru I have personally reviewed the CT scan and interpreted it myself. This patient has an epigastric region ventral incisional hernia. She has a periumbilical ventral incisional hernia. There are approximately 4 cm apart.   They both have incarcerated fat in CC: Darrol Seip, MD

## (undated) NOTE — ED AVS SNAPSHOT
Gustav Phoenix   MRN: [id number]    Department:  [address] [facility] [facility] in [place]   Date of Visit:  5/23/2018           Disclosure     Insurance plans vary and the physician(s) referred by the ER may not be covered by your plan.  Please contact tell this physician (or your personal doctor if your instructions are to return to your personal doctor) about any new or lasting problems. The primary care or specialist physician will see patients referred from the [facility] Emergency Department.  Fei Biggs

## (undated) NOTE — ED AVS SNAPSHOT
Kaylynn Burns   MRN: [id number]    Department:  [facility] Emergency Department   Date of Visit:  10/16/2017           Disclosure     Insurance plans vary and the physician(s) referred by the ER may not be covered by your plan.  Please contact your If you have been prescribed any medication(s), please fill your prescription right away and begin taking the medication(s) as directed    If the emergency physician has read X-rays, these will be re-interpreted by a radiologist.  If there is a significant

## (undated) NOTE — LETTER
ASTHMA ACTION PLAN for Chani Leon     : 10/10/1975     Date: 24  Doctor:  MAYA Jaramillo  Phone for doctor or clinic: [facility], [place] [address], [place] [address] [address] [address] [phone]           ACT Goal: 20 or greater    Call your provider if you require your rescue/quick reliever medication more than 2-3 times in a 24 hour period.    If you require your rescue inhaler/medication more than 2-3 times weekly, your asthma may not be under proper control and you should seek medical attention.    *Quick Relievers are Xopenex and Albuterol*    You can use the colors of a traffic light to help learn about your asthma medicines.  Year Round       1. Green - Go! % of Personal Best Peak Flow   Use controller medicine.   Breathing is good  No cough or wheeze  Can work and play Medicine How much to take When to take it    Medications       Steroid Inhalants Instructions     fluticasone propionate 44 MCG/ACT Inhalation Aerosol Inhale 1 puff into the lungs 2 (two) times daily.       Sympathomimetics Instructions     albuterol (2.5 MG/3ML) 0.083% Inhalation Nebu Soln Take 3 mL (2.5 mg total) by nebulization every 4 (four) hours as needed for Wheezing or Shortness of Breath.                    2. Yellow - Caution. 50-79% Personal Best Peak Flow  Use reliever medicine to keep an asthma attack from getting bad.   Cough  Quick Relievers  Wheezing  Tight Chest  Wake up at night Medicine How much to take When to take it    If symptoms are not improving in 24-48 hrs, call office for further instructions  Medications       Steroid Inhalants Instructions     fluticasone propionate 44 MCG/ACT Inhalation Aerosol Inhale 1 puff into the lungs 2 (two) times daily.       Sympathomimetics Instructions     albuterol (2.5 MG/3ML) 0.083% Inhalation Nebu Soln Take 3 mL (2.5 mg total) by nebulization every 4 (four) hours as needed for Wheezing or Shortness of Breath.                     3. Red - Stop! Danger! <50% Personal Best Peak Flow  Continue Controller Medications But ADD:   Medicine not helping  Breathing is hard and fast  Nose opens wide  Can't walk  Ribs show  Can't talk well Medicine How much to take When to take it    If your symptoms do not improve in ONE hour -  go to the emergency room or call 911 immediately! If symptoms improve, call office for appointment immediately.    Albuterol inhaler 2 puffs every 20 minutes for three treatments       Don't forget:  Rinse mouth after using inhaler  Use spacer for inhaler  Remember to get your Flu vaccine every fall!    [x] Asthma Action Plan reviewed with the caregiver and patient, and a copy of the plan was given to the patient/caregiver.   [] Asthma Action Plan reviewed with the caregiver and patient on the phone, and copy mailed to patient/caregiver or sent via [employer]     Signatures:   Provider  MAYA Jaramillo Patient  Chani QURESHI Carolyn Caretaker

## (undated) NOTE — LETTER
ASTHMA ACTION PLAN for Rebecca Rubin     : 10/10/1975     Date: 21  Doctor:  Ermelinda Walls MD  Phone for doctor or clinic: [address], [address] [phone]       20    ACT Goal: 2 (90 Base) MCG/ACT Inhalation Aero Soln    Inhale 2 puffs into the lungs every 4 (four) hours as needed for Wheezing.      albuterol sulfate (2.5 MG/3ML) 0.083% Inhalation Nebu Soln    Take 3 mL (2.5 mg total) by nebulization every 4 (four) hours as needed f

## (undated) NOTE — LETTER
19    Patient: Riaz Polk  : 10/10/1975 Visit date: 9/3/2019    Dear  Dr. Dorn Dandy, MD,    Thank you for referring Riaz Polk to my practice. Please find my assessment and plan below.         Assessment   Ventral hernia without obstruction or auscultation bilaterally. Her heart rate and rhythm are regular. Her abdomen is soft, nontender, nondistended, with active bowel sounds present. The patient has a large ventral hernia present above the umbilicus.   It does appear to be enlarged since pre Sincerely,       Parker Ruiz MD   CC: Annie Aquino.  Urszula Schilling MD

## (undated) NOTE — ED AVS SNAPSHOT
Jet Zarate   MRN: [id number]    Department:  [facility] Emergency Department   Date of Visit:  8/27/2019           Disclosure     Insurance plans vary and the physician(s) referred by the ER may not be covered by your plan.  Please contact your tell this physician (or your personal doctor if your instructions are to return to your personal doctor) about any new or lasting problems. The primary care or specialist physician will see patients referred from the [facility] Emergency Department.  Fei Biggs

## (undated) NOTE — LETTER
20    Patient: Tennille Le  : 10/10/1975 Visit date: 2020    Dear  Dr. Gwen Gusman MD,    Thank you for referring Tennille Le to my practice. Please find my assessment and plan below.         Assessment   Ventral hernia without obstruction or g Clinical exam of the abdomen reveals her to have 2 separate hernias. She has a hernia in the epigastrium that is greater than 12 cm in greatest dimension. It is slightly reducible, but also slightly incarcerated.   The fascial defect is probably only 2.5 the reduction, or lysis of adhesions, the patient will have the procedure terminated, the bowel fixed, and return to the operating room for another attempt. We absolutely cannot place mesh in the face of any bowel injury.   In the face of a recurrent ventr I did review an outside film from [facility] by reports only. It does not see her larger hernia and sac and contents. It mentions only a small umbilical hernia that is identified in my physical exam.  I need no further imaging at this point.              Clif Henderson

## (undated) NOTE — ED AVS SNAPSHOT
Fernando Mcfarland   MRN: [id number]    Department:  [facility] Emergency Department   Date of Visit:  3/13/2018           Disclosure     Insurance plans vary and the physician(s) referred by the ER may not be covered by your plan.  Please contact your tell this physician (or your personal doctor if your instructions are to return to your personal doctor) about any new or lasting problems. The primary care or specialist physician will see patients referred from the [facility] Emergency Department.  Mame Portillo

## (undated) NOTE — LETTER
03/19/22        [address] [place] [address],[facility] Floor [address]      Dear Sam Hatch records indicate that you have outstanding lab work and or testing that was ordered for you and has not yet been completed:  Orders Placed This Encounter      URINALYSIS, AUTO, W/O SCOPE      Urine Preg Test      COMP METABOLIC PANEL [12009] [Q]      LIPID PANEL [4540] [Q]      HGB A1C [496] [Q]    To provide you with the best possible care, please complete these orders at your earliest convenience. If you have recently completed these orders please disregard this letter. If you have any questions please call the office at Dept: [phone].      Thank you,       MAYA Hannah

## (undated) NOTE — LETTER
Chani Leon, :10/10/1975    CONSENT FOR PROCEDURE/SEDATION    1. I authorize the performance upon Chani Leon  the following: Endometrial Biopsy    2. I authorize Dr. Kailee Ellington MD (and whomever is designated as the doctor’s assistant), to perform the above-mentioned procedures.    3. If any unforeseen conditions arise during this procedure calling for additional  procedures, operations, or medications (including anesthesia and blood transfusion), I further request and authorize the doctor to do whatever he/she deems advisable in my interest.    4. I consent to the taking and reproduction of any photographs in the course of this procedure for professional purposes.    5. I consent to the administration of such sedation as may be considered necessary or advisable by the physician responsible for this service, with the exception of ______________________________________________________    6. I have been informed by my doctor of the nature and purpose of this procedure sedation, possible alternative methods of treatment, risk involved and possible complications.    7. If I have a Do Not Resuscitate (DNR) order in place, the physician and I (or the individual authorized to consent on my behalf) will discuss and agree as to whether the Do Not Resuscitate (DNR) order will remain in effect or will be discontinued during the performance of the procedure and the applicable recovery period. If the Do Not Resuscitate (DNR) order is discontinued and is to be reinstated following the procedure/recovery period, the physician will determine when the applicable recovery period ends for purposes of reinstating the Do Not Resuscitate (DNR) order.    Signature of Patient:_______________________________________________    Signature of person authorized to consent for patient:  _______________________________________________________________    Relationship to patient: ____________________________________________    Witness:  _________________________________________ Date:___________     Physician Signature: _______________________________ Date:___________

## (undated) NOTE — LETTER
[facility] CARE IN [place]  [address] [address]  Dept: [phone]  Dept Fax: [phone]      January 31, 2018    Patient: Akosua Johnston   Date of Visit: 1/31/2018       To Whom It May Concern:     Anne Fitzgerald was seen and treated in